# Patient Record
Sex: MALE | NOT HISPANIC OR LATINO | Employment: UNEMPLOYED | URBAN - METROPOLITAN AREA
[De-identification: names, ages, dates, MRNs, and addresses within clinical notes are randomized per-mention and may not be internally consistent; named-entity substitution may affect disease eponyms.]

---

## 2024-06-05 ENCOUNTER — HOSPITAL ENCOUNTER (EMERGENCY)
Facility: CLINIC | Age: 29
Discharge: PSYCHIATRIC OR SUBSTANCE USE FACILITY | End: 2024-06-07
Attending: EMERGENCY MEDICINE | Admitting: EMERGENCY MEDICINE
Payer: COMMERCIAL

## 2024-06-05 DIAGNOSIS — F31.12 BIPOLAR AFFECTIVE DISORDER, CURRENTLY MANIC, MODERATE (H): ICD-10-CM

## 2024-06-05 PROCEDURE — 99285 EMERGENCY DEPT VISIT HI MDM: CPT

## 2024-06-05 ASSESSMENT — COLUMBIA-SUICIDE SEVERITY RATING SCALE - C-SSRS
2. HAVE YOU ACTUALLY HAD ANY THOUGHTS OF KILLING YOURSELF IN THE PAST MONTH?: NO
6. HAVE YOU EVER DONE ANYTHING, STARTED TO DO ANYTHING, OR PREPARED TO DO ANYTHING TO END YOUR LIFE?: NO
1. IN THE PAST MONTH, HAVE YOU WISHED YOU WERE DEAD OR WISHED YOU COULD GO TO SLEEP AND NOT WAKE UP?: NO
1. IN THE PAST MONTH, HAVE YOU WISHED YOU WERE DEAD OR WISHED YOU COULD GO TO SLEEP AND NOT WAKE UP?: NO
6. HAVE YOU EVER DONE ANYTHING, STARTED TO DO ANYTHING, OR PREPARED TO DO ANYTHING TO END YOUR LIFE?: NO
2. HAVE YOU ACTUALLY HAD ANY THOUGHTS OF KILLING YOURSELF IN THE PAST MONTH?: NO

## 2024-06-06 ENCOUNTER — TELEPHONE (OUTPATIENT)
Dept: BEHAVIORAL HEALTH | Facility: CLINIC | Age: 29
End: 2024-06-06
Payer: COMMERCIAL

## 2024-06-06 PROBLEM — F90.9 ADHD (ATTENTION DEFICIT HYPERACTIVITY DISORDER): Status: ACTIVE | Noted: 2024-06-06

## 2024-06-06 PROBLEM — F31.9 BIPOLAR DISORDER (H): Status: ACTIVE | Noted: 2024-06-06

## 2024-06-06 PROCEDURE — 250N000013 HC RX MED GY IP 250 OP 250 PS 637: Performed by: EMERGENCY MEDICINE

## 2024-06-06 PROCEDURE — 99207 PR NO CHARGE LOS: CPT | Performed by: NURSE PRACTITIONER

## 2024-06-06 PROCEDURE — 250N000009 HC RX 250: Performed by: EMERGENCY MEDICINE

## 2024-06-06 PROCEDURE — 96372 THER/PROPH/DIAG INJ SC/IM: CPT | Performed by: EMERGENCY MEDICINE

## 2024-06-06 PROCEDURE — 250N000011 HC RX IP 250 OP 636: Mod: JZ | Performed by: EMERGENCY MEDICINE

## 2024-06-06 RX ORDER — BENZTROPINE MESYLATE 1 MG/1
1 TABLET ORAL 2 TIMES DAILY PRN
Status: DISCONTINUED | OUTPATIENT
Start: 2024-06-06 | End: 2024-06-07 | Stop reason: HOSPADM

## 2024-06-06 RX ORDER — OLANZAPINE 10 MG/1
10 TABLET, ORALLY DISINTEGRATING ORAL 2 TIMES DAILY PRN
Status: DISCONTINUED | OUTPATIENT
Start: 2024-06-06 | End: 2024-06-07

## 2024-06-06 RX ORDER — LISDEXAMFETAMINE DIMESYLATE 30 MG/1
30 CAPSULE ORAL EVERY MORNING
Status: ON HOLD | COMMUNITY
End: 2024-07-09

## 2024-06-06 RX ORDER — HYDROXYZINE HYDROCHLORIDE 50 MG/1
50 TABLET, FILM COATED ORAL 3 TIMES DAILY PRN
Status: ON HOLD | COMMUNITY
End: 2024-07-09

## 2024-06-06 RX ORDER — QUETIAPINE FUMARATE 50 MG/1
100 TABLET, FILM COATED ORAL 3 TIMES DAILY PRN
Status: DISCONTINUED | OUTPATIENT
Start: 2024-06-06 | End: 2024-06-07 | Stop reason: HOSPADM

## 2024-06-06 RX ORDER — OLANZAPINE 10 MG/1
10 TABLET, ORALLY DISINTEGRATING ORAL AT BEDTIME
Status: DISCONTINUED | OUTPATIENT
Start: 2024-06-06 | End: 2024-06-06

## 2024-06-06 RX ORDER — WATER 10 ML/10ML
INJECTION INTRAMUSCULAR; INTRAVENOUS; SUBCUTANEOUS
Status: COMPLETED
Start: 2024-06-06 | End: 2024-06-06

## 2024-06-06 RX ORDER — DIVALPROEX SODIUM 500 MG/1
1500 TABLET, DELAYED RELEASE ORAL AT BEDTIME
Status: ON HOLD | COMMUNITY
End: 2024-07-09

## 2024-06-06 RX ORDER — WATER 10 ML/10ML
INJECTION INTRAMUSCULAR; INTRAVENOUS; SUBCUTANEOUS
Status: DISCONTINUED
Start: 2024-06-06 | End: 2024-06-06 | Stop reason: HOSPADM

## 2024-06-06 RX ORDER — DEXTROAMPHETAMINE SACCHARATE, AMPHETAMINE ASPARTATE, DEXTROAMPHETAMINE SULFATE AND AMPHETAMINE SULFATE 5; 5; 5; 5 MG/1; MG/1; MG/1; MG/1
20 TABLET ORAL DAILY
Status: ON HOLD | COMMUNITY
End: 2024-07-09

## 2024-06-06 RX ORDER — BENZTROPINE MESYLATE 1 MG/1
1 TABLET ORAL 2 TIMES DAILY PRN
Status: ON HOLD | COMMUNITY
End: 2024-07-09

## 2024-06-06 RX ORDER — DIVALPROEX SODIUM 500 MG/1
1500 TABLET, DELAYED RELEASE ORAL AT BEDTIME
Status: DISCONTINUED | OUTPATIENT
Start: 2024-06-06 | End: 2024-06-07 | Stop reason: HOSPADM

## 2024-06-06 RX ORDER — OLANZAPINE 10 MG/1
10 TABLET, ORALLY DISINTEGRATING ORAL ONCE
Status: COMPLETED | OUTPATIENT
Start: 2024-06-06 | End: 2024-06-06

## 2024-06-06 RX ORDER — HYDROXYZINE HYDROCHLORIDE 25 MG/1
50 TABLET, FILM COATED ORAL 3 TIMES DAILY PRN
Status: DISCONTINUED | OUTPATIENT
Start: 2024-06-06 | End: 2024-06-07 | Stop reason: HOSPADM

## 2024-06-06 RX ORDER — DEXTROAMPHETAMINE SACCHARATE, AMPHETAMINE ASPARTATE, DEXTROAMPHETAMINE SULFATE AND AMPHETAMINE SULFATE 5; 5; 5; 5 MG/1; MG/1; MG/1; MG/1
20 TABLET ORAL DAILY
Status: DISCONTINUED | OUTPATIENT
Start: 2024-06-06 | End: 2024-06-06

## 2024-06-06 RX ORDER — RISPERIDONE 2 MG/1
2 TABLET ORAL AT BEDTIME
Status: ON HOLD | COMMUNITY
End: 2024-07-09

## 2024-06-06 RX ORDER — RISPERIDONE 2 MG/1
2 TABLET ORAL AT BEDTIME
Status: DISCONTINUED | OUTPATIENT
Start: 2024-06-06 | End: 2024-06-07

## 2024-06-06 RX ORDER — OLANZAPINE 10 MG/2ML
10 INJECTION, POWDER, FOR SOLUTION INTRAMUSCULAR DAILY PRN
Status: DISCONTINUED | OUTPATIENT
Start: 2024-06-06 | End: 2024-06-07

## 2024-06-06 RX ADMIN — RISPERIDONE 2 MG: 2 TABLET ORAL at 23:59

## 2024-06-06 RX ADMIN — HYDROXYZINE HYDROCHLORIDE 50 MG: 25 TABLET ORAL at 18:51

## 2024-06-06 RX ADMIN — OLANZAPINE 10 MG: 10 TABLET, ORALLY DISINTEGRATING ORAL at 00:20

## 2024-06-06 RX ADMIN — OLANZAPINE 10 MG: 10 TABLET, ORALLY DISINTEGRATING ORAL at 10:48

## 2024-06-06 RX ADMIN — WATER 10 ML: 1 INJECTION INTRAMUSCULAR; INTRAVENOUS; SUBCUTANEOUS at 13:13

## 2024-06-06 RX ADMIN — OLANZAPINE 10 MG: 10 INJECTION, POWDER, FOR SOLUTION INTRAMUSCULAR at 13:13

## 2024-06-06 RX ADMIN — QUETIAPINE FUMARATE 100 MG: 50 TABLET ORAL at 12:50

## 2024-06-06 RX ADMIN — HYDROXYZINE HYDROCHLORIDE 50 MG: 25 TABLET ORAL at 12:38

## 2024-06-06 RX ADMIN — DIVALPROEX SODIUM 1500 MG: 500 TABLET, DELAYED RELEASE ORAL at 23:59

## 2024-06-06 ASSESSMENT — COLUMBIA-SUICIDE SEVERITY RATING SCALE - C-SSRS
2. HAVE YOU ACTUALLY HAD ANY THOUGHTS OF KILLING YOURSELF?: NO
1. HAVE YOU WISHED YOU WERE DEAD OR WISHED YOU COULD GO TO SLEEP AND NOT WAKE UP?: NO

## 2024-06-06 NOTE — ED NOTES
"Pt now awake walking unit talking with pt in 2 continues to display manic pressured speech with poor behavioral control and poor boundaries with others and need to be continually reminded to remain appropriate.      When pt asked to go back to room pt did go back to room pt told \"you are fucking retarded faggot\"   "

## 2024-06-06 NOTE — ED PROVIDER NOTES
Care signed out to me by Dr. Graham.  Please see initial ED provider note regarding history present illness, physical exam, and medical decision making.  In brief patient is a 28-year-old male brought in from group home with ongoing psychosis.  Patient does have a underlying history of bipolar disorder and ADHD.  He is currently on Adderall, risperidone, and Vyvanse.  Patient also been prescribed Depakote in the past but it seems that he is maybe not compliant with this medication.  Given ongoing psychosis patient awaits a DEC evaluation.      1035 - Patient had a code 21 called, placed in restraints.Zyprexa IM administered.      Patient has had extremely labile moods and behavior throughout the late morning early afternoon.  I reordered the patient's home medications not continuing his Adderall or Vyvanse.  He has received as needed doses of Zyprexa and Seroquel.  Patient was placed in seclusion following removal of restraints as he was quite disorganized and interrupting other patient care within the emergency department.  DEC did briefly evaluate the patient and obtained collateral information.  Given patient's decompensation we will plan for inpatient psychiatric admission and stabilization.  They are currently working on a revocation of commitment and a court hold.  Patient has been placed on a 72-hour hold until court hold is available.  Care signed out to my colleague Dr. Pacheco pending available inpatient psychiatric bed        ICD-10-CM    1. Bipolar affective disorder, currently manic, moderate (H)  F31.12                Ferny Ibarra MD  06/06/24 2918

## 2024-06-06 NOTE — PHARMACY-ADMISSION MEDICATION HISTORY
Pharmacist Admission Medication History    Admission medication history is complete. The information provided in this note is only as accurate as the sources available at the time of the update.    Information Source(s): Caregiver and Facility (TCU/NH/) medication list/MAR via phone    Pertinent Information: Spoke to  and nurse that assists patient with medications (# [phone])(RN # [phone]). Patient was supposedly given Depakote last night, but dose was found in patient's pockets here. RN also noted patient has not been sleeping lately and were considering changing ADHD regimen but had not yet made changes.    Changes made to PTA medication list:  Added: All  Deleted: None  Changed: None    Allergies reviewed with patient and updates made in EHR: unable to assess    Medication History Completed By: Jerry Roche [facility] 6/6/2024 11:38 AM    PTA Med List   Medication Sig Last Dose    amphetamine-dextroamphetamine (ADDERALL) 20 MG tablet Take 20 mg by mouth daily Takes in afternoon 6/5/2024 at 1400    benztropine (COGENTIN) 1 MG tablet Take 1 mg by mouth 2 times daily as needed for extrapyramidal symptoms (EPS) Past Month at PRN    divalproex sodium delayed-release (DEPAKOTE) 500 MG DR tablet Take 1,500 mg by mouth at bedtime 6/4/2024 at HS    hydrOXYzine HCl (ATARAX) 50 MG tablet Take 50 mg by mouth 3 times daily as needed for anxiety Past Week at PRN    lisdexamfetamine (VYVANSE) 30 MG capsule Take 30 mg by mouth every morning 6/5/2024 at AM    risperiDONE (RISPERDAL) 2 MG tablet Take 2 mg by mouth at bedtime 6/5/2024 at HS

## 2024-06-06 NOTE — CONSULTS
"Diagnostic Evaluation Consultation  Crisis Assessment    Patient Name: Venancio Edgar  Age:  28 year old  Legal Sex: male  Gender Identity: male  Pronouns:   Race: Data Unavailable  Ethnicity: Data Unavailable  Language: Data Unavailable      Patient was assessed: In person      Patient location: [facility] EMERGENCY DEPT                             [facility]    Referral Data and Chief Complaint  Venancio Edgar presents to the ED via EMS. Patient is presenting to the ED for the following concerns: Verbal agitation, Significant behavioral change, Paranoia, Physical aggression.   Factors that make the mental health crisis life threatening or complex are:  Pt presents to the ED via EMS after becoming physically and verbally agitated at his group home towards staff and a community member while outside. When meeting with Writer, Pt presented with a labile mood, quickly changing from being cooperative to Writer to yelling at Writer, stating he will not participate, questioning Writer, laughing out loud while talking about his medications with volume steadily increasing. Writer attempted to collect information of what has been going on with Pt, yet Pt denied any concerns and then started stating Writer was asking \"the wrong questions\" and is trying to keep Pt in the hospital. Writer discussed how he was trying to let Pt share his perspective and understanding of the situation. Pt continued to be verbally escalated, stating staff was \"retarded\", not asking the right questions, and not needing to speak with a therapist. Writer terminated the assessment given Pt's elevated and labile mood and Pt presenting as experiencing disorganized thought as evidenced by answering and responding to Writer's questions with answers that did not pertain to the question asked.      Informed Consent and Assessment Methods  Explained the crisis assessment process, including applicable information disclosures and limits to " confidentiality, assessed understanding of the process, and obtained consent to proceed with the assessment.  Assessment methods included conducting a formal interview with patient, review of medical records, collaboration with medical staff, and obtaining relevant collateral information from family and community providers when available.  : done     Patient response to interventions: unacceptance expressed  Coping skills were attempted to reduce the crisis:  It was reported Pt called the police several times on 6/5/24 due to concern of Pt's brother's wellbeing     History of the Crisis   It is reported Pt was calm during the day and later in the evening became upset while in his room playing video games. It is also reported Pt then started calling 911 to inquire about his brother's safety. It is reported Pt was unable to be redirected by staff in the group home. It is reported Pt started to be verbally escalated with staff and displaying physical aggression towards the staff. It is reported staff went outside to get space from Pt and it is reported Pt followed staff outside. While outside, it is reported Pt then started to yelling and getting physically close to an individual who happened to be near by. It is reported by staff that Pt has not been sleeping, increase in agitation, and concern of if Pt is taking his medication. Chart review indicates Pt has identified not taking medication as prescribed and security search resulted in finding Depakote in his pocket.    Brief Psychosocial History  Family:  Single, Children no  Support System:  Facility resident(s)/Staff, Parent(s)  Employment Status:  unemployed  Source of Income:  unable to assess  Financial Environmental Concerns:  none  Current Hobbies:  television/movies/videos  Barriers in Personal Life:  mental health concerns, behavioral concerns    Significant Clinical History  Current Anxiety Symptoms:     Current Depression/Trauma:  irritable  Current  Somatic Symptoms:     Current Psychosis/Thought Disturbance:  agitation, hostile/aggressive, elated mood, anger  Current Eating Symptoms:   (unable to assess given Pt's presentation.)  Chemical Use History:  Alcohol:  (unable to assess given Pt's presentation.)  Benzodiazepines:  (unable to assess given Pt's presentation.)  Opiates:  (unable to assess given Pt's presentation.)  Cocaine:  (unable to assess given Pt's presentation.)  Marijuana:  (unable to assess given Pt's presentation.)  Other Use:  (unable to assess given Pt's presentation.)   Past diagnosis:  Bipolar Disorder, ADHD, Other (chart review indicates possible schizophrenia)  Family history:  Bipolar Disorder, Schizophrenia  Past treatment:  Civil Commitment, Inpatient Hospitalization, Supportive Living Environment (group home, prison house, etc), Psychiatric Medication Management, Individual therapy  Details of most recent treatment:  It is reported by Pt's group home staff that Pt has appointments for therapy and medication management, yet Pt declines to attend  Other relevant history:  Pt is on a stay of commitment through [facility].       Collateral Information  Is there collateral information: Yes     Collateral information name, relationship, phone number:  [facility] ,    What happened today: He shared he was told by staff that Pt was getting upset over his video game and then started yelling and became physically aggressive towards staff and they went outside and Pt followed them.     What is different about patient's functioning: He shared Pt has not been sleeping, increased in agitation and quick to anger, and may not be taking medication.     Concern about alcohol/drug use:  no    What do you think the patient needs:  stay in hospital until stable.    Has patient made comments about wanting to kill themselves/others: yes    If d/c is recommended, can they take part in safety/aftercare planning:  yes (Pt is allowed  back to the group home when stable.)    Additional collateral information: Writer spoke with Pt's mother, Yolanda ([phone]). She shared concern that Pt has not been taking his medication and appears to be decompensating. She shared Pt has been hospitalized other times for similar presentation and needed to be stabilized. She shared request for his commitment PD to be revoked in order to remain in the hospital and to get better. She shared Pt's family has history of schizophrenia, schizoaffective bipolar type and she is used to seeing these symptoms and knowing when someone is decompensating.    Writer spoke with Chelsea ([facility] , [phone]). Writer provided update on Pt's presentation and report from assessment and staff report. She shared Pt may need to remain in the hospital for additional care. Writer reiterated concern of medication noncompliance and decompensation, being verbally and physically agitated with group home staff, a community member, and staff here at the ED. Chelsea stated she will pursue revocation of provisional discharge. Writer provided fax number for court order once obtained. She reports she will file later today.      Risk Assessment  [place] Suicide Severity Rating Scale Full Clinical Version:  Suicidal Ideation  Q6 Suicide Behavior (Lifetime): no     Suicidal Behavior (Lifetime)  Actual Attempt (Lifetime):  (Unable to assess given Pt's presentation and disorganized thought.)  Has subject engaged in non-suicidal self-injurious behavior? (Lifetime):  (Unable to assess given Pt's presentation and disorganized thought.)  Interrupted Attempts (Lifetime):  (Unable to assess given Pt's presentation and disorganized thought.)  Aborted or Self-Interrupted Attempt (Lifetime):  (Unable to assess given Pt's presentation and disorganized thought.)  Preparatory Acts or Behavior (Lifetime):  (Unable to assess given Pt's presentation and disorganized thought.)    [place] Suicide  Severity Rating Scale Recent:   Suicidal Ideation (Recent)  Q1 Wished to be Dead (Past Month): no  Q2 Suicidal Thoughts (Past Month): no  Level of Risk per Screen: no risks indicated          Environmental or Psychosocial Events: neither working nor attending school, impulsivity/recklessness, unemployment/underemployment, challenging interpersonal relationships  Protective Factors: Protective Factors: lives in a responsibly safe and stable environment, supportive ongoing medical and mental health care relationships    Does the patient have thoughts of harming others? Feels Like Hurting Others: other (see comments)  Previous Attempt to Hurt Others: yes  Current presentation: Irritable  Violence Threats in Past 6 Months: It is reported Pt was making threats toward staff while at the group home.  Current Violence Plan or Thoughts: Unable to assess given Pt's presentation.  Is the patient engaging in sexually inappropriate behavior?: no  Duty to warn initiated: no  Duty to warn details: Staff is aware of Pt's actions at the group home.    Is the patient engaging in sexually inappropriate behavior?  no        Mental Status Exam   Affect: Labile  Appearance: Appropriate  Attention Span/Concentration: Attentive, Other (please comment) (would respond to Writer when verbally prompted)  Eye Contact: Intense    Fund of Knowledge: Appropriate   Language /Speech Content: Fluent  Language /Speech Volume: Loud  Language /Speech Rate/Productions: Normal  Recent Memory: Variable  Remote Memory: Variable  Mood: Irritable  Orientation to Person: Yes   Orientation to Place: Yes  Orientation to Time of Day: Answer (please comment) (unable to assess given Pt's presentation.)  Orientation to Date: Answer (please comment) (unable to assess given Pt's presentation.)     Situation (Do they understand why they are here?): Answer (please comment) (unable to assess given Pt's presentation.)  Psychomotor Behavior: Normal  Thought Content:  Paranoia  Thought Form: Paranoia, Other (please comment) (Disorganized, would not respond to Writer's prompts with coherent answers)        Medication  Psychotropic medications:   Medication Orders - Psychiatric (From admission, onward)      Start     Dose/Rate Route Frequency Ordered Stop    06/06/24 2200  risperiDONE (risperDAL) tablet 2 mg         2 mg Oral AT BEDTIME 06/06/24 1148      06/06/24 1239  OLANZapine zydis (zyPREXA) ODT tab 10 mg         10 mg Oral 2 TIMES DAILY PRN 06/06/24 1239      06/06/24 1238  QUEtiapine (SEROquel) tablet 100 mg         100 mg Oral 3 TIMES DAILY PRN 06/06/24 1238      06/06/24 1148  hydrOXYzine HCl (ATARAX) tablet 50 mg         50 mg Oral 3 TIMES DAILY PRN 06/06/24 1148      06/06/24 1032  OLANZapine (zyPREXA) injection 10 mg         10 mg Intramuscular DAILY PRN 06/06/24 1032               Current Care Team  No care team member to display    Diagnosis  Patient Active Problem List   Diagnosis Code    Bipolar disorder (H) F31.9    ADHD (attention deficit hyperactivity disorder) F90.9       Primary Problem This Admission  Active Hospital Problems    Bipolar disorder (H)      ADHD (attention deficit hyperactivity disorder)        Clinical Summary and Substantiation of Recommendations     It is the recommendation of this clinician that the patient be admitted to inpatient mental health care for further treatment, stabilization and safety. Attempts at managing mental health symptoms and maintaining safety at a lower level of care have been unsuccessful. Patient s current mental health symptoms are requiring a secure setting due to: pt is displaying symptoms of psychosis that are impairing ability to function safely in the community.      Severe psychiatric, behavioral or other comorbid conditions are appropriate for management at inpatient mental health as indicated by at least one of the following: Psychiatric Symptoms  Severe dysfunction in daily living is present as indicated by  at least one of the following: Extreme deterioration in social interactions, Complete inability to maintain any appropriate aspect of personal responsibility in any adult roles  Situation and expectations are appropriate for inpatient care: Patient is unwilling to participate in treatment voluntarily and requires treatment, Need for physical restraint, seclusion, or other involuntary treatment intervention is present, Around-the-clock medical and nursing care to address symptoms and initiate intervention is required  Inpatient mental health services are necessary to meet patient needs and at least one of the following: Specific condition related to admission diagnosis is present and judged likely to deteriorate in absence of treatment at proposed level of care, Specific condition related to admission diagnosis is present and judged likely to further improve at proposed level of care    Writer spoke with Chelsea ([facility] , [phone]). Writer provided update on Pt's presentation and report from assessment and staff report. Chelsea stated she will pursue revocation of provisional discharge. Writer provided fax number for court order once obtained. She reports she will file later today.       Patient coping skills attempted to reduce the crisis:  It was reported Pt called the police several times on 6/5/24 due to concern of Pt's brother's wellbeing    Disposition  Recommended disposition: Inpatient Mental Health        Reviewed case and recommendations with attending provider. Attending Name: Dr. Ibarra       Attending concurs with disposition: yes       Patient and/or validated legal guardian concurs with disposition:   no (Pt wants to discharge)       Final disposition:  inpatient mental health    Legal status on admission: 72 Hour Hold, Commitment    Assessment Details   Total duration spent with the patient: 10 min     CPT code(s) utilized: Non-Billable    TRI Ochoa, Psychotherapist  DEC -  Triage & Transition Services  Callback: [phone]

## 2024-06-06 NOTE — ED NOTES
At 1440, seclusion was discontinued, as pt is calm and resting with eyes closed, breathing even and unlabored. As pt had previously disrobed, pt is covered with a blanket and scrubs have been made available when pt is agreeable to putting them on.

## 2024-06-06 NOTE — ED PROVIDER NOTES
Emergency Department Note      History of Present Illness     Chief Complaint  Psychiatric Evaluation    HPI  Venancio Edgar is a 28 year old male brought by EMS from his group home.  He has a diagnosis of bipolar disorder and ADHD.  He says that he is on Adderall, risperidone, and Vyvanse.  He also says that he has had Depakote prescribed in the past although he does not like to take it.  The patient was concerned about the welfare of his brother today although it is not entirely clear why.  He called police who came out to his residence.  He was reportedly behaving erratically and was brought into the ED.  The patient does seem to jump from topic to topic and is quite animated.  He makes Zoroastrian statements.  He was in an argument with another individual out of the street tonight.    Independent Historian  EMS provide history with the patient's erratic behavior prehospital.    Past Medical History   Medical History and Problem List  Bipolar disorder  ADHD    Medications  Vyvanse  Depakote  Risperidone  Adderall      Surgical History   No past surgical history on file.  Physical Exam   Patient Vitals for the past 24 hrs:   BP Temp Pulse Resp SpO2   06/05/24 2346 (!) 144/90 97.8  F (36.6  C) 83 16 98 %     Physical Exam  Constitutional:       General: He is not in acute distress.     Appearance: Normal appearance. He is not toxic-appearing.   HENT:      Head: Atraumatic.   Eyes:      General: No scleral icterus.     Conjunctiva/sclera: Conjunctivae normal.   Cardiovascular:      Rate and Rhythm: Normal rate.      Heart sounds: Normal heart sounds.   Pulmonary:      Effort: Pulmonary effort is normal. No respiratory distress.      Breath sounds: Normal breath sounds.   Abdominal:      Palpations: Abdomen is soft.      Tenderness: There is no abdominal tenderness.   Musculoskeletal:         General: No deformity.      Cervical back: Neck supple.   Skin:     General: Skin is warm.      Capillary Refill: Capillary  refill takes less than 2 seconds.   Neurological:      General: No focal deficit present.      Mental Status: He is alert and oriented to person, place, and time.   Psychiatric:      Comments: The patient is quite animated.  He jumps from topic to topic.         ED Course    Medications Administered  Medications   OLANZapine zydis (zyPREXA) ODT tab 10 mg (10 mg Oral $Given 6/6/24 0020)     Discussion of Management  DEC    Social Determinants of Health adding to complexity of care  Lives in a group home    ED Course     Medical Decision Making / Diagnosis     VIDHI Edgar is a 28 year old male with history of bipolar disorder.  He arrives very animated and appears somewhat manic.  However, he has not required restraints.  He did agree to take a dose of Zyprexa.  We are waiting on DEC evaluation.    ICD-10 Codes:    ICD-10-CM    1. Bipolar affective disorder, currently manic, moderate (H)  F31.12             Alex Clarke MD  06/06/24 5148

## 2024-06-06 NOTE — ED NOTES
Patient yelling at staff after having a conversation with DEC, patient was at the window with pressured speech and not listening to multiple staff members. Patient then turned and focused on another patient that was in their room, multiple staff assisting to redirect the patient and not listening to return and stay out of the other patients (BH3)'s room. Staff reported that the patient threw himself to the ground and code 21 was called by writer. Restraints were placed in the room by preceptee and room cleared of objects.

## 2024-06-06 NOTE — ED NOTES
"Pt awake independently ambulated to the BR and back given meal tray writer asked about getting blood work and other labs pt replied yelling \"no dont ask me again!\"   "

## 2024-06-06 NOTE — CONSULTS
DEC Consult Order placed. DEC assessment completed by TRI Wells on 6/6/24 at 10am. Consult acknowledged and completed.     DOLORES MCKEON

## 2024-06-06 NOTE — ED NOTES
IP MH Referral Acuity Rating Score (RARS)    LMHP complete at referral to IP MH, with DEC; and, daily while awaiting IP MH placement. Call score to PPS.  CRITERIA SCORING   New 72 HH and Involuntary for IP MH (not adolescent) 1/1   Boarding over 24 hours 0/1   Vulnerable adult at least 55+ with multiple co morbidities; or, Patient age 11 or under 0/1   Suicide ideation without relief of precipitating factors 0/1   Current plan for suicide 0/1   Current plan for homicide 0/1   Imminent risk or actual attempt to seriously harm another without relief of factors precipitating the attempt 0/1   Severe dysfunction in daily living (ex: complete neglect for self care, extreme disruption in vegetative function, extreme deterioration in social interactions) 1/1   Recent (last 2 weeks) or current physical aggression in the ED 1/1   Restraints or seclusion episode in ED 1/1   Verbal aggression, agitation, yelling, etc., while in the ED 1/1   Active psychosis with psychomotor agitation or catatonia 1/1   Need for constant or near constant redirection (from leaving, from others, etc).  1/1   Intrusive or disruptive behaviors 1/1   TOTAL Acuity Total Score: 8

## 2024-06-06 NOTE — ED NOTES
Around 1230, pt began to get more restless and anxious, upset about being on a hold and missing a class. RN offered oral hydroxyzine. Pt took medication. Unfortunately, pt continued to get more agitated, and began screaming at staff and was not redirectable. RN notified Dr. Ibarra. Per MD, RN to administer oral Seroquel and trial seclusion. Pt took Seroquel, but began to scream and throw water. Pt threw himself back and slid down against the wall. No injury was sustained. Bed frame removed and mattress placed on the floor. Seclusion initiated. Per MD, RN to administer IM zyprexa, if pt does not deescalate and is a harm to self or others. Pt continued to scream, removed clothing, and began to throw mattress and self around room and on floor. RN was concerned for the potential of pt harming himself. RN administered IM zyprexa with security assistance. Pt is now on mattress, resting with eyes closed, breathing even and unlabored. RN updated Dr. Ibarra on situation.

## 2024-06-06 NOTE — ED TRIAGE NOTES
Pt with hx of schizophrenia brought in by ems from group [facility] - paranoid, flight of ideas and delusional. Pt placed on hold as patient was making threats against group home staff and throwing punches.

## 2024-06-06 NOTE — ED NOTES
Assumed care of patient. Patient resting in bed w/ regular respirations, repositions independently.

## 2024-06-06 NOTE — ED NOTES
Pt continues to rest on mattress in room Pt even chest rise and fall breathing even easy and unlabored   Pt independently adjusting self in bed     Family (liz) called given update and states that if patient is to be placed IP that he had a good experience and outcomes when admitted to [facility]

## 2024-06-06 NOTE — ED NOTES
Contacts:    Yolanda (mom): [phone]    Darline Romo ( for senior living): [phone]    Bree (group home ): [phone]    Chelsea Brennan ([facility] ): [phone]    Christi (member of case management team): [phone]

## 2024-06-06 NOTE — ED NOTES
Around 1020, pt was getting restless and argumentative, upset about being on a hold and not being able to leave. RN and security attempted to review care plan, verbally deescalate pt, and redirect to room. Pt went to another pt's room and started interacting with pt. RN requested that pt leave other pt's room to allow for privacy. Despite multiple attempts, pt became more agitated, and continued to refuse. Security officers and code 21 was called. Pt postured at security, and while pt was being directed back to his room, pt laid on the floor and refused to stand up. Pt did not sustain any injuries from laying on the ground. Security brought pt to room and pt was placed in 5-point restraints at 1035. All extremities CMS are intact. Dr. Ibarra was at bedside to assess pt. Per MD RN to order and administered 10 mg IM zyprexa. RN offered IM zyprexa to pt, but pt requested 10 mg oral zyprexa, alternatively. RN administered to pt (see MAR). 1:1 sitter started and continuous video monitoring continued. At 1200, pt was calm and able to verbalize a safety plan to RN and security. All restraints removed. CMS remains intact. Pt provided water and food.

## 2024-06-06 NOTE — ED NOTES
"Pt given PRN medication for agitation (see MAR). Pt continues to yell out and whale out in room. Then comes to staff and says \"you are all a bunch of dumb retards\"   "
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
## 2024-06-06 NOTE — TELEPHONE ENCOUNTER
Pt has been in seclusion/restraints x2 today. Not appropriate for low acuity placement at this time. Labs have not been collected.     R: [place] [facility] [place]patient [place] Call Log 6/6/24 @5:15 pm:    Intake has called facilities that have not updated the bed status within the last 12 hours.                               [facility] is at capacity            [facility] is posting 10 beds. [phone] per call @4:45 p, only 2 avail and they are reviewing   [facility] is posting 0 beds. Negative covid required              [facility] is posting 0 beds. Neg covid. No high school/Elisa-psych. [phone]   [facility] is posting 0 beds. [phone]   [facility] is posting 0 beds. [phone]    [facility] is posting 1 bed. Ages 18-28. Negative covid. [phone]   [facility] is posting 0 beds. [phone]   [facility] is posting 0 beds [phone]       [facility] is posting 7 beds. LOW acuity ONLY. Mixed unit 12+. Negative covid- [phone]   [facility] has 2 beds posted. No aggression. Negative Covid. Low acuity    [facility] is posting 0 beds. Negative covid. [phone]     [facility] is posting 4 beds. Low acuity only. Neg covid.  [phone]    [facility] is posting 0 beds. Low acuity. No current aggression.     [facility] is posting 3 beds available. Negative covid.  [phone].       [facility] is posting 0 beds. Low acuity. 72 HH hold preferred. Negative covid required. [phone]    [facility] is posting 3 beds. Low acuity only. Neg covid.  [phone]    [facility] [place] is posting 3 beds.  Negative covid required.   Vol only, No history of aggression, violence, or assault. No sexual offenders. No 72 HH holds. [phone]    [facility] is posting 3 beds. Negative covid required.  (Must have the  cognitive ability to do programming. No aggressive or violent behavior or recent HX in the last 2 yrs. MH must be primary.) Always low acuity. [phone]    [facility] has 0 beds posted. Negative covid required.  Low acuity only. Violence and aggression capped.  [phone]   [facility] is posting 4 beds. Low acuity, Negative covid required. [phone]   [facility] is posting 1 bed. Negative covid required.  [phone]   [facility] is posting 0 beds. Negative covid. LOW acuity. (No lines, drains, or tubes, oxygen, CPAP, IV, etc.) Must Have a Ride Home. [phone]   [facility] is posting 5 beds. Negative covid.[phone] (No. lines, drains, or tubes, oxygen, CPAP, IV, etc.).      Pt remains on the worklist pending appropriate bed availability.

## 2024-06-06 NOTE — CONSULTS
Psychiatry consult request acknowledged. Spoke with RN who indicates patient is not appropriate for interview at this time. He had another episode of agitation around 1230 and received quetiapine and olanzapine. He is currently asleep and it was decided that it would be best to allow him to continue sleeping.     Home medicines, including Depakote DR 1500 mg at bedtime and risperidone 2 mg at bedtime were previously reordered. Agree with continuing these medicines for now, while holding his psychostimulants.    Patient remains on a 72 hour hold and  has indicated that they will be filing a revocation of his provisional discharge. Would continue 72 hour hold until court hold is active.     Will plan to follow-up with patient tomorrow morning    Last Watkins, THANIA-BC, APRN, CNP  Consult/Liaison Psychiatry  [facility]  Provider can be paged via [facility] Paging/Directory  If I am unavailable, please contact [facility] at [phone] to reach the on-call provider.

## 2024-06-06 NOTE — ED NOTES
Bed: [facility]  Expected date:   Expected time:   Means of arrival:   Comments:  [facility], requesting security

## 2024-06-06 NOTE — ED NOTES
Patient's group home address is: [address].  Patient has lived here for 6-7 months, per EMS.     Contact for collateral is: Sita Giron, [phone]  Writer attempted however was unable to connect with  staff to gather collateral information for DEC assessment.     Andry Sommers on 6/6/2024 at 3:06 AM

## 2024-06-07 ENCOUNTER — TELEPHONE (OUTPATIENT)
Dept: BEHAVIORAL HEALTH | Facility: CLINIC | Age: 29
End: 2024-06-07
Payer: COMMERCIAL

## 2024-06-07 ENCOUNTER — HOSPITAL ENCOUNTER (INPATIENT)
Facility: CLINIC | Age: 29
LOS: 33 days | Discharge: NURSING, REHAB OR LONG-TERM CARE | DRG: 885 | End: 2024-07-10
Attending: PSYCHIATRY & NEUROLOGY | Admitting: PSYCHIATRY & NEUROLOGY
Payer: COMMERCIAL

## 2024-06-07 VITALS
SYSTOLIC BLOOD PRESSURE: 120 MMHG | TEMPERATURE: 97.3 F | RESPIRATION RATE: 18 BRPM | DIASTOLIC BLOOD PRESSURE: 76 MMHG | OXYGEN SATURATION: 96 % | HEART RATE: 71 BPM

## 2024-06-07 DIAGNOSIS — F90.2 ATTENTION DEFICIT HYPERACTIVITY DISORDER (ADHD), COMBINED TYPE: ICD-10-CM

## 2024-06-07 DIAGNOSIS — F31.10 BIPOLAR I DISORDER WITH MANIA (H): Primary | ICD-10-CM

## 2024-06-07 PROCEDURE — 99284 EMERGENCY DEPT VISIT MOD MDM: CPT | Performed by: NURSE PRACTITIONER

## 2024-06-07 PROCEDURE — 250N000013 HC RX MED GY IP 250 OP 250 PS 637: Performed by: PSYCHIATRY & NEUROLOGY

## 2024-06-07 PROCEDURE — 250N000013 HC RX MED GY IP 250 OP 250 PS 637: Performed by: EMERGENCY MEDICINE

## 2024-06-07 PROCEDURE — 124N000002 HC R&B MH UMMC

## 2024-06-07 PROCEDURE — 250N000013 HC RX MED GY IP 250 OP 250 PS 637: Performed by: NURSE PRACTITIONER

## 2024-06-07 RX ORDER — LORAZEPAM 2 MG/ML
1 INJECTION INTRAMUSCULAR EVERY 6 HOURS PRN
Status: DISCONTINUED | OUTPATIENT
Start: 2024-06-07 | End: 2024-06-07 | Stop reason: HOSPADM

## 2024-06-07 RX ORDER — LORAZEPAM 1 MG/1
1 TABLET ORAL EVERY 6 HOURS PRN
Status: DISCONTINUED | OUTPATIENT
Start: 2024-06-07 | End: 2024-06-07 | Stop reason: HOSPADM

## 2024-06-07 RX ORDER — BENZTROPINE MESYLATE 1 MG/1
1 TABLET ORAL 2 TIMES DAILY PRN
Status: DISCONTINUED | OUTPATIENT
Start: 2024-06-07 | End: 2024-07-10 | Stop reason: HOSPADM

## 2024-06-07 RX ORDER — HALOPERIDOL 5 MG/ML
5 INJECTION INTRAMUSCULAR EVERY 6 HOURS PRN
Status: DISCONTINUED | OUTPATIENT
Start: 2024-06-07 | End: 2024-06-07 | Stop reason: HOSPADM

## 2024-06-07 RX ORDER — OLANZAPINE 10 MG/2ML
10 INJECTION, POWDER, FOR SOLUTION INTRAMUSCULAR 3 TIMES DAILY PRN
Status: DISCONTINUED | OUTPATIENT
Start: 2024-06-07 | End: 2024-06-11

## 2024-06-07 RX ORDER — HYDROXYZINE HYDROCHLORIDE 25 MG/1
25 TABLET, FILM COATED ORAL EVERY 4 HOURS PRN
Status: DISCONTINUED | OUTPATIENT
Start: 2024-06-07 | End: 2024-06-11

## 2024-06-07 RX ORDER — RISPERIDONE 3 MG/1
3 TABLET ORAL AT BEDTIME
Status: DISCONTINUED | OUTPATIENT
Start: 2024-06-07 | End: 2024-06-07 | Stop reason: HOSPADM

## 2024-06-07 RX ORDER — HALOPERIDOL 5 MG/1
5 TABLET ORAL EVERY 6 HOURS PRN
Status: DISCONTINUED | OUTPATIENT
Start: 2024-06-07 | End: 2024-06-07 | Stop reason: HOSPADM

## 2024-06-07 RX ORDER — HYDROXYZINE HYDROCHLORIDE 50 MG/1
50 TABLET, FILM COATED ORAL 3 TIMES DAILY PRN
Status: DISCONTINUED | OUTPATIENT
Start: 2024-06-07 | End: 2024-07-10 | Stop reason: HOSPADM

## 2024-06-07 RX ORDER — RISPERIDONE 2 MG/1
2 TABLET ORAL AT BEDTIME
Status: DISCONTINUED | OUTPATIENT
Start: 2024-06-07 | End: 2024-06-10

## 2024-06-07 RX ORDER — OLANZAPINE 10 MG/1
10 TABLET ORAL 3 TIMES DAILY PRN
Status: DISCONTINUED | OUTPATIENT
Start: 2024-06-07 | End: 2024-06-11

## 2024-06-07 RX ORDER — DIVALPROEX SODIUM 500 MG/1
1500 TABLET, DELAYED RELEASE ORAL AT BEDTIME
Status: DISCONTINUED | OUTPATIENT
Start: 2024-06-07 | End: 2024-06-12

## 2024-06-07 RX ORDER — MAGNESIUM HYDROXIDE/ALUMINUM HYDROXICE/SIMETHICONE 120; 1200; 1200 MG/30ML; MG/30ML; MG/30ML
30 SUSPENSION ORAL EVERY 4 HOURS PRN
Status: DISCONTINUED | OUTPATIENT
Start: 2024-06-07 | End: 2024-07-10 | Stop reason: HOSPADM

## 2024-06-07 RX ORDER — AMOXICILLIN 250 MG
1 CAPSULE ORAL 2 TIMES DAILY PRN
Status: DISCONTINUED | OUTPATIENT
Start: 2024-06-07 | End: 2024-07-10 | Stop reason: HOSPADM

## 2024-06-07 RX ORDER — LANOLIN ALCOHOL/MO/W.PET/CERES
3 CREAM (GRAM) TOPICAL
Status: DISCONTINUED | OUTPATIENT
Start: 2024-06-07 | End: 2024-07-10 | Stop reason: HOSPADM

## 2024-06-07 RX ORDER — ACETAMINOPHEN 325 MG/1
650 TABLET ORAL EVERY 4 HOURS PRN
Status: DISCONTINUED | OUTPATIENT
Start: 2024-06-07 | End: 2024-07-10 | Stop reason: HOSPADM

## 2024-06-07 RX ADMIN — OLANZAPINE 10 MG: 10 TABLET, ORALLY DISINTEGRATING ORAL at 11:10

## 2024-06-07 RX ADMIN — RISPERIDONE 2 MG: 2 TABLET ORAL at 21:22

## 2024-06-07 RX ADMIN — NICOTINE POLACRILEX 2 MG: 2 GUM, CHEWING ORAL at 19:49

## 2024-06-07 RX ADMIN — NICOTINE POLACRILEX 2 MG: 2 GUM, CHEWING ORAL at 21:01

## 2024-06-07 RX ADMIN — DIVALPROEX SODIUM 1500 MG: 500 TABLET, DELAYED RELEASE ORAL at 21:22

## 2024-06-07 RX ADMIN — LORAZEPAM 1 MG: 1 TABLET ORAL at 12:01

## 2024-06-07 RX ADMIN — HYDROXYZINE HYDROCHLORIDE 50 MG: 25 TABLET ORAL at 06:35

## 2024-06-07 RX ADMIN — NICOTINE POLACRILEX 2 MG: 2 GUM, CHEWING ORAL at 12:26

## 2024-06-07 ASSESSMENT — ACTIVITIES OF DAILY LIVING (ADL)
ADLS_ACUITY_SCORE: 35
ADLS_ACUITY_SCORE: 45
ADLS_ACUITY_SCORE: 35
ADLS_ACUITY_SCORE: 29
ADLS_ACUITY_SCORE: 35
ADLS_ACUITY_SCORE: 29
ADLS_ACUITY_SCORE: 35

## 2024-06-07 NOTE — ED NOTES
"Pt screaming and yelling at another patient in common area.  Pt escorted to room.  Writer discussed with pt need for appropriate behavior.  Pt verbalized understanding.  Pt now watching television.  Writer requested pt allow blood work to be drawn.  Pt declined blood work at this time.  Writer will attempt again later in the shift.  Writer advised pt he will have a medication available to him later in the evening in order to improve his emotional lability.  Pt stated, \"okay.\"  Pt given water and is now laying in cart on his side resting.  "

## 2024-06-07 NOTE — ED PROVIDER NOTES
No issues during my care.  Patient accepted for transfer to inpatient mental health bed.    Luciano Baxter, DO Baxter, Luciano Azul,   06/07/24 1553

## 2024-06-07 NOTE — TELEPHONE ENCOUNTER
R: [facility] Access Inpatient Bed Call Log 6/7/2024 8:06:42 AM    Intake has called facilities that have not updated their bed status within the last 12 hours.    ADULTS:  *[facility] is posting 0 beds.              [facility] is posting 10 beds. [phone]  8:14a Per [place], low acuity and limited ICU beds beds available.   [facility] is posting 0 beds. [phone]              [facility] is posting 0 beds. [phone]  8:16a Per [place], at capacity .   [facility] is posting 0 beds. [phone]   [facility] (These are Young Adult beds, 18-28) is posting 1 beds. Negative COVID test required, no recent or significant aggression, violence, or sexual assault. [phone]   [facility] is posting 0 beds. [phone]            [facility] is posting 0 beds. [phone]     [facility] is posting 0 beds. [phone]        Pt remains on work list pending appropriate bed availability.      *[facility] is posting 7 beds. Mixed unit 12+. Low acuity only.  DIRECT: [phone]      [facility] is positing 2 beds. No aggression.  [phone]         [facility] is posting 0 beds. [phone]   [facility] is posting 2 beds. Low acuity only. [phone]      [facility] is posting 1 beds. Low acuity. No current aggression. [phone]   [facility] is posting 2 beds. Low acuity. [phone]   [facility] is posting 0 beds. 72 HH preferred. Low acuity. [phone]  8:12a Per SHAWN [place] after 3:00p, 1 potential d/c.   [facility] is posting 0 beds. Low acuity. [phone]     [facility] is posting 1 beds. Vol only, No Hx of aggression, violence, or assault. No sexual offenders. No 72 hr holds. [phone]     [facility] is posting 3 beds. (Must have the cognitive ability to do programming. No aggressive or violent behavior or recent HX in the last  2 yrs. MH must be primary.) [phone]   [facility] is posting 0 beds. Low acuity only. Violence and aggression capped. [phone]     [facility] is posting 4 beds. Low acuity, Neg Covid. [phone]  8:17a [place], some availability.   [facility] [place] posting 1 beds. Negative covid.      [facility] is posting 0 beds. [phone] no wounds, lines, drains, C-paps, tubes and must be able to care for themselves; no heavy hx of aggression. Pt also needs a confirmed ride from facility upon d/c. 8:11a No beds available.  [facility] is posting 28 beds. Call for details. [phone] OUT OF STATE 8:06a 14 child and adol beds, and 30 adult beds.   [facility] is posting 5 beds. Mixed unit.  [phone] 8:08a 2 open beds with a few potential d/c's. No high acuity.     Pt remains on work list pending appropriate bed availability.
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
## 2024-06-07 NOTE — ED NOTES
"Pt requested and was given rubber pencil and paper because he wanted to \"write down my thoughts.\"  Pt then came out of room and is now sitting in common area.  "

## 2024-06-07 NOTE — ED NOTES
"Pt up to the window; \"I need the hold paperwork and the writing behind it that made then think they can keep me here.\"  Pt requests this writer come tell him \"exactly how he got here.\"  \"I need to call my  and that is my right.\"  Pt then went back to his room and started yelling and punching his mattress.  \"You retards behind the window, give me a fucking toothbrush and toothpaste.\"  This RN went out to speak with pt; requesting pt stop yelling.  Pt states, \"yeah, that olanzapine deafens my ears so I have to scream in order to make sure my voices sound the same.\"  Pt does agree that his voice sounds normal right now and he will stop yelling.  "

## 2024-06-07 NOTE — ED NOTES
"Pt awake and walks out of room to window.  States he does not want breakfast at this time.  Pt offered warm blanket and socks, which he accepted and said, \"Thank you.\"  Writer educated pt regarding breakfast being ordered for him and also about appropriate behavior while at facility.  Pt verbalized understanding.  Pt also given remote to television.  Colleague reported to writer pt requested television channel be changed because the tv was lying to him.  Pt now in room watching cartoons and laughing.  "

## 2024-06-07 NOTE — TELEPHONE ENCOUNTER
R: [place]  Access Inpatient [place] Call [place]  6/7/24 @ 1:00am   Intake has called facilities that have not updated their bed status within the last 12 hours.     *METRO:  [place] [facility]: @ capacity.  [facility]: POSTING 10 BEDS. 1 to 2 ICU-level beds.  [place] -- [facility]: @ cap per website. Low acuity  [place] -- [facility]: @ cap per website. Low acuity only.  [place] -- [facility]: @ cap per website.  [facility]: @ cap per website.  [facility]/ beds: POSTING 1 BED. Ages 18-28, Voluntary only, NO aggression/physical/sexual assault, violence hx or drug abuse, or psychosis. Negative Covid.  [place] -- [facility]: @ cap per website.  [place] -- [facility]: @ cap per website.  [place] -- [facility]: @ cap per website. Do not review overnight.     *STATEWIDE (by distance):  [facility]: POSTING 7 BEDS. Mixed unit/Low acuity only.   [facility] - POSTING 2 BEDS. Low acuity, No aggression  [facility] -- @ cap per website.   [facility] - POSTING 1 BED. Low acuity only. No current aggression.  [facility] - POSTING 4 BEDS. Negative Covid. Lower acuity only.  [facility] - POSTING 2 BEDS. Low acuity only. Prefer med-adjustment placements.  [facility] - POSTING 3 BEDS.  No aggression. - Only Low Acuity reviews  [place] - [facility]: @ cap per website. No aggressive behaviors. Do not review overnight.  [place] -- [facility]: POSTING 1 BEDS.  No hx of aggression. No sexual offenders. Voluntary patients only.  [place] -- [facility]: POSTING 3 BEDS. Low acuity only. Must be able to do programming. No aggression/violent behavior in 2 years. No CD treatment.  [place] -- [facility]: POSTING 5 BEDS. Negative Covid test. Must be low acuity ONLY.  [place] -- [facility]: POSTING 4 BEDS. Low acuity. Negative Covid.   [place] -- [facility]: POSTING 1 BED. LOW ACUITY ONLY. Negative Covid Required.  [facility]: @ [facility] per website. No hx of aggression/assault. No lines, drains or tubes. Does not provide detox or CD treatment. Require a confirmed ride upon discharge.  [place] -- [facility]: POSTING 5 BEDS. Negative COVID. No medical devices.     Pt remains on waitlist pending appropriate placement availability

## 2024-06-07 NOTE — ED NOTES
"Pt reluctant to take olanzapine;  states that his \"mind just began waking up from the last dose.\"  \" I don't want it to dull my mind.\"  Pt did agree to take it.  "

## 2024-06-07 NOTE — ED NOTES
Pharmacy contacted by writer due to incorrect dosage of Depakote being sent for pt.  Pharmacy states they will send proper amount.

## 2024-06-07 NOTE — ED NOTES
Pt offered PRN anxiety medication due to pt pacing, multiple visits to window, having difficulty sitting still.  Pt accepted Hydroxyzine 50mg PO.  Pt now back in room watching television.

## 2024-06-07 NOTE — ED NOTES
"Patient up to nurses station, tapping on window. Patient states \"I need to change the TV, its lying, it keeps lying to me.\"  "

## 2024-06-07 NOTE — PROGRESS NOTES
"Triage & Transition Services, Extended Care       Patient: Venancio goes by \"Venancio,\" uses he/him pronouns  Date of Service: June 7, 2024  Site of Service: [facility] EMERGENCY DEPT                             [facility]    Patient was seen yes In person  Mode of Assessment: In person    Patient is followed related to: other (accepted to in mental health unit; awaiting transfer)  Notable observations from today's encounter include: continue to be verbally intrusive and aggressive during the morning; thoughts are disorganized  The care team is working towards the following: Learn and Demonstrate at Least One Skill Focused on Crisis Stabilization, Demonstrate Calm, Non Violent/Destructive Behavior for at least 24 hours  Significant status changes: no  Case Management included: Case Management Included: collaborating with patient's support system  Details on Collaborating with Patient's Support System: [place] faxed Notice of Intent to Revoke Stayed Commitment yesterday late afternoon  Summary of Interaction: provided copy of notice to patient; answered his questions    Recommendations: Final Disposition / Recommended Care Path: inpatient mental health  Plan for Care reviewed with assigned Medical Provider: yes  Plan for Care Team Review: provider, RN  Comments: [place]  Patient and/or validated legal guardian concurs: yes  Clinical Substantiation: Patient has been accepted to [facility] and is awaiting transfer for further psychiatric stabilization.    Summary: Patient has been accepted to [facility] and is awaiting transfer for further psychiatric stabilization.    Legal Status: County: [facility]  Legal Status at Admission: 72 Hour Hold (waiting on signed court order)  72 Hour Hold - Date/Time Initiated: 06/06/2024 1134  72 Hour Hold - Date/Time Ends: 06/11/2024 1134    MITRA Polanco, [facility]  Licensed Mental Health Professional (LMHP)  [place], [phone]    "

## 2024-06-07 NOTE — ED NOTES
"Pt up to the window;  states that his socks are wet and he will need a new pair.  Then pt states, \"is anyone going to play therapist today or psychiatrist and come talk to me\"?  This writer states that a psychiatrist will be here to speak with him shortly.  Pt states, \"wouldn't it make more sense for a therapist to talk to me to figure out my issues before a psychiatrist comes and only talks to me about my meds\"?  "

## 2024-06-07 NOTE — ED NOTES
Patient yelling at staff because he wanted breakfast burrito and was yelling at staff to call his mom. Calling DEC  celeste.

## 2024-06-07 NOTE — ED NOTES
IP MH Referral Acuity Rating Score (RARS)    LMHP complete at referral to IP MH, with DEC; and, daily while awaiting IP MH placement. Call score to PPS.  CRITERIA SCORING   New 72 HH and Involuntary for IP MH (not adolescent) 1/1   Boarding over 24 hours 1/1   Vulnerable adult at least 55+ with multiple co morbidities; or, Patient age 11 or under 0/1   Suicide ideation without relief of precipitating factors 0/1   Current plan for suicide 0/1   Current plan for homicide 0/1   Imminent risk or actual attempt to seriously harm another without relief of factors precipitating the attempt 0/1   Severe dysfunction in daily living (ex: complete neglect for self care, extreme disruption in vegetative function, extreme deterioration in social interactions) 1/1   Recent (last 2 weeks) or current physical aggression in the ED 1/1   Restraints or seclusion episode in ED 1/1   Verbal aggression, agitation, yelling, etc., while in the ED 1/1   Active psychosis with psychomotor agitation or catatonia 1/1   Need for constant or near constant redirection (from leaving, from others, etc).  1/1   Intrusive or disruptive behaviors 1/1   TOTAL Acuity Total Score: 9

## 2024-06-07 NOTE — ED NOTES
Pt took his brown sugar packet and emptied it out in a line in front of BH door.  Pt then went back to his room and began incessantly laughing at the cartoons on his TV.

## 2024-06-07 NOTE — PROGRESS NOTES
Writer received Notice of Intent to Revoke Stayed Commitment from patient's county , [place] from [facility].  A copy was given to  RN to put in paper chart and copy was given to patient.  We are still waiting for the signed court hold document.     CM Chelsea notes on fax cover sheet that she will be out of office until June 12.  Her supervisor, Rosa ([phone]) or another staff member of the  Adult MH Team ([phone]) can assist with patient's case.     Campos Brown, KAYCESW

## 2024-06-07 NOTE — ED NOTES
"Pt states that there is no clock in common area.  Writer informed pt that he can consult television for time.  Pt declined, stating, \"It will tell all the other times too.\"  "

## 2024-06-07 NOTE — ED NOTES
"Pt was given his evening medications.  When giving meds, pt stated, \"I don't need the Depakote, but whatever.\"  Pt then took both Depakote and Risperidol.  Pt allowed vital signs to be taken but is still refusing to have labs drawn.  Pt provided verbal support and encouragement for his demeanor at this time.  Pt is currently eating a courtesy meal and watching television.   "

## 2024-06-07 NOTE — CONSULTS
[facility]  Psychiatry Consultation      Patient name: Venancio Edgar   MRN: [id number]    Age: 28 year old    YOB: 1995    Please refer to the [facility] 's note for additional historical information, safety assessment, and psychosocial treatment details:     Reason for consult: medication management for jose with psychosis    Pertinent information related to this encounter:     Patient is 28 year old male who presented to the emergency department from his group home due to symptoms of jose, including lack of sleep, paranoia, flight of ideas, and worsening agitation and anger. He had also been making threats against group home staff. Staff suspect he has not been taking his prescribed medications, and 3 tablets of Depakote were found in his pocket upon arrival to the ED. PMH significant for bipolar disorder and ADHD.     Patient seen for evaluation in the ED today. He had been resting earlier this morning after receiving a dose of hydroxyzine. Attempted to see him yesterday but he was asleep for much of the afternoon after receiving quetiapine. In the ED, he has been irritable, calling staff names, belittling staff. His behavior is disorganized. Per ED staff, he has been laughing to himself at times.     Attempted to see patient in the  area. Prior to entering, patient noticed writer through the window and began making condescending remarks. Upon meeting with patient, discuss that one of my roles here is to discuss medication. He intensely stares at writer without speaking. Attempted to ask him questions about his medications, and he continued to stare at writer without blinking. He eventually did ask writer to leave the  area and declined any further questions. After exiting, he slid a piece of paper under the door, asking writer to hand it back to him. Writer slid it back under the door, and patient it back. Written on the piece of paper were several disorganized  statements that writer could not comprehend. Patient aware he is on a hold but not agreeable to transfer to inpatient.     Medical History:  Refer to the latest internal medicine/hospitalist note which I reviewed.   No past medical history on file.     Medications:    Current Facility-Administered Medications:     benztropine (COGENTIN) tablet 1 mg, 1 mg, Oral, BID PRN, Ferny Ibarra MD    divalproex sodium delayed-release (DEPAKOTE) DR tablet 1,500 mg, 1,500 mg, Oral, At Bedtime, Ferny Ibarra MD, 1,500 mg at 06/06/24 2359    haloperidol (HALDOL) tablet 5 mg, 5 mg, Oral, Q6H PRN **OR** haloperidol lactate (HALDOL) injection 5 mg, 5 mg, Intramuscular, Q6H PRN, Barron Watkins CNP    hydrOXYzine HCl (ATARAX) tablet 50 mg, 50 mg, Oral, TID PRN, Ferny Ibarra MD, 50 mg at 06/07/24 0635    LORazepam (ATIVAN) tablet 1 mg, 1 mg, Oral, Q6H PRN **OR** LORazepam (ATIVAN) injection 1 mg, 1 mg, Intramuscular, Q6H PRN, Barron Watkins CNP    QUEtiapine (SEROquel) tablet 100 mg, 100 mg, Oral, TID PRN, Ferny Ibarra MD, 100 mg at 06/06/24 1250    risperiDONE (risperDAL) tablet 3 mg, 3 mg, Oral, At Bedtime, Barron Watkins CNP    Current Outpatient Medications:     amphetamine-dextroamphetamine (ADDERALL) 20 MG tablet, Take 20 mg by mouth daily Takes in afternoon, Disp: , Rfl:     benztropine (COGENTIN) 1 MG tablet, Take 1 mg by mouth 2 times daily as needed for extrapyramidal symptoms (EPS), Disp: , Rfl:     divalproex sodium delayed-release (DEPAKOTE) 500 MG DR tablet, Take 1,500 mg by mouth at bedtime, Disp: , Rfl:     hydrOXYzine HCl (ATARAX) 50 MG tablet, Take 50 mg by mouth 3 times daily as needed for anxiety, Disp: , Rfl:     lisdexamfetamine (VYVANSE) 30 MG capsule, Take 30 mg by mouth every morning, Disp: , Rfl:     risperiDONE (RISPERDAL) 2 MG tablet, Take 2 mg by mouth at bedtime, Disp: , Rfl:     Vital Signs:    B/P: 118/78, T: 97.3, P: 71, R: 18  There is no height or weight on file to calculate BMI.        Mental status examination:  Appearance: awake, alert, dressed in hospital scrubs, appeared as age stated, and unkempt  Attitude:  evasive, guarded, and uncooperative  Eye Contact:  intense, staring directly at writer without speaking  Mood:   irritable, labile  Affect:  mood congruent, intensity is heightened, and labile  Speech:  clear, coherent  Psychomotor Behavior:  no evidence of tardive dyskinesia, dystonia, or tics  Throught Process:  disorganized and illogical  Associations:  no loose associations  Thought Content:   not voicing any suicidal or homicidal thinking. He appears to be laughing inappropriately at times, possibly responding to internal stimuli. Presents with paranoia and delusions.   Insight:  limited  Judgement:  poor  Oriented to:   self, place  Attention Span and Concentration:  poor  Recent and Remote Memory:  fair      Diagnoses:    Bipolar Disorder, current episode jose, with psychosis     Recommendations:  1) Continue Depakote DR 1500 mg at bedtime for mood stabilization  2) Increase risperidone from 2 mg to 3 mg at bedtime  3) Will trial PRN haldol/ativan in place of prn olanzapine  4) Can also continue to utilize quetiapine 100 mg tid prn for anxiety or agitation. Patient was able to rest after receiving this yesterday.   5) Intent to revoke his PD was submitted by county . Awaiting court hold. Continue 72 hour hold. Continue to pursue inpatient psychiatric hospitalization.   6) Patient has been unwilling to have any labs drawn thus far. Would attempt to check a Depakote level in about 3 days.      Please reconsult with psychiatry as needed.   aBrron Watkins, CNP

## 2024-06-07 NOTE — ED NOTES
Brought patient toothbrush/toothbrush, and snacks.Patient was shaky, pressured speech, but grateful for the items and snacks.

## 2024-06-08 PROCEDURE — 99222 1ST HOSP IP/OBS MODERATE 55: CPT | Mod: AI | Performed by: PSYCHIATRY & NEUROLOGY

## 2024-06-08 PROCEDURE — 250N000013 HC RX MED GY IP 250 OP 250 PS 637: Performed by: PSYCHIATRY & NEUROLOGY

## 2024-06-08 PROCEDURE — 124N000002 HC R&B MH UMMC

## 2024-06-08 RX ORDER — RISPERIDONE 1 MG/1
1 TABLET ORAL EVERY MORNING
Status: DISCONTINUED | OUTPATIENT
Start: 2024-06-08 | End: 2024-06-20

## 2024-06-08 RX ADMIN — RISPERIDONE 2 MG: 2 TABLET ORAL at 21:26

## 2024-06-08 RX ADMIN — NICOTINE POLACRILEX 2 MG: 2 GUM, CHEWING ORAL at 09:52

## 2024-06-08 RX ADMIN — HYDROXYZINE HYDROCHLORIDE 50 MG: 50 TABLET ORAL at 09:52

## 2024-06-08 RX ADMIN — HYDROXYZINE HYDROCHLORIDE 50 MG: 50 TABLET ORAL at 21:26

## 2024-06-08 RX ADMIN — DIVALPROEX SODIUM 1500 MG: 500 TABLET, DELAYED RELEASE ORAL at 21:27

## 2024-06-08 RX ADMIN — RISPERIDONE 1 MG: 1 TABLET, FILM COATED ORAL at 11:54

## 2024-06-08 RX ADMIN — NICOTINE POLACRILEX 2 MG: 2 GUM, CHEWING ORAL at 11:42

## 2024-06-08 RX ADMIN — OLANZAPINE 10 MG: 10 TABLET, FILM COATED ORAL at 10:38

## 2024-06-08 ASSESSMENT — ACTIVITIES OF DAILY LIVING (ADL)
ADLS_ACUITY_SCORE: 29
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
## 2024-06-08 NOTE — PLAN OF CARE
Problem: Manic Behavior Episode  Goal: Decreased Manic Symptoms  Outcome: Progressing  Intervention: Promote Mood Equilibrium    Alertness: Alert and oriented x3  Affect: labile, tense  Mood: irritable; anxious  Appearance: unkempt  Speech: Pressured; tangential with flight of ideas.    Thought Process: logical   Thought Content: Preoccupied with delusions of his brother being in danger; denies auditory hallucinations, SI/SIB/HI, paranoia  Delusions: Persecutory; believes that his brother is in danger  Anxiety & Depression: Endorses high anxiety   Skin: WNL per patient report.  Bowel/bladder: Denied bowel and bladder.   Appetite: Breakfast and lunch: 100% for both meals  Medications: Medication compliant  PRN medications this shift:   - Hydroxyzine 50 mg to target anxiety  - Zyprexa 10 mg to manage agitation  - Nicotine gum 2mg x2 for comfort.     New Provider orders: Discontinued Miralax r/t refusing it the past few days.She ordered a PRN if needed for constipation.      Milieu Participation: Patient was present in the milieu and appeared social and interactive with peers/staff. Patient's behaviors in the milieu was mostly appropriate with peers; slight intrusive with poor boundaries. He didn't require redirections.Became upset/agitated upon meeting with the psychiatrist; accepted prn Zyprexa with relief noted. Preoccupied with the thought that his brother is in danger and he is not answering his phone and was unable to leave a VM for his brother. Support and encouragement offered. Able to self-regulate and he is receptive to nursing interventions. Patient voices his needs appropriately. No acute behavioral concern with patient this shift.       Patient is eating, hydrating, and sleeping adequately. Patient is medication compliant with reminders. Medication side effects were not observed or reported this shift. Patient denies pain/ physical discomforts. No acute medical concern. VS WNL BP (!) 144/80 (BP Location:  Left arm, Patient Position: Sitting)   Pulse 107   Temp 97.8  F (36.6  C) (Oral)   Resp 18   SpO2 97%

## 2024-06-08 NOTE — PHARMACY-ADMISSION MEDICATION HISTORY
Pharmacy obtained medication history at [facility] on 6/6. See note by Jerry Roche for more details.     Isatu Jenkins, PharmD, MPH, MS
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
## 2024-06-08 NOTE — PLAN OF CARE
06/07/24 2051   Patient Belongings   Belongings Search Yes   Clothing Search Yes     Goal Outcome Evaluation:       Locker:    Shoes  Gray Sweater   Black Pants   Black Phone  A               Admission:  I am responsible for any personal items that are not sent to the safe or pharmacy.  [facility] is not responsible for loss, theft or damage of any property in my possession.    Signature:  _________________________________ Date: _______  Time: _____                                              Staff Signature:  ____________________________ Date: ________  Time: _____      2nd Staff person, if patient is unable/unwilling to sign:    Signature: ________________________________ Date: ________  Time: _____     Discharge:  [facility] has returned all of my personal belongings:    Signature: _________________________________ Date: ________  Time: _____                                          Staff Signature:  ____________________________ Date: ________  Time: _____
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
## 2024-06-09 PROCEDURE — 250N000013 HC RX MED GY IP 250 OP 250 PS 637: Performed by: PSYCHIATRY & NEUROLOGY

## 2024-06-09 PROCEDURE — 124N000002 HC R&B MH UMMC

## 2024-06-09 RX ORDER — HALOPERIDOL 5 MG/ML
5 INJECTION INTRAMUSCULAR EVERY 8 HOURS PRN
Status: DISCONTINUED | OUTPATIENT
Start: 2024-06-09 | End: 2024-07-10 | Stop reason: HOSPADM

## 2024-06-09 RX ORDER — LORAZEPAM 2 MG/1
2 TABLET ORAL EVERY 8 HOURS PRN
Status: DISCONTINUED | OUTPATIENT
Start: 2024-06-09 | End: 2024-06-19

## 2024-06-09 RX ORDER — DIPHENHYDRAMINE HYDROCHLORIDE 50 MG/ML
50 INJECTION INTRAMUSCULAR; INTRAVENOUS EVERY 8 HOURS PRN
Status: DISCONTINUED | OUTPATIENT
Start: 2024-06-09 | End: 2024-07-10 | Stop reason: HOSPADM

## 2024-06-09 RX ORDER — HALOPERIDOL 5 MG/1
5 TABLET ORAL EVERY 8 HOURS PRN
Status: DISCONTINUED | OUTPATIENT
Start: 2024-06-09 | End: 2024-06-19

## 2024-06-09 RX ORDER — LORAZEPAM 2 MG/ML
2 INJECTION INTRAMUSCULAR EVERY 8 HOURS PRN
Status: DISCONTINUED | OUTPATIENT
Start: 2024-06-09 | End: 2024-06-21

## 2024-06-09 RX ORDER — DIPHENHYDRAMINE HCL 50 MG
50 CAPSULE ORAL EVERY 8 HOURS PRN
Status: DISCONTINUED | OUTPATIENT
Start: 2024-06-09 | End: 2024-06-19

## 2024-06-09 RX ADMIN — DIVALPROEX SODIUM 1500 MG: 500 TABLET, DELAYED RELEASE ORAL at 21:17

## 2024-06-09 RX ADMIN — NICOTINE POLACRILEX 2 MG: 2 GUM, CHEWING ORAL at 08:02

## 2024-06-09 RX ADMIN — HALOPERIDOL 5 MG: 5 TABLET ORAL at 11:33

## 2024-06-09 RX ADMIN — DIPHENHYDRAMINE HYDROCHLORIDE 50 MG: 50 CAPSULE ORAL at 11:33

## 2024-06-09 RX ADMIN — HYDROXYZINE HYDROCHLORIDE 50 MG: 50 TABLET ORAL at 09:22

## 2024-06-09 RX ADMIN — LORAZEPAM 2 MG: 2 TABLET ORAL at 11:33

## 2024-06-09 RX ADMIN — OLANZAPINE 10 MG: 10 TABLET, FILM COATED ORAL at 08:02

## 2024-06-09 RX ADMIN — RISPERIDONE 2 MG: 2 TABLET ORAL at 21:17

## 2024-06-09 RX ADMIN — NICOTINE POLACRILEX 2 MG: 2 GUM, CHEWING ORAL at 10:17

## 2024-06-09 RX ADMIN — NICOTINE POLACRILEX 2 MG: 2 GUM, CHEWING ORAL at 09:04

## 2024-06-09 RX ADMIN — RISPERIDONE 1 MG: 1 TABLET, FILM COATED ORAL at 07:54

## 2024-06-09 ASSESSMENT — ACTIVITIES OF DAILY LIVING (ADL)
DRESS: INDEPENDENT
ADLS_ACUITY_SCORE: 29
ADLS_ACUITY_SCORE: 29
DRESS: INDEPENDENT;SCRUBS (BEHAVIORAL HEALTH)
ORAL_HYGIENE: INDEPENDENT
ADLS_ACUITY_SCORE: 29
ORAL_HYGIENE: INDEPENDENT
ADLS_ACUITY_SCORE: 29
HYGIENE/GROOMING: INDEPENDENT
ADLS_ACUITY_SCORE: 29
HYGIENE/GROOMING: INDEPENDENT
LAUNDRY: UNABLE TO COMPLETE
ADLS_ACUITY_SCORE: 29
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
## 2024-06-09 NOTE — PLAN OF CARE
"Problem: Anxiety Signs/Symptoms  Goal: Improved Mood Symptoms (Anxiety Signs/Symptoms)  Outcome: Progressing  Intervention: Optimize Emotion and Mood    Alertness: Alert and oriented x3  Affect: labile, tense  Mood: irritable; anxious  Appearance: unkempt  Speech: Pressured; tangential with flight of ideas.    Thought Process: logical   Thought Content: Preoccupied with delusions of his brother being in danger; denies auditory hallucinations, SI/SIB/HI, paranoia; appears to have visual hallucinations.   Delusions: Persecutory; believes that his brother is in danger  Anxiety & Depression: Endorses high anxiety; had a panic attack episode  Skin: WNL per patient report.  Bowel/bladder: denies bowel and bladder issue.   Appetite: good  Medications: Medication compliant  PRN medications this shift:   - Hydroxyzine 50 mg to target anxiety    Milieu Participation: Patient was isolative to his room napping majority of the shift. Upon waking up from nap, patient began crying and had episode of high anxiety with emotional dysregulations. Nursing intervention provided utilizing TIPP techniques with  cold touch and paced breathing exercises. Patient was able to regulate emotionally; took a shower and administered prn Hydroxyzine 50 mg along with bedtime medications. Relief of anxiety noted. Patient also shared with the writer that \"I have blood all over my shirt\" and when asked he pointed at his sleeves. No blood noted and writer educated patient about visual hallucinations but patient declined and insisted that he saw a blood and \"was coming from my nose.\" Patient was present in the milieu for the reminder of the evening shift; socialized with peers/staff. Asked writer to call his brother for him, no one answered the phone and writer was unable to leave a VM. Patient makes his needs known appropriately. No acute behavioral concern with patient this shift.     Patient is eating, hydrating, and sleeping adequately. Patient is " medication compliant with reminders. Medication side effects were not observed or reported this shift. Patient denies pain/ physical discomforts. No acute medical concern. VS WNL BP (!) 144/80 (BP Location: Left arm, Patient Position: Sitting)   Pulse 107   Temp 97.8  F (36.6  C) (Oral)   Resp 18   SpO2 97%

## 2024-06-09 NOTE — PLAN OF CARE
Goal Outcome Evaluation:  Problem: Sleep Disturbance  Goal: Adequate Sleep/Rest  Outcome: Progressing       Pt in bed at beginning of shift, breathing quiet and unlabored. Pt slept through shift. Pt slept 6.75 hours.      No pt complaints or concerns at this time.      No PRNs given. Will continue to monitor.

## 2024-06-10 PROCEDURE — 250N000013 HC RX MED GY IP 250 OP 250 PS 637: Performed by: PSYCHIATRY & NEUROLOGY

## 2024-06-10 PROCEDURE — 124N000002 HC R&B MH UMMC

## 2024-06-10 RX ORDER — RISPERIDONE 4 MG/1
4 TABLET ORAL AT BEDTIME
Status: DISCONTINUED | OUTPATIENT
Start: 2024-06-10 | End: 2024-06-19

## 2024-06-10 RX ADMIN — LORAZEPAM 2 MG: 2 TABLET ORAL at 13:28

## 2024-06-10 RX ADMIN — OLANZAPINE 10 MG: 10 TABLET, FILM COATED ORAL at 11:00

## 2024-06-10 RX ADMIN — DIVALPROEX SODIUM 1500 MG: 500 TABLET, DELAYED RELEASE ORAL at 20:14

## 2024-06-10 RX ADMIN — HYDROXYZINE HYDROCHLORIDE 50 MG: 50 TABLET ORAL at 14:31

## 2024-06-10 RX ADMIN — NICOTINE POLACRILEX 2 MG: 2 GUM, CHEWING ORAL at 13:35

## 2024-06-10 RX ADMIN — NICOTINE POLACRILEX 2 MG: 2 GUM, CHEWING ORAL at 14:30

## 2024-06-10 RX ADMIN — DIPHENHYDRAMINE HYDROCHLORIDE 50 MG: 50 CAPSULE ORAL at 13:28

## 2024-06-10 RX ADMIN — HYDROXYZINE HYDROCHLORIDE 50 MG: 50 TABLET ORAL at 10:03

## 2024-06-10 RX ADMIN — RISPERIDONE 4 MG: 4 TABLET ORAL at 20:14

## 2024-06-10 RX ADMIN — NICOTINE POLACRILEX 2 MG: 2 GUM, CHEWING ORAL at 10:13

## 2024-06-10 RX ADMIN — RISPERIDONE 1 MG: 1 TABLET, FILM COATED ORAL at 08:24

## 2024-06-10 RX ADMIN — NICOTINE POLACRILEX 2 MG: 2 GUM, CHEWING ORAL at 11:39

## 2024-06-10 RX ADMIN — HALOPERIDOL 5 MG: 5 TABLET ORAL at 13:28

## 2024-06-10 RX ADMIN — Medication 3 MG: at 20:14

## 2024-06-10 RX ADMIN — NICOTINE POLACRILEX 2 MG: 2 GUM, CHEWING ORAL at 08:24

## 2024-06-10 ASSESSMENT — ACTIVITIES OF DAILY LIVING (ADL)
ADLS_ACUITY_SCORE: 29
HYGIENE/GROOMING: INDEPENDENT
ADLS_ACUITY_SCORE: 29
ORAL_HYGIENE: INDEPENDENT
ADLS_ACUITY_SCORE: 29
ADLS_ACUITY_SCORE: 29
ORAL_HYGIENE: INDEPENDENT
ADLS_ACUITY_SCORE: 29
ADLS_ACUITY_SCORE: 29
DRESS: SCRUBS (BEHAVIORAL HEALTH);INDEPENDENT
ADLS_ACUITY_SCORE: 29
DRESS: INDEPENDENT;SCRUBS (BEHAVIORAL HEALTH)
HYGIENE/GROOMING: INDEPENDENT
ADLS_ACUITY_SCORE: 29

## 2024-06-10 NOTE — PLAN OF CARE
Pt asleep at start of shift.     Breathing quiet and unlabored when sleeping.     Pt had no c/o pain or discomfort during the HS.     Appears to have slept 6.25 hours.     Pt continues on Acuity SIO.    Goal Outcome Evaluation:  Problem: Adult Behavioral Health Plan of Care  Goal: Adheres to Safety Considerations for Self and Others  Intervention: Develop and Maintain Individualized Safety Plan  Recent Flowsheet Documentation  Taken 6/10/2024 0000 by Kelly Sullivan, RN  Safety Measures: safety rounds completed  Goal: Absence of New-Onset Illness or Injury  Intervention: Identify and Manage Fall Risk  Recent Flowsheet Documentation  Taken 6/10/2024 0000 by Kelly Sullivan, RN  Safety Measures: safety rounds completed

## 2024-06-10 NOTE — PLAN OF CARE
BEH IP Unit Acuity Rating Score (UARS)  Patient is given one point for every criteria they meet.    CRITERIA SCORING   On a 72 hour hold, court hold, committed, stay of commitment, or revocation. 1    Patient LOS on BEH unit exceeds 20 days. 0  LOS: 3   Patient under guardianship, 55+, otherwise medically complex, or under age 11. 0   Suicide ideation without relief of precipitating factors. 0   Current plan for suicide. 0   Current plan for homicide. 0   Imminent risk or actual attempt to seriously harm another without relief of factors precipitating the attempt. 1   Severe dysfunction in daily living (ex: complete neglect for self care, extreme disruption in vegetative function, extreme deterioration in social interactions). 1   Recent (last 7 days) or current physical aggression in the ED or on unit. 1     Restraints or seclusion episode in past 72 hours. 1   Recent (last 7 days) or current verbal aggression, agitation, yelling, etc., while in the ED or unit. 1 - Last 6/10   Active psychosis. 1   Need for constant or near constant redirection (from leaving, from others, etc).  1   Intrusive or disruptive behaviors. 1   Patient requires 3 or more hours of individualized nursing care per 8-hour shift (i.e. for ADLs, meds, therapeutic interventions). 1   TOTAL 10

## 2024-06-10 NOTE — PLAN OF CARE
Problem: Adult Behavioral Health Plan of Care  Goal: Adheres to Safety Considerations for Self and Others  Outcome: Progressing   Goal Outcome Evaluation:    Pt isolating to room, appearing to sleep for much of shift. Pt presents as mildly disorganized, labile. Endorses anxiety, rated 7/10. Denies all other psychological symptoms. No acute behavioral outbursts noted this shift.      Took scheduled medications without issue.     Denies pain. Denies having any acute physical concerns. No adverse effects of medication noted.     Attempted to gain pt's vitals, however BP cuff was having a difficult time calculating a reading, and pt indicated that he did not want to continue, thus VS reading was not obtained.     1:1 acuity staffing
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
## 2024-06-10 NOTE — PROGRESS NOTES
Combination PRN Medication Administration    Medications Administered: Benadryl+Haldol+Ativan combination    What patient symptom(s) led to the administration of these medications?  Agitation and lability related to conversations about his legal status and meeting his provider.     What interventions were attempted to avoid use of these medication (including other PRN medications)?   De-escalation, medication administration, reorientation, and     What were the patient's response(s) to the interventions?   He took the medications without issue. His mood Lability continued for about 20 mins.      Provider notified: Dr Card        Date: 06/10/24        Time: 2682    Sri Villagomez RN
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
## 2024-06-10 NOTE — PLAN OF CARE
"  Problem: Adult Inpatient Plan of Care  Goal: Optimal Comfort and Wellbeing  Intervention: Provide Person-Centered Care  Recent Flowsheet Documentation  Taken 6/10/2024 2969 by Sri Villagomez RN  Trust Relationship/Rapport:   care explained   choices provided   emotional support provided   empathic listening provided   questions answered   questions encouraged   reassurance provided   thoughts/feelings acknowledged   Goal Outcome Evaluation:    Plan of Care Reviewed With: patient      Pt presents as manic and disorganized with mood lability. He has no insight into his mental health, and denies any mental health symptoms including SI/HI/AVH, but did appear to be internally preoccupied. Pt approached the writer He was seen talking to himself while pacing down the nielson. He vacillated between anger, sadness with tears, and elation.  He became extremely agitated after speaking with the provider and reciving an updated legal status. Combo PRN Ativan/Haldol Benadryl was administered (see documentation)  Pt calmed down after the administration of the PRN medication and he apologized to writer. He asked if we could not \"use the word manic any longer\".   He remained mainly withdrawn after he went to his room towards the end of the shift.   Pt had no other acute physical or behavioral concerns         "

## 2024-06-11 LAB
ALBUMIN SERPL BCG-MCNC: 4.2 G/DL (ref 3.5–5.2)
ALP SERPL-CCNC: 51 U/L (ref 40–150)
ALT SERPL W P-5'-P-CCNC: 18 U/L (ref 0–70)
ANION GAP SERPL CALCULATED.3IONS-SCNC: 10 MMOL/L (ref 7–15)
AST SERPL W P-5'-P-CCNC: 12 U/L (ref 0–45)
BASOPHILS # BLD AUTO: 0 10E3/UL (ref 0–0.2)
BASOPHILS NFR BLD AUTO: 1 %
BILIRUB SERPL-MCNC: 0.2 MG/DL
BUN SERPL-MCNC: 7.8 MG/DL (ref 6–20)
CALCIUM SERPL-MCNC: 9.6 MG/DL (ref 8.6–10)
CHLORIDE SERPL-SCNC: 102 MMOL/L (ref 98–107)
CHOLEST SERPL-MCNC: 157 MG/DL
CREAT SERPL-MCNC: 0.84 MG/DL (ref 0.67–1.17)
DEPRECATED HCO3 PLAS-SCNC: 27 MMOL/L (ref 22–29)
EGFRCR SERPLBLD CKD-EPI 2021: >90 ML/MIN/1.73M2
EOSINOPHIL # BLD AUTO: 0.2 10E3/UL (ref 0–0.7)
EOSINOPHIL NFR BLD AUTO: 4 %
ERYTHROCYTE [DISTWIDTH] IN BLOOD BY AUTOMATED COUNT: 12.9 % (ref 10–15)
GLUCOSE SERPL-MCNC: 125 MG/DL (ref 70–99)
HBA1C MFR BLD: 5.4 %
HCT VFR BLD AUTO: 41.4 % (ref 40–53)
HDLC SERPL-MCNC: 31 MG/DL
HGB BLD-MCNC: 13.9 G/DL (ref 13.3–17.7)
IMM GRANULOCYTES # BLD: 0 10E3/UL
IMM GRANULOCYTES NFR BLD: 0 %
LDLC SERPL CALC-MCNC: 88 MG/DL
LYMPHOCYTES # BLD AUTO: 2 10E3/UL (ref 0.8–5.3)
LYMPHOCYTES NFR BLD AUTO: 40 %
MCH RBC QN AUTO: 28.7 PG (ref 26.5–33)
MCHC RBC AUTO-ENTMCNC: 33.6 G/DL (ref 31.5–36.5)
MCV RBC AUTO: 86 FL (ref 78–100)
MONOCYTES # BLD AUTO: 0.2 10E3/UL (ref 0–1.3)
MONOCYTES NFR BLD AUTO: 5 %
NEUTROPHILS # BLD AUTO: 2.5 10E3/UL (ref 1.6–8.3)
NEUTROPHILS NFR BLD AUTO: 50 %
NONHDLC SERPL-MCNC: 126 MG/DL
NRBC # BLD AUTO: 0 10E3/UL
NRBC BLD AUTO-RTO: 0 /100
PLATELET # BLD AUTO: 190 10E3/UL (ref 150–450)
POTASSIUM SERPL-SCNC: 3.9 MMOL/L (ref 3.4–5.3)
PROT SERPL-MCNC: 6.4 G/DL (ref 6.4–8.3)
RBC # BLD AUTO: 4.84 10E6/UL (ref 4.4–5.9)
SODIUM SERPL-SCNC: 139 MMOL/L (ref 135–145)
TRIGL SERPL-MCNC: 191 MG/DL
VALPROATE SERPL-MCNC: 92.7 UG/ML
WBC # BLD AUTO: 4.9 10E3/UL (ref 4–11)

## 2024-06-11 PROCEDURE — 85025 COMPLETE CBC W/AUTO DIFF WBC: CPT | Performed by: PSYCHIATRY & NEUROLOGY

## 2024-06-11 PROCEDURE — 124N000002 HC R&B MH UMMC

## 2024-06-11 PROCEDURE — 80061 LIPID PANEL: CPT | Performed by: PSYCHIATRY & NEUROLOGY

## 2024-06-11 PROCEDURE — 36415 COLL VENOUS BLD VENIPUNCTURE: CPT | Performed by: PSYCHIATRY & NEUROLOGY

## 2024-06-11 PROCEDURE — 250N000013 HC RX MED GY IP 250 OP 250 PS 637: Performed by: PSYCHIATRY & NEUROLOGY

## 2024-06-11 PROCEDURE — 83036 HEMOGLOBIN GLYCOSYLATED A1C: CPT | Performed by: PSYCHIATRY & NEUROLOGY

## 2024-06-11 PROCEDURE — 80164 ASSAY DIPROPYLACETIC ACD TOT: CPT | Performed by: PSYCHIATRY & NEUROLOGY

## 2024-06-11 PROCEDURE — 82247 BILIRUBIN TOTAL: CPT | Performed by: PSYCHIATRY & NEUROLOGY

## 2024-06-11 RX ADMIN — DIPHENHYDRAMINE HYDROCHLORIDE 50 MG: 50 CAPSULE ORAL at 20:42

## 2024-06-11 RX ADMIN — OLANZAPINE 10 MG: 10 TABLET, FILM COATED ORAL at 07:27

## 2024-06-11 RX ADMIN — HYDROXYZINE HYDROCHLORIDE 50 MG: 50 TABLET ORAL at 19:37

## 2024-06-11 RX ADMIN — NICOTINE POLACRILEX 2 MG: 2 GUM, CHEWING ORAL at 18:14

## 2024-06-11 RX ADMIN — NICOTINE POLACRILEX 2 MG: 2 GUM, CHEWING ORAL at 19:38

## 2024-06-11 RX ADMIN — RISPERIDONE 4 MG: 4 TABLET ORAL at 19:38

## 2024-06-11 RX ADMIN — DIVALPROEX SODIUM 1500 MG: 500 TABLET, DELAYED RELEASE ORAL at 19:37

## 2024-06-11 RX ADMIN — LORAZEPAM 2 MG: 2 TABLET ORAL at 20:42

## 2024-06-11 RX ADMIN — HALOPERIDOL 5 MG: 5 TABLET ORAL at 20:42

## 2024-06-11 RX ADMIN — RISPERIDONE 1 MG: 1 TABLET, FILM COATED ORAL at 07:57

## 2024-06-11 RX ADMIN — HALOPERIDOL 5 MG: 5 TABLET ORAL at 08:34

## 2024-06-11 RX ADMIN — LORAZEPAM 2 MG: 2 TABLET ORAL at 08:34

## 2024-06-11 RX ADMIN — DIPHENHYDRAMINE HYDROCHLORIDE 50 MG: 50 CAPSULE ORAL at 08:34

## 2024-06-11 ASSESSMENT — ACTIVITIES OF DAILY LIVING (ADL)
ADLS_ACUITY_SCORE: 29

## 2024-06-11 NOTE — PLAN OF CARE
BEH IP Unit Acuity Rating Score (UARS)  Patient is given one point for every criteria they meet.    CRITERIA SCORING   On a 72 hour hold, court hold, committed, stay of commitment, or revocation. 1    Patient LOS on BEH unit exceeds 20 days. 0  LOS: 4   Patient under guardianship, 55+, otherwise medically complex, or under age 11. 0   Suicide ideation without relief of precipitating factors. 0   Current plan for suicide. 0   Current plan for homicide. 0   Imminent risk or actual attempt to seriously harm another without relief of factors precipitating the attempt. 1   Severe dysfunction in daily living (ex: complete neglect for self care, extreme disruption in vegetative function, extreme deterioration in social interactions). 1   Recent (last 7 days) or current physical aggression in the ED or on unit. 1     Restraints or seclusion episode in past 72 hours. 1   Recent (last 7 days) or current verbal aggression, agitation, yelling, etc., while in the ED or unit. 1 - Last 6/11   Active psychosis. 1   Need for constant or near constant redirection (from leaving, from others, etc).  1   Intrusive or disruptive behaviors. 1   Patient requires 3 or more hours of individualized nursing care per 8-hour shift (i.e. for ADLs, meds, therapeutic interventions). 1   TOTAL 10

## 2024-06-11 NOTE — PLAN OF CARE
Initial meeting note:    Therapist introduced self to patient and discussed psychotherapy service available to patient.     Pt response: Pt not interested currently in meeting 1:1; therapist will continue remaining available for pt     Plan: Pt was sleeping      Attempted to check in on client but he was sleeping both times I went to meet him.

## 2024-06-11 NOTE — PROGRESS NOTES
"Patient seen, chart reviewed, care discussed with staff.  Blood pressure 121/81, pulse 107, temperature 97.4  F (36.3  C), temperature source Oral, resp. rate 17, weight 90.1 kg (198 lb 11.2 oz), SpO2 97%.  He received a shot of Haldol-Lorazepam-Benadryl today for intensity and lability, he slept ok.  Olanzapine PRN was not effective.  Less angry at me for saying he is manic.      From nursing notes: \"Per staff report patient woke up and requested for water to drink and when he was reminded that he has fasting lab in the morning, he was increasingly getting agitated. When writer approached him he states \" you know exactly what fuck is going on, don't come at me from the side\". He ripped the NPO sign from his door, and closed his door.\"    General appearance: discheveled  Alert.   Affect: labile  Mood: irritable earlier, labile    Speech:  normal.   Eye contact:  good.    Psychomotor behavior: normal  Gait: steady   Abnormal movements:  none  Delusions: not aware of illness  Hallucinations:  denied  Thoughts: logical  Associations: intact  Judgement: poor  Insight: poor  Cognitions: intact in conversation  Memory:  intact in conversation  Orientation: normal    Not suicidal.    Imp:  Bipolar jose  2.  ADHD  Plan:  Depakote level 6/14    Current Facility-Administered Medications   Medication Dose Route Frequency Provider Last Rate Last Admin    acetaminophen (TYLENOL) tablet 650 mg  650 mg Oral Q4H PRN Javier Card MD        alum & mag hydroxide-simethicone (MAALOX) suspension 30 mL  30 mL Oral Q4H PRN Javier Card MD        benztropine (COGENTIN) tablet 1 mg  1 mg Oral BID PRN Javier Card MD        haloperidol (HALDOL) tablet 5 mg  5 mg Oral Q8H PRN Satya Garza MD   5 mg at 06/11/24 0834    And    LORazepam (ATIVAN) tablet 2 mg  2 mg Oral Q8H PRN Satya Garza MD   2 mg at 06/11/24 0834    And    diphenhydrAMINE (BENADRYL) capsule 50 mg  50 mg Oral Q8H PRN Satya Garza MD   50 mg " at 06/11/24 0834    haloperidol lactate (HALDOL) injection 5 mg  5 mg Intramuscular Q8H PRN Satya Garza MD        And    LORazepam (ATIVAN) injection 2 mg  2 mg Intramuscular Q8H PRN Satya Garza MD        And    diphenhydrAMINE (BENADRYL) injection 50 mg  50 mg Intramuscular Q8H PRN Satya Garza MD        divalproex sodium delayed-release (DEPAKOTE) DR tablet 1,500 mg  1,500 mg Oral At Bedtime Javier Cadr MD   1,500 mg at 06/10/24 2014    hydrOXYzine HCl (ATARAX) tablet 50 mg  50 mg Oral TID PRN Javier Card MD   50 mg at 06/10/24 1431    melatonin tablet 3 mg  3 mg Oral At Bedtime PRN Javier Card MD   3 mg at 06/10/24 2014    nicotine (NICORETTE) gum 2 mg  2 mg Buccal Q1H PRN Javier Card MD   2 mg at 06/10/24 1430    risperiDONE (risperDAL) tablet 1 mg  1 mg Oral Satya Ponce MD   1 mg at 06/11/24 0757    risperiDONE (risperDAL) tablet 4 mg  4 mg Oral At Bedtime Javier Card MD   4 mg at 06/10/24 2014    senna-docusate (SENOKOT-S/PERICOLACE) 8.6-50 MG per tablet 1 tablet  1 tablet Oral BID PRN Javier Card MD         Recent Results (from the past 168 hour(s))   Comprehensive metabolic panel    Collection Time: 06/11/24  9:50 AM   Result Value Ref Range    Sodium 139 135 - 145 mmol/L    Potassium 3.9 3.4 - 5.3 mmol/L    Carbon Dioxide (CO2) 27 22 - 29 mmol/L    Anion Gap 10 7 - 15 mmol/L    Urea Nitrogen 7.8 6.0 - 20.0 mg/dL    Creatinine 0.84 0.67 - 1.17 mg/dL    GFR Estimate >90 >60 mL/min/1.73m2    Calcium 9.6 8.6 - 10.0 mg/dL    Chloride 102 98 - 107 mmol/L    Glucose 125 (H) 70 - 99 mg/dL    Alkaline Phosphatase 51 40 - 150 U/L    AST 12 0 - 45 U/L    ALT 18 0 - 70 U/L    Protein Total 6.4 6.4 - 8.3 g/dL    Albumin 4.2 3.5 - 5.2 g/dL    Bilirubin Total 0.2 <=1.2 mg/dL   Hemoglobin A1c    Collection Time: 06/11/24  9:50 AM   Result Value Ref Range    Hemoglobin A1C 5.4 <5.7 %   Lipid panel    Collection Time: 06/11/24  9:50 AM   Result Value Ref Range     Cholesterol 157 <200 mg/dL    Triglycerides 191 (H) <150 mg/dL    Direct Measure HDL 31 (L) >=40 mg/dL    LDL Cholesterol Calculated 88 <=100 mg/dL    Non HDL Cholesterol 126 <130 mg/dL   CBC with platelets and differential    Collection Time: 06/11/24  9:50 AM   Result Value Ref Range    WBC Count 4.9 4.0 - 11.0 10e3/uL    RBC Count 4.84 4.40 - 5.90 10e6/uL    Hemoglobin 13.9 13.3 - 17.7 g/dL    Hematocrit 41.4 40.0 - 53.0 %    MCV 86 78 - 100 fL    MCH 28.7 26.5 - 33.0 pg    MCHC 33.6 31.5 - 36.5 g/dL    RDW 12.9 10.0 - 15.0 %    Platelet Count 190 150 - 450 10e3/uL    % Neutrophils 50 %    % Lymphocytes 40 %    % Monocytes 5 %    % Eosinophils 4 %    % Basophils 1 %    % Immature Granulocytes 0 %    NRBCs per 100 WBC 0 <1 /100    Absolute Neutrophils 2.5 1.6 - 8.3 10e3/uL    Absolute Lymphocytes 2.0 0.8 - 5.3 10e3/uL    Absolute Monocytes 0.2 0.0 - 1.3 10e3/uL    Absolute Eosinophils 0.2 0.0 - 0.7 10e3/uL    Absolute Basophils 0.0 0.0 - 0.2 10e3/uL    Absolute Immature Granulocytes 0.0 <=0.4 10e3/uL    Absolute NRBCs 0.0 10e3/uL

## 2024-06-11 NOTE — PROGRESS NOTES
"Per staff report patient woke up and requested for water to drink and when he was reminded that he has fasting lab in the morning, he was increasingly getting agitated. When writer approached him he states \" you know exactly what fuck is going on, don't come at me from the side\". He ripped the NPO sign from his door, and closed his door.     Patient was observed resting in room at the start of the shift. Patient slept for 6.5 hours. He was encouraged not drink water due the fasting lab that he has this morning, but per staff report patient was observed drinking water. Patient was increasingly getting agitated and stating \" I don't want to do my blood draw today, if you don't stop I will go to my room and this will get escalated I will start to scream\". Patient was then requesting for anxiety medication, due to patient's agitation level patient was offered to take Prn for agitation. He was given 10 mg Zyprexa. He is in denial about his mental health, and was saying \" I am not depressed, but I am actually happy\" . He continues to make statements \" why do you guys want to take my blood, and I don't want the lab people to have my results and can you promise me if I do my blood draw can I leave this place\". Patient was encouraged to get the blood draw and he agreed and states \" okay I will do it\".   "

## 2024-06-11 NOTE — PLAN OF CARE
Per chart review, patient received a shot of Haldol-Lorazepam-Benadryl today for intensity and lability at 8:30 am this morning. Was sleeping each time writer passed by room today after this. Was unable to introduce self and groups. Will continue to check in and invite and encourage to attend groups as appropriate.

## 2024-06-11 NOTE — PROGRESS NOTES
Combination PRN Medication Administration    Medications Administered: Benadryl+Haldol+Ativan combination    What patient symptom(s) led to the administration of these medications?  agitation     What interventions were attempted to avoid use of these medication (including other PRN medications)?   Previous PRN adm, redirection, reassurance, validation, coping strategies, distractions, breakfast    What were the patient's response(s) to the interventions?   Helpful for only a very brief time, less than 10 minutes. Patient vacillates quickly from pleasant to agitated.     Provider notified: Kyaw       Date: 06/11/24        Time: 0824    Jocelin Shepherd RN

## 2024-06-11 NOTE — PLAN OF CARE
Problem: Adult Behavioral Health Plan of Care  Goal: Adheres to Safety Considerations for Self and Others  Outcome: Progressing   Goal Outcome Evaluation:    Sleeping for much of shift. Pressured speech. Denied all psychological symptoms. Social. Visible in milieu later in shift. No acute behavioral outburst noted.     Denies pain. Denies having any acute physical concerns. Eating well.     No acute behavioral outburst noted.     Maintains acuity staffing.     /70 (BP Location: Left arm, Patient Position: Sitting, Cuff Size: Adult Regular)   Pulse 69   Temp 97.8  F (36.6  C) (Temporal)   Resp 17   SpO2 97%

## 2024-06-11 NOTE — PLAN OF CARE
"Days  Nursing assessment completed. Patient awake and in the lounge when writer arrived. He had initially declined am labs and then agreed, however due to lab taking an extended period to come to the unit (despitewriter calling the lab), he ate his breakfast. He took PRN zyprexa fom the night nurse at 0727. Took scheduled Risperdal fom writer. He states he does not need risperdal becaue he does not hear voices and he is sick of his reality being questioned. Became increasingly agitated and requested PRN haldol, benadryl, ativan PO.   Patient rested during the afternoon. Refused ammonia level. Patient slept in his room for the rest of the shift.  Evening  Patient woke up during the beginning of the evening shift. He had a pleasant conversation with writer and accepted validation that he has been having a good day. Affect bright, full range. He interacted appropriately with staff and peers. Ate dinner and independently attended to ADLs. Showered. Requested HS medications. While writer was getting his scheduled HS medications, he made a phone call to his mom. After the call, he was crying and upset. He asked for a PRN Zyprexa. PRN hydroxyzine was administered with HS medications at 1937 (as PRN Zyprexa was discontinued by provider earlier today). This was partially effective temporarily. He was sitting on the floor in the hallway and began to cry, stating there is a pitting feeling in his stomach that he worries will not go away. He was able to talk with staff for a short while, but became upset that \"Dr. Card just thinks I am manic\". Patient continued to become increasingly more anxious and upset. He requested and received additional PRN medication at 2042 (only option available) haldol, ativan benadryl, PO with good benefit.                           "

## 2024-06-11 NOTE — PLAN OF CARE
"Team Note Due:  Monday    Assessment/Intervention/Current Symtoms and Care Coordination:  Chart review and met with team, discussed pt progress, symptomology, and response to treatment.  Discussed the discharge plan and any potential impediments to discharge.    Per team, Pt agitated this morning, received Haldol, Ativan, Benadryl at 8:30am. Pt reports he is \"sick of having his reality questioned\". The provider will increase his Risperdal.     I emailed Jordan at the court to ask for the original commitment order. Pt has been on a stayed commitment, however he is now on a court hold and pending revocation of his commitment.     I called CM Chelsea Modi to coordinate care [phone], left voicemail asking for a return call.     Discharge Plan or Goal:  Back to group home vs alternative group home?  [facility]- group home   [address] [place] [address]    Barriers to Discharge:  Symptoms - jose     Referral Status:  TBD     Legal Status:  COURT HOLD - notice of intent to revoke stay of commitment   [facility]: [facility]  File Number: [id number]  Start and expiration date of commitment:     Next court hearing June 13th at 1:30PM  Pt's  is:  Carlos Gonzales  [employer]  Ph:  [phone]    Contacts:     Yolanda (mom): [phone]     Darline Romo ( for penitentiary): [phone]     Bree (group home ): [phone]     Chelsea Brennan ([facility] ): [phone]  [facility]  Ph: [phone]     Christi (member of case management team): [phone]  Medication Management/Psychiatry:  Name/Clinic: [facility]   Number: [phone]     Upcoming Meetings and Dates/Important Information and next steps:  Commitment process - Next court hearing June 13th at 1:30PM  Get RENNY for group home once pt is more stable and willing to sign RENNY, pt declined to sign for mom yesterday  "

## 2024-06-12 PROCEDURE — 250N000013 HC RX MED GY IP 250 OP 250 PS 637: Performed by: PSYCHIATRY & NEUROLOGY

## 2024-06-12 PROCEDURE — 124N000002 HC R&B MH UMMC

## 2024-06-12 RX ADMIN — NICOTINE POLACRILEX 2 MG: 2 GUM, CHEWING ORAL at 17:14

## 2024-06-12 RX ADMIN — HYDROXYZINE HYDROCHLORIDE 50 MG: 50 TABLET ORAL at 17:14

## 2024-06-12 RX ADMIN — HYDROXYZINE HYDROCHLORIDE 50 MG: 50 TABLET ORAL at 21:16

## 2024-06-12 RX ADMIN — DIVALPROEX SODIUM 1250 MG: 500 TABLET, DELAYED RELEASE ORAL at 20:05

## 2024-06-12 RX ADMIN — NICOTINE POLACRILEX 2 MG: 2 GUM, CHEWING ORAL at 10:13

## 2024-06-12 RX ADMIN — NICOTINE POLACRILEX 2 MG: 2 GUM, CHEWING ORAL at 13:33

## 2024-06-12 RX ADMIN — RISPERIDONE 4 MG: 4 TABLET ORAL at 20:05

## 2024-06-12 RX ADMIN — HYDROXYZINE HYDROCHLORIDE 50 MG: 50 TABLET ORAL at 11:24

## 2024-06-12 RX ADMIN — NICOTINE POLACRILEX 2 MG: 2 GUM, CHEWING ORAL at 14:39

## 2024-06-12 RX ADMIN — Medication 3 MG: at 21:18

## 2024-06-12 RX ADMIN — RISPERIDONE 1 MG: 1 TABLET, FILM COATED ORAL at 10:07

## 2024-06-12 RX ADMIN — NICOTINE POLACRILEX 2 MG: 2 GUM, CHEWING ORAL at 20:33

## 2024-06-12 RX ADMIN — NICOTINE POLACRILEX 2 MG: 2 GUM, CHEWING ORAL at 11:22

## 2024-06-12 ASSESSMENT — ACTIVITIES OF DAILY LIVING (ADL)
ADLS_ACUITY_SCORE: 29
HYGIENE/GROOMING: INDEPENDENT
HYGIENE/GROOMING: INDEPENDENT
ADLS_ACUITY_SCORE: 29
DRESS: INDEPENDENT
ADLS_ACUITY_SCORE: 29
ORAL_HYGIENE: INDEPENDENT
ADLS_ACUITY_SCORE: 29
ORAL_HYGIENE: INDEPENDENT
ADLS_ACUITY_SCORE: 29
DRESS: SCRUBS (BEHAVIORAL HEALTH);INDEPENDENT
ADLS_ACUITY_SCORE: 29

## 2024-06-12 NOTE — PLAN OF CARE
Initial meeting note:    Therapist introduced self to patient and discussed psychotherapy service available to patient.     Pt response: Pt expressed interest in meeting with therapist    Plan: Made plan to meet with pt again; began identifying goals

## 2024-06-12 NOTE — PLAN OF CARE
"Team Note Due:  Monday    Assessment/Intervention/Current Symtoms and Care Coordination:  Chart review and met with team, discussed pt progress, symptomology, and response to treatment.  Discussed the discharge plan and any potential impediments to discharge.    Per team, Pt received B52 in the evening, pleasant, showered, cried after speaking with mom on the phone. Slept 6 hours. This morning was on the phone talking about \"do they have footage of...\".     I asked to meet with patient who was socializing in the [facility], and he stated that makes his anxiety really high. I shared it was nothing bad. I asked if he could sign an RENNY for his group home and he agreed. I asked if he could sign an RENNY for his mother and psychiatrist to see what meds work best, and he stated no because he didn't want (the provider) to have access to speak to them. Pt expressed several statements about the provider. I asked why and he stated \"because he says I am manic and he made me cry\". I shared we are all trying to help him here and if he changes his mind about the RENNY's he could let us know. Pt was pleasant with me. He continues with rapid speech, elevated mood. I asked what helps when his anxiety is high and he says \"haldol ativan and benadryl\". I asked if he tries anything before that and he states nothing else helps. Says hydroxyzine works briefly and wears off right away. I informed RN about Pt's paranoia about provider and suggested someone else meet alongside pt and provider.     I called Bree (group [place] ): [phone]. I asked for some info and he stated he was in the hospital. I explained yes I was calling from the hospital. He didn't have any information, directed me to call Darline Romo. ( for [facility]): [phone]. I called and left a voicemail.     Darline Romo called me back and shared she will talk to her boss to confirm but she is assuming he is welcome back. She says according to Mom - " "Mom says when he gets like this, he most likely hasnt taken his meds for like a month, \"hospital staff said 3 depakote pills were in his pocket, he hides it under his tongue sometimes\", she has seen this 4-5 times before and he can get dangerous. I shared he is not ready for discharge right now but just wanted to see if returning back is an option for dispo. I provided the unit # incase she wants to speak to nursing or Pt.     Beth from [facility] called wanting records. I called back and left  asking for a fax or secure email. I also called [facility]  to ask for a # and she gave me: [phone] which states number not in service. Secure faxed records to [email or web address]. and commitment  [email or web address].    Discharge Plan or Goal:  Back to group home vs alternative group home?  [facility]- group [place]   [address] [place] [address]    Barriers to Discharge:  Symptomology - jose     Referral Status:  TBD     Legal Status:  COURT HOLD - notice of intent to revoke stay of commitment   [facility]: [facility]  File Number: [id number]  Start and expiration date of commitment:     Next court hearing June 13th at 1:30PM  Pt's  is:  Carlos Gonzales  [employer]  Ph:  [phone]    Beth Garza    [address] [place] [address]  Phone: [phone]  [email or web address].    Contacts:    Critical access hospital Group Home:  Primary Contact: Darline Romo ( for custodial): [phone] - RENNY on file for group home staff  Secondary Contact: Bree (group home ): [phone] RENNY on file for group home staff     :  Chelsea Brennan ([facility]  Ph: [phone] - Commitment CM no RENNY needed   [email or web address].  [facility]  Ph: [phone]  Christi (member of case management team): [phone]    Medication Management/Psychiatry:  Name/Clinic: [facility] - " Pt declines RENNY for Psychiatry  Number: [phone]    Other:  Yolanda (mom): [phone] - Pt declines RENNY for Mom     Upcoming Meetings and Dates/Important Information and next steps:  Commitment process - Next court hearing June 13th at 1:30PM

## 2024-06-12 NOTE — PLAN OF CARE
Problem: Sleep Disturbance  Goal: Adequate Sleep/Rest  Outcome: Progressing   Goal Outcome Evaluation:    Patient appeared to sleep 6 hours this night shift.  No prns or snacks given or requested.  No concerns were reported or noted.  Remains on [facility] staffing at this time.  Ammonia lab this morning.

## 2024-06-12 NOTE — PLAN OF CARE
BEH IP Unit Acuity Rating Score (UARS)  Patient is given one point for every criteria they meet.    CRITERIA SCORING   On a 72 hour hold, court hold, committed, stay of commitment, or revocation. 1    Patient LOS on BEH unit exceeds 20 days. 0  LOS: 5   Patient under guardianship, 55+, otherwise medically complex, or under age 11. 0   Suicide ideation without relief of precipitating factors. 0   Current plan for suicide. 0   Current plan for homicide. 0   Imminent risk or actual attempt to seriously harm another without relief of factors precipitating the attempt. 1   Severe dysfunction in daily living (ex: complete neglect for self care, extreme disruption in vegetative function, extreme deterioration in social interactions). 1   Recent (last 7 days) or current physical aggression in the ED or on unit. 1     Restraints or seclusion episode in past 72 hours. 1   Recent (last 7 days) or current verbal aggression, agitation, yelling, etc., while in the ED or unit. 1 - Last 6/11   Active psychosis. 1   Need for constant or near constant redirection (from leaving, from others, etc).  1   Intrusive or disruptive behaviors. 1   Patient requires 3 or more hours of individualized nursing care per 8-hour shift (i.e. for ADLs, meds, therapeutic interventions). 1   TOTAL 10

## 2024-06-12 NOTE — PLAN OF CARE
"  Rehab Group    Start time: 1015  End time: 1215  Patient time total: 35 minutes    attended partial group    #2 attended   Group Type: occupational therapy   Group Topic Covered: cognitive activities, coping skills, and healthy leisure time    Stratego      Group Session Detail:    Patient Response: Pt partcipated in group focused on leisure participation and exploration, socialization and cognitive challenge. Discussed how participation in leisure activities can be used as a healthy coping skill in symptom management and a strategy to reduce stress.    Cognition: Distractible, Restless        Mood/Affect:  Labile, Pleasant overall    Plan: Patient encouraged to maintain attendance for continued ongoing support in working towards occupational therapy goals to support overall treatment/care.        Patient Detail:    Requested to sit and watch writer and another peer participating in a strategy activity. Requested to assist writer in setup of the activity in where to place each piece to optimize strategy. Was social during this time off and on, mostly bright. Appeared to get along well with other peer in the group.     Left group after while d/t feeling restless and unable to sit any longer. Was overhead on the phone later leaving message to brother, was upset about brother \"disowning him\" and was yelling while leaving this message.   Voiced being upset about not being able to receive the medications he usually takes for his ADHD, stating it is making it very difficult for him to focus.       No Charge    Patient Active Problem List   Diagnosis    Bipolar disorder (H)    ADHD (attention deficit hyperactivity disorder)    Bipolar I disorder with jose (H)      "

## 2024-06-12 NOTE — PROGRESS NOTES
"Patient seen, chart reviewed, care discussed with staff.  Blood pressure 136/89, pulse 119, temperature 97.7  F (36.5  C), temperature source Oral, resp. rate 20, weight 90.1 kg (198 lb 11.2 oz), SpO2 98%.  Still angry with me for stating he was \"manic\" but willing to talk to me.    General appearance: fair  Alert.   Affect: good, labile.  Cried after talking to his mother.  Slept 6 hours.  Mood: fair    Speech:  mild increased production   Eye contact:  good.    Psychomotor behavior: normal  Gait: steady   Abnormal movements:  none  Delusions: present, he didcussed finding the meaning of the world using chinese letters and elements.  Hallucinations:  denied  Thoughts: circumstantial  Associations: intact  Judgement: poor  Insight: poor  Cognitions: intact in conversation  Memory:  intact in conversation  Orientation: normal    Not suicidal.    Imp:  Bipolar jose  2.  ADHD  3.  Depakote ;level is 92, will reduce dosage    Current Facility-Administered Medications   Medication Dose Route Frequency Provider Last Rate Last Admin    acetaminophen (TYLENOL) tablet 650 mg  650 mg Oral Q4H PRN Javier Card MD        alum & mag hydroxide-simethicone (MAALOX) suspension 30 mL  30 mL Oral Q4H PRN Javier Card MD        benztropine (COGENTIN) tablet 1 mg  1 mg Oral BID PRN Javier Card MD        haloperidol (HALDOL) tablet 5 mg  5 mg Oral Q8H PRN Satya Garza MD   5 mg at 06/11/24 2042    And    LORazepam (ATIVAN) tablet 2 mg  2 mg Oral Q8H PRN Satya Garza MD   2 mg at 06/11/24 2042    And    diphenhydrAMINE (BENADRYL) capsule 50 mg  50 mg Oral Q8H PRN Satya Garza MD   50 mg at 06/11/24 2042    haloperidol lactate (HALDOL) injection 5 mg  5 mg Intramuscular Q8H PRSatya Bauer MD        And    LORazepam (ATIVAN) injection 2 mg  2 mg Intramuscular Q8H PRSatya Bauer MD        And    diphenhydrAMINE (BENADRYL) injection 50 mg  50 mg Intramuscular Q8H PRN Kayla, " Satya TIWARI MD        divalproex sodium delayed-release (DEPAKOTE) DR tablet 1,500 mg  1,500 mg Oral At Bedtime Javier Card MD   1,500 mg at 06/11/24 1937    hydrOXYzine HCl (ATARAX) tablet 50 mg  50 mg Oral TID PRN Javier Card MD   50 mg at 06/12/24 1124    melatonin tablet 3 mg  3 mg Oral At Bedtime PRN Javier Card MD   3 mg at 06/10/24 2014    nicotine (NICORETTE) gum 2 mg  2 mg Buccal Q1H PRN Javier Card MD   2 mg at 06/12/24 1333    risperiDONE (risperDAL) tablet 1 mg  1 mg Oral Satya Montez MD   1 mg at 06/12/24 1007    risperiDONE (risperDAL) tablet 4 mg  4 mg Oral At Bedtime Javier Card MD   4 mg at 06/11/24 1938    senna-docusate (SENOKOT-S/PERICOLACE) 8.6-50 MG per tablet 1 tablet  1 tablet Oral BID PRN Javier Card MD         Recent Results (from the past 168 hour(s))   Valproic acid    Collection Time: 06/11/24  9:50 AM   Result Value Ref Range    Valproic acid 92.7   ug/mL   Comprehensive metabolic panel    Collection Time: 06/11/24  9:50 AM   Result Value Ref Range    Sodium 139 135 - 145 mmol/L    Potassium 3.9 3.4 - 5.3 mmol/L    Carbon Dioxide (CO2) 27 22 - 29 mmol/L    Anion Gap 10 7 - 15 mmol/L    Urea Nitrogen 7.8 6.0 - 20.0 mg/dL    Creatinine 0.84 0.67 - 1.17 mg/dL    GFR Estimate >90 >60 mL/min/1.73m2    Calcium 9.6 8.6 - 10.0 mg/dL    Chloride 102 98 - 107 mmol/L    Glucose 125 (H) 70 - 99 mg/dL    Alkaline Phosphatase 51 40 - 150 U/L    AST 12 0 - 45 U/L    ALT 18 0 - 70 U/L    Protein Total 6.4 6.4 - 8.3 g/dL    Albumin 4.2 3.5 - 5.2 g/dL    Bilirubin Total 0.2 <=1.2 mg/dL   Hemoglobin A1c    Collection Time: 06/11/24  9:50 AM   Result Value Ref Range    Hemoglobin A1C 5.4 <5.7 %   Lipid panel    Collection Time: 06/11/24  9:50 AM   Result Value Ref Range    Cholesterol 157 <200 mg/dL    Triglycerides 191 (H) <150 mg/dL    Direct Measure HDL 31 (L) >=40 mg/dL    LDL Cholesterol Calculated 88 <=100 mg/dL    Non HDL Cholesterol 126 <130 mg/dL   CBC with platelets  and differential    Collection Time: 06/11/24  9:50 AM   Result Value Ref Range    WBC Count 4.9 4.0 - 11.0 10e3/uL    RBC Count 4.84 4.40 - 5.90 10e6/uL    Hemoglobin 13.9 13.3 - 17.7 g/dL    Hematocrit 41.4 40.0 - 53.0 %    MCV 86 78 - 100 fL    MCH 28.7 26.5 - 33.0 pg    MCHC 33.6 31.5 - 36.5 g/dL    RDW 12.9 10.0 - 15.0 %    Platelet Count 190 150 - 450 10e3/uL    % Neutrophils 50 %    % Lymphocytes 40 %    % Monocytes 5 %    % Eosinophils 4 %    % Basophils 1 %    % Immature Granulocytes 0 %    NRBCs per 100 WBC 0 <1 /100    Absolute Neutrophils 2.5 1.6 - 8.3 10e3/uL    Absolute Lymphocytes 2.0 0.8 - 5.3 10e3/uL    Absolute Monocytes 0.2 0.0 - 1.3 10e3/uL    Absolute Eosinophils 0.2 0.0 - 0.7 10e3/uL    Absolute Basophils 0.0 0.0 - 0.2 10e3/uL    Absolute Immature Granulocytes 0.0 <=0.4 10e3/uL    Absolute NRBCs 0.0 10e3/uL

## 2024-06-12 NOTE — PLAN OF CARE
Problem: Adult Inpatient Plan of Care  Goal: Plan of Care Review  Description: The Plan of Care Review/Shift note should be completed every shift.  The Outcome Evaluation is a brief statement about your assessment that the patient is improving, declining, or no change.  This information will be displayed automatically on your shift  note.  Outcome: Progressing   Goal Outcome Evaluation:                    Patient slept late into the morning. Woke up and approach writer at the nursing station appearing alert. He requested coffee and writer offered his medications as well. He accepted to take his medications / he was compliant and coffee given to him. No pain noted. Patient anxious during shift. He noted that his anxiety rises with Dr. Card being in his sight. Writer checked his vitals and his HR was 148 bpm and writer reassessed a few minutes after he spoke with the CTC / SW. As Dr. Card was walking by, the patient kept noted his increasing anxiety due to provider's presence and after provider was out of his sight he said his anxiety level was better and his HR dropped from 145-119 bpm. However, his HR was high earlier when Dr. Card was not around-the CTC had mentioned him needing to sign an RENNY was during vitals assessment and his BP at that time was elevated.     Patient has been safe on the unit so far. Patent requested prn hydroxyzine for anxiety and 50 mg was administered. He ha been requesting nicotine gum throughout the shift. His affect is intermittently bright with him laughing and smilng.     Patient requested order to use his phone to take out numbers and do a bank transaction. Writer spoke with Dr. Card and got a one time phone use approval for the bank transaction-see order.   Vitals noted at /89 (BP Location: Left arm, Patient Position: Sitting, Cuff Size: Adult Regular)   Pulse 119   Temp 97.7  F (36.5  C) (Oral)   Resp 20   Wt 90.1 kg (198 lb 11.2 oz)   SpO2 98%       Patient came up  to the nursing station requesting to use his phone as he noted earlier. Writer informed him of provider's new one time patient care order-see order details. He was agreeable with the order and writer asked for a staff to assist and supervise patient use of his phone. Phone taken out of patient's locker, had to charge the phone so patient has to wait. Patient came back up to the nursing station later reporting increasing anxiety and he was offered prn nicotine gum and lavender patch to help as he waits for prn hydroxyzine to be due again. He was educated on the process and use of B52. Will continue to monitor and encourage adjunct methods to help with patient's anxiety level.     Patient requested to use his own clothing / shirt for court tomorrow-provider approved. See order.

## 2024-06-12 NOTE — PROGRESS NOTES
Combination PRN Medication Administration    Medications Administered: Benadryl+Haldol+Ativan combination    What patient symptom(s) led to the administration of these medications?  Agitation, anxiety     What interventions were attempted to avoid use of these medication (including other PRN medications)?   Hydroxyzine (only available PRN, Zyprexa PRN was discontinued earlier today)  Reassurance, support, coping strategies, talking with staff    What were the patient's response(s) to the interventions?   adequate    Provider notified: On Call       Date: 06/11/24        Time: 2046    Jocelin Shepherd RN

## 2024-06-12 NOTE — PLAN OF CARE
Problem: Anxiety Signs/Symptoms  Goal: Improved Mood Symptoms (Anxiety Signs/Symptoms)  Outcome: Not Progressing   Goal Outcome Evaluation:    Pressured loud speech, irritable at times. Social. Endorses high anxiety, given PRN hydroxyzine, which pt reports as being somewhat effective; requesting additional medication to alleviate. Denies depression. Denies A/VH, denies having any thoughts of harming self or others. Generally cooperative. Pt expressed gratitude for care and thanked writer for helping with pt's concerns.    Pt continues to disparage his provider. States that he does not have depression and does not want to take Depakote. States that he does not have psychotic symptoms but takes Risperdal to appease others. States that he is upset that he was taken off Adderall and states that this medication helps him.      Mild tachycardia, . Denies pain. Denies having any acute physical concerns at this time.     BP (!) 133/90 (BP Location: Right arm, Patient Position: Sitting, Cuff Size: Adult Regular)   Pulse 100   Temp 98.3  F (36.8  C) (Temporal)   Resp 20   Wt 90.1 kg (198 lb 11.2 oz)   SpO2 97%

## 2024-06-12 NOTE — PLAN OF CARE
"Approached patient in [facility] to attempt to invite to groups. Patient stated he cannot join right now as he is waiting for a phone call from . Writer offered to provide outside of group activities such as crosswords, word search or coloring pages. Patient replied that \"well since you guys won't give me my ADHS meds, I can't focus on anything. It's torture\". Will continue to invite to groups and encourage participation as appropriate.   "

## 2024-06-13 PROCEDURE — 250N000013 HC RX MED GY IP 250 OP 250 PS 637: Performed by: PSYCHIATRY & NEUROLOGY

## 2024-06-13 PROCEDURE — G0177 OPPS/PHP; TRAIN & EDUC SERV: HCPCS

## 2024-06-13 PROCEDURE — 124N000002 HC R&B MH UMMC

## 2024-06-13 PROCEDURE — H2032 ACTIVITY THERAPY, PER 15 MIN: HCPCS

## 2024-06-13 RX ORDER — NICOTINE 21 MG/24HR
1 PATCH, TRANSDERMAL 24 HOURS TRANSDERMAL DAILY
Status: DISCONTINUED | OUTPATIENT
Start: 2024-06-13 | End: 2024-07-10 | Stop reason: HOSPADM

## 2024-06-13 RX ADMIN — NICOTINE POLACRILEX 2 MG: 2 GUM, CHEWING ORAL at 11:22

## 2024-06-13 RX ADMIN — HYDROXYZINE HYDROCHLORIDE 50 MG: 50 TABLET ORAL at 18:39

## 2024-06-13 RX ADMIN — NICOTINE POLACRILEX 2 MG: 2 GUM, CHEWING ORAL at 18:40

## 2024-06-13 RX ADMIN — NICOTINE POLACRILEX 2 MG: 2 GUM, CHEWING ORAL at 08:47

## 2024-06-13 RX ADMIN — DIPHENHYDRAMINE HYDROCHLORIDE 50 MG: 50 CAPSULE ORAL at 12:30

## 2024-06-13 RX ADMIN — RISPERIDONE 4 MG: 4 TABLET ORAL at 20:20

## 2024-06-13 RX ADMIN — HALOPERIDOL 5 MG: 5 TABLET ORAL at 12:30

## 2024-06-13 RX ADMIN — NICOTINE POLACRILEX 2 MG: 2 GUM, CHEWING ORAL at 10:08

## 2024-06-13 RX ADMIN — RISPERIDONE 1 MG: 1 TABLET, FILM COATED ORAL at 08:28

## 2024-06-13 RX ADMIN — HYDROXYZINE HYDROCHLORIDE 50 MG: 50 TABLET ORAL at 09:27

## 2024-06-13 RX ADMIN — DIVALPROEX SODIUM 1250 MG: 500 TABLET, DELAYED RELEASE ORAL at 20:19

## 2024-06-13 RX ADMIN — NICOTINE 1 PATCH: 21 PATCH, EXTENDED RELEASE TRANSDERMAL at 11:20

## 2024-06-13 RX ADMIN — Medication 3 MG: at 20:21

## 2024-06-13 RX ADMIN — LORAZEPAM 2 MG: 2 TABLET ORAL at 12:30

## 2024-06-13 RX ADMIN — NICOTINE POLACRILEX 2 MG: 2 GUM, CHEWING ORAL at 13:04

## 2024-06-13 ASSESSMENT — ACTIVITIES OF DAILY LIVING (ADL)
LAUNDRY: UNABLE TO COMPLETE
ADLS_ACUITY_SCORE: 29
ORAL_HYGIENE: INDEPENDENT
ADLS_ACUITY_SCORE: 29
DRESS: INDEPENDENT
ADLS_ACUITY_SCORE: 29
HYGIENE/GROOMING: INDEPENDENT
ADLS_ACUITY_SCORE: 29

## 2024-06-13 NOTE — PLAN OF CARE
"Problem: Manic Behavior Episode  Goal: Decreased Manic Symptoms  Outcome: Progressing     Patient was in bed at the start of shift sleeping. He refused his scheduled lab draw this morning stating \" they were being sneaky and tried to take my blood. They thought I was sleeping, but I wasn't and since they are being so desperate, now they can't have my blood\". His insight is poor and his thinking is delusional, and suspicious. While in the [facility] area, writer heard him telling another nurse  \"the court is listening to me through a third party proxy and sometime they can be seen and sometime they can't be seen. Sometime they use a nurse to collect this information and when I talk about it, they classify me as manic\" He is restless observed pacing up and down the hallway. He endorsed anxiety rated 7/10 and received hydroxyzine 50 mg with relief. During this shift patient was visible in the [facility] area, was social with peers and attended group. His affect is labile and mood is irritable, anxious. His speech is hyper verbal and loud. He ate 50% of his breakfast and ate 100% of lunch. He did not complain of pain but endorsed anxiety. At 12:20 pm, patient approached writer and said he feels agitated after receiving a phone call from his , and feels he won't be able to keep it together during his court meeting this afternoon. He requested and received prn benadryl, ativan, haldol, with relief. He denies all other psych symptoms. His clothes are in the washer.     Patient is now committed and he took the new fine.       Combination PRN Medication Administration    Medications Administered: Benadryl+Haldol+Ativan combination    What patient symptom(s) led to the administration of these medications?  Received a phone call from his . Feeling agitated and anxious.      What interventions were attempted to avoid use of these medication (including other PRN medications)?   Hydroxyzine, music and group therapy.     What " "were the patient's response(s) to the interventions?   Patient stated \"this will help me, it usually works great\"     Provider notified: Yes        Date: 06/13/24        Time: 12:27 pm     Mima Kowalski RN         "

## 2024-06-13 NOTE — PLAN OF CARE
BEH IP Unit Acuity Rating Score (UARS)  Patient is given one point for every criteria they meet.    CRITERIA SCORING   On a 72 hour hold, court hold, committed, stay of commitment, or revocation. 1    Patient LOS on BEH unit exceeds 20 days. 0  LOS: 6   Patient under guardianship, 55+, otherwise medically complex, or under age 11. 0   Suicide ideation without relief of precipitating factors. 0   Current plan for suicide. 0   Current plan for homicide. 0   Imminent risk or actual attempt to seriously harm another without relief of factors precipitating the attempt. 1   Severe dysfunction in daily living (ex: complete neglect for self care, extreme disruption in vegetative function, extreme deterioration in social interactions). 1   Recent (last 7 days) or current physical aggression in the ED or on unit. 1     Restraints or seclusion episode in past 72 hours. 1   Recent (last 7 days) or current verbal aggression, agitation, yelling, etc., while in the ED or unit. 1 - Last 6/11   Active psychosis. 1   Need for constant or near constant redirection (from leaving, from others, etc).  1   Intrusive or disruptive behaviors. 1   Patient requires 3 or more hours of individualized nursing care per 8-hour shift (i.e. for ADLs, meds, therapeutic interventions). 1   TOTAL 10

## 2024-06-13 NOTE — PLAN OF CARE
"Team Note Due:  Monday    Assessment/Intervention/Current Symtoms and Care Coordination:  Chart review and no team today.     Pt reports high anxiety ahead of court this afternoon. Pt denies manic symptoms but continues to present as such.     Pt attended court and was given permission by provider to wear a sweater from home for court. This was removed from him after court per PA and placed in wash as pt reported it was malodorous. Pt stated \"court was only 7 minutes\" and stated they will revoke his stay, and change to a full commitment. He says \"I already knew they would do this anyways\". Will await signed  order.     Pt asked if he can return to his group home at discharge and I shared yes aDrline Romo shared he can return once the hospital discharges him. He shared he wanted to call Bree at the group home any ways because he likes talking to him.     Pt has been social in the milieu, often reporting high anxiety and requesting medications for this. Pt does appear less loud, and less disorganized then when he first arrived. He has not been observed shouting, or crying hysterically. His mood appears less labile, mostly reporting \"high anxiety\". He does still have pressured, tangential speech.     When writer was speaking to him, he asked writer to read off [facility] the group home staff's phone number for him that he had written on a piece of paper so he can type it into the phone stating \"It's easier if you read it off\".     Discharge Plan or Goal:  Back to group home:  [facility] [place]   [address] [place] [address]    Barriers to Discharge:  Symptomology - jose     Referral Status:  TBD     Legal Status:  COURT HOLD - notice of intent to revoke stay of commitment   [facility]  File Number: [id number]  Start and expiration date of commitment:     Pt's  is:  Carlos Gonzales  [employer]  Ph:  [phone]    Beth Garza    [address] [place] [address] "  [place] [address]  Phone: [phone]  [email or web address].    Contacts:    [facility]:  Primary Contact: Darline Romo ( for custodial): [phone] - RENNY on file for group home staff  Secondary Contact: Bree (group home ): [phone] RENNY on file for group home staff     :  Chelsea Brennan ([facility]  Ph: [phone] - Commitment CM no RENNY needed   [email or web address]..  [facility]  Ph: [phone]  Christi (member of case management team): [phone]    Medication Management/Psychiatry:  Name/Clinic: [facility] - Pt declines RENNY for Psychiatry  Number: [phone]    Other:  Yolanda (mom): [phone] - Pt declines RENNY for Mom     Upcoming Meetings and Dates/Important Information and next steps:  Commitment process - Next court hearing June 13th at 1:30PM

## 2024-06-13 NOTE — SUMMARY OF CARE
"  Combination PRN Medication Administration    Medications Administered: Benadryl+Haldol+Ativan combination    What patient symptom(s) led to the administration of these medications?  Received a phone call from his . Feeling agitated and anxious.      What interventions were attempted to avoid use of these medication (including other PRN medications)?   Hydroxyzine, music and group therapy.     What were the patient's response(s) to the interventions?   Patient stated \"this will help me, it usually works great\"     Provider notified: Yes        Date: 06/13/24        Time: 12:27 pm     Mima Kowalski RN   "

## 2024-06-13 NOTE — PROGRESS NOTES
"Pt greeted this therapist upon arrival onto the unit, asking to show his artwork from a previous session and sharing about upcoming court.  He wanted to start the group and changes his clothes despite court starting in five minutes.  He was open to coaching on some small time-management and priority setting interventions and then returned 7 minutes after group started moving in and out of the session declaring \"I've got such bad ADHD.\"  He then shared about being \"more honest with my psychiatrist\" about not taking his medications and admitted he was \"feeling grandiose\" without them.  He joined movements in dance/movement therapy (D/MT) that were organizing as well as co-regulated with peers and this therapist in an intermittent manner.         06/13/24 1315   Expressive Therapy   Therapy Type dance/movement   Minutes of Treatment 35       "

## 2024-06-13 NOTE — PLAN OF CARE
Problem: Sleep Disturbance  Goal: Adequate Sleep/Rest  Outcome: Progressing   Goal Outcome Evaluation:    Patient appeared to sleep 6 hours. this night shift.  No prns or snacks given or requested.  No concerns were reported or noted.  Remains on [employer] staffing and has court today at 1330. Ammonia level today. Depakote level tomorrow.

## 2024-06-13 NOTE — PLAN OF CARE
Initial meeting note:    Therapist introduced self to patient and discussed psychotherapy service available to patient.     Pt response: Pt not interested currently in meeting 1:1; therapist will continue remaining available for pt     Plan: Pt was encouraged to attend groups and therapist will remain available for 1:1 sessions    Greeted pt in the milieu where he shared that he has court but can't remember why. Writer reminded him and pt shared some thoughts about it being unfair b/c people are lying about him and that he is afraid to say anything b/c he doesn't want to purger himself. Writer suggested he could write down what he wanted to say and that he has a right to say or not say anything. Pt shared he has anxiety and wants his anxiety meds back. Writer suggested they could review some coping skills. Pt shared he already knows how to square breathe and didn't need to review coping skills. Shortly after this pt shared that he could tell he said too much so she left the area to ask for a PRN from his nurse.

## 2024-06-14 LAB — AMMONIA PLAS-SCNC: 85 UMOL/L (ref 16–60)

## 2024-06-14 PROCEDURE — 36415 COLL VENOUS BLD VENIPUNCTURE: CPT | Performed by: PSYCHIATRY & NEUROLOGY

## 2024-06-14 PROCEDURE — 250N000013 HC RX MED GY IP 250 OP 250 PS 637: Performed by: PSYCHIATRY & NEUROLOGY

## 2024-06-14 PROCEDURE — 124N000002 HC R&B MH UMMC

## 2024-06-14 PROCEDURE — 80164 ASSAY DIPROPYLACETIC ACD TOT: CPT | Performed by: PSYCHIATRY & NEUROLOGY

## 2024-06-14 PROCEDURE — 99232 SBSQ HOSP IP/OBS MODERATE 35: CPT | Performed by: CLINICAL NURSE SPECIALIST

## 2024-06-14 PROCEDURE — 82140 ASSAY OF AMMONIA: CPT | Performed by: PSYCHIATRY & NEUROLOGY

## 2024-06-14 PROCEDURE — G0177 OPPS/PHP; TRAIN & EDUC SERV: HCPCS

## 2024-06-14 PROCEDURE — 250N000013 HC RX MED GY IP 250 OP 250 PS 637: Performed by: CLINICAL NURSE SPECIALIST

## 2024-06-14 RX ORDER — DIPHENHYDRAMINE HCL 50 MG
50 CAPSULE ORAL
Status: DISCONTINUED | OUTPATIENT
Start: 2024-06-14 | End: 2024-07-10 | Stop reason: HOSPADM

## 2024-06-14 RX ORDER — OLANZAPINE 10 MG/1
10 TABLET, ORALLY DISINTEGRATING ORAL ONCE
Status: COMPLETED | OUTPATIENT
Start: 2024-06-14 | End: 2024-06-14

## 2024-06-14 RX ORDER — GABAPENTIN 100 MG/1
200 CAPSULE ORAL 3 TIMES DAILY PRN
Status: DISCONTINUED | OUTPATIENT
Start: 2024-06-14 | End: 2024-07-10 | Stop reason: HOSPADM

## 2024-06-14 RX ADMIN — NICOTINE POLACRILEX 2 MG: 2 GUM, CHEWING ORAL at 18:53

## 2024-06-14 RX ADMIN — NICOTINE POLACRILEX 2 MG: 2 GUM, CHEWING ORAL at 12:53

## 2024-06-14 RX ADMIN — DIPHENHYDRAMINE HYDROCHLORIDE 50 MG: 50 CAPSULE ORAL at 21:03

## 2024-06-14 RX ADMIN — HALOPERIDOL 5 MG: 5 TABLET ORAL at 11:31

## 2024-06-14 RX ADMIN — RISPERIDONE 4 MG: 4 TABLET ORAL at 19:56

## 2024-06-14 RX ADMIN — NICOTINE POLACRILEX 2 MG: 2 GUM, CHEWING ORAL at 14:11

## 2024-06-14 RX ADMIN — NICOTINE 1 PATCH: 21 PATCH, EXTENDED RELEASE TRANSDERMAL at 08:07

## 2024-06-14 RX ADMIN — DIVALPROEX SODIUM 1250 MG: 500 TABLET, DELAYED RELEASE ORAL at 19:55

## 2024-06-14 RX ADMIN — LORAZEPAM 2 MG: 2 TABLET ORAL at 11:31

## 2024-06-14 RX ADMIN — Medication 3 MG: at 19:56

## 2024-06-14 RX ADMIN — HYDROXYZINE HYDROCHLORIDE 50 MG: 50 TABLET ORAL at 09:05

## 2024-06-14 RX ADMIN — RISPERIDONE 1 MG: 1 TABLET, FILM COATED ORAL at 08:02

## 2024-06-14 RX ADMIN — OLANZAPINE 10 MG: 10 TABLET, ORALLY DISINTEGRATING ORAL at 21:03

## 2024-06-14 RX ADMIN — NICOTINE POLACRILEX 2 MG: 2 GUM, CHEWING ORAL at 08:22

## 2024-06-14 RX ADMIN — NICOTINE POLACRILEX 2 MG: 2 GUM, CHEWING ORAL at 10:36

## 2024-06-14 RX ADMIN — NICOTINE POLACRILEX 2 MG: 2 GUM, CHEWING ORAL at 17:43

## 2024-06-14 RX ADMIN — NICOTINE POLACRILEX 2 MG: 2 GUM, CHEWING ORAL at 09:23

## 2024-06-14 RX ADMIN — GABAPENTIN 200 MG: 100 CAPSULE ORAL at 17:43

## 2024-06-14 RX ADMIN — DIPHENHYDRAMINE HYDROCHLORIDE 50 MG: 50 CAPSULE ORAL at 11:31

## 2024-06-14 ASSESSMENT — ACTIVITIES OF DAILY LIVING (ADL)
ADLS_ACUITY_SCORE: 29
DRESS: INDEPENDENT;SCRUBS (BEHAVIORAL HEALTH)
ADLS_ACUITY_SCORE: 29
HYGIENE/GROOMING: INDEPENDENT
ORAL_HYGIENE: INDEPENDENT
ADLS_ACUITY_SCORE: 29

## 2024-06-14 NOTE — CARE PLAN
"  Rehab Group    Start time: 1030  End time: 1115  Patient time total: 45 minutes    attended full group     #3 attended   Group Type: general health and coping and life skills   Group Topic Covered: balanced lifestyle and coping skills     Group Session Detail:  Pt attended a self reflection OT discussion.  Participants were guided through discussion questions that reflected on the their challenges, lessons, mood, gratitude, and what they wanted to remember about this day/this period of time.     Patient Response/Contribution:  actively engaged     Patient Detail:    Pt initially needed much redirection as he was engaged in his own conversation with group members.  Pt proceeded to tell a story about his main support identified as his \"Godmother\", the mother of someone whom he met over the internet.  Pt shares a recent success is being able to mentally make the ads skip on [employer] without having a paid subscription.  Pt was frustrated with court before it occurred as he felt \"they already have their minds made up, it doesn't matter what I say or do!\"        Train & Education Service Per Session 45 + Minutes () OT Group code    Patient Active Problem List   Diagnosis    Bipolar disorder (H)    ADHD (attention deficit hyperactivity disorder)    Bipolar I disorder with jose (H)       "

## 2024-06-14 NOTE — PLAN OF CARE
Initial meeting note:    Therapist introduced self to patient and discussed psychotherapy service available to patient.     Pt response: Pt not interested currently in meeting 1:1; therapist will continue remaining available for pt     Plan: Pt was encouraged to attend groups and therapist will remain available for 1:1 sessions    Pt not interested in meeting but briefly told me in the hallway that his court went well, he was happy to have to listen to the reasons the in the petition that for the commitment b/c he has a different understanding of what happened. Pt asked how many days before he can discharge. Writer encouraged pt to work that out with his provider. Pt has pressured speech and and intensity to his anxious mood.

## 2024-06-14 NOTE — PROVIDER NOTIFICATION
06/14/24 0657   Sleep/Rest   Sleep/Rest/Relaxation no problem identified   Sleep Hygiene Promotion noise level reduced;regular sleep pattern promoted;room lighting adjusted   Night Time # Hours 6.5 hours     Assumed care of patient at 1130 pm, in bed sleeping without any s/s distress.Breathing easy,regular non-labored.No behavioral issues noted.No signs of SI,HI,SIB or psychosis noted.15 minute safety checks maintained as per policy.Nursing will continue to monitor as per plan of care.Slept all night for 6.5 hours. Pending labs for valproric acid level, ammonia and urine drug screen this AM.

## 2024-06-14 NOTE — CARE PLAN
"  Rehab Group    Start time: 1030  End time: 1115  Patient time total: 45 minutes    attended full group     #3 attended   Group Type: occupational therapy   Group Topic Covered: coping skills     Group Session Detail:  Self awareness discussion questions     Patient Response/Contribution:  cooperative with task, supportive of peers, expressed understanding of topic, actively engaged, distracted , and interrupted others/ frequently     Patient Detail:    Pt was mainly focused on his desire to discharge, and was under the belief (his own belief) that by requesting a new provider, he would likely be given a discharge date when meeting with him today.  Pt interrupts frequently to give basically unrelated feedback to peers - continued to reiterate telling a peer all his problems would be solved by a [employer] job selling cell phones.  Pt was upset at lunch time when he did not get a full meal - claims \"the cafeteria hates me, just as I thought\" though did not want to listen to writer when I explained I encourged him yesterday to choose sides, or they would not send him any.  Pt replies, \"no, if you don't pick them, they'll pick them for you\".  I continued to explain they only send patients a whole tray if nothing is circled on the menu, though pt only argued \"no , they're against me!\"        Train & Education Service Per Session 45 + Minutes () OT Group code    Patient Active Problem List   Diagnosis    Bipolar disorder (H)    ADHD (attention deficit hyperactivity disorder)    Bipolar I disorder with jose (H)       "

## 2024-06-14 NOTE — PLAN OF CARE
"Team Note Due:  Monday    Assessment/Intervention/Current Symtoms and Care Coordination:  Chart review and no team today.     Pt now switched providers to [facility].    Pt was observed on the phone smiling and laughing loudly. He greeted writer. Awaiting signed commitment order. I met with pt and he shared that he still hasn't met with his new provider. I shared that provider is probably busy and will get to him when he is able. Pt states \"He doesn't look busy!\". Pt states he is upset because his lunch tray was 'screwed up'. Pt states he was so hungry he ate it anyway. I asked if he was getting the right tray and he shared he was getting some additional food sent to him. He was able to process through this with writer. Exhibited pressured, rapid speech.     Discharge Plan or Goal:  Back to group home:  [facility]   [address] [place] [address]    Barriers to Discharge:  Symptomology - jose     Referral Status:  TBD     Legal Status:  COURT HOLD - notice of intent to revoke stay of commitment   County: [facility]  File Number: [id number]  Start and expiration date of commitment:     Pt's  is:  Carlos Gonzales  [employer]  Ph:  [phone]    Beth Garza    [address] [place] [address]  Phone: [phone]  [email or web address].    Contacts:    [facility] [place]:  Primary Contact: Darline Romo ( for intermediate): [phone] - RENNY on file for group home staff  Secondary Contact: Bree (group home ): [phone] RENNY on file for group home staff     :  Chelsea Brennan ([facility]  Ph: [phone] - Commitment CM no RENNY needed   [email or web address]  [facility]  Ph: [phone]  Christi (member of case management team): [phone]    Medication Management/Psychiatry:  Name/Clinic: [facility]  Number: [phone] " [phone]    Other:  Yolanda (mom): [phone] - Pt declines RENNY for Mom     Upcoming Meetings and Dates/Important Information and next steps:  Will need discharge appt for psychiatry prior to leaving  COS and PD

## 2024-06-14 NOTE — PLAN OF CARE
Pt is withdrawn and isolative to self. presented with flat  affect but  brighten up with approach  No harm and assault  to self and others noted or reported this shift.   Pt denies  SI/HI, delusions, and hallucination,discomfort  and pain   Pt is medication compliant and contracted for safety.   Pt is cooperative with cares and behaviorally controlled this shift.  PRN utilized: Atarax  for anxiety 6/10 and melatonin for sleep.     Problem: Adult Inpatient Plan of Care  Goal: Plan of Care Review  Description: The Plan of Care Review/Shift note should be completed every shift.  The Outcome Evaluation is a brief statement about your assessment that the patient is improving, declining, or no change.  This information will be displayed automatically on your shift  note.  Outcome: Progressing     Problem: Manic Behavior Episode  Goal: Decreased Manic Symptoms  Outcome: Progressing   Goal Outcome Evaluation:

## 2024-06-14 NOTE — PLAN OF CARE
"  Problem: Adult Behavioral Health Plan of Care  Goal: Plan of Care Review  Outcome: Progressing  Flowsheets (Taken 6/14/2024 1050)  Patient Agreement with Plan of Care: (\" I want to do anything to help me get out of here. Do you think having my lab drawn will get me out of here quicker?\") agrees with comment (describe)     Problem: Psychotic Signs/Symptoms  Goal: Improved Mood Symptoms (Psychotic Signs/Symptoms)  Outcome: Progressing  Intervention: Optimize Emotion and Mood  Recent Flowsheet Documentation  Taken 6/14/2024 1050 by Chana Downey RN  Diversional Activity: television   Goal Outcome Evaluation:    Plan of Care Reviewed With: patient      When approached about his Lab refusal, he asked, \" Do you think having my Lab drawn will get me out of here quicker? I want to do anything to help me get out of here.\"The  will come later. He was loud and hyperverbal most of the time; however, his speech got loud and tense when he requested Benadryl, Haldol, and Ativan(B52) because of high anxiety. The author attempted to tell him that it was too soon for his Vistaril. \" You mean, I don't have an order for Benadryl and something else. I am agitated.\"  With Provider Junior's permission, he received the B52 for agitation. He had an outburst of crying after talking to his provider, probably over discharging issues. He denied SI/HI, AVHs, and pain but endorsed anxiety and depression. He ate a double portion per his request. He states sleeping and using the restroom were not a concern. He spent most of his day out of his room pacing and socializing with his peers. He asked for gum frequently. VSS except the HR was 115               "

## 2024-06-14 NOTE — CARE PLAN
Rehab Group    Start time: 1130  End time: 1215  Patient time total: 45 minutes    attended full group     #3 attended   Group Type: OT Clinic   Group Topic Covered: coping skills       Group Session Detail:  The purpose of occupational therapy clinic is to facilitate coping skill exploration, creative expression within personally meaningful activities, and clinical observation of social, cognitive, and kinesthetic performance skills.      Patient Response/Contribution:  positive affect, cooperative with task, attentive, and actively engaged       Patient Detail:    Pt expressed an interest in watercolor painting, and after being set up with supplies, painted a cartoon character of a benites while socializing with peers.  Conversation was a bit loose at times between all 3 peers, talking about artificial intelligence being not so artificial, though they all seemed to enjoy each others company.  Pt enjoyed selecting music and sharing with peers.          Train & Education Service Per Session 45 + Minutes () OT Group code    Patient Active Problem List   Diagnosis    Bipolar disorder (H)    ADHD (attention deficit hyperactivity disorder)    Bipolar I disorder with jose (H)

## 2024-06-14 NOTE — PROGRESS NOTES
"Expand All Collapse All    ncomplete         The patient's care was discussed with the treatment team during the daily team meeting and/or staff's chart notes were reviewed.  Staff report  Pt slept 6.5 hours overnight.     Per Evening Staff report: Pt is withdrawn and isolative to self. presented with flat  affect but  brighten up with approach  No harm and assault  to self and others noted or reported this shift.   Pt denies  SI/HI, delusions, and hallucination,discomfort  and pain   Pt is medication compliant and contracted for safety.   Pt is cooperative with cares and behaviorally controlled this shift.  PRN utilized: Atarax  for anxiety 6/10 and melatonin for sleep.    Per CTC report  Pt reports high anxiety ahead of court this afternoon. Pt denies manic symptoms but continues to present as such.      Pt attended court and was given permission by provider to wear a sweater from home for court. This was removed from him after court per PA and placed in wash as pt reported it was malodorous. Pt stated \"court was only 7 minutes\" and stated they will revoke his stay, and change to a full commitment. He says \"I already knew they would do this anyways\". Will await signed  order.      Pt asked if he can return to his group home at discharge and I shared yes Darline Romo shared he can return once the hospital discharges him. He shared he wanted to call [facility] at the group home any ways because he likes talking to him.      Pt has been social in the milieu, often reporting high anxiety and requesting medications for this. Pt does appear less loud, and less disorganized then when he first arrived. He has not been observed shouting, or crying hysterically. His mood appears less labile, mostly reporting \"high anxiety\". He does still have pressured, tangential speech.      When writer was speaking to him, he asked writer to read off [facility] the group home staff's phone number for him that he had written on a piece of " "paper so he can type it into the phone stating \"It's easier if you read it off\".     Upon interview, the patient was initially calm and cooperative with this assessment, he shared that he was doing okay and that his mood is good.  Patient mentioned that he had ADHD but did not want to talk about it since not to be done in the hospital regarding his ADHD medications, he stated there is no use talking about it.  Patient mentioned that he does not like Depakote as it appears to big to swallow though he reported being on Depakote for a long time.  More over patient does not like blood draw that accompany the use of  Depakote.    Patient soon became paranoid and irritable after he mentioned that this provider discharge him and let him go home.  Patient shared that he has life outside of the hospital, reported that he is in school and needed to have his school projects done. Patient hinted he is doing online school.  He became more infuriated when this writer told him that he does not know him enough to effect his discharge today, reminding the patient that we are meeting for the first time and that this provider needed more time to go to his record and understand him better.  Patient is controlled this as writer denying going through his records.  Writer clarified the statement and validated patient's concerns but this does not pacify the patient as he stated \"I do not want to talk anymore.\"    Patient went out of the room, had a meltdown in front of the nursing station, sat down on the floor crying patient was later approached by a staff who persuaded him to cooperate with this provider.  Subsequently, the patient approached this provider, apologized and requested if we could continue where we stopped.  Patient became more cooperative, relaxed and much more comfortable with the assessment.  Writer informed the patient to feel free and be open-minded, asked what ever question or request he has and also not feel compelled " to answer any questions.his uncomfortable answering.    Patient shared that he loves video game, and nature, he mentioned that he needed a more potent antianxiety medication other than hydroxyzine, when writer asked what medication does he have in mind, patient responded AtivanHenriqueadryl, Haldol (B-52).  Writer educated the patient that the medication he is requesting for is strictly for agitation.  Patient was amenable to other as needed for anxiety, writer proposed gabapentin and patient was receptive    Patient also shared that he struggles with attention but he is concerned is that we should not discontinue his PTA medications.  Writer validated his concern and assured that this prior to admit medications would not be discontinued.  Patient denied suicidal/homicidal ideations, he denied auditory/visual hallucinations and thoughts of self-harm              General appearance: fair  Alert.   Affect: good, labile.  Cried after meeting with me, later he approached to continue the interview  Mood: Labile  Speech: Normal  Eye contact:  good.    Psychomotor behavior: normal  Gait: steady   Abnormal movements:  none  Delusions: more of paranoia  Hallucinations:  denied  Thoughts: circumstantial  Associations: intact  Judgement: poor  Insight: poor  Cognitions: intact in conversation  Memory:  intact in conversation  Orientation: normal    Not suicidal.     Imp:  Bipolar jose  2.  ADHD  3.  Depakote ;level is 92, will reduce dosage     Current Facility-Administered Medications             Current Facility-Administered Medications   Medication Dose Route Frequency Provider Last Rate Last Admin    acetaminophen (TYLENOL) tablet 650 mg  650 mg Oral Q4H PRN Javier Card MD        alum & mag hydroxide-simethicone (MAALOX) suspension 30 mL  30 mL Oral Q4H PRN Javier Card MD        benztropine (COGENTIN) tablet 1 mg  1 mg Oral BID PRN Javier Card MD        haloperidol (HALDOL) tablet 5 mg  5 mg Oral Q8H PRN  Satya Garza MD   5 mg at 06/11/24 2042     And    LORazepam (ATIVAN) tablet 2 mg  2 mg Oral Q8H PRN Satya Garza MD   2 mg at 06/11/24 2042     And    diphenhydrAMINE (BENADRYL) capsule 50 mg  50 mg Oral Q8H PRN Satya Garza MD   50 mg at 06/11/24 2042    haloperidol lactate (HALDOL) injection 5 mg  5 mg Intramuscular Q8H PRN Satya Garza MD         And    LORazepam (ATIVAN) injection 2 mg  2 mg Intramuscular Q8H PRN Satya Garza MD         And    diphenhydrAMINE (BENADRYL) injection 50 mg  50 mg Intramuscular Q8H PRN Satya Garza MD        divalproex sodium delayed-release (DEPAKOTE) DR tablet 1,500 mg  1,500 mg Oral At Bedtime Javier Card MD   1,500 mg at 06/11/24 1937    hydrOXYzine HCl (ATARAX) tablet 50 mg  50 mg Oral TID PRN Javier Card MD   50 mg at 06/12/24 1124    melatonin tablet 3 mg  3 mg Oral At Bedtime PRN Javier Card MD   3 mg at 06/10/24 2014    nicotine (NICORETTE) gum 2 mg  2 mg Buccal Q1H PRN Javier Card MD   2 mg at 06/12/24 1333    risperiDONE (risperDAL) tablet 1 mg  1 mg Oral Satya Montez MD   1 mg at 06/12/24 1007    risperiDONE (risperDAL) tablet 4 mg  4 mg Oral At Bedtime Javier Card MD   4 mg at 06/11/24 1938    senna-docusate (SENOKOT-S/PERICOLACE) 8.6-50 MG per tablet 1 tablet  1 tablet Oral BID PRN Javier Card MD             Recent Results[]Expand by Default         Recent Results (from the past 168 hour(s))   Valproic acid     Collection Time: 06/11/24  9:50 AM   Result Value Ref Range     Valproic acid 92.7   ug/mL   Comprehensive metabolic panel     Collection Time: 06/11/24  9:50 AM   Result Value Ref Range     Sodium 139 135 - 145 mmol/L     Potassium 3.9 3.4 - 5.3 mmol/L     Carbon Dioxide (CO2) 27 22 - 29 mmol/L     Anion Gap 10 7 - 15 mmol/L     Urea Nitrogen 7.8 6.0 - 20.0 mg/dL     Creatinine 0.84 0.67 - 1.17 mg/dL     GFR Estimate >90 >60 mL/min/1.73m2     Calcium 9.6 8.6 - 10.0 mg/dL      Chloride 102 98 - 107 mmol/L     Glucose 125 (H) 70 - 99 mg/dL     Alkaline Phosphatase 51 40 - 150 U/L     AST 12 0 - 45 U/L     ALT 18 0 - 70 U/L     Protein Total 6.4 6.4 - 8.3 g/dL     Albumin 4.2 3.5 - 5.2 g/dL     Bilirubin Total 0.2 <=1.2 mg/dL   Hemoglobin A1c     Collection Time: 06/11/24  9:50 AM   Result Value Ref Range     Hemoglobin A1C 5.4 <5.7 %   Lipid panel     Collection Time: 06/11/24  9:50 AM   Result Value Ref Range     Cholesterol 157 <200 mg/dL     Triglycerides 191 (H) <150 mg/dL     Direct Measure HDL 31 (L) >=40 mg/dL     LDL Cholesterol Calculated 88 <=100 mg/dL     Non HDL Cholesterol 126 <130 mg/dL   CBC with platelets and differential     Collection Time: 06/11/24  9:50 AM   Result Value Ref Range     WBC Count 4.9 4.0 - 11.0 10e3/uL     RBC Count 4.84 4.40 - 5.90 10e6/uL     Hemoglobin 13.9 13.3 - 17.7 g/dL     Hematocrit 41.4 40.0 - 53.0 %     MCV 86 78 - 100 fL     MCH 28.7 26.5 - 33.0 pg     MCHC 33.6 31.5 - 36.5 g/dL     RDW 12.9 10.0 - 15.0 %     Platelet Count 190 150 - 450 10e3/uL     % Neutrophils 50 %     % Lymphocytes 40 %     % Monocytes 5 %     % Eosinophils 4 %     % Basophils 1 %     % Immature Granulocytes 0 %     NRBCs per 100 WBC 0 <1 /100     Absolute Neutrophils 2.5 1.6 - 8.3 10e3/uL     Absolute Lymphocytes 2.0 0.8 - 5.3 10e3/uL     Absolute Monocytes 0.2 0.0 - 1.3 10e3/uL     Absolute Eosinophils 0.2 0.0 - 0.7 10e3/uL     Absolute Basophils 0.0 0.0 - 0.2 10e3/uL     Absolute Immature Granulocytes 0.0 <=0.4 10e3/uL     Absolute NRBCs 0.0 10e3/uL           Changes made today: 6/14/2024  Gabapentin 200 mg 3 times daily as needed for anxiety.       Attestation:   Patient has been seen and evaluated by me, Mariano Pacheco, DNP, APRN CNP  The patient was counseled on nature of illness and treatment plan/options  Care was coordinated with treatment team  Total time > 30 minutes

## 2024-06-14 NOTE — PLAN OF CARE
Visible in milieu. Social, pleasant. Denies A/VH, SI/HI. Denies depression. Endorses high anxiety related to being in hospital and lack of clarity regarding discharge.     Pt stated that he was very agitated and that he would like to have zyprexa to calm him down. Spoke with provider and subsequently ordered a one-time dose of 10mg Zyprexa.    Given PRN melatonin for sleep. Later given PRN benadryl for sleep. Given PRN gabapentin for anxiety, which pt reports was somewhat effective.     Took scheduled medication without incident.     No physical concerns voiced at this time.     Tachycardic, HR of 111, upon recheck, HR of 101    /77 (BP Location: Left arm, Patient Position: Sitting, Cuff Size: Adult Large)   Pulse 101   Temp 97.6  F (36.4  C) (Oral)   Resp 16   Wt 90.1 kg (198 lb 11.2 oz)   SpO2 98%

## 2024-06-14 NOTE — PLAN OF CARE
BEH IP Unit Acuity Rating Score (UARS)  Patient is given one point for every criteria they meet.    CRITERIA SCORING   On a 72 hour hold, court hold, committed, stay of commitment, or revocation. 1    Patient LOS on BEH unit exceeds 20 days. 0  LOS: 7   Patient under guardianship, 55+, otherwise medically complex, or under age 11. 0   Suicide ideation without relief of precipitating factors. 0   Current plan for suicide. 0   Current plan for homicide. 0   Imminent risk or actual attempt to seriously harm another without relief of factors precipitating the attempt. 0   Severe dysfunction in daily living (ex: complete neglect for self care, extreme disruption in vegetative function, extreme deterioration in social interactions). 1   Recent (last 7 days) or current physical aggression in the ED or on unit. 1     Restraints or seclusion episode in past 72 hours. 1   Recent (last 7 days) or current verbal aggression, agitation, yelling, etc., while in the ED or unit. 1 - Last 6/11   Active psychosis. 1   Need for constant or near constant redirection (from leaving, from others, etc).  0   Intrusive or disruptive behaviors. 0   Patient requires 3 or more hours of individualized nursing care per 8-hour shift (i.e. for ADLs, meds, therapeutic interventions). 0   TOTAL 6

## 2024-06-14 NOTE — CARE PLAN
Rehab Group    Start time: 1115   End time: 1200  Patient time total: 45 minutes    attended partial group     #3 attended   Group Type: OT Clinic   Group Topic Covered: coping skills     Group Session Detail:  The purpose of occupational therapy clinic is to facilitate coping skill exploration, creative expression within personally meaningful activities, and clinical observation of social, cognitive, and kinesthetic performance skills.      Patient Response/Contribution:  positive affect, cooperative with task, and worked intermittently     Patient Detail:    Pt had some difficulty focusing on activity, in and out of group a few times, unsure what he wanted to do - evenutally decided to do watercolor again, though lost interest fairly quickly.  Enjoyed selecting and listening to music with peers.        Train & Education Service Per Session 45 + Minutes () OT Group code    Patient Active Problem List   Diagnosis    Bipolar disorder (H)    ADHD (attention deficit hyperactivity disorder)    Bipolar I disorder with jose (H)

## 2024-06-15 LAB — VALPROATE SERPL-MCNC: 75.3 UG/ML

## 2024-06-15 PROCEDURE — 124N000002 HC R&B MH UMMC

## 2024-06-15 PROCEDURE — 250N000013 HC RX MED GY IP 250 OP 250 PS 637: Performed by: CLINICAL NURSE SPECIALIST

## 2024-06-15 PROCEDURE — 250N000013 HC RX MED GY IP 250 OP 250 PS 637: Performed by: PSYCHIATRY & NEUROLOGY

## 2024-06-15 RX ADMIN — DIVALPROEX SODIUM 1250 MG: 500 TABLET, DELAYED RELEASE ORAL at 21:18

## 2024-06-15 RX ADMIN — NICOTINE POLACRILEX 2 MG: 2 GUM, CHEWING ORAL at 10:02

## 2024-06-15 RX ADMIN — RISPERIDONE 1 MG: 1 TABLET, FILM COATED ORAL at 08:53

## 2024-06-15 RX ADMIN — GABAPENTIN 200 MG: 100 CAPSULE ORAL at 21:18

## 2024-06-15 RX ADMIN — NICOTINE POLACRILEX 2 MG: 2 GUM, CHEWING ORAL at 15:40

## 2024-06-15 RX ADMIN — HYDROXYZINE HYDROCHLORIDE 50 MG: 50 TABLET ORAL at 14:11

## 2024-06-15 RX ADMIN — NICOTINE 1 PATCH: 21 PATCH, EXTENDED RELEASE TRANSDERMAL at 08:53

## 2024-06-15 RX ADMIN — HYDROXYZINE HYDROCHLORIDE 50 MG: 50 TABLET ORAL at 11:43

## 2024-06-15 RX ADMIN — NICOTINE POLACRILEX 2 MG: 2 GUM, CHEWING ORAL at 11:13

## 2024-06-15 RX ADMIN — NICOTINE POLACRILEX 2 MG: 2 GUM, CHEWING ORAL at 19:53

## 2024-06-15 RX ADMIN — NICOTINE POLACRILEX 2 MG: 2 GUM, CHEWING ORAL at 21:18

## 2024-06-15 RX ADMIN — NICOTINE POLACRILEX 2 MG: 2 GUM, CHEWING ORAL at 14:11

## 2024-06-15 RX ADMIN — HYDROXYZINE HYDROCHLORIDE 50 MG: 50 TABLET ORAL at 17:23

## 2024-06-15 RX ADMIN — NICOTINE POLACRILEX 2 MG: 2 GUM, CHEWING ORAL at 08:57

## 2024-06-15 RX ADMIN — NICOTINE POLACRILEX 2 MG: 2 GUM, CHEWING ORAL at 12:19

## 2024-06-15 RX ADMIN — RISPERIDONE 4 MG: 4 TABLET ORAL at 21:18

## 2024-06-15 RX ADMIN — GABAPENTIN 200 MG: 100 CAPSULE ORAL at 13:28

## 2024-06-15 ASSESSMENT — ACTIVITIES OF DAILY LIVING (ADL)
ADLS_ACUITY_SCORE: 29
HYGIENE/GROOMING: INDEPENDENT
HYGIENE/GROOMING: INDEPENDENT
ORAL_HYGIENE: INDEPENDENT
ADLS_ACUITY_SCORE: 29
ORAL_HYGIENE: INDEPENDENT
ADLS_ACUITY_SCORE: 29
DRESS: INDEPENDENT
ADLS_ACUITY_SCORE: 29
DRESS: INDEPENDENT
ADLS_ACUITY_SCORE: 29
ADLS_ACUITY_SCORE: 29

## 2024-06-15 NOTE — PLAN OF CARE
"Milieu Participation/Behavior:  Alert and active in the milieu. He is social with select staff and peers. Enjoys music and listens to headphones.   He continues to be loud at times, with pressured and tangential speech. He interjects himself into others conversations. Demonstrates very labile mood.   Pt was reportedly very rude to the OT staff during group, as he has been demanding to listen to music on and IPAD which wasn't able to be accommodated. After OT group, pt c/o elevated anxiety and demanded specifically for Benadryl/Ativan/Haldol prn. He was amenable to using prn Hydroxyzine first as he did not present as acutely agitated. He continues to seek prn Benadryl/ativan/Haldol throughout the shift and becomes argumentative and demonstrating manipulative behaviors. Pt is making threats to escalate his behavior in order to get Benadry/Ativan/Haldol. He was heard on the phone stating, \"As I understand it, they (staff) don't care that I am uncomfortable. I guess I will just have to flip the fuck out.\" (Just prior to this time pt was observed laughing and watching a movie). He is not receptive of non pharmalogical methods of coping with anxiety.   Provider on call paged with request to consider an alternative prn or increased dose for pt to utilize.     States he was brought to the hospital just because he called 911 to make a missing persons report about his brother.   He spoke about his hobbies at home and says that he likes to play video games and be on the internet.     Safety:  No acute safety concerns.     SI/Self harm:   Denies SI or NSSI    Aggression/agitation/HI:   Verbal threats to escalate behavior in order to obtain prn medications    AVH:   Denies hallucinations. States \"the only issues with my psyche happen at night.\"    Affect:  Labile    Mood:  \"Good\", \"bored\"  \"Anxious\"        PRN Med:  1143-Hydroxyzine-pt reports good efficacy, but within 20 minutes of administration pt was asking again for oral " "Benadryl/Ativan/Haldol. Pt sarcastically says, \"I guess Ill just have to do the best I can, or get agitated enough for you to give it to me.\"  1328-prn Gabapentin  1411-PRN Hydroxyzine    -Nicotine gum     Medication AE:   Per pt- \"Do not give me any more zyprexa ever again. It gave me this crazy feeling in my toe and like lightning strikes of neurons on the left side of my head.\"     I & O:  Eating and hydrating well. No concerns    Sleep:   Reportedly slept 5 hours overnight. No concerns per pt    ADLs:  Independent    Physical Complaints/Issues:   Elevated ammonia  (85)    Vitals:    Refused VS assessment.     Problem: Adult Behavioral Health Plan of Care  Goal: Plan of Care Review  Outcome: Progressing  Flowsheets (Taken 6/15/2024 1385)  Patient Agreement with Plan of Care: agrees     Problem: Manic Behavior Episode  Goal: Decreased Manic Symptoms  Outcome: Progressing   Goal Outcome Evaluation:    Plan of Care Reviewed With: patient                   "

## 2024-06-15 NOTE — PLAN OF CARE
Problem: Sleep Disturbance  Goal: Adequate Sleep/Rest  Outcome: Progressing   Goal Outcome Evaluation:    Patient appeared to sleep 5 hours this night shift.  No prns or snacks given or requested.  No concerns were reported or noted.  Remains on Acuity staffing at this time.

## 2024-06-15 NOTE — PLAN OF CARE
"  Rehab Group    Start time: 11:15  End time: 12:10  Patient time total: 5-10 minutes    attended partial group    #2 attended   Group Type: occupational therapy   Group Topic Covered: balanced lifestyle, cognitive activities, emotional regulation, healthy leisure time, and social skills     Group Session Detail:  Pt passively observed a structured occupational therapy group with a focus on facilitating social and leisure engagement. This occupational therapy group was facilitated in order to: foster relaxation, explore leisure opportunities, cope with stress, decrease isolation/improve social skills, and exercise overall cognitive skills.     Patient Response/Contribution:  left group on several occasions, interrupted others/ frequently, appeared frustrated, became angry or agitated, and disruptive to the group     Patient Detail:  Upon writer's entry to the [facility] area, patient approached writer and demanded to listen to music via an Ipad. Writer was unable to accommodate patient's request at this time due to the inaccessibility of an Ipad and the fact that there was to be an organized group beginning. Patient verbalized, \"We just want to talk and listen to the Ipad, that's all we want to do...so no one is going to come to your group.\" Patient spoke in a very loud, tense and elevated manner. Patient appearing increasingly frustrated and agitated during this interaction. Patient did not appear accepting or receptive to writer's responses.   Eventually, patient appeared more regulated and calm and approached writer to thank her for being here on the weekend. Patient appeared more accepting of boundaries and guidelines. Patient sat within the dynamic of group; however, opted to passively observe the group activity (briefly). Patient reported increased feelings of anxiety and left to speak with his nurse. Patient remained restless, coming in/out of group area for the remainder of group.       Patient " Active Problem List   Diagnosis    Bipolar disorder (H)    ADHD (attention deficit hyperactivity disorder)    Bipolar I disorder with jose (H)

## 2024-06-16 PROCEDURE — 250N000013 HC RX MED GY IP 250 OP 250 PS 637: Performed by: PSYCHIATRY & NEUROLOGY

## 2024-06-16 PROCEDURE — 124N000002 HC R&B MH UMMC

## 2024-06-16 PROCEDURE — 250N000013 HC RX MED GY IP 250 OP 250 PS 637: Performed by: CLINICAL NURSE SPECIALIST

## 2024-06-16 RX ADMIN — DIPHENHYDRAMINE HYDROCHLORIDE 50 MG: 50 CAPSULE ORAL at 22:19

## 2024-06-16 RX ADMIN — NICOTINE POLACRILEX 4 MG: 4 GUM, CHEWING BUCCAL at 21:05

## 2024-06-16 RX ADMIN — GABAPENTIN 200 MG: 100 CAPSULE ORAL at 12:15

## 2024-06-16 RX ADMIN — Medication 3 MG: at 23:59

## 2024-06-16 RX ADMIN — NICOTINE 1 PATCH: 21 PATCH, EXTENDED RELEASE TRANSDERMAL at 07:56

## 2024-06-16 RX ADMIN — HYDROXYZINE HYDROCHLORIDE 50 MG: 50 TABLET ORAL at 10:52

## 2024-06-16 RX ADMIN — HYDROXYZINE HYDROCHLORIDE 50 MG: 50 TABLET ORAL at 18:43

## 2024-06-16 RX ADMIN — GABAPENTIN 200 MG: 100 CAPSULE ORAL at 23:59

## 2024-06-16 RX ADMIN — NICOTINE POLACRILEX 4 MG: 4 GUM, CHEWING BUCCAL at 19:38

## 2024-06-16 RX ADMIN — SENNOSIDES AND DOCUSATE SODIUM 1 TABLET: 50; 8.6 TABLET ORAL at 18:38

## 2024-06-16 RX ADMIN — NICOTINE POLACRILEX 4 MG: 4 GUM, CHEWING BUCCAL at 18:07

## 2024-06-16 RX ADMIN — NICOTINE POLACRILEX 2 MG: 2 GUM, CHEWING ORAL at 12:20

## 2024-06-16 RX ADMIN — NICOTINE POLACRILEX 2 MG: 2 GUM, CHEWING ORAL at 08:32

## 2024-06-16 RX ADMIN — NICOTINE POLACRILEX 4 MG: 4 GUM, CHEWING BUCCAL at 16:46

## 2024-06-16 RX ADMIN — DIVALPROEX SODIUM 1250 MG: 500 TABLET, DELAYED RELEASE ORAL at 20:04

## 2024-06-16 RX ADMIN — NICOTINE POLACRILEX 4 MG: 4 GUM, CHEWING BUCCAL at 14:26

## 2024-06-16 RX ADMIN — NICOTINE POLACRILEX 2 MG: 2 GUM, CHEWING ORAL at 10:16

## 2024-06-16 RX ADMIN — NICOTINE POLACRILEX 4 MG: 4 GUM, CHEWING BUCCAL at 22:10

## 2024-06-16 RX ADMIN — DIPHENHYDRAMINE HYDROCHLORIDE 50 MG: 50 CAPSULE ORAL at 01:45

## 2024-06-16 ASSESSMENT — ACTIVITIES OF DAILY LIVING (ADL)
ORAL_HYGIENE: INDEPENDENT
DRESS: INDEPENDENT;SCRUBS (BEHAVIORAL HEALTH)
ADLS_ACUITY_SCORE: 29
HYGIENE/GROOMING: INDEPENDENT
ADLS_ACUITY_SCORE: 29
DRESS: SCRUBS (BEHAVIORAL HEALTH);INDEPENDENT
ADLS_ACUITY_SCORE: 29
ADLS_ACUITY_SCORE: 29
HYGIENE/GROOMING: INDEPENDENT
ADLS_ACUITY_SCORE: 29
ORAL_HYGIENE: INDEPENDENT
ADLS_ACUITY_SCORE: 29

## 2024-06-16 NOTE — PLAN OF CARE
Rehab Group    Start time: 1615  End time: 1710  Patient time total: 30 minutes    attended partial group     #3 attended   Group Type: occupational therapy   Group Topic Covered: cognitive therapy, coping skills, healthy leisure time, problem solving, and social skills   Group Session Detail:  Patient engaged in a leisure activity with a visuospatial and cognitive component in order to promote: problem solving skills, improve attention, emphasize new learning, exercise cognitive skills, and foster leisure and social engagement   Patient Response/Contribution:  supportive of peers and left group on several occasions   Patient Detail:pt hyperverbal at times. Pt initially agreeable to participate in group activity. Pt was in and out of group area on several occasions. Pt pleasant during interactions. Pt smiled and laughed throughout group engagement. Pt asked to assist peers with his bananagram tiles and peer agreed. Most of pt's responses were not actual words, pt agreed with this observation and was able to change the tiles and make actual words. Pt shared enjoyment of activity.         No Charge    Patient Active Problem List   Diagnosis    Bipolar disorder (H)    ADHD (attention deficit hyperactivity disorder)    Bipolar I disorder with jose (H)

## 2024-06-16 NOTE — PLAN OF CARE
"Pt VSS. Pt denies SI, HI, SIB, hallucinations, and depression. Pt is labile, frequently seeks out staff, and speaks in a loud voice.     Pt requested PRN hydroxyzine for anxiety at 1050. Pt described his anxiety as initiating from his abdominal area. Pt said \"as my stomach gets larger, so does my anxiety\".     Pt refused Risperdal this morning and reported it was for \"explicit reasons\", pt stated \"Since taking it, I have had two orgasms without ejaculation, so I won't take it anymore\". Pt was not redirectable regarding medication administration.     Pt visible in morning in milieu watching TV, talking with staff and peers, and walking in the nielson. Pt at breakfast in milieu and took a shower.    Problem: Impaired Interpersonal Functioning  Goal: Improved Self-Control (Impaired Interpersonal Functioning)  Outcome: Progressing  Intervention: Promote Behavior and Impulse Control  Recent Flowsheet Documentation  Taken 6/16/2024 0906 by Yanna Durán, RN  Trust Relationship/Rapport:   care explained   choices provided   thoughts/feelings acknowledged   questions encouraged   questions answered   empathic listening provided       "

## 2024-06-16 NOTE — PLAN OF CARE
"AM Note    Assumed care for pt at 11AM.    Reported having high anxiety rated 7/10 and requested PRN gabapentin, although pt reports that he does not think that the medication is effective and is waiting until he can take another dose of hydroxyzine. Pt is generally calm and cooperative. Visible in milieu, social. Making frequent requests. Overheard in milieu expressing his dismay that his doctor called him manic, insulted his doctor.     Pt appeared to become upset when writer brought up pt's AM Risperdal, which pt refused. Pt stated that he is \"not going to talk about it.\" Per previous nursing noted pt reported having sexual side effects from risperdal, he reports not having ejaculation with orgasm.     PM Note     Denied all psychological symptoms. Pleasant with writer. Social, visible in milieu. Speech is linear, logical. Loud, pressured speech. No acute behavioral outburst noted.   Continues to refused scheduled Risperdal due to sexual side effect. States that he will not talk about this.    PRN nicotine. PRN Benadryl for sleep.   Endorses foot discomfort from hard floor. No other physical concerns voiced.   "

## 2024-06-16 NOTE — PROGRESS NOTES
Venancio presents as remaining somewhat hypomanic at this time. Speaks in a loud voice. Labile in mood. Agitated and anxious about not being able to reach his mother on the phone during day shift yesterday.  Benadryl prn given for sleep and agitation at 0140.  Also wants it known that he does not want to take Risperdal due to some explicit reasons.  Denies any pain at this time.     Difficulty falling to sleep. Had Benadryl prn for anxiety and agitation.  Soon after, Venancio fell to sleep and appeared to have slept a total of 4.25 hours.  Remains on [employer] staffing.      Ammonia level from yesterday high at 85. (16-60 normal limits).

## 2024-06-16 NOTE — PLAN OF CARE
"Pt has a elated affect with anxious elevated mood. Pt rates anxiety at 6/10 and depression 0/10. Pt received hydroxyzine for anxiety with some relief. Pt rates pain at 0/10. Pt reports no SI/HI/SIB and contracts for safety. Pt denies any hallucinations and not noted responding to any internal stimuli. Pt was medication compliant. No reported or observed medication side effects. Pt was visible on unit and social with select peers. Amonia level was 85 yesterday and on call provider was updated. BP was 149/105 and the recheck was 126/74. Discussed \"B52\" with pt and was receptive to only using B52 as a last resort. Continue current POC.    Plan of Care Reviewed With: patient                   "

## 2024-06-17 PROCEDURE — 250N000013 HC RX MED GY IP 250 OP 250 PS 637: Performed by: PSYCHIATRY & NEUROLOGY

## 2024-06-17 PROCEDURE — G0177 OPPS/PHP; TRAIN & EDUC SERV: HCPCS

## 2024-06-17 PROCEDURE — 99232 SBSQ HOSP IP/OBS MODERATE 35: CPT | Performed by: CLINICAL NURSE SPECIALIST

## 2024-06-17 PROCEDURE — 90832 PSYTX W PT 30 MINUTES: CPT

## 2024-06-17 PROCEDURE — 124N000002 HC R&B MH UMMC

## 2024-06-17 RX ADMIN — HALOPERIDOL 5 MG: 5 TABLET ORAL at 12:45

## 2024-06-17 RX ADMIN — DIPHENHYDRAMINE HYDROCHLORIDE 50 MG: 50 CAPSULE ORAL at 12:45

## 2024-06-17 RX ADMIN — NICOTINE 1 PATCH: 21 PATCH, EXTENDED RELEASE TRANSDERMAL at 08:23

## 2024-06-17 RX ADMIN — NICOTINE POLACRILEX 4 MG: 4 GUM, CHEWING BUCCAL at 08:37

## 2024-06-17 RX ADMIN — LORAZEPAM 2 MG: 2 TABLET ORAL at 12:52

## 2024-06-17 RX ADMIN — ACETAMINOPHEN 650 MG: 325 TABLET, FILM COATED ORAL at 01:30

## 2024-06-17 RX ADMIN — HYDROXYZINE HYDROCHLORIDE 50 MG: 50 TABLET ORAL at 09:54

## 2024-06-17 RX ADMIN — DIVALPROEX SODIUM 1250 MG: 500 TABLET, DELAYED RELEASE ORAL at 21:48

## 2024-06-17 RX ADMIN — HYDROXYZINE HYDROCHLORIDE 50 MG: 50 TABLET ORAL at 01:23

## 2024-06-17 ASSESSMENT — ACTIVITIES OF DAILY LIVING (ADL)
ADLS_ACUITY_SCORE: 29

## 2024-06-17 NOTE — PLAN OF CARE
"Goal Outcome Evaluation:    Plan of Care Reviewed With: patient        Pt visible in the milieu, socialized with peers, watched TV briefly in the [facility] area, attended and participated in group activities. Pt very hyper verbal and denies all mental health psych symptoms in the morning and contracted for safety in a dismissive way. Pt later expressed anxiety and restless about 0950 and received prn hydroxyzine at 0954. Pt continued to be hyper verbal, cursing and swearing at staff. Pt refused his morning risperidone and took only his nicotine patched and stated the medication causes erectile dysfunction. Staff explained the benefit of the medication and pt stated \"you will on hold me and give me a shot\".  During lunch time, pt complaint his lunch was not double potion and per nutrition staff  pt ordered two different on tray and was expecting for the every thing to be double. Writer explained to pt and pt was screamed at writer stating \"the only think that you can me happy but you cannot\". Staff encouraged pt to fill out his menu and pt stated we need to figure out his test. Staff explained to pt the important of filling his menu and pt stated \"I don't care\".   Writer was in the nurses station and head pt yelling, screaming, and cursing and slammed his door very hard. This writer offered prn haldol, ativan and benadryl and pt continued threatening and posturing at staff. Pt later accepted his prn medications after having 1:1 with the charge nurse. Pt came out and apologized to this writer.   Pt stated he was suicidal and writer attempted to do further assessment and pt stated \"I don't want to talk about it, if 16 years are doing it why not me?\".  ALE Pacheco notified and pt was placed on SIO.         "

## 2024-06-17 NOTE — PLAN OF CARE
"Individual Therapy Note      Date of Service: June 17, 2024    Patient: Venancio goes by \"Venancio,\" uses he/him pronouns    Individuals Present: Venancio & Drea Correa [facility]    Session start: 0915  Session end: 0945  Session duration in minutes: 30      Modality Used: Person Centered and Brief Therapy    Goals: Verbal processing    Patient Description of current symptoms: Pt shared he feels fine     Mental Status Exam:   Attitude: cooperative  Eye Contact: good  Mood: anxious  Affect: mood congruent and intensity is heightened  Speech: clear, coherent and pressured speech  Psychomotor Behavior: no evidence of tardive dyskinesia, dystonia, or tics  Thought Process:  disorganized and tangental  Associations: loosening of associations present  Thought Content: no evidence of suicidal ideation or homicidal ideation  Insight:  poor  Judgement: poor  Attention Span and Concentration: fair    Pt progress: Met pt in hallway. Pt started telling writer about his thoughts related to his hospital stay. Pt was tangential and pressured in his speech, was tense with intense eye contact. At one point pt started pacing in place while he was talking to writer. Pt shared some delusional thoughts about the internet capturing souls, decoding captcha, and other thoughts of grandeur.     Treatment Objective(s) Addressed:   The focus of this session was on processing feelings related to hospital stay.       Progress Towards Goals and Assessment of Patient:   Patient is not making progress towards treatment goals as evidenced by delusional thoughts and jose.       Therapeutic Intervention(s):   Provided active listening, unconditional positive regard, and validation.   Engaged in guided discovery, explored patient's perspectives and helped expand them through socratic dialogue       Plan/next step: Engaged in unit programming  Check in with therapist as needed      [address]404 - Psychotherapy (with patient) - 30 (16-37*) min    Patient Active " Problem List   Diagnosis    Bipolar disorder (H)    ADHD (attention deficit hyperactivity disorder)    Bipolar I disorder with jose (H)

## 2024-06-17 NOTE — PLAN OF CARE
06/17/24 1519   Individualization/Patient Specific Goals   Patient Personal Strengths expressive of emotions;resilient   Patient Vulnerabilities traumatic event;lacks insight into illness;history of unsuccessful treatment   Interprofessional Rounds   Summary Venancio presents with symptoms of jose and limited insight into his current mental health functioning.   Participants advanced practice nurse;nursing;[facility]   Behavioral Team Discussion   Participants Mariano Pacheco CNP, Racheal CTC, Ramon RN   Anticipated length of stay 10 days   Continued Stay Criteria/Rationale Pending symptom stabilization and safe discharge plan.   Anticipated Discharge Disposition IRTS;group home     PRECAUTIONS AND SAFETY    Behavioral Orders   Procedures    Assault precautions    Code 1 - Restrict to Unit    Routine Programming     As clinically indicated    Status 15     Every 15 minutes.    Status Individual Observation     SIO 2:1  Patient SIO status reviewed with team/RN.  Please also refer to RN/team documentation for add'l detail.    -SIO staff to monitor following which have contributed to patient being on SIO:  Threatening Suicide  -Possible interventions SIO staff could use to support patient's treatment progress:  Monitoring and redirection  -When following observed, team will review discontinuation of SIO:  No longer Suicidal     Order Specific Question:   CONTINUOUS 24 hours / day     Answer:   5 feet     Order Specific Question:   Indications for SIO     Answer:   Suicide risk       Safety  Safety WDL: WDL  Patient Location: patient room, own, hallway, dining room, lounge  Observed Behavior: sitting  Observed Behavior (Comment): watching television, engaging with peers and staffs  Safety Measures: suicide check-in completed  Diversional Activity: television  Suicidality: Status 15  Assault: status 15  Elopement Interventions: status 15

## 2024-06-17 NOTE — PLAN OF CARE
"Patient continues on SIO 2:1 staffing to manage his aggressive and self-destructive behaviors.  Venancio had verbalized suicidal ideation during the day shift today.  Suicide precautions were initiated this evening.  Venancio has not engaged in any aggressive of self injurious behaviors this shift.    Patient slept poorly during the NOC shift of 6/17/24 with only 1.5 hours of sleep noted.  He demonstrated signs of mounting agitation during the day shift and received a dose of PRN Haldol, Benadryl, and Ativan at 12:45.  Between the sleep deprivation and the administration of emergency PRN medications, Venancio slept for the majority of this evening shift.  He woke up around 21:30 and unit staff brought him food and fluids.  RN writer greeted Venancio shortly after this.  He presented as calm, pleasant, and cooperative on approach.  Venancio reported that he was \"doing much better\".  He denied any active suicidal ideation, plan, or intent.  Venancio accepted his scheduled dose of Depakote without incident, but he refused to accept his HS dose of Risperdal; he noted that he experiences \"sexual side effects\" with the Risperdal.  Soon after taking his HS dose of Depakote, Venancio retreated to his room and verbalized a desire to return to sleep.    Elevated ammonia level collected on 6/14/24 noted.  Currently, Venancio is not demonstrating any symptoms of hyperammonemia.  While he reportedly demonstrated evidence of irritable mood earlier today, this is thought to be a manifestation of his acute manic episode.  Venancio is not showing any signs of headache, vomiting, ataxia, or gait abnormalities.     Latest Reference Range & Units 06/14/24 14:20   Ammonia 16 - 60 umol/L 85 (H)   (H): Data is abnormally high    This note has been routed to Venancio's attending psychiatric provider, Dr. Pacheco, to carry out provider notification and ensure proper follow-up.  "

## 2024-06-17 NOTE — PROGRESS NOTES
Venancio remains hypomanic and is having a difficult time falling to sleep again tonight.  Asking for Melatonin and Neurontin prn.  Both given and heading back to bed to try and sleep.  Polite, cooperative, talkative.     Vistaril given per patient request at 0125.  Having a difficult time still trying to sleep.    Now at 0234, Venancio is becoming very verbally agitated and expressing anger and frustration that he is in the hospital.  Wanting more meds at this time, but has already had prns of Benadryl, Vistaril, Tylenol, Neurontin, and Melatonin since 2219 last evening.  Refuses Risperdal as he feels it is affecting him in a negative sexual way. Seems to be agitated due to not being able to sleep tonight.  Asked him how he would handle the lack of sleep if he were at home and he said he didn't know.     After several prn meds and later in the shift, Venancio fell asleep for 1.5 hours.  Awake now in the lounge and remains hypomanic.

## 2024-06-17 NOTE — PROGRESS NOTES
"Writer attempted to clarify if the pt was suicidal.   Pt stated \"well yeah I said I was going to hurt myself, so I can go to [facility] because stupid fuck [facility] is not meeting my needs\".   Writer stated that doing so will not get him transferred to another hospital. He became very mad, and started saying that were were violating HIPAA, and yelling loudly. Writer asked him to call his , and discuss that option with him. He stated he would and he is going to [facility] the entire hospital.   "

## 2024-06-17 NOTE — PLAN OF CARE
"Team Note Due:  Monday     Assessment/Intervention/Current Symtoms and Care Coordination:  Chart review and met with team, discussed pt progress, symptomology, and response to treatment. Discussed the discharge plan and any potential impediments to discharge.     Writer called [facility]'s Office to ask about Hand. No Hand petition has been filed. Acquired Hand petition paperwork. Provider notified and given petition paperwork.     Venancio was overheard asking the [facility] if he could be transferred to another hospital. [facility] was redirecting him to ask the care team. He was becoming more agitated. Writer intervened to communicate that he would not be transferring to another hospital. Venancio was very tense and screamed \"fuck you\" at writer and walked to his room. Shortly after he was heard screaming in the hallway, vacillating between angry (He was upset about being offered medications, he stated that somebody told him he would be hospitalized longer if he took medications) and sad, becoming tearful and saying, \"I'm not like that at all.\"      Discharge Plan or Goal:  Back to group [place]:  [facility]- group home   [address] [place] [address]     Barriers to Discharge:  Symptomology - jose     Referral Status:  TBD     Legal Status:  COURT HOLD - notice of intent to revoke stay of commitment   [facility]: [facility]  File Number: [id number]  Start and expiration date of commitment:      Pt's  is:  Carlos Gonzales  [place] [employer]  Ph:  [phone]     Beth Garza    [address] [place] [address]  Phone: [phone]  [email or web address]     Contacts:    [facility]  Primary Contact: Darline Romo ( for correction): [phone] - RENNY on file for group home staff  Secondary Contact: Bree (group home ): [phone] RENNY on file for group home staff     :  Chelsea Brennan ([facility]  Ph: [phone] - " Commitment CM no RENNY needed   [email or web address]..  [facility]  Ph: [phone]  Christi (member of case management team): [phone]     Medication Management/Psychiatry:  Name/Clinic: [facility] - Pt declines [facility] for Psychiatry  Number: [phone]     Other:  Yolanda (mom): [phone] - Pt declines [facility] for Mom      Upcoming Meetings and Dates/Important Information and next steps:  Will need discharge appt for psychiatry prior to leaving  COS and PD

## 2024-06-17 NOTE — PLAN OF CARE
BEH IP Unit Acuity Rating Score (UARS)  Patient is given one point for every criteria they meet.    CRITERIA SCORING   On a 72 hour hold, court hold, committed, stay of commitment, or revocation. 1    Patient LOS on BEH unit exceeds 20 days. 0  LOS: 10   Patient under guardianship, 55+, otherwise medically complex, or under age 11. 0   Suicide ideation without relief of precipitating factors. 0   Current plan for suicide. 0   Current plan for homicide. 0   Imminent risk or actual attempt to seriously harm another without relief of factors precipitating the attempt. 0   Severe dysfunction in daily living (ex: complete neglect for self care, extreme disruption in vegetative function, extreme deterioration in social interactions). 1   Recent (last 7 days) or current physical aggression in the ED or on unit. 0   Restraints or seclusion episode in past 72 hours. 0   Recent (last 7 days) or current verbal aggression, agitation, yelling, etc., while in the ED or unit. 1   Active psychosis. 1   Need for constant or near constant redirection (from leaving, from others, etc).  0   Intrusive or disruptive behaviors. 0   Patient requires 3 or more hours of individualized nursing care per 8-hour shift (i.e. for ADLs, meds, therapeutic interventions). 0   TOTAL 4

## 2024-06-17 NOTE — PROGRESS NOTES
Combination PRN Medication Administration    Medications Administered: Benadryl+Haldol+Ativan combination    What patient symptom(s) led to the administration of these medications?  Verbally cursing, posturing towards writer with fist flexed and ready to punch writer and writer left and other staff intervened. Pt yelled at the top of his voice and slammed his door very hard.      What interventions were attempted to avoid use of these medication (including other PRN medications)?   Pt had previously received prn hydroxyzine at 0954 for anxiety and restless. Staff used essential oil and verbal redirection.    What were the patient's response(s) to the interventions?   Pt came out and apologized to this writer    Provider notified: Junior AGUILERA       Date: 06/17/24        Time: 3172    Harry Mckinnon RN

## 2024-06-17 NOTE — PROGRESS NOTES
"   The patient's care was discussed with the treatment team during the daily team meeting and/or staff's chart notes were reviewed.  Staff report  Pt slept 6.5 hours overnight.     Per Day/Evening Staff report: Assumed care for pt at 11AM.     Reported having high anxiety rated 7/10 and requested PRN gabapentin, although pt reports that he does not think that the medication is effective and is waiting until he can take another dose of hydroxyzine. Pt is generally calm and cooperative. Visible in milieu, social. Making frequent requests. Overheard in milieu expressing his dismay that his doctor called him manic, insulted his doctor.      Pt appeared to become upset when writer brought up pt's AM Risperdal, which pt refused. Pt stated that he is \"not going to talk about it.\" Per previous nursing noted pt reported having sexual side effects from risperdal, he reports not having ejaculation with orgasm.      PM Note      Denied all psychological symptoms. Pleasant with writer. Social, visible in milieu. Speech is linear, logical. Loud, pressured speech. No acute behavioral outburst noted.   Continues to refused scheduled Risperdal due to sexual side effect. States that he will not talk about this.    PRN nicotine. PRN Benadryl for sleep.   Endorses foot discomfort from hard floor. No other physical concerns voiced.      Upon interview, the patient was making a phone call unsuccessfully when writer approached him for an assessment.  Patient initially responded \"leave me alone\" but when he saw this writer and the manager (Conchis) patient was cooperative and followed us to his room.  Patient was curious as to what happened to this writer's hand.  Writer explained to the patient that it was a home accident and could not write with the hand because of the thumb spica hence the manager acting as my scribe. Patient demonstrated empathy.    Patient reported that \"I kept myself schizoaffective during the weekend.\"  I think " "that's the right thing to do and I believe he can be cured without a pill.  Patient complained of side effects from risperidone stating he could not ejaculate on orgasm.  Writer validated patient's concerns that side effects are the reasons for most noncompliance.  Writer promised to look into it and see what could be changed.  Regarding Zyprexa, patient stated he got better sleep but it does not last.  He stated that he had an issue with sleep since he was a teenager.  Patient blamed Zyprexa for making the left side of his brain active but prevents blood flow to his right small toe which makes it numb and feels like the toe is not there.  Patient asked this writer \"can it be fixed?\"  Patient then shared that he could not take Zyprexa for this side effect which bothers him.    Patient was paranoid, demonstrated some flight of ideas, reported his brother are genuses, \"that [employer] are sublimely messaging me.\"  Writer asked if the patient will be willing to take any alternative medications to risperidone?  Patient reported he was not going to take antipsychotic medications because of the side effect profiles, stating that it might be necessary to inject him intramuscularly every time as he would not be willing to take it voluntarily.  As part of his disorganization, patient stated \"I am not going to an IRTS without anybody talking about placement    Writer asked the patient about constantly requesting for B-52 patient said he was asking for B-52 but his RN Te talked to him and there was no further discussion about it.  Patient endorsed hearing voices but denies self-harm.    While writer was assessing another patient, he over heard patient yelled out loudly, staff reported patient was palpably agitated, verbally coursing, posturing towards staff with a closed fist wanting to punch the staff, but other staff intervened.  Patient yelled out the more and slammed the door.  He was immediately giving an emergency " "medications: Benadryl+Haldol+ Ativan combination. Later, staff reported that patient was threatening suicide, placed on SIO 2:1 at 5  feet for safety.                   General appearance: fair  Alert.  Awake, alert  Affect: \"good\", labile.  Blunted, flat  Mood: Emotional lability, irritable,   Speech: Pressured, loud  Eye contact:  good.    Psychomotor behavior: Agitated, posturing, slamming door  Gait: steady   Abnormal movements:  none  Delusions: more of paranoia,  Hallucinations:  denied  Thoughts: circumstantial, flight of ideas  Associations: Loosening association  Judgement: poor  Insight: poor  Cognitions: intact in conversation  Memory:  intact in conversation  Orientation: normal    Not suicidal.     Imp:  Bipolar jose  2.  ADHD  3.  Depakote ;level is 92, will reduce dosage     Current Facility-Administered Medications                     Current Facility-Administered Medications   Medication Dose Route Frequency Provider Last Rate Last Admin    acetaminophen (TYLENOL) tablet 650 mg  650 mg Oral Q4H PRN Javier Card MD        alum & mag hydroxide-simethicone (MAALOX) suspension 30 mL  30 mL Oral Q4H PRN Javier Card MD        benztropine (COGENTIN) tablet 1 mg  1 mg Oral BID PRN Javier Card MD        haloperidol (HALDOL) tablet 5 mg  5 mg Oral Q8H PRN Satya Garza MD   5 mg at 06/11/24 2042     And    LORazepam (ATIVAN) tablet 2 mg  2 mg Oral Q8H PRN Satya Garza MD   2 mg at 06/11/24 2042     And    diphenhydrAMINE (BENADRYL) capsule 50 mg  50 mg Oral Q8H PRN Satya Garza MD   50 mg at 06/11/24 2042    haloperidol lactate (HALDOL) injection 5 mg  5 mg Intramuscular Q8H PRSatya Bauer MD         And    LORazepam (ATIVAN) injection 2 mg  2 mg Intramuscular Q8H PRSatya Bauer MD         And    diphenhydrAMINE (BENADRYL) injection 50 mg  50 mg Intramuscular Q8H PRSatya Bauer MD        divalproex sodium delayed-release (DEPAKOTE) DR" tablet 1,500 mg  1,500 mg Oral At Bedtime Javier Card MD   1,500 mg at 06/11/24 1937    hydrOXYzine HCl (ATARAX) tablet 50 mg  50 mg Oral TID PRN Javier Card MD   50 mg at 06/12/24 1124    melatonin tablet 3 mg  3 mg Oral At Bedtime PRN Javier Card MD   3 mg at 06/10/24 2014    nicotine (NICORETTE) gum 2 mg  2 mg Buccal Q1H PRN Javier Card MD   2 mg at 06/12/24 1333    risperiDONE (risperDAL) tablet 1 mg  1 mg Oral Satya Montez MD   1 mg at 06/12/24 1007    risperiDONE (risperDAL) tablet 4 mg  4 mg Oral At Bedtime Javier Card MD   4 mg at 06/11/24 1938    senna-docusate (SENOKOT-S/PERICOLACE) 8.6-50 MG per tablet 1 tablet  1 tablet Oral BID PRN Javier Card MD             Recent Results[]Expand by Default             Recent Results (from the past 168 hour(s))   Valproic acid     Collection Time: 06/11/24  9:50 AM   Result Value Ref Range     Valproic acid 92.7   ug/mL   Comprehensive metabolic panel     Collection Time: 06/11/24  9:50 AM   Result Value Ref Range     Sodium 139 135 - 145 mmol/L     Potassium 3.9 3.4 - 5.3 mmol/L     Carbon Dioxide (CO2) 27 22 - 29 mmol/L     Anion Gap 10 7 - 15 mmol/L     Urea Nitrogen 7.8 6.0 - 20.0 mg/dL     Creatinine 0.84 0.67 - 1.17 mg/dL     GFR Estimate >90 >60 mL/min/1.73m2     Calcium 9.6 8.6 - 10.0 mg/dL     Chloride 102 98 - 107 mmol/L     Glucose 125 (H) 70 - 99 mg/dL     Alkaline Phosphatase 51 40 - 150 U/L     AST 12 0 - 45 U/L     ALT 18 0 - 70 U/L     Protein Total 6.4 6.4 - 8.3 g/dL     Albumin 4.2 3.5 - 5.2 g/dL     Bilirubin Total 0.2 <=1.2 mg/dL   Hemoglobin A1c     Collection Time: 06/11/24  9:50 AM   Result Value Ref Range     Hemoglobin A1C 5.4 <5.7 %   Lipid panel     Collection Time: 06/11/24  9:50 AM   Result Value Ref Range     Cholesterol 157 <200 mg/dL     Triglycerides 191 (H) <150 mg/dL     Direct Measure HDL 31 (L) >=40 mg/dL     LDL Cholesterol Calculated 88 <=100 mg/dL     Non HDL Cholesterol 126 <130 mg/dL   CBC with  platelets and differential     Collection Time: 06/11/24  9:50 AM   Result Value Ref Range     WBC Count 4.9 4.0 - 11.0 10e3/uL     RBC Count 4.84 4.40 - 5.90 10e6/uL     Hemoglobin 13.9 13.3 - 17.7 g/dL     Hematocrit 41.4 40.0 - 53.0 %     MCV 86 78 - 100 fL     MCH 28.7 26.5 - 33.0 pg     MCHC 33.6 31.5 - 36.5 g/dL     RDW 12.9 10.0 - 15.0 %     Platelet Count 190 150 - 450 10e3/uL     % Neutrophils 50 %     % Lymphocytes 40 %     % Monocytes 5 %     % Eosinophils 4 %     % Basophils 1 %     % Immature Granulocytes 0 %     NRBCs per 100 WBC 0 <1 /100     Absolute Neutrophils 2.5 1.6 - 8.3 10e3/uL     Absolute Lymphocytes 2.0 0.8 - 5.3 10e3/uL     Absolute Monocytes 0.2 0.0 - 1.3 10e3/uL     Absolute Eosinophils 0.2 0.0 - 0.7 10e3/uL     Absolute Basophils 0.0 0.0 - 0.2 10e3/uL     Absolute Immature Granulocytes 0.0 <=0.4 10e3/uL     Absolute NRBCs 0.0 10e3/uL            Changes made today: 6/17/2024

## 2024-06-18 PROCEDURE — 250N000013 HC RX MED GY IP 250 OP 250 PS 637: Performed by: PSYCHIATRY & NEUROLOGY

## 2024-06-18 PROCEDURE — 250N000013 HC RX MED GY IP 250 OP 250 PS 637: Performed by: CLINICAL NURSE SPECIALIST

## 2024-06-18 PROCEDURE — 99232 SBSQ HOSP IP/OBS MODERATE 35: CPT | Performed by: CLINICAL NURSE SPECIALIST

## 2024-06-18 PROCEDURE — 124N000002 HC R&B MH UMMC

## 2024-06-18 RX ORDER — LACTULOSE 10 G/10G
20 SOLUTION ORAL 3 TIMES DAILY
Status: DISCONTINUED | OUTPATIENT
Start: 2024-06-18 | End: 2024-06-21

## 2024-06-18 RX ADMIN — DIPHENHYDRAMINE HYDROCHLORIDE 50 MG: 50 CAPSULE ORAL at 18:15

## 2024-06-18 RX ADMIN — DIVALPROEX SODIUM 1250 MG: 500 TABLET, DELAYED RELEASE ORAL at 18:14

## 2024-06-18 RX ADMIN — GABAPENTIN 200 MG: 100 CAPSULE ORAL at 13:35

## 2024-06-18 RX ADMIN — LACTULOSE 20 G: 10 POWDER, FOR SOLUTION ORAL at 18:14

## 2024-06-18 RX ADMIN — LORAZEPAM 2 MG: 2 TABLET ORAL at 18:15

## 2024-06-18 RX ADMIN — HYDROXYZINE HYDROCHLORIDE 50 MG: 50 TABLET ORAL at 11:39

## 2024-06-18 RX ADMIN — HALOPERIDOL 5 MG: 5 TABLET ORAL at 18:15

## 2024-06-18 RX ADMIN — NICOTINE POLACRILEX 4 MG: 4 GUM, CHEWING BUCCAL at 10:06

## 2024-06-18 RX ADMIN — NICOTINE POLACRILEX 4 MG: 4 GUM, CHEWING BUCCAL at 13:36

## 2024-06-18 RX ADMIN — HYDROXYZINE HYDROCHLORIDE 50 MG: 50 TABLET ORAL at 16:17

## 2024-06-18 RX ADMIN — NICOTINE POLACRILEX 4 MG: 4 GUM, CHEWING BUCCAL at 19:26

## 2024-06-18 RX ADMIN — SENNOSIDES AND DOCUSATE SODIUM 1 TABLET: 50; 8.6 TABLET ORAL at 14:27

## 2024-06-18 RX ADMIN — NICOTINE POLACRILEX 4 MG: 4 GUM, CHEWING BUCCAL at 11:39

## 2024-06-18 RX ADMIN — NICOTINE POLACRILEX 4 MG: 4 GUM, CHEWING BUCCAL at 16:18

## 2024-06-18 RX ADMIN — NICOTINE 1 PATCH: 21 PATCH, EXTENDED RELEASE TRANSDERMAL at 08:49

## 2024-06-18 RX ADMIN — NICOTINE POLACRILEX 4 MG: 4 GUM, CHEWING BUCCAL at 08:56

## 2024-06-18 RX ADMIN — NICOTINE POLACRILEX 4 MG: 4 GUM, CHEWING BUCCAL at 14:38

## 2024-06-18 ASSESSMENT — ACTIVITIES OF DAILY LIVING (ADL)
ADLS_ACUITY_SCORE: 29
HYGIENE/GROOMING: HANDWASHING;SHOWER;INDEPENDENT
ADLS_ACUITY_SCORE: 29
ORAL_HYGIENE: INDEPENDENT
ADLS_ACUITY_SCORE: 29
DRESS: SCRUBS (BEHAVIORAL HEALTH)
LAUNDRY: UNABLE TO COMPLETE
DRESS: INDEPENDENT;SCRUBS (BEHAVIORAL HEALTH)
ADLS_ACUITY_SCORE: 29
ORAL_HYGIENE: INDEPENDENT
ADLS_ACUITY_SCORE: 29
HYGIENE/GROOMING: INDEPENDENT

## 2024-06-18 NOTE — PLAN OF CARE
"Team Note Due:  Monday     Assessment/Intervention/Current Symtoms and Care Coordination:  Chart review and met with team, discussed pt progress, symptomology, and response to treatment. Discussed the discharge plan and any potential impediments to discharge.     Per team, Pt continues to present with jose, loud pressured speech.   Got 6 hrs of sleep. Yesterday was in a nurses face and reported SI. Today denies SI, SIB.     I met with Pt, he was loud, pressured speech. I asked how he was feeling today and he stated much better, glad you didn't ask me yesterday. He denies having any SI today.  His mood was labile. Pt stated \"let [facility] know\". I asked who [employer] was and he stated \"use your brain for 30 seconds\" then stated it was his group home company. I said I only knew them as [employer]. Pt had periods of agitation in his voice stating he didn't want to see any paperwork, \"just put it in my binder\". Pt states he doesn't want any meds changed when he goes back to his group home. I did share I requested his med list from [facility] as it sounds like he was requesting a bedtime med from [facility] so we want to see what he was taking there. Pt stated he appreciated me taking the time to speak with him today. He also asked about my name, despite having met him frequently. He could not recall who I was.     I called  Director Darline Romo to ask for a copy of his med list per team as Pt reported taking Risperdal at . She stated she would email me a copy as well as the new  director's contact info.     I called Pt's commitment CM as Pt was calling her stating that 'we need the commitment order so we can work on discharge planning'. Her voicemail was full. I sent her a follow up email to coordinate care and inform her Provider is now BYohanaA and requesting Hand petkelsey.     Commitment CM emailed back stating she still does not have the signed order from the , but as soon as she gets it she will " "send it. She stated that Pt is probably referring to \"Beth Garza\" the , where he often references \"Rosa Garza\". I shared with CM we are submitted a Hand petition.    CM emailed back: \"I am not too surprised about moving forward with a Hand.  I have been working lu [employer] for just over a year since he was petitioned for commitment.  He current presentation is not what I have experienced with him when in a hospital setting for as long as he has.  If he continues to refuse signing releases of information, I can reach out to our 's Office about getting a court order - cooperating with signing releases of information is a term of his commitment\".    I sent CM updated records as well as the Hand petition, and submitted the Hand petition to Bethabigail Garza  as well.     Provider informed Pt that he was submitting the Hand petition and he appeared to take this well stating he would do what the provider requested.      Discharge Plan or Goal:  Back to long-term or a different facility within the agency with higher level of care  Potentially:  [facility]- group home   [address] [place] [address]     Barriers to Discharge:  Symptomology - jose     Referral Status:  TBD    Legal Status:  COURT HOLD - notice of intent to revoke stay of commitment   [facility]: [facility]  File Number: [id number]  Start and expiration date of commitment:      Pt's  is:  Carlos Gonzales  [employer]  Ph:  [phone]     Beth Garza    [address] [place] [address]  Phone: [phone]  [email or web address].     Contacts:    [facility]:  Primary Contact: Darline Romo ( for long-term): [phone] - RENNY on file for group home staff  Secondary Contact: Bree (group home ): [phone] RENNY on file for group home staff     :  Chelsea Brennan ([facility]  Ph: [phone] - Commitment  no " RENNY needed   [email or web address].  [facility]  Ph: [phone]  Christi (member of case management team): [phone]     Medication Management/Psychiatry:  Name/Clinic: [facility] - Pt declines [facility]EN[facility] for Psychiatry  Number: [phone]     Other:  Yolanda (mom): [phone] - Pt declines [facility] for Mom      Upcoming Meetings and Dates/Important Information and next steps:  Will need discharge appt for psychiatry prior to leaving  COS and PD

## 2024-06-18 NOTE — PLAN OF CARE
BEH IP Unit Acuity Rating Score (UARS)  Patient is given one point for every criteria they meet.    CRITERIA SCORING   On a 72 hour hold, court hold, committed, stay of commitment, or revocation. 1    Patient LOS on BEH unit exceeds 20 days. 0  LOS: 11   Patient under guardianship, 55+, otherwise medically complex, or under age 11. 0   Suicide ideation without relief of precipitating factors. 0   Current plan for suicide. 0   Current plan for homicide. 0   Imminent risk or actual attempt to seriously harm another without relief of factors precipitating the attempt. 1   Severe dysfunction in daily living (ex: complete neglect for self care, extreme disruption in vegetative function, extreme deterioration in social interactions). 1   Recent (last 7 days) or current physical aggression in the ED or on unit. 0   Restraints or seclusion episode in past 72 hours. 0   Recent (last 7 days) or current verbal aggression, agitation, yelling, etc., while in the ED or unit. 1   Active psychosis. 1   Need for constant or near constant redirection (from leaving, from others, etc).  0   Intrusive or disruptive behaviors. 1   Patient requires 3 or more hours of individualized nursing care per 8-hour shift (i.e. for ADLs, meds, therapeutic interventions). 0   TOTAL 6

## 2024-06-18 NOTE — PLAN OF CARE
Approached patient in [facility] to invite to participate in group. Patient right away asked if selecting songs on the iPad would be a part of group. Writer notified patient that this would not be part of this group, but presented with other options. Patient informed writer that he did not wish to join to continue working on project unless writer brought the iPad for music and stated that nobody else would be interested in joining either if writer didn't do this.   Writer again offered the options for group and reiterated that there would not be music selection at this time. Patient therefore stated he would not join group. Was observed on the unit during this time, hyper-verbal and tangential.

## 2024-06-18 NOTE — CARE PLAN
Rehab Group    Start time: 1115  End time: 1200  Patient time total: 45 minutes    attended full group     #3 attended   Group Type: OT Clinic   Group Topic Covered: activity therapy, coping skills, and healthy leisure time     Group Session Detail:  The purpose of occupational therapy clinic is to facilitate coping skill exploration, creative expression within personally meaningful activities, and clinical observation of social, cognitive, and kinesthetic performance skills.      Patient Response/Contribution:  attentive and actively engaged     Patient Detail:    Improved attention to task activity of choice today.  Chose to work on another [employer] project while taking turn selecting music.  Continued to ensure all peers got turns, even if someone stepped away.  Relaxed, appropriate use of humor, not taking over group time socially.  Spilled water all over table, took initiative to clean it up quickly before it impacted anyone else's work.        Train & Education Service Per Session 45 + Minutes () OT Group code    Patient Active Problem List   Diagnosis    Bipolar disorder (H)    ADHD (attention deficit hyperactivity disorder)    Bipolar I disorder with jose (H)

## 2024-06-18 NOTE — PLAN OF CARE
Problem: Sleep Disturbance  Goal: Adequate Sleep/Rest  Outcome: Progressing   Goal Outcome Evaluation:    Patient appeared to sleep 6 hours this night shift.  No prns or snacks given or requested.  No concerns were reported or noted.  On a 2:1 SIO at this time.  Could probably be put back on acuity or 1:1 staffing.  Will continue to monitor Venancio.

## 2024-06-18 NOTE — PROVIDER NOTIFICATION
Combination PRN Medication Administration    Medications Administered: Benadryl+Haldol+Ativan combination given at 18:15.    What patient symptom(s) led to the administration of these medications?    Persistent agitation in the setting of severe manic symptoms.  Threatening behavior, aggressive posturing, and menacing gestures.    What interventions were attempted to avoid use of these medication (including other PRN medications)?     PRN Hydroxyzine administered at 16:17.  Emotional support, validation, and reassurance.  Verbal de-escalation.  Reduction of environmental stimulation.        What were the patient's response(s) to the interventions?     Significant reduction in agitation noted.  Venancio was able to integrate back into the therapeutic milieu and contract for safety.      Patient's attending psychiatric provider, Mariano Pacheco CNP was notified of these events via message routing on 06/18/24  at 18:55.    Fareed Rosa RN

## 2024-06-18 NOTE — PLAN OF CARE
"Patient remains manic appearing with no appreciable insight into the severity of his manic symptoms.  Venancio continues on 1:1 acuity staffing to manage his aggressive, impulsive, and self-destructive behaviors.  Venancio presents as elated and grandiose with tangential, pressured speech.  He tends to lead the discussion.  His affect and eye contact are notable for increased intensity.  Venancio would frequently seek out RN writer this evening with a host of complaints related to his involuntary hospitalization and length-of-stay.  He stated that he would need to work closely with a therapist to manage \"the trauma associated with being a patient at [place]!\".  He was given emotional support, validation, and reassurance.  Venancio reported a spike in anxiety this afternoon and requested a dose of PRN Hydroxyzine 50 mg; he was given this at 16:17 and later reported minimal relief from his persistent symptoms.     Shortly after eating dinner this evening, Venancio was socializing with peers in [place] [address].  A staff member was completing routine vital signs assessments and Venancio agreed to cooperate with completion of this task.  When the blood pressure cuff was applied, Venacnio was apparently triggered by the discomfort of the inflating cuff.  He became extremely agitated, threatening, and postured toward unit staff in an aggressive manner.  Venancio's screaming triggered a staff response and RN writer was able to verbally de-escalate him to the point where he was willing to walk away from the confrontation he was instigating.  RN writer escorted Venancio to his room to reduce environmental stimulation.  Once in his room, Venancio reported that he was planning to \"do something to that mother fucker!\" while clenching his fists and making menacing gestures that implied aggressive ideation.  Venancio stated that the staff member was treating him with mal-intent and \"hurting me on purpose!\".  He was quite agitated and was demonstrating severe " "perseveration.  At this point, the decision was made to administer emergency medications to reduce Venancio's persistent agitation and aggressive behaviors.  He was given a dose of PRN Haldol 5 mg, Ativan 2 mg, and Benadryl 50 mg at 18:15.  He requested his HS dose of Depakote early, and this request was granted; he received the Depakote along with the emergency PRN medications.  This did appear to offer Venancio relief, as he was able to integrate back into the therapeutic milieu without further incident.    Venancio continues to refuse his HS dose of Risperdal.  He cited \"sexual side effects\" as his reason for refusing this medication.      Venancio was given first dose education on Lactulose, which he started on this shift to target his elevated ammonia level.  He accepted this without incident and affirmed an understanding of this teaching.    He denies any SE's or acute physical concerns at this time.    "

## 2024-06-18 NOTE — PROGRESS NOTES
"The patient's care was discussed with the treatment team during the daily team meeting and/or staff's chart notes were reviewed.  Staff report  Pt slept 6 hours overnight.  Patient presented with pressured speech, loud and manic with occasional mood lability.  Patient is requesting for a therapist, does not want outpatient provider/psychiatrist.  Patient reported taking 0.25 mg of  risperidone at bedtime while in his group home, and would be willing to take same amount now in the hospital.       Upon interview, patient appears anxious but cooperative with this assessment.  He shared that he does not want medication and that makes him feel tired, stated \"I want a medication that have no sexual dysfunction side effect\".\"    Patient reported a high anxiety level but shared that occupational therapy helped.  Patient persistently request for Ativan believing that is the only medication that will help him.  Writer discussed with the patient the intent to file a Hand order with [facility] because patient has not been taking medications since admission, and considering the number of days he had been here without taking medication and the problems that brought him lingers, it amounts to wasting of his time.  Patient himself informed this writer he was admitted on 7 June, that he has been here for 11 days.  Writer made the patient understand that the goal of admitting him would be defeated if patient's condition does not improve due to not taking medications.    Patient perseverated on getting Ativan for his anxiety, writer informed the patient that rather than indulging in palliative measures, we want to takle the problem from the root.  Explained to the patient that dealing with the problem from the roots would eliminate or reduce his anxiety.  Patient responded that the root of the issue is unknown.  Patient requested this writer to call [facility] and let them know \"which ever medication they need me to take, I will " "take it.\"  Patient appears calmer than usual even though he expressed high anxiety level, he does not appear disruptive thus far. Per staff report patient denied SI or SIB requested to downgrade his 2:1 SIO to a staff acuity.    Patient results indicate hyperammonemia internal medicine consult placed  Lactulose ordered.                  General appearance: fair  Alert.  Awake, alert, Calm and Cooperative  Affect: \"good\", labile.  Blunted, flat  Mood:Anxious  Speech: Pressured, loud  Eye contact:  good.    Psychomotor behavior: Calm  Gait: steady   Abnormal movements:  none  Delusions: more of paranoia,  Hallucinations:  denied  Thoughts: circumstantial, flight of ideas  Associations: Loosening association  Judgement: poor  Insight: poor  Cognitions: intact in conversation  Memory:  intact in conversation  Orientation: normal    Not suicidal.     Imp:  Bipolar jose  2.  ADHD  3.  Depakote ;level is 92, will reduce dosage     Current Facility-Administered Medications                     Current Facility-Administered Medications   Medication Dose Route Frequency Provider Last Rate Last Admin    acetaminophen (TYLENOL) tablet 650 mg  650 mg Oral Q4H PRN Javier Card MD        alum & mag hydroxide-simethicone (MAALOX) suspension 30 mL  30 mL Oral Q4H PRN Javier Card MD        benztropine (COGENTIN) tablet 1 mg  1 mg Oral BID PRN Javier Card MD        haloperidol (HALDOL) tablet 5 mg  5 mg Oral Q8H PRN Satya Garza MD   5 mg at 06/11/24 2042     And    LORazepam (ATIVAN) tablet 2 mg  2 mg Oral Q8H PRN Satya Garza MD   2 mg at 06/11/24 2042     And    diphenhydrAMINE (BENADRYL) capsule 50 mg  50 mg Oral Q8H PRN Satya Garza MD   50 mg at 06/11/24 2042    haloperidol lactate (HALDOL) injection 5 mg  5 mg Intramuscular Q8H PRSatya Bauer MD         And    LORazepam (ATIVAN) injection 2 mg  2 mg Intramuscular Q8H PRN Satya Garza MD         And    diphenhydrAMINE " (BENADRYL) injection 50 mg  50 mg Intramuscular Q8H PRN Satya Garza MD        divalproex sodium delayed-release (DEPAKOTE) DR tablet 1,500 mg  1,500 mg Oral At Bedtime Javier Card MD   1,500 mg at 06/11/24 1937    hydrOXYzine HCl (ATARAX) tablet 50 mg  50 mg Oral TID PRN Javier Card MD   50 mg at 06/12/24 1124    melatonin tablet 3 mg  3 mg Oral At Bedtime PRN Javier Card MD   3 mg at 06/10/24 2014    nicotine (NICORETTE) gum 2 mg  2 mg Buccal Q1H PRN Javier Card MD   2 mg at 06/12/24 1333    risperiDONE (risperDAL) tablet 1 mg  1 mg Oral QAM Satya Garza MD   1 mg at 06/12/24 1007    risperiDONE (risperDAL) tablet 4 mg  4 mg Oral At Bedtime Javier Card MD   4 mg at 06/11/24 1938    senna-docusate (SENOKOT-S/PERICOLACE) 8.6-50 MG per tablet 1 tablet  1 tablet Oral BID PRN Javier Card MD             Recent Results[]Expand by Default             Recent Results (from the past 168 hour(s))   Valproic acid     Collection Time: 06/11/24  9:50 AM   Result Value Ref Range     Valproic acid 92.7   ug/mL   Comprehensive metabolic panel     Collection Time: 06/11/24  9:50 AM   Result Value Ref Range     Sodium 139 135 - 145 mmol/L     Potassium 3.9 3.4 - 5.3 mmol/L     Carbon Dioxide (CO2) 27 22 - 29 mmol/L     Anion Gap 10 7 - 15 mmol/L     Urea Nitrogen 7.8 6.0 - 20.0 mg/dL     Creatinine 0.84 0.67 - 1.17 mg/dL     GFR Estimate >90 >60 mL/min/1.73m2     Calcium 9.6 8.6 - 10.0 mg/dL     Chloride 102 98 - 107 mmol/L     Glucose 125 (H) 70 - 99 mg/dL     Alkaline Phosphatase 51 40 - 150 U/L     AST 12 0 - 45 U/L     ALT 18 0 - 70 U/L     Protein Total 6.4 6.4 - 8.3 g/dL     Albumin 4.2 3.5 - 5.2 g/dL     Bilirubin Total 0.2 <=1.2 mg/dL   Hemoglobin A1c     Collection Time: 06/11/24  9:50 AM   Result Value Ref Range     Hemoglobin A1C 5.4 <5.7 %   Lipid panel     Collection Time: 06/11/24  9:50 AM   Result Value Ref Range     Cholesterol 157 <200 mg/dL     Triglycerides 191 (H) <150 mg/dL      Direct Measure HDL 31 (L) >=40 mg/dL     LDL Cholesterol Calculated 88 <=100 mg/dL     Non HDL Cholesterol 126 <130 mg/dL   CBC with platelets and differential     Collection Time: 06/11/24  9:50 AM   Result Value Ref Range     WBC Count 4.9 4.0 - 11.0 10e3/uL     RBC Count 4.84 4.40 - 5.90 10e6/uL     Hemoglobin 13.9 13.3 - 17.7 g/dL     Hematocrit 41.4 40.0 - 53.0 %     MCV 86 78 - 100 fL     MCH 28.7 26.5 - 33.0 pg     MCHC 33.6 31.5 - 36.5 g/dL     RDW 12.9 10.0 - 15.0 %     Platelet Count 190 150 - 450 10e3/uL     % Neutrophils 50 %     % Lymphocytes 40 %     % Monocytes 5 %     % Eosinophils 4 %     % Basophils 1 %     % Immature Granulocytes 0 %     NRBCs per 100 WBC 0 <1 /100     Absolute Neutrophils 2.5 1.6 - 8.3 10e3/uL     Absolute Lymphocytes 2.0 0.8 - 5.3 10e3/uL     Absolute Monocytes 0.2 0.0 - 1.3 10e3/uL     Absolute Eosinophils 0.2 0.0 - 0.7 10e3/uL     Absolute Basophils 0.0 0.0 - 0.2 10e3/uL     Absolute Immature Granulocytes 0.0 <=0.4 10e3/uL     Absolute NRBCs 0.0 10e3/uL           Changes made today 6/18/24  Lactulose 20 g packet 3 times daily  Hand [facility] with [facility]

## 2024-06-18 NOTE — PLAN OF CARE
"  Problem: Adult Behavioral Health Plan of Care  Goal: Adheres to Safety Considerations for Self and Others  Outcome: Progressing     Problem: Adult Behavioral Health Plan of Care  Goal: Optimized Coping Skills in Response to Life Stressors  Intervention: Promote Effective Coping Strategies  Recent Flowsheet Documentation  Taken 6/18/2024 6823 by Alphonse Granados RN  Supportive Measures:   active listening utilized   self-care encouraged   self-responsibility promoted   verbalization of feelings encouraged   self-reflection promoted   relaxation techniques promoted   Goal Outcome Evaluation:    Plan of Care Reviewed With: patient          Patient present in the milieu all day pacing the halls, social and interactive with staff and peers and attending group. Patient upon approach full range in affect at times appearing tense and anxious with pressured and loud speech. Patient states continued frustration with hospitalization and commitment process. Patient at the start of the shift on 2:1 SIO to monitor for SI/SIB following statements made the day before. Patient today denying thoughts/intent/plan of SI or SIB stating \"It was the only thing I could think to say at the time cause I wanted to go to [facility]\". Patient was asked if he had a plan on he would have acted on the suicidal comments yesterday, he responded \"I would have called my \". Patient again stated \"It was just something I said, obviously I couldn't hurt myself by calling my . After discussion with provider and care team SIO was discontinued with patient continuing to contract for safety the remainder of the day.     Patient reporting increased anxiety and depression on and off throughout the day which he requested for PRN hydroxyzine at 1139 and later Gabapentin at 1335. Patient reporting relief with medication and coping techniques including artwork and showering. Patient denied SI/SIB, AH/VH, or thoughts of harming others. Patient does still " demonstrate symptoms of jose including tense affect, and pressured speech, and mood lability.     Patient cooperative with vital sign assessments which were stable. Provider notified of increased lab level of ammonia with no ordered changes with current assessments or interventions. Patient had no stated or observed symptoms of Hyperammonemia. Patient diet good eating 100% of meals and numerous snacks throughout the day. Patient independent with requesting and accepting of scheduled medications though continues to refuse AM Risperidone continuing to site concerns for sexual side effects. Patient requested PRN Sennakot to address constipation. Patient reports chronic slight difficulty going with last BM today. Patient independent with identifying needs and completing ADL's including showering twice today.

## 2024-06-19 LAB
AMMONIA PLAS-SCNC: 47 UMOL/L (ref 16–60)
HOLD SPECIMEN: NORMAL
VALPROATE SERPL-MCNC: 75.2 UG/ML

## 2024-06-19 PROCEDURE — 80164 ASSAY DIPROPYLACETIC ACD TOT: CPT | Performed by: CLINICAL NURSE SPECIALIST

## 2024-06-19 PROCEDURE — 99232 SBSQ HOSP IP/OBS MODERATE 35: CPT | Performed by: CLINICAL NURSE SPECIALIST

## 2024-06-19 PROCEDURE — 250N000013 HC RX MED GY IP 250 OP 250 PS 637: Performed by: CLINICAL NURSE SPECIALIST

## 2024-06-19 PROCEDURE — 82140 ASSAY OF AMMONIA: CPT | Performed by: PHYSICIAN ASSISTANT

## 2024-06-19 PROCEDURE — 250N000013 HC RX MED GY IP 250 OP 250 PS 637: Performed by: PSYCHIATRY & NEUROLOGY

## 2024-06-19 PROCEDURE — 99207 PR NO BILLABLE SERVICE THIS VISIT: CPT | Performed by: PHYSICIAN ASSISTANT

## 2024-06-19 PROCEDURE — 124N000002 HC R&B MH UMMC

## 2024-06-19 PROCEDURE — 36415 COLL VENOUS BLD VENIPUNCTURE: CPT | Performed by: PHYSICIAN ASSISTANT

## 2024-06-19 RX ORDER — HALOPERIDOL 5 MG/1
5 TABLET ORAL EVERY 8 HOURS PRN
Status: DISCONTINUED | OUTPATIENT
Start: 2024-06-19 | End: 2024-06-20

## 2024-06-19 RX ORDER — RISPERIDONE 0.5 MG/1
0.5 TABLET ORAL AT BEDTIME
Status: DISCONTINUED | OUTPATIENT
Start: 2024-06-19 | End: 2024-07-10 | Stop reason: HOSPADM

## 2024-06-19 RX ORDER — DIPHENHYDRAMINE HCL 50 MG
50 CAPSULE ORAL EVERY 8 HOURS PRN
Status: DISCONTINUED | OUTPATIENT
Start: 2024-06-19 | End: 2024-06-20

## 2024-06-19 RX ADMIN — NICOTINE POLACRILEX 4 MG: 4 GUM, CHEWING BUCCAL at 18:46

## 2024-06-19 RX ADMIN — HYDROXYZINE HYDROCHLORIDE 50 MG: 50 TABLET ORAL at 22:03

## 2024-06-19 RX ADMIN — NICOTINE POLACRILEX 4 MG: 4 GUM, CHEWING BUCCAL at 17:39

## 2024-06-19 RX ADMIN — LACTULOSE 20 G: 10 POWDER, FOR SOLUTION ORAL at 09:19

## 2024-06-19 RX ADMIN — NICOTINE POLACRILEX 4 MG: 4 GUM, CHEWING BUCCAL at 16:31

## 2024-06-19 RX ADMIN — NICOTINE 1 PATCH: 21 PATCH, EXTENDED RELEASE TRANSDERMAL at 09:19

## 2024-06-19 RX ADMIN — LACTULOSE 20 G: 10 POWDER, FOR SOLUTION ORAL at 13:44

## 2024-06-19 RX ADMIN — NICOTINE POLACRILEX 4 MG: 4 GUM, CHEWING BUCCAL at 10:43

## 2024-06-19 RX ADMIN — HYDROXYZINE HYDROCHLORIDE 50 MG: 50 TABLET ORAL at 16:31

## 2024-06-19 RX ADMIN — HALOPERIDOL 5 MG: 5 TABLET ORAL at 12:31

## 2024-06-19 RX ADMIN — RISPERIDONE 0.5 MG: 0.5 TABLET, FILM COATED ORAL at 21:07

## 2024-06-19 RX ADMIN — GABAPENTIN 200 MG: 100 CAPSULE ORAL at 13:34

## 2024-06-19 RX ADMIN — Medication 3 MG: at 22:05

## 2024-06-19 RX ADMIN — DIPHENHYDRAMINE HYDROCHLORIDE 50 MG: 50 CAPSULE ORAL at 12:31

## 2024-06-19 RX ADMIN — DIVALPROEX SODIUM 1250 MG: 500 TABLET, DELAYED RELEASE ORAL at 21:06

## 2024-06-19 RX ADMIN — NICOTINE POLACRILEX 4 MG: 4 GUM, CHEWING BUCCAL at 12:32

## 2024-06-19 RX ADMIN — LACTULOSE 20 G: 10 POWDER, FOR SOLUTION ORAL at 21:06

## 2024-06-19 RX ADMIN — HYDROXYZINE HYDROCHLORIDE 50 MG: 50 TABLET ORAL at 09:40

## 2024-06-19 RX ADMIN — NICOTINE POLACRILEX 4 MG: 4 GUM, CHEWING BUCCAL at 09:31

## 2024-06-19 RX ADMIN — NICOTINE POLACRILEX 4 MG: 4 GUM, CHEWING BUCCAL at 22:00

## 2024-06-19 RX ADMIN — NICOTINE POLACRILEX 4 MG: 4 GUM, CHEWING BUCCAL at 13:36

## 2024-06-19 ASSESSMENT — ACTIVITIES OF DAILY LIVING (ADL)
ADLS_ACUITY_SCORE: 29
HYGIENE/GROOMING: HANDWASHING;SHOWER;INDEPENDENT
ADLS_ACUITY_SCORE: 29
DRESS: INDEPENDENT
ADLS_ACUITY_SCORE: 29
DRESS: INDEPENDENT
ADLS_ACUITY_SCORE: 29
ADLS_ACUITY_SCORE: 29
ORAL_HYGIENE: INDEPENDENT
ADLS_ACUITY_SCORE: 29
ADLS_ACUITY_SCORE: 29
HYGIENE/GROOMING: INDEPENDENT
ADLS_ACUITY_SCORE: 29
ADLS_ACUITY_SCORE: 29
LAUNDRY: UNABLE TO COMPLETE
ADLS_ACUITY_SCORE: 29
ORAL_HYGIENE: INDEPENDENT
ADLS_ACUITY_SCORE: 29
ADLS_ACUITY_SCORE: 29

## 2024-06-19 NOTE — PROGRESS NOTES
Internal Medicine was consulted today for hyperammonemia. In review of the chart, it looks like the last ammonia checked at 6/14/24 and was mildly elevated at 85. Recheck today reveals a normal ammonia at 47. Mr. Edgar denies any complaints currently.     We will cancel this consult as the ammonia is normal and patient has no complaints.     Please feel free to consult us with any questions or concerns.     ZOEY LightC, MPH

## 2024-06-19 NOTE — PLAN OF CARE
"Team Note Due:  Monday     Assessment/Intervention/Current Symtoms and Care Coordination:  Chart review and met with team, discussed pt progress, symptomology, and response to treatment. Discussed the discharge plan and any potential impediments to discharge.     Per team, Pt agitated last night, posturing, clenching fists. Appeared to get triggered by his blood pressure cuff and accused staff of trying to hurt him. Was redirected to his room. Took Haldol Ativan Benadryl. Appears to be seeking many medications. Continued loud pressured speech, mood lability.     I met with patient and he shared he was highly anxious right now. I asked if we could walk and talk and initiated walking down the [place] with him. He asked if I had heard from his CM and I shared the courts are closed today and she has not yet sent me the signed order. I reminded him that the provider submitted a porter petition. (They most likely will complete both at same time). I also informed pt that even with a signed order that doesn't mean he can discharge right away, reminded him of the Porter. I did share per team that Provider changed his risperidone to 0.25 HS as he requested. I also shared we discussed PRN Haldol and Hydroxyzine to help with his anxiety. I shared his CM plans to come to meet for a discharge planning meeting at some point. He says \"yeah because I am ready to go, I have a group home\". I asked pt what happened last night and he said \"that was a few days ago\". I prompted him about the event of last night with the BP cuff and he said \"that matty wouldn't apologize to me, Mo and (someone else) wouldn't do that [staff at his group home]. I praised him for walking away. I encouraged him to speak to his provider about his new PRN options today to avoid escalating to that point again. Pt speaking rapidly, pressured, loud speech. His eyes are pin pointed when speaking. He also reported feeling sad. Continued limited insight, feels there is " "nothing wrong and he should be able to discharge. Reports the only reason he's here is waiting for the  order which is not accurate and he has been informed of this several times. He is unable to connect this.     Shortly after pt was escalating, started yelling, screaming. Apparently peer told him to give his ice cream treat to him just prior. Pt was crying hysterically, pacing, saying he can't take this anymore. Pt went to his room and cried heavily across his bed stating why god why!. Pt calmed down after quite a long time and later stated \"I apologize , can you tell the courts the hospital is giving me the wrong meds which is why I am acting this way\". Pt continues to present as tense, labile mood, laughing, joking at times speaking loudly, pressured speech, racing thoughts.      Discharge Plan or Goal:  Back to [facility] or a different facility within the agency with higher level of care  Potentially:  [facility] [place]   [address] [place] [address]     Barriers to Discharge:  Symptomology - jose     Referral Status:  TBD    Legal Status:  COURT HOLD - notice of intent to revoke stay of commitment   Hand submitted 6/18  County: [facility]  File Number: [id number]  Start and expiration date of commitment:      Pt's  is:  Carlos Gonzales  [employer]  Ph:  [phone]     Beth Garza    [address] [place] [address]  Phone: [phone]  [email or web address].     Contacts:    [facility]:  Primary Contact: Darline Romo ( for [facility]): [phone] - RENNY on file for group home staff  Secondary Contact: Bree (group home ): [phone] RENNY on file for group home staff     :  Chelsea Brennan ([facility]  Ph: [phone] - Commitment CM no RENNY needed   [email or web address]  [facility]  Ph: [phone]  Christi (member of case management team): " [phone]     Medication Management/Psychiatry:  Name/Clinic: [facility] - Pt declines [facility] for Psychiatry  Number: [phone]     Other:  Yolanda (mom): [phone] - Pt declines [facility] for Mom      Upcoming Meetings and Dates/Important Information and next steps:  Will need discharge appt for psychiatry prior to leaving  COS and PD  Follow up with group home about med list if we haven't got it back on Thurs 6/20.

## 2024-06-19 NOTE — PLAN OF CARE
Pt had behavioral outburst around 1215 r/t being told by provider that he cannot make a skype/discord call that he apparently asked for. Pt yelling, clenching fists, crying, swearing, demanding B52. Writer, charge RN and psych associates attempted to verbally de-escalate. Writer offered gabapentin and nicotine gum as this was pt's plan with primary RN earlier. Pt only continued to yell and demand B52. Writer pulled out haldol, benadryl, ativan to give to pt as it was clear pt continued to escalate. At that time, the provider gave order not to give B52 as this is volitional behavior. Verbal de-escalation was continued and pt did walk back to his room. At that point, primary RN returned from break and received new order from provider to give haldol and benadryl. See MAR and primary RN note.

## 2024-06-19 NOTE — PLAN OF CARE
Rehab Group    Start time: 1600  End time: 1700  Patient time total: 20 minutes    attended partial group    #2 attended   Group Type: occupational therapy   Group Topic Covered: cognitive activities, coping skills, and healthy leisure time    Card game activity      Group Session Detail:    Cognition: Distractible, Restless       Mood/Affect:  Anxious, Irritable at times, pleasant overall     Thought Content: Delusional Disorganized             Plan: Patient encouraged to maintain attendance for continued ongoing support in working towards occupational therapy goals to support overall treatment/care.        Patient Detail:    Requested to sit a table with writer and peer while observing the activity they were participating in. Was hyper verbal at times during this time, often trying to get the peer to laugh along with what he was talking about and questioning whether peer was paying attention to what he was saying while peer was trying to focus on activity. Expressed frustrations regarding several things on the unit such as staff not smiling enough when he asked them to turn lights on.        Patient Active Problem List   Diagnosis    Bipolar disorder (H)    ADHD (attention deficit hyperactivity disorder)    Bipolar I disorder with jose (H)

## 2024-06-19 NOTE — PLAN OF CARE
Pt asleep at start of shift.     Breathing quiet and unlabored when sleeping.     Pt had no c/o pain or discomfort during the HS.     Appears to have slept 6.75 hours.     Pt continues on Acuity SIO..     Goal Outcome Evaluation:  Problem: Adult Behavioral Health Plan of Care  Goal: Adheres to Safety Considerations for Self and Others  Intervention: Develop and Maintain Individualized Safety Plan  Recent Flowsheet Documentation  Taken 6/19/2024 0000 by Kelly Sullivan, RN  Safety Measures: safety rounds completed  Goal: Absence of New-Onset Illness or Injury  Intervention: Identify and Manage Fall Risk  Recent Flowsheet Documentation  Taken 6/19/2024 0000 by Kelly Sullivan, RN  Safety Measures: safety rounds completed     Problem: Sleep Disturbance  Goal: Adequate Sleep/Rest  Outcome: Progressing

## 2024-06-19 NOTE — PLAN OF CARE
Problem: Adult Behavioral Health Plan of Care  Goal: Adheres to Safety Considerations for Self and Others  Intervention: Develop and Maintain Individualized Safety Plan  Recent Flowsheet Documentation  Taken 6/19/2024 1455 by Alphonse Granados RN  Safety Measures:   1:1 observation maintained   safety plan reviewed   clinical history reviewed   self-directed behavior promoted   Goal Outcome Evaluation:    Plan of Care Reviewed With: patient        Patient present in the milieu all day pacing the halls, social and interactive with staff and peers and attending group. Patient upon approach full range in affect at times appearing tense and anxious with pressured and loud speech. Patient states continued frustration with hospitalization and commitment process.     Patient reporting increased anxiety and depression on and off throughout the day which he requested for PRN hydroxyzine and gabapentin as well as using showering, artwork and ginger ale as coping mechanisms. Patient reporting relief with medication. Patient denied SI/SIB, AH/VH. Patient does still demonstrate symptoms of jose including tense affect, and pressured speech, and mood lability.     Patient after lunch reporting increasing anxiety and depression in relation to continued hospitalization and inability to get more Ginger ale. Patient requesting for PRN Benadryl, Haldol, and Ativan and continued to escalate as another RN attempted to consult with provider about the PRN medication Which provider ordered not to give at this time. Patient continued to escalate to the point that he was screaming and spitting in the lounge, while pulling his hair, than began clenching his fists then asked another male peer if he wanted to fight several times. RN and staff continuously intervened with reassurance and providing space by clearing the lounge. Patient stated he would be willing to take Haldol and Benadryl. Provider consulted and placed orders for the medication  which were given. Patient stated plan to shower which he demonstrated and appeared to calm and was apologetic.     Patient cooperative with vital sign assessments which were stable. Patients ammonia levels reassessed and came back WNL at 47. Provider notified and stated plan to continue on Lactulose remainder of day then will be discontinued tomorrow. Patient had no stated or observed symptoms of Hyperammonemia. Patient diet good eating 100% of meals and numerous snacks throughout the day. Patient independent with requesting and accepting of scheduled medications though continues to refuse AM Risperidone continuing to site concerns for sexual side effects. Patient independent with identifying needs and completing ADL's including showering twice today.

## 2024-06-19 NOTE — PLAN OF CARE
BEH IP Unit Acuity Rating Score (UARS)  Patient is given one point for every criteria they meet.    CRITERIA SCORING   On a 72 hour hold, court hold, committed, stay of commitment, or revocation. 1    Patient LOS on BEH unit exceeds 20 days. 0  LOS: 12   Patient under guardianship, 55+, otherwise medically complex, or under age 11. 0   Suicide ideation without relief of precipitating factors. 0   Current plan for suicide. 0   Current plan for homicide. 0   Imminent risk or actual attempt to seriously harm another without relief of factors precipitating the attempt. 1   Severe dysfunction in daily living (ex: complete neglect for self care, extreme disruption in vegetative function, extreme deterioration in social interactions). 1   Recent (last 7 days) or current physical aggression in the ED or on unit. 1   Restraints or seclusion episode in past 72 hours. 0   Recent (last 7 days) or current verbal aggression, agitation, yelling, etc., while in the ED or unit. 1   Active psychosis. 1   Need for constant or near constant redirection (from leaving, from others, etc).  1   Intrusive or disruptive behaviors. 1   Patient requires 3 or more hours of individualized nursing care per 8-hour shift (i.e. for ADLs, meds, therapeutic interventions). 0   TOTAL 8

## 2024-06-19 NOTE — PROGRESS NOTES
The patient's care was discussed with the treatment team during the daily team meeting and/or staff's chart notes were reviewed.  Staff report  Pt slept 6.5 hours overnight.  Patient presented with pressured speech, loud and manic with occasional mood lability. Couple outbursts today with little to no cause, Over ice cream, B/P curves/measurement, medications for anxiety and receiving information about the Court/Hand medications.     Upon interview, patient was interviewed with Marcin, a PA acting as my scribe . appears labile anxious and very paranoid during the assessment.initially, the patient was cooperative with this assessment, discussing about his diabetes medications.patient informed this writer that it is unnecessary to have him on Hand medication and that writer she will call [facility] and inform them that patient is willing to take the offered medication.  Writer informed the patient the medications on the list for Hand pending approval from the court.    Patient commented that he would do if Invega to please the staff and the county but he would prefer Abilify.  Writer explained to the patient the benefit and the risks of this medication, educating the patient that Invega comes in injectable Sustenna that he could be taking once monthly since he does not like taking oral pills when at home.  Patient disputed this has a wrong claim that he likes taking oral pills he just does not like the side effects.  Patient was agreeable to taking risperidone 0.5 mg at bedtime, although his request was for 0.25 mg at bedtime.    Patient shared that he does not think he needs antipsychotic medication since he was not hearing voices.  Writer informed the patient that the medication is not intended for voice hearing only.  The conversation was going smoothly between the patient and this provider until the patient requested a skype call order, explaining to this provider that he needed an order to visit a website  "called [employer] and be able to use his cell phone there for 50 minutes or less.  Writer informed the patient that it is against the unit policy.  Patient stated he had contacted the staff about this and they told him this provider is the one who could put in an order before he could use skype.    Writer informed the patient that since it is against the unit policy it will not be appropriate to put in such order.  Patient momentarily flared up with a great outburst, he became irate, yelled out and stormed out of the room towards the nurses station crying and insinuating racial discrimination.  When staff asked the patient what happened he told staff he only requested to retrieve phone numbers from his cell phone and he was denied (This was not true).  Patient immediately was requesting emergency medication ativan+benadryl+haldol volitionally.  Patient has been consistently requesting for this emergency medication (B-52), believing that the only way to get this is by acting out with aggressive behaviors.  This has become a pattern of volitional behavior.               General appearance: fair  Alert.  Awake, alert, Calm and Cooperative  Affect: \"Irritable\", labile, Blunted, flat  Mood:Anxious, irritable  Speech: Pressured, loud  Eye contact:  good.    Psychomotor behavior: Restless  Gait: steady   Abnormal movements:  none  Delusions: more of paranoia,  Hallucinations: denied  Thoughts: circumstantial, flight of ideas  Associations: Loosening association  Judgement: poor  Insight: poor  Cognitions: intact in conversation  Memory:  intact in conversation  Orientation: normal    Not suicidal.     Imp:  Bipolar jose  2.  ADHD  3.  Depakote ;level is 92, will reduce dosage     Current Facility-Administered Medications                     Current Facility-Administered Medications   Medication Dose Route Frequency Provider Last Rate Last Admin    acetaminophen (TYLENOL) tablet 650 mg  650 mg Oral Q4H PRN Javier Card MD     "    alum & mag hydroxide-simethicone (MAALOX) suspension 30 mL  30 mL Oral Q4H PRN Javier Card MD        benztropine (COGENTIN) tablet 1 mg  1 mg Oral BID PRN Javier Card MD        haloperidol (HALDOL) tablet 5 mg  5 mg Oral Q8H PRN Satya Garza MD   5 mg at 06/11/24 2042     And    LORazepam (ATIVAN) tablet 2 mg  2 mg Oral Q8H PRN Satya Garza MD   2 mg at 06/11/24 2042     And    diphenhydrAMINE (BENADRYL) capsule 50 mg  50 mg Oral Q8H PRN Satya Garza MD   50 mg at 06/11/24 2042    haloperidol lactate (HALDOL) injection 5 mg  5 mg Intramuscular Q8H PRN Satya Garza MD         And    LORazepam (ATIVAN) injection 2 mg  2 mg Intramuscular Q8H PRN Satya Garza MD         And    diphenhydrAMINE (BENADRYL) injection 50 mg  50 mg Intramuscular Q8H PRN Satya Garza MD        divalproex sodium delayed-release (DEPAKOTE) DR tablet 1,500 mg  1,500 mg Oral At Bedtime Javier Card MD   1,500 mg at 06/11/24 1937    hydrOXYzine HCl (ATARAX) tablet 50 mg  50 mg Oral TID PRN Javier Card MD   50 mg at 06/12/24 1124    melatonin tablet 3 mg  3 mg Oral At Bedtime PRN Javier Card MD   3 mg at 06/10/24 2014    nicotine (NICORETTE) gum 2 mg  2 mg Buccal Q1H PRN Javier Card MD   2 mg at 06/12/24 1333    risperiDONE (risperDAL) tablet 1 mg  1 mg Oral Satya Montez MD   1 mg at 06/12/24 1007    risperiDONE (risperDAL) tablet 4 mg  4 mg Oral At Bedtime Javier Card MD   4 mg at 06/11/24 1938    senna-docusate (SENOKOT-S/PERICOLACE) 8.6-50 MG per tablet 1 tablet  1 tablet Oral BID PRN Jvaier Card MD             Recent Results[]Expand by Default             Recent Results (from the past 168 hour(s))   Valproic acid     Collection Time: 06/11/24  9:50 AM   Result Value Ref Range     Valproic acid 92.7   ug/mL   Comprehensive metabolic panel     Collection Time: 06/11/24  9:50 AM   Result Value Ref Range     Sodium 139 135 - 145 mmol/L     Potassium  3.9 3.4 - 5.3 mmol/L     Carbon Dioxide (CO2) 27 22 - 29 mmol/L     Anion Gap 10 7 - 15 mmol/L     Urea Nitrogen 7.8 6.0 - 20.0 mg/dL     Creatinine 0.84 0.67 - 1.17 mg/dL     GFR Estimate >90 >60 mL/min/1.73m2     Calcium 9.6 8.6 - 10.0 mg/dL     Chloride 102 98 - 107 mmol/L     Glucose 125 (H) 70 - 99 mg/dL     Alkaline Phosphatase 51 40 - 150 U/L     AST 12 0 - 45 U/L     ALT 18 0 - 70 U/L     Protein Total 6.4 6.4 - 8.3 g/dL     Albumin 4.2 3.5 - 5.2 g/dL     Bilirubin Total 0.2 <=1.2 mg/dL   Hemoglobin A1c     Collection Time: 06/11/24  9:50 AM   Result Value Ref Range     Hemoglobin A1C 5.4 <5.7 %   Lipid panel     Collection Time: 06/11/24  9:50 AM   Result Value Ref Range     Cholesterol 157 <200 mg/dL     Triglycerides 191 (H) <150 mg/dL     Direct Measure HDL 31 (L) >=40 mg/dL     LDL Cholesterol Calculated 88 <=100 mg/dL     Non HDL Cholesterol 126 <130 mg/dL   CBC with platelets and differential     Collection Time: 06/11/24  9:50 AM   Result Value Ref Range     WBC Count 4.9 4.0 - 11.0 10e3/uL     RBC Count 4.84 4.40 - 5.90 10e6/uL     Hemoglobin 13.9 13.3 - 17.7 g/dL     Hematocrit 41.4 40.0 - 53.0 %     MCV 86 78 - 100 fL     MCH 28.7 26.5 - 33.0 pg     MCHC 33.6 31.5 - 36.5 g/dL     RDW 12.9 10.0 - 15.0 %     Platelet Count 190 150 - 450 10e3/uL     % Neutrophils 50 %     % Lymphocytes 40 %     % Monocytes 5 %     % Eosinophils 4 %     % Basophils 1 %     % Immature Granulocytes 0 %     NRBCs per 100 WBC 0 <1 /100     Absolute Neutrophils 2.5 1.6 - 8.3 10e3/uL     Absolute Lymphocytes 2.0 0.8 - 5.3 10e3/uL     Absolute Monocytes 0.2 0.0 - 1.3 10e3/uL     Absolute Eosinophils 0.2 0.0 - 0.7 10e3/uL     Absolute Basophils 0.0 0.0 - 0.2 10e3/uL     Absolute Immature Granulocytes 0.0 <=0.4 10e3/uL     Absolute NRBCs 0.0 10e3/uL            Changes made today 6/18/24  Risperidone to 0.25 mg at bedtime.

## 2024-06-20 PROCEDURE — 250N000013 HC RX MED GY IP 250 OP 250 PS 637: Performed by: CLINICAL NURSE SPECIALIST

## 2024-06-20 PROCEDURE — 250N000013 HC RX MED GY IP 250 OP 250 PS 637: Performed by: PSYCHIATRY & NEUROLOGY

## 2024-06-20 PROCEDURE — 99232 SBSQ HOSP IP/OBS MODERATE 35: CPT | Performed by: CLINICAL NURSE SPECIALIST

## 2024-06-20 PROCEDURE — 124N000002 HC R&B MH UMMC

## 2024-06-20 RX ORDER — DIPHENHYDRAMINE HCL 50 MG
50 CAPSULE ORAL EVERY 8 HOURS PRN
Status: CANCELLED | OUTPATIENT
Start: 2024-06-20

## 2024-06-20 RX ORDER — HALOPERIDOL 5 MG/1
5 TABLET ORAL EVERY 8 HOURS PRN
Status: DISCONTINUED | OUTPATIENT
Start: 2024-06-20 | End: 2024-07-10 | Stop reason: HOSPADM

## 2024-06-20 RX ORDER — HALOPERIDOL 5 MG/1
5 TABLET ORAL ONCE
Status: COMPLETED | OUTPATIENT
Start: 2024-06-20 | End: 2024-06-20

## 2024-06-20 RX ORDER — HALOPERIDOL 5 MG/1
5 TABLET ORAL EVERY 8 HOURS PRN
Status: CANCELLED | OUTPATIENT
Start: 2024-06-20

## 2024-06-20 RX ORDER — HALOPERIDOL 5 MG/1
5 TABLET ORAL EVERY 6 HOURS PRN
Status: CANCELLED | OUTPATIENT
Start: 2024-06-20

## 2024-06-20 RX ORDER — DIPHENHYDRAMINE HCL 50 MG
50 CAPSULE ORAL EVERY 8 HOURS PRN
Status: DISCONTINUED | OUTPATIENT
Start: 2024-06-20 | End: 2024-07-10 | Stop reason: HOSPADM

## 2024-06-20 RX ORDER — HALOPERIDOL 5 MG/1
5 TABLET ORAL EVERY 8 HOURS PRN
Status: DISCONTINUED | OUTPATIENT
Start: 2024-06-20 | End: 2024-06-20

## 2024-06-20 RX ORDER — DIPHENHYDRAMINE HCL 50 MG
50 CAPSULE ORAL EVERY 8 HOURS PRN
Status: DISCONTINUED | OUTPATIENT
Start: 2024-06-20 | End: 2024-06-20

## 2024-06-20 RX ADMIN — Medication 3 MG: at 20:45

## 2024-06-20 RX ADMIN — NICOTINE POLACRILEX 4 MG: 4 GUM, CHEWING BUCCAL at 15:02

## 2024-06-20 RX ADMIN — NICOTINE POLACRILEX 4 MG: 4 GUM, CHEWING BUCCAL at 16:49

## 2024-06-20 RX ADMIN — DIPHENHYDRAMINE HYDROCHLORIDE 50 MG: 50 CAPSULE ORAL at 19:56

## 2024-06-20 RX ADMIN — LACTULOSE 20 G: 10 POWDER, FOR SOLUTION ORAL at 08:19

## 2024-06-20 RX ADMIN — HALOPERIDOL 5 MG: 5 TABLET ORAL at 11:05

## 2024-06-20 RX ADMIN — RISPERIDONE 0.5 MG: 0.5 TABLET, FILM COATED ORAL at 19:57

## 2024-06-20 RX ADMIN — LACTULOSE 20 G: 10 POWDER, FOR SOLUTION ORAL at 13:43

## 2024-06-20 RX ADMIN — HYDROXYZINE HYDROCHLORIDE 50 MG: 50 TABLET ORAL at 10:23

## 2024-06-20 RX ADMIN — NICOTINE 1 PATCH: 21 PATCH, EXTENDED RELEASE TRANSDERMAL at 08:23

## 2024-06-20 RX ADMIN — DIVALPROEX SODIUM 1250 MG: 500 TABLET, DELAYED RELEASE ORAL at 19:56

## 2024-06-20 RX ADMIN — HYDROXYZINE HYDROCHLORIDE 50 MG: 50 TABLET ORAL at 15:05

## 2024-06-20 RX ADMIN — NICOTINE POLACRILEX 4 MG: 4 GUM, CHEWING BUCCAL at 12:17

## 2024-06-20 RX ADMIN — DIPHENHYDRAMINE HYDROCHLORIDE 50 MG: 50 CAPSULE ORAL at 11:05

## 2024-06-20 RX ADMIN — NICOTINE POLACRILEX 4 MG: 4 GUM, CHEWING BUCCAL at 09:15

## 2024-06-20 RX ADMIN — NICOTINE POLACRILEX 4 MG: 4 GUM, CHEWING BUCCAL at 17:58

## 2024-06-20 ASSESSMENT — ACTIVITIES OF DAILY LIVING (ADL)
ADLS_ACUITY_SCORE: 29
ADLS_ACUITY_SCORE: 29
DRESS: SCRUBS (BEHAVIORAL HEALTH);INDEPENDENT
ADLS_ACUITY_SCORE: 29
HYGIENE/GROOMING: INDEPENDENT
ORAL_HYGIENE: INDEPENDENT
ADLS_ACUITY_SCORE: 29

## 2024-06-20 NOTE — PLAN OF CARE
"Pt had an uneventful shift this day. Pt mood presents as anxiety with mixed affect. Pt denies SI, SIB, HI and thoughts of hurting others. Pt denies depression and A/VH. Pt c/o anxiety and requested hydroxyzine at 1023 with minimal effect. Pt approached window at approx 1100 and c/o anxiety of 8/10, pt speech was pressured with tense affect. Pt was clenching fists, not aggressively. Pt was provided with haldol/bendaryl combination with stated effect. Pt appeared calmer, speech less pressured and decreased volume.     Pt requested to get a phone number out of his phone for \"Max\". RN writer monitored and pt used phone appropriately and gave it back w/o incident.    Pt was medication compliant. Food and fluid intake adequate. No behavioral outbursts reported or observed. Pt made bed and tidied room on his own accord this morning.      "

## 2024-06-20 NOTE — PLAN OF CARE
"Denies having any thoughts of harming self or others. Denies A/VH. Endorses anxiety \"less than 5\" and depression \"less than 5.\" Pt notes improvement in his level of anxiety. Pt often laughing in appropriate contexts, but does so very loudly. Speaks loudly, with some degree of pressured speech. Generally cooperative.     Pt expresses frustration with hospitalization and a desire to be out of the hospital by the 4th of July. Pt talked with writer regarding a video games that will release soon that he is excited about.    Give PRN Benadryl and melatonin. Initially tried to give pt melatonin, however pt stated that he wanted Benadryl, thus Benadryl was given first. Pt later came to nursing window again for assistance with sleep, with pt asking for haldol or zyprexa to help him sleep. Discussed with pt that these medications have appropriate uses and that other options should be used to aid with sleep, with pt being somewhat receptive to this. Given PRN melatonin at this time.      Pt reported by PA staff to be yelling early in shift in the bathroom about ear wax in right ear. Spoke to pt about issue, with pt appearing very upset by this issue, yelling, insisted to writer that he can not hear and that his hearing is not simply diminished when writer asked if his hearing with diminished despite pt responding to writer's questions and thus presumably demonstrating some capacity to hear. Pt did not give a specific answer to questioning about when issues with wax started and if it has been getting worse, with pt simply stating that he has had the issue his \"whole life.\" Denies any other symptoms beyond not being able to \"hear anything.\" Provider contacted with ENT consult subsequently obtained. Provider also ordered scheduled debrox, however pt stated that he would not allow any liquid into his ear and thus refused this medication.    Took psychiatric medication initially without incident, however after given, pt stated " "something approximating \"you didn't give me the Risperdal, did you?,\" writer stated that he did, with pt stating something approximating \"it's fine.\"      Refused scheduled lactulose, stating that he had loose stool.     Pt endorses foot pain from hard floor. Refused medication for pain.     /80 (BP Location: Left arm, Patient Position: Sitting, Cuff Size: Adult Large)   Pulse 93   Temp 97.3  F (36.3  C) (Temporal)   Resp 16   Wt 90.1 kg (198 lb 11.2 oz)   SpO2 96%     "

## 2024-06-20 NOTE — PLAN OF CARE
Problem: Sleep Disturbance  Goal: Adequate Sleep/Rest  Outcome: Progressing   Goal Outcome Evaluation:    Patient appeared to sleep 6.75 hours this night shift.  No prns or snacks given or requested.  No concerns were reported or noted.  [facility] and [facility] on [facility] at this time.

## 2024-06-20 NOTE — PLAN OF CARE
BEH IP Unit Acuity Rating Score (UARS)  Patient is given one point for every criteria they meet.    CRITERIA SCORING   On a 72 hour hold, court hold, committed, stay of commitment, or revocation. 1    Patient LOS on BEH unit exceeds 20 days. 0  LOS: 13   Patient under guardianship, 55+, otherwise medically complex, or under age 11. 0   Suicide ideation without relief of precipitating factors. 0   Current plan for suicide. 0   Current plan for homicide. 0   Imminent risk or actual attempt to seriously harm another without relief of factors precipitating the attempt. 1   Severe dysfunction in daily living (ex: complete neglect for self care, extreme disruption in vegetative function, extreme deterioration in social interactions). 1   Recent (last 7 days) or current physical aggression in the ED or on unit. 1   Restraints or seclusion episode in past 72 hours. 0   Recent (last 7 days) or current verbal aggression, agitation, yelling, etc., while in the ED or unit. 1   Active psychosis. 1   Need for constant or near constant redirection (from leaving, from others, etc).  1   Intrusive or disruptive behaviors. 1   Patient requires 3 or more hours of individualized nursing care per 8-hour shift (i.e. for ADLs, meds, therapeutic interventions). 0   TOTAL 8

## 2024-06-20 NOTE — PLAN OF CARE
Pt has a labile affect with anxious tense mood. Speech is tangential and pressured.  Pt rates anxiety at 6/10 and depression 5/10. Encouraged pt to use coping techniques to help with anxiety. Pt received hydroxyzine at 1630 with some relief. Pt rates pain at 0/10. Pt reports no SI/HI/SIB and contracts for safety. Pt denies any hallucinations and not noted responding to any internal stimuli. Pt was medication compliant with no cheeking noted. No reported or observed medication side effects. Pt was visible on unit and social with peers. Continue current POC.    Plan of Care Reviewed With: patient Plan of Care Reviewed With: patient    Overall Patient Progress: no changeOverall Patient Progress: no change

## 2024-06-20 NOTE — PLAN OF CARE
"Team Note Due:  Monday     Assessment/Intervention/Current Symtoms and Care Coordination:  Chart review and met with team, discussed pt progress, symptomology, and response to treatment. Discussed the discharge plan and any potential impediments to discharge.     Received confirmation that the Hand petition was filed.  is awaiting a hearing date for this.     I called Darline Romo - group home director to follow up on the group home med list as she stated she would send It a couple days ago but I have yet to receive this. I left her a voicemail. I also called [facility] -  to ask for a copy of this. He sent me a picture of it on a computer screen. Fwd this to Pt's provider.     A peer (who told pt yesterday to give him food off his tray) made a loud noise at the other end of the nielson, and pt stated \"that was probably for me\". Pt continues to present as loud, pressured speech, tense. Pt appears to have animosity toward peer. This should be monitored closely. Pt's mood continues to be labile, paranoid.       Discharge Plan or Goal:  Back to [facility] or a different facility within the agency with higher level of care  Potentially:  [facility]   [address] [place] [address]     Barriers to Discharge:  Symptomology - jose, agitation      Referral Status:  TBD    Legal Status:  COURT HOLD - notice of intent to revoke stay of commitment   Hand submitted 6/18 - awaiting Hand hearing as of 6/20.   [facility]: [facility]  File Number: [id number]     Pt's  is:  Carlos Gonzales  [employer]  Ph:  [phone]     Beth Garza    [address] [place] [address]  Phone: [phone]  [email or web address]     Contacts:  [facility]:  Primary Contact: Darline Romo ( for [facility]): [phone] - RENNY on file for group home staff  Secondary Contact: Bree (group home ): [phone] RENNY " on file for group home staff  Email: [email or web address]    :  Chelsea Brennan ([facility]  Ph: [phone] - Commitment CM no RENNY needed   [email or web address].  [facility]  Ph: [phone]  Christi (member of case management team): [phone]     Medication Management/Psychiatry:  Name/Clinic: [facility] - Pt declines RENNY for Psychiatry  Number: [phone]     Other:  Yolanda (mom): [phone] - Pt declines RENNY for Mom      Upcoming Meetings and Dates/Important Information and next steps:  Will need discharge appt for psychiatry prior to leaving  COS and PD

## 2024-06-20 NOTE — PROGRESS NOTES
"The patient's care was discussed with the treatment team during the daily team meeting and/or staff's chart notes were reviewed.  Staff report  Pt slept 6.75 hours overnight.  Patient apologizing for his behavior of yesterday, stated he hope today is going to be a better day for him. Evening shift report: Pt has a labile affect with anxious tense mood. Speech is tangential and pressured.  Pt rates anxiety at 6/10 and depression 5/10. Encouraged pt to use coping techniques to help with anxiety. Pt received hydroxyzine at 1630 with some relief. Pt rates pain at 0/10. Pt reports no SI/HI/SIB and contracts for safety. Pt denies any hallucinations and not noted responding to any internal stimuli. Pt was medication compliant with no cheeking noted. No reported or observed medication side effects. Pt was visible on unit and social with peers. Continue current POC.     Plan of Care Reviewed With: patient Plan of Care Reviewed With: patient     Overall Patient Progress: no changeOverall Patient Progress: no change    Upon interview, patient was interviewed in his room with one of the male PAs  Acted as my scribe.  Patient appears animated, labile and paranoid but minimally cooperative stated that he is \"doing good.\"  Patient reported that the as needed medication helped his mood, stating that his mood was not pleasant prior to taking the as needed medication.  Patient continued to apologize for his behavior of yesterday.    Patient requested for his phone to retrieve and number from it so he could talk to Sha.  He was redirected to his primary RN for that.  Patient stated he likes his group home and does not want to go to the [facility].  Patient was talking about his medications as well as discharge writer informed the patient that we will have to wait because the process is in the court and we can only do so much to alter the court process.    Patient seems to demonstrate understanding, and she stated \"okay waiting on court " "decision.\"  Patient again stated he would be willing to take Abilify or Invega, stating that once a month Invega is good.  Patient denied auditory/visual hallucinations, denied suicidal/homicidal ideation and thoughts of self-harm.   Patient reported feeling safe on the unit and that he has been exercising by walking a lot within the hallway.  Writer asked the patient could go back to group if he has no further questions.                  General appearance: fair  Alert.  Awake, alert, Calm and Cooperative  Affect:  labile, Paranoid, Blunted   Mood: \"doing good\" Anxious,   Speech: Pressured, loud  Eye contact:  good.    Psychomotor behavior: Restless  Gait: steady   Abnormal movements:  none  Delusions: more of paranoia,  Hallucinations: denied  Thoughts: less circumstantial, less tangential  Associations: No Loosening association  Judgement: poor  Insight: poor  Cognitions: intact in conversation  Memory:  intact in conversation  Orientation: normal    Not suicidal.     Imp:  Bipolar jose  2.  ADHD  3.  Depakote ;level is 92, will reduce dosage     Current Facility-Administered Medications                     Current Facility-Administered Medications   Medication Dose Route Frequency Provider Last Rate Last Admin    acetaminophen (TYLENOL) tablet 650 mg  650 mg Oral Q4H PRN Javier Card MD        alum & mag hydroxide-simethicone (MAALOX) suspension 30 mL  30 mL Oral Q4H PRN Javier Card MD        benztropine (COGENTIN) tablet 1 mg  1 mg Oral BID PRN Javier Card MD        haloperidol (HALDOL) tablet 5 mg  5 mg Oral Q8H PRN Satya Garza MD   5 mg at 06/11/24 2042     And    LORazepam (ATIVAN) tablet 2 mg  2 mg Oral Q8H PRN Satya Garza MD   2 mg at 06/11/24 2042     And    diphenhydrAMINE (BENADRYL) capsule 50 mg  50 mg Oral Q8H PRN Satya Garza MD   50 mg at 06/11/24 2042    haloperidol lactate (HALDOL) injection 5 mg  5 mg Intramuscular Q8H PRN Satya Garza MD         " And    LORazepam (ATIVAN) injection 2 mg  2 mg Intramuscular Q8H PRN Satya Garza MD         And    diphenhydrAMINE (BENADRYL) injection 50 mg  50 mg Intramuscular Q8H PRN Satya Garza MD        divalproex sodium delayed-release (DEPAKOTE) DR tablet 1,500 mg  1,500 mg Oral At Bedtime Javier Card MD   1,500 mg at 06/11/24 1937    hydrOXYzine HCl (ATARAX) tablet 50 mg  50 mg Oral TID PRN Javier Card MD   50 mg at 06/12/24 1124    melatonin tablet 3 mg  3 mg Oral At Bedtime PRN Javier Card MD   3 mg at 06/10/24 2014    nicotine (NICORETTE) gum 2 mg  2 mg Buccal Q1H PRN Javier Card MD   2 mg at 06/12/24 1333    risperiDONE (risperDAL) tablet 1 mg  1 mg Oral Satya Montez MD   1 mg at 06/12/24 1007    risperiDONE (risperDAL) tablet 4 mg  4 mg Oral At Bedtime Javier Card MD   4 mg at 06/11/24 1938    senna-docusate (SENOKOT-S/PERICOLACE) 8.6-50 MG per tablet 1 tablet  1 tablet Oral BID PRN Javier Card MD             Recent Results[]Expand by Default             Recent Results (from the past 168 hour(s))   Valproic acid     Collection Time: 06/11/24  9:50 AM   Result Value Ref Range     Valproic acid 92.7   ug/mL   Comprehensive metabolic panel     Collection Time: 06/11/24  9:50 AM   Result Value Ref Range     Sodium 139 135 - 145 mmol/L     Potassium 3.9 3.4 - 5.3 mmol/L     Carbon Dioxide (CO2) 27 22 - 29 mmol/L     Anion Gap 10 7 - 15 mmol/L     Urea Nitrogen 7.8 6.0 - 20.0 mg/dL     Creatinine 0.84 0.67 - 1.17 mg/dL     GFR Estimate >90 >60 mL/min/1.73m2     Calcium 9.6 8.6 - 10.0 mg/dL     Chloride 102 98 - 107 mmol/L     Glucose 125 (H) 70 - 99 mg/dL     Alkaline Phosphatase 51 40 - 150 U/L     AST 12 0 - 45 U/L     ALT 18 0 - 70 U/L     Protein Total 6.4 6.4 - 8.3 g/dL     Albumin 4.2 3.5 - 5.2 g/dL     Bilirubin Total 0.2 <=1.2 mg/dL   Hemoglobin A1c     Collection Time: 06/11/24  9:50 AM   Result Value Ref Range     Hemoglobin A1C 5.4 <5.7 %   Lipid panel      Collection Time: 06/11/24  9:50 AM   Result Value Ref Range     Cholesterol 157 <200 mg/dL     Triglycerides 191 (H) <150 mg/dL     Direct Measure HDL 31 (L) >=40 mg/dL     LDL Cholesterol Calculated 88 <=100 mg/dL     Non HDL Cholesterol 126 <130 mg/dL   CBC with platelets and differential     Collection Time: 06/11/24  9:50 AM   Result Value Ref Range     WBC Count 4.9 4.0 - 11.0 10e3/uL     RBC Count 4.84 4.40 - 5.90 10e6/uL     Hemoglobin 13.9 13.3 - 17.7 g/dL     Hematocrit 41.4 40.0 - 53.0 %     MCV 86 78 - 100 fL     MCH 28.7 26.5 - 33.0 pg     MCHC 33.6 31.5 - 36.5 g/dL     RDW 12.9 10.0 - 15.0 %     Platelet Count 190 150 - 450 10e3/uL     % Neutrophils 50 %     % Lymphocytes 40 %     % Monocytes 5 %     % Eosinophils 4 %     % Basophils 1 %     % Immature Granulocytes 0 %     NRBCs per 100 WBC 0 <1 /100     Absolute Neutrophils 2.5 1.6 - 8.3 10e3/uL     Absolute Lymphocytes 2.0 0.8 - 5.3 10e3/uL     Absolute Monocytes 0.2 0.0 - 1.3 10e3/uL     Absolute Eosinophils 0.2 0.0 - 0.7 10e3/uL     Absolute Basophils 0.0 0.0 - 0.2 10e3/uL     Absolute Immature Granulocytes 0.0 <=0.4 10e3/uL     Absolute NRBCs 0.0 10e3/uL            Changes made today 6/20/24  Discontinued  1 mg morning dose of Risperidone

## 2024-06-21 PROCEDURE — 124N000002 HC R&B MH UMMC

## 2024-06-21 PROCEDURE — 99232 SBSQ HOSP IP/OBS MODERATE 35: CPT | Performed by: CLINICAL NURSE SPECIALIST

## 2024-06-21 PROCEDURE — 250N000013 HC RX MED GY IP 250 OP 250 PS 637: Performed by: CLINICAL NURSE SPECIALIST

## 2024-06-21 PROCEDURE — H2032 ACTIVITY THERAPY, PER 15 MIN: HCPCS

## 2024-06-21 PROCEDURE — 250N000013 HC RX MED GY IP 250 OP 250 PS 637: Performed by: PSYCHIATRY & NEUROLOGY

## 2024-06-21 PROCEDURE — F09Z3XZ CERUMEN MANAGEMENT TREATMENT USING CERUMEN MANAGEMENT EQUIPMENT: ICD-10-PCS | Performed by: PHYSICIAN ASSISTANT

## 2024-06-21 PROCEDURE — 69210 REMOVE IMPACTED EAR WAX UNI: CPT | Performed by: PHYSICIAN ASSISTANT

## 2024-06-21 RX ADMIN — NICOTINE POLACRILEX 4 MG: 4 GUM, CHEWING BUCCAL at 08:30

## 2024-06-21 RX ADMIN — NICOTINE POLACRILEX 4 MG: 4 GUM, CHEWING BUCCAL at 13:28

## 2024-06-21 RX ADMIN — DIPHENHYDRAMINE HYDROCHLORIDE 50 MG: 50 CAPSULE ORAL at 11:47

## 2024-06-21 RX ADMIN — DIVALPROEX SODIUM 1250 MG: 500 TABLET, DELAYED RELEASE ORAL at 20:50

## 2024-06-21 RX ADMIN — Medication 3 MG: at 22:29

## 2024-06-21 RX ADMIN — HYDROXYZINE HYDROCHLORIDE 50 MG: 50 TABLET ORAL at 10:34

## 2024-06-21 RX ADMIN — DIPHENHYDRAMINE HYDROCHLORIDE 50 MG: 50 CAPSULE ORAL at 21:08

## 2024-06-21 RX ADMIN — NICOTINE 1 PATCH: 21 PATCH, EXTENDED RELEASE TRANSDERMAL at 08:23

## 2024-06-21 RX ADMIN — HALOPERIDOL 5 MG: 5 TABLET ORAL at 11:47

## 2024-06-21 RX ADMIN — RISPERIDONE 0.5 MG: 0.5 TABLET, FILM COATED ORAL at 20:50

## 2024-06-21 RX ADMIN — NICOTINE POLACRILEX 4 MG: 4 GUM, CHEWING BUCCAL at 16:20

## 2024-06-21 RX ADMIN — CARBAMIDE PEROXIDE 3 DROP: 65 SOLUTION/ DROPS TOPICAL at 20:53

## 2024-06-21 ASSESSMENT — ACTIVITIES OF DAILY LIVING (ADL)
ADLS_ACUITY_SCORE: 29
ORAL_HYGIENE: INDEPENDENT;PROMPTS
HYGIENE/GROOMING: INDEPENDENT
ADLS_ACUITY_SCORE: 29
ORAL_HYGIENE: INDEPENDENT
ADLS_ACUITY_SCORE: 29
ADLS_ACUITY_SCORE: 29
DRESS: INDEPENDENT
ADLS_ACUITY_SCORE: 29
HYGIENE/GROOMING: INDEPENDENT
ADLS_ACUITY_SCORE: 29
DRESS: INDEPENDENT
ADLS_ACUITY_SCORE: 29
ADLS_ACUITY_SCORE: 29

## 2024-06-21 NOTE — PLAN OF CARE
"Team Note Due:  Monday     Assessment/Intervention/Current Symtoms and Care Coordination:  Chart review and met with team, discussed pt progress, symptomology, and response to treatment. Discussed the discharge plan and any potential impediments to discharge.     I saw pt in the nielson and he had towels. I asked if he was about to shower and he stated yes I want to shower as much as I can to show that I am ready to leave. He also mentioned showering helps him cope. Encouraged him to do so if it helps calm him.    Pt's Hand hearing came in the fax. July 2nd at 9:30AM. I gave a copy to patient, and he immediately stated \"What!\", he stated he needed a second to process so I left him alone. He was observed sitting in the chair near the phone staring, and tense. He was yelling that he does not accept this. A copy was placed in binder. I sent a court tv request to Deborah Leon.     A few minutes later, Pt asked to speak with writer again. Pt states he spoke to Ramiro his  and he wants to waive the hearing and take whatever meds the provider wants him to. He says he cannot wait until July 2nd as he needs to be out on July 4th stating he has things to do. He asks me to \"call the court everyday and harass them like they harass me\". I referred him to his  to advocate for him. He says \"there's no reason why they can't meet on Monday, Tues, Wed, Thurs or Friday next week!\". Pt started to state what he believes the Hand hearing is stating \"they will let me pick which meds I want from a list\". I did share that is not the process and with a Hand the hospital can \"force\" meds to help him get better. Pt did not seem to understand this. He says he will take whatever meds the provider wants him to. Pt's thought process is not coherent. He does not appear able to comprehend information at this time due to jose symptomology. Pt has pin pointed pupils, he stands with his arms in the air, and fist clenched " "speaking loudly. I asked what might be helpful right now, and he says \"Haldol! But they won't give me that right now!\". Pt was redirected and said he was going to use the exercise bike. Pt continues with elevated, loud, pressured, tangential speech. Pt continues to shout across the unit and share this news with everyone.     Discharge Plan or Goal:  Most likely discharge to different facility within the [facility] agency with higher level of care though pt is currently wanting to return to [facility].     Was living at:  [facility] through [facility]  [address] [place] [id number]     Barriers to Discharge:  Symptomology - jose, agitation      Referral Status:  TBD    Legal Status:  COURT HOLD - notice of intent to revoke stay of commitment   Hand submitted 6/18 - awaiting Hand hearing as of 6/20.   [facility]  File Number: [id number]     Pt's  is:  Carlos Gonzales  [place] [employer]  Ph:  [phone]     Beth Graza    [address] [place] [id number]  Phone: [phone]  [email or web address].     Contacts:  [facility]:  Primary Contact: Darline Romo ( for [facility]): [phone] - RENNY on file for group home staff  Secondary Contact: Bree (group home ): [phone] RENNY on file for group home staff  Email: [email or web address]    :  Chelsea Brennan ([facility]  Ph: [phone] - Commitment CM no RENNY needed   [email or web address].  [facility]  Ph: [phone]  Christi (member of case management team): [phone]     Medication Management/Psychiatry:  Name/Clinic: [facility] - Pt declines [facility] for Psychiatry  Number: [phone]     Other:  Yolanda (mom): [phone] - Pt declines [facility] for Mom      Upcoming Meetings and Dates/Important Information and next steps:  Will need discharge appt for psychiatry prior to leaving  [facility] and " PD    Upcoming Hand hearing 7/2 at 9:30AM.

## 2024-06-21 NOTE — PLAN OF CARE
BEH IP Unit Acuity Rating Score (UARS)  Patient is given one point for every criteria they meet.    CRITERIA SCORING   On a 72 hour hold, court hold, committed, stay of commitment, or revocation. 1    Patient LOS on BEH unit exceeds 20 days. 0  LOS: 14   Patient under guardianship, 55+, otherwise medically complex, or under age 11. 0   Suicide ideation without relief of precipitating factors. 0   Current plan for suicide. 0   Current plan for homicide. 0   Imminent risk or actual attempt to seriously harm another without relief of factors precipitating the attempt. 1   Severe dysfunction in daily living (ex: complete neglect for self care, extreme disruption in vegetative function, extreme deterioration in social interactions). 1   Recent (last 7 days) or current physical aggression in the ED or on unit. 1   Restraints or seclusion episode in past 72 hours. 0   Recent (last 7 days) or current verbal aggression, agitation, yelling, etc., while in the ED or unit. 1   Active psychosis. 1   Need for constant or near constant redirection (from leaving, from others, etc).  1   Intrusive or disruptive behaviors. 1   Patient requires 3 or more hours of individualized nursing care per 8-hour shift (i.e. for ADLs, meds, therapeutic interventions). 0   TOTAL 8

## 2024-06-21 NOTE — CARE PLAN
"  Rehab Group    Start time: 1030   End time: 1215  Patient time total: 50 minutes    attended full group    #2 attended   Group Type: general health and coping   Group Topic Covered: anger/conflict management     Group Session Detail:  Topic group - worksheet activity/discussion where purpose was to reflect on a past accomplishment that felt impossible at the time, and to recognize personal skills, strengths, etc that helped to bring success.  Additionally, group members were asked to think of something that feels impossible right now, why it may not be impossible, and small steps to take toward achieving the impossible once again.     Patient Response/Contribution:  cooperative with task and supportive of peers     Patient Detail:    Pt initially presented to group in a pleasant mood, though during check-in, began talking about the length of his hospitalization, questioned who broke HIPAA to find his diagnosis.    Regarding topic activity, pt stated he thought \"hydroxizine\" was impossible,and he will try it out.Currently he feels it is impossible to get out of the hospital, and feels it is all up to his doctor and taking his medications.  Makes tangential comments throughout, though is receptive to cues to return to group topic.          Train & Education Service Per Session 45 + Minutes () OT Group code    Patient Active Problem List   Diagnosis    Bipolar disorder (H)    ADHD (attention deficit hyperactivity disorder)    Bipolar I disorder with jose (H)       "

## 2024-06-21 NOTE — PROGRESS NOTES
Rehab Group     Start time: 1030  End time: 1415  Patient time total: 60 minutes      #3 attended   Group Type: music   Group Topic Covered: balanced lifestyle, coping skills, emotional regulation, healthy leisure time, mindfulness, relaxation , self-care, self-esteem, sensory intervention, and social skills   Group Session Detail: Cooperatively engaged in Morning and Afternoon Music Relaxation groups to decrease anxiety and promote Mindfulness, self-expression and  social skills.     Patient Response/Contribution:  cooperative with task and attentive   Patient Detail:  Talkative affect, appropriately engaged in session, responding well to the music.  Presented as somewhat organized/tracking in conversation in group, open, but when overheard in the hallway talking to staff appeared much less regulated and grounded, appearing not fully in reality.   Likes hyperpop music and resonates with this.        Activity Therapy Per 15 minutes () Other Rehab therapies    Patient Active Problem List   Diagnosis    Bipolar disorder (H)    ADHD (attention deficit hyperactivity disorder)    Bipolar I disorder with jose (H)

## 2024-06-21 NOTE — PLAN OF CARE
"  Rehab Group    Start time: 1545  End time: 1430  Patient time total: 5 minutes    attended partial group     #3 attended   Group Type: occupational therapy   Group Topic Covered: healthy leisure time    What do you bev      Group Session Detail:    Patient Response: Pt partcipated in group focused on leisure participation and exploration and socialization. Discussed how participation in leisure activities can be used as a healthy coping skill in symptom management and a strategy to reduce stress.     Plan: Patient encouraged to maintain attendance for continued ongoing support in working towards occupational therapy goals to support overall treatment/care.        Patient Detail:    Initially declined writer's invite to join group. Expressed missing another peer who transferred to a different unit yesterday.   Part way through group expressed interest in joining group, however became upset when there was not enough chairs and did not want to pull one up, stating \"they apparently need the chairs over there (pod 2) more than we do.   Did later on join for last few minutes of group, thanking writer at the end.      Patient Active Problem List   Diagnosis    Bipolar disorder (H)    ADHD (attention deficit hyperactivity disorder)    Bipolar I disorder with jose (H)      "

## 2024-06-21 NOTE — PROGRESS NOTES
Patient is irritable, flat and blunted affect. Pt had a good morning until 11:30am; but pt started to get anxious, elated, angry, and agitated. He felt these symptoms because of how held up his case is with the court. He wants to discharge on July 4th because a new video game is coming out on that same day. He was frustrated and requested Benadryl 50 mg and Haldol 5 mg. Appropriate measures were taken combination of prn administration form was filled out. Due to the symptoms he was experiencing above he was able to get the medications. He has been showing a calm behavior since taking the prn medication. He denies depression, paranoia and delusions. He denies SI/SIB/HI.     No physical concerns noted this shift. Pt also denies pain.

## 2024-06-21 NOTE — CONSULTS
Otolaryngology Consult Note  June 21, 2024      CC: Ear wax, change in hearing    HPI: Venancio Edgar is a 28 year old male with a past medical history of bipolar disorder and ADHD who was brought to the ED on 6/5/2024 for erratic behavior. He is currently admitted for psychiatric management. ENT was consulted due to patient complaint of ear wax and change in hearing.    Patient denies any change in hearing or tinnitus. He states that he has had problems with ear wax in the past. He reports being seen in the emergency department for ear cleanings in the past and states water from the shower gets stuck behind the wax and affects his hearing. This is what prompts his ED visits. He wants his ears cleaned today so he can submerge his head in the shower comfortably.    Past Medical History:   Diagnosis Date    ADHD (attention deficit hyperactivity disorder)     Bipolar disorder (H)     DORV with doubly committed VSD     Stimulant abuse (H)     Substance-induced psychotic disorder (H)     Vitamin D deficiency        No past surgical history on file.    No current outpatient medications on file.        No Known Allergies    Social History     Socioeconomic History    Marital status: Not on file     Spouse name: Not on file    Number of children: Not on file    Years of education: Not on file    Highest education level: Not on file   Occupational History    Not on file   Tobacco Use    Smoking status: Not on file    Smokeless tobacco: Not on file   Substance and Sexual Activity    Alcohol use: Not on file    Drug use: Not on file    Sexual activity: Not on file   Other Topics Concern    Not on file   Social History Narrative    Born in [place], grew up in [place].   2021: History of loss of mother and adoption early life by his biological family members (here referred to as mother).  Most recent year he has been unstable [facility], engaging in high risk behaviors and drug abuse.      Social Determinants of  Health     Financial Resource Strain: High Risk (1/1/2022)    Received from [facility]    Financial Resource Strain     Difficulty of Paying Living Expenses: Not on file     Difficulty of Paying Living Expenses: Not on file   Food Insecurity: Not on file   Transportation Needs: Not on file   Physical Activity: Not on file   Stress: Not on file   Social Connections: Unknown (1/1/2022)    Received from [facility]    Social Connections     Frequency of Communication with Friends and Family: Not on file   Interpersonal Safety: Not on file   Housing Stability: Not on file       No family history on file.    ROS: 12 point review of systems is negative unless noted in HPI.    PHYSICAL EXAM:  General: walking around unit, no acute distress  /80 (BP Location: Left arm, Patient Position: Sitting, Cuff Size: Adult Large)   Pulse 93   Temp 97.3  F (36.3  C) (Temporal)   Resp 16   Wt 90.1 kg (198 lb 11.2 oz)   SpO2 96%   Head: normocephalic, atraumatic  Face: symmetrical, no swelling, edema, or erythema.   Eyes: EOMI, clear sclera  Ears: no tragal tenderness, external ear canal open with cerumen impaction bilaterally  Nose: no anterior drainage, no external deformity  Mouth: Normal tongue, moist, adequate dentition  Neck: no LAD, trachea midline  Neuro: cranial nerves 2-12 grossly intact  Respiratory: breathing non-labored on RA, no stridor  Skin: no rashes or skin lesions of the face/neck  Psych: cooperative, talkative  Cardio: extremities warm and well perfused     Otologic microscope exam:   Patient's ear pathology required use of the O-Scope microscope for the purpose of cleaning and improving visualization in order to assure a more accurate diagnostic evaluation.     Right ear was examined under the microscope. Moist, dark wax completely occluding the canal. A moderate amount of cerumen was removed with suction. TM was not visualized before  patient stopped the procedure. He would like to continue with drops and have a repeat cleaning on Monday.    Left ear was also examined under the microscope. Cerumen is hard and dry and occluding the canal. A suction was used to remove a moderate amount of cerumen. TM again was not fully visualized as patient stopped the procedure as he felt enough was removed for him to comfortably submerge his head in the shower.        Assessment and Plan  Venancio Edgar is a 28 year old male with a past medical history of bipolar disorder and ADHD who was brought to the ED on 6/5/2024 for erratic behavior. He is currently admitted for psychiatric management. ENT was consulted due to patient complaint of ear wax and change in hearing. Exam is consistent with bilateral cerumen impaction. Ears were partially cleaned today with no signs of infection at this time. Recommend bilateral debrox drops daily and ENT will return for repeat cleaning on Monday. Suction machine was left in station 12 exam room.    - Debrox drops to both ears 1-2x daily  - Repeat cleaning expected for Monday  - Please contact ENT with any questions or concerns    --Patient and plan discussed with Dr. Tristin Hanks PA-C pg via [facility] or [facility]  Otolaryngology-Head & Neck Surgery  Please page ENT with questions by dialing [phone] and entering job code [phone] when prompted.

## 2024-06-21 NOTE — PROVIDER NOTIFICATION
"Combination PRN Medication Administration    Medications Administered: Benadryl+Haldol combination    What patient symptom(s) led to the administration of these medications?    Pt is upset after talking to the provider he stated his symptom got worse. \"I am on edge and I am feeling my self getting angry, elated I do not want to do something stupid. I know this medication will help me\".      What interventions were attempted to avoid use of these medication (including other PRN medications)?     Giving other options such as a 1:1 therapy. Pt declined and stated he wants Benadryl and Haldol combination. Hydroxyzine was given he stated it is ineffective.     What were the patient's response(s) to the interventions?     Pt declined therapy 1:1 therapy and just wanted medication.     Provider notified: Mariano Pacheco       Date: 06/21/24        Time: 1144    Shazia Devine RN   "

## 2024-06-21 NOTE — PLAN OF CARE
Venancio appeared sleeping for  6.75 hours without respiratory distress  No discomfort or pain verbalized   No harm/assault  to self and others noted or reported  No aggressive behavior exhibited this shift.    Problem: Sleep Disturbance  Goal: Adequate Sleep/Rest  Outcome: Progressing     Problem: Adult Behavioral Health Plan of Care  Goal: Plan of Care Review  Outcome: Progressing  Goal: Adheres to Safety Considerations for Self and Others  Intervention: Develop and Maintain Individualized Safety Plan  Recent Flowsheet Documentation  Taken 6/21/2024 0324 by Ailyn Barker RN  Safety Measures: safety rounds completed  Goal: Absence of New-Onset Illness or Injury  Intervention: Identify and Manage Fall Risk  Recent Flowsheet Documentation  Taken 6/21/2024 0324 by Ailyn Barker RN  Safety Measures: safety rounds completed   Goal Outcome Evaluation:

## 2024-06-21 NOTE — PLAN OF CARE
Group Attendance:  attended full group    Time session began: 1415  Time session ended: 1500  Patient's total time in group: 15    Total # Attendees   3   Group Type psychotherapeutic     Group Topic Covered emotional regulation and healthy coping skills        Group Session Detail Group members chose songs to listen to that matches or will improve their mood. Writer also used this time as a time to check in with the mood of pts.     Patient's response to the group topic/interactions:  left group on several occasions, distracted , and verbalizations were off topic     Patient Details: Pt attended group for a few minutes and talked with a peer for a while. Pt was very distracted with recent news he received and was in and out of the group area talking with different people.            No Charge (0-15 min)    Patient Active Problem List   Diagnosis    Bipolar disorder (H)    ADHD (attention deficit hyperactivity disorder)    Bipolar I disorder with jose (H)

## 2024-06-21 NOTE — CARE PLAN
Rehab Group    Start time: 1130   End time: 1215  Patient time total: 25 minutes    attended partial group    #2 attended   Group Type: general health and coping and life skills   Group Topic Covered: healthy leisure time     Group Session Detail:     Patient Response/Contribution:  positive affect, cooperative with task, supportive of peers, and requested more information on topic     Patient Detail:    Pt was in/out of social activity group.  Peer requested to make popcorn - pt enjoyed watching the airpopper, asked a few questions, noted he might like to get one.  Pt states he's hopeful to be able to return to his group home, as he really likes it there.  Friendly with peers, enjoyed listening to music - remains appropriate.        No Charge    Patient Active Problem List   Diagnosis    Bipolar disorder (H)    ADHD (attention deficit hyperactivity disorder)    Bipolar I disorder with jose (H)

## 2024-06-21 NOTE — PROGRESS NOTES
"The patient's care was discussed with the treatment team during the daily team meeting and/or staff's chart notes were reviewed.  Staff report  Pt slept 6.75 hours overnight.  Denies having any thoughts of harming self or others. Denies A/VH. Endorses anxiety \"less than 5\" and depression \"less than 5.\" Pt notes improvement in his level of anxiety. Pt often laughing in appropriate contexts, but does so very loudly. Speaks loudly, with some degree of pressured speech. Generally cooperative.      Pt expresses frustration with hospitalization and a desire to be out of the hospital by the 4th of July. Pt talked with writer regarding a video games that will release soon that he is excited about.     Give PRN Benadryl and melatonin. Initially tried to give pt melatonin, however pt stated that he wanted Benadryl, thus Benadryl was given first. Pt later came to nursing window again for assistance with sleep, with pt asking for haldol or zyprexa to help him sleep. Discussed with pt that these medications have appropriate uses and that other options should be used to aid with sleep, with pt being somewhat receptive to this. Given PRN melatonin at this time.       Pt reported by PA staff to be yelling early in shift in the bathroom about ear wax in right ear. Spoke to pt about issue, with pt appearing very upset by this issue, yelling, insisted to writer that he can not hear and that his hearing is not simply diminished when writer asked if his hearing with diminished despite pt responding to writer's questions and thus presumably demonstrating some capacity to hear. Pt did not give a specific answer to questioning about when issues with wax started and if it has been getting worse, with pt simply stating that he has had the issue his \"whole life.\" Denies any other symptoms beyond not being able to \"hear anything.\" Provider contacted with ENT consult subsequently obtained. Provider also ordered scheduled debrox, however pt " "stated that he would not allow any liquid into his ear and thus refused this medication.     Took psychiatric medication initially without incident, however after given, pt stated something approximating \"you didn't give me the Risperdal, did you?,\" writer stated that he did, with pt stating something approximating \"it's fine.\"       Refused scheduled lactulose, stating that he had loose stool.      Pt endorses foot pain from hard floor. Refused medication for pain.        Upon interview, patient stated \"I am in a good mood, I do not have the need to take Haldol/Benadryl.\"  Patient shared that he slept well because another Nurse helped him become tired by talking to him.  Patient stated \"I am happy today, however reported he has nothing much to do here.    Patient remains fairly calm, less pressured speech, and truly seems to be happy and cooperative with this assessment.  Patient mentioned that \"Haldol/Benadryl works for me.\"  Patient asked if the [facility] picked the Hand medications for me yet?  He became momentarily frustrated when he was told we are still expecting to hear from [facility].  Writer encouraged the patient to continue to use his coping skills, and assuring him that we might hear from the [facility] next week if not today Friday. Just heard from the [facility] few hours later that the [facility] just set a Court Hand hearing for July 2 and patient was not happy when the news was broken to him, asking the [facility] to harass the Court to move the date closer.     Meanwhile, patient had earlier demanded to leave July 4 because of a new video game that is coming out that day. At that time, writer had informed the patient that we are waiting on the court.  Understanding that the weekend is here, writer encouraged the patient to continue to use his coping skills.    Patient shared that his mother is coming tomorrow and he wanted her to bring \"crocs shoes without laces, pops and chips.  Patient informed that he he could " "have the pop (ginger ale) and the chips requested at the nurses discretion but writer would put an order to wear the crocs shoes as he complained that his feet are cracking from walking barefooted on the unit. Patient was not able to maintain  stable mood for a long time, appears labile, demonstrating different types of emotions within the short period of this assessment, overall, mood is unpredictable.                   General appearance: fair  Alert.  Awake, alert, Calm and Cooperative  Affect:  labile, happy, Paranoid, Blunted   Mood: \"Good\" mood lability  Speech: less pressured, loud  Eye contact:  good.    Psychomotor behavior: less restless  Gait: steady   Abnormal movements:  none  Delusions: more of paranoia,  Hallucinations: denied  Thoughts: less circumstantial, less tangential  Associations: No Loosening association  Judgement: poor  Insight: poor  Cognitions: intact in conversation  Memory:  intact in conversation  Orientation: normal    Not suicidal.     Imp:  Bipolar jose  2.  ADHD  3.  Depakote ;level is 92, will reduce dosage     Current Facility-Administered Medications                     Current Facility-Administered Medications   Medication Dose Route Frequency Provider Last Rate Last Admin    acetaminophen (TYLENOL) tablet 650 mg  650 mg Oral Q4H PRN Javier Card MD        alum & mag hydroxide-simethicone (MAALOX) suspension 30 mL  30 mL Oral Q4H PRN Javier Card MD        benztropine (COGENTIN) tablet 1 mg  1 mg Oral BID PRN Javier Card MD        haloperidol (HALDOL) tablet 5 mg  5 mg Oral Q8H PRN Satya Garza MD   5 mg at 06/11/24 2042     And    LORazepam (ATIVAN) tablet 2 mg  2 mg Oral Q8H PRN Satya Garza MD   2 mg at 06/11/24 2042     And    diphenhydrAMINE (BENADRYL) capsule 50 mg  50 mg Oral Q8H PRN Satya Garza MD   50 mg at 06/11/24 2042    haloperidol lactate (HALDOL) injection 5 mg  5 mg Intramuscular Q8H PRN Satya Garza MD         " And    LORazepam (ATIVAN) injection 2 mg  2 mg Intramuscular Q8H PRN Satya Garza MD         And    diphenhydrAMINE (BENADRYL) injection 50 mg  50 mg Intramuscular Q8H PRN Satya Garza MD        divalproex sodium delayed-release (DEPAKOTE) DR tablet 1,500 mg  1,500 mg Oral At Bedtime Javier Card MD   1,500 mg at 06/11/24 1937    hydrOXYzine HCl (ATARAX) tablet 50 mg  50 mg Oral TID PRN Javier Card MD   50 mg at 06/12/24 1124    melatonin tablet 3 mg  3 mg Oral At Bedtime PRN Javier Card MD   3 mg at 06/10/24 2014    nicotine (NICORETTE) gum 2 mg  2 mg Buccal Q1H PRN Javier Card MD   2 mg at 06/12/24 1333    risperiDONE (risperDAL) tablet 1 mg  1 mg Oral Satya Montez MD   1 mg at 06/12/24 1007    risperiDONE (risperDAL) tablet 4 mg  4 mg Oral At Bedtime Javier Card MD   4 mg at 06/11/24 1938    senna-docusate (SENOKOT-S/PERICOLACE) 8.6-50 MG per tablet 1 tablet  1 tablet Oral BID PRN Javier Card MD             Recent Results[]Expand by Default             Recent Results (from the past 168 hour(s))   Valproic acid     Collection Time: 06/11/24  9:50 AM   Result Value Ref Range     Valproic acid 92.7   ug/mL   Comprehensive metabolic panel     Collection Time: 06/11/24  9:50 AM   Result Value Ref Range     Sodium 139 135 - 145 mmol/L     Potassium 3.9 3.4 - 5.3 mmol/L     Carbon Dioxide (CO2) 27 22 - 29 mmol/L     Anion Gap 10 7 - 15 mmol/L     Urea Nitrogen 7.8 6.0 - 20.0 mg/dL     Creatinine 0.84 0.67 - 1.17 mg/dL     GFR Estimate >90 >60 mL/min/1.73m2     Calcium 9.6 8.6 - 10.0 mg/dL     Chloride 102 98 - 107 mmol/L     Glucose 125 (H) 70 - 99 mg/dL     Alkaline Phosphatase 51 40 - 150 U/L     AST 12 0 - 45 U/L     ALT 18 0 - 70 U/L     Protein Total 6.4 6.4 - 8.3 g/dL     Albumin 4.2 3.5 - 5.2 g/dL     Bilirubin Total 0.2 <=1.2 mg/dL   Hemoglobin A1c     Collection Time: 06/11/24  9:50 AM   Result Value Ref Range     Hemoglobin A1C 5.4 <5.7 %   Lipid panel      Collection Time: 06/11/24  9:50 AM   Result Value Ref Range     Cholesterol 157 <200 mg/dL     Triglycerides 191 (H) <150 mg/dL     Direct Measure HDL 31 (L) >=40 mg/dL     LDL Cholesterol Calculated 88 <=100 mg/dL     Non HDL Cholesterol 126 <130 mg/dL   CBC with platelets and differential     Collection Time: 06/11/24  9:50 AM   Result Value Ref Range     WBC Count 4.9 4.0 - 11.0 10e3/uL     RBC Count 4.84 4.40 - 5.90 10e6/uL     Hemoglobin 13.9 13.3 - 17.7 g/dL     Hematocrit 41.4 40.0 - 53.0 %     MCV 86 78 - 100 fL     MCH 28.7 26.5 - 33.0 pg     MCHC 33.6 31.5 - 36.5 g/dL     RDW 12.9 10.0 - 15.0 %     Platelet Count 190 150 - 450 10e3/uL     % Neutrophils 50 %     % Lymphocytes 40 %     % Monocytes 5 %     % Eosinophils 4 %     % Basophils 1 %     % Immature Granulocytes 0 %     NRBCs per 100 WBC 0 <1 /100     Absolute Neutrophils 2.5 1.6 - 8.3 10e3/uL     Absolute Lymphocytes 2.0 0.8 - 5.3 10e3/uL     Absolute Monocytes 0.2 0.0 - 1.3 10e3/uL     Absolute Eosinophils 0.2 0.0 - 0.7 10e3/uL     Absolute Basophils 0.0 0.0 - 0.2 10e3/uL     Absolute Immature Granulocytes 0.0 <=0.4 10e3/uL     Absolute NRBCs 0.0 10e3/uL            Changes made today 6/21/24  Care order, Ok to wear crocs on the unit.

## 2024-06-22 PROCEDURE — 250N000013 HC RX MED GY IP 250 OP 250 PS 637: Performed by: PSYCHIATRY & NEUROLOGY

## 2024-06-22 PROCEDURE — 250N000013 HC RX MED GY IP 250 OP 250 PS 637: Performed by: CLINICAL NURSE SPECIALIST

## 2024-06-22 PROCEDURE — 124N000002 HC R&B MH UMMC

## 2024-06-22 RX ADMIN — NICOTINE POLACRILEX 4 MG: 4 GUM, CHEWING BUCCAL at 18:16

## 2024-06-22 RX ADMIN — DIPHENHYDRAMINE HYDROCHLORIDE 50 MG: 50 CAPSULE ORAL at 12:24

## 2024-06-22 RX ADMIN — NICOTINE POLACRILEX 4 MG: 4 GUM, CHEWING BUCCAL at 10:57

## 2024-06-22 RX ADMIN — HYDROXYZINE HYDROCHLORIDE 50 MG: 50 TABLET ORAL at 11:30

## 2024-06-22 RX ADMIN — DIVALPROEX SODIUM 1250 MG: 500 TABLET, DELAYED RELEASE ORAL at 19:08

## 2024-06-22 RX ADMIN — Medication 3 MG: at 19:11

## 2024-06-22 RX ADMIN — NICOTINE 1 PATCH: 21 PATCH, EXTENDED RELEASE TRANSDERMAL at 09:48

## 2024-06-22 RX ADMIN — CARBAMIDE PEROXIDE 3 DROP: 65 SOLUTION/ DROPS TOPICAL at 09:51

## 2024-06-22 RX ADMIN — HALOPERIDOL 5 MG: 5 TABLET ORAL at 12:24

## 2024-06-22 RX ADMIN — NICOTINE POLACRILEX 4 MG: 4 GUM, CHEWING BUCCAL at 09:50

## 2024-06-22 RX ADMIN — NICOTINE POLACRILEX 4 MG: 4 GUM, CHEWING BUCCAL at 15:01

## 2024-06-22 RX ADMIN — HYDROXYZINE HYDROCHLORIDE 50 MG: 50 TABLET ORAL at 16:13

## 2024-06-22 RX ADMIN — RISPERIDONE 0.5 MG: 0.5 TABLET, FILM COATED ORAL at 19:09

## 2024-06-22 RX ADMIN — NICOTINE POLACRILEX 4 MG: 4 GUM, CHEWING BUCCAL at 12:24

## 2024-06-22 ASSESSMENT — ACTIVITIES OF DAILY LIVING (ADL)
ADLS_ACUITY_SCORE: 29
ORAL_HYGIENE: INDEPENDENT
ADLS_ACUITY_SCORE: 29
HYGIENE/GROOMING: HANDWASHING;SHOWER;INDEPENDENT
LAUNDRY: UNABLE TO COMPLETE
ADLS_ACUITY_SCORE: 29
ADLS_ACUITY_SCORE: 29
DRESS: SCRUBS (BEHAVIORAL HEALTH)
ADLS_ACUITY_SCORE: 29

## 2024-06-22 NOTE — PROGRESS NOTES
Patient irritable during the start of the evening, he asked if his family can bring food to him from home. Writer told pt that it is not something that can be done right now. Provider also notified, provider also stated it can not be done due to unit rules. Pt has been seeing other pts get food from their family brought to them during visiting time, and is wondering why his case is different. He was very agitated and was loud on the unit. Writer had to call unit manager to explain the issue. Unit manager stated he can only have it brought to him once this weekend and this issue can be revisited with the treatment team on Monday. He denies all mental health symptoms including anxiety, depression, paranoia, delusions and SI/SIB/HI. Pt also denies hearing voices. Denies any physical concern, pt also denies pain. He is eating and drinking fine. Pt appears disheveled. He mentioned that he is getting a good night of sleep here.     /88 (BP Location: Left arm)   Pulse 78   Temp 98.3  F (36.8  C) (Tympanic)   Resp 16   Wt 90.1 kg (198 lb 11.2 oz)   SpO2 96%

## 2024-06-22 NOTE — PLAN OF CARE
RN Assessment   The patient presents with a hypomanic state and a predominantly irritable or elevated mood. He reports difficulty coping with anxiety and agitation and has requested hydroxyzine and Haldol, and Benadryl on an as-needed basis, despite encouragement to utilize non-pharmacologic coping strategies. He acknowledges attempting some suggested strategies but expresses a preference for medication.   The patient denies any current safety concerns, including active or passive suicidal ideation or thoughts of non-suicidal self-injury. Alhtough presented remains disorganized he denies psychiatric symptoms incliiung hallucinations, delusions, or paranoia.   The patient's insight into his situation and the need for hospitalization is poor; he believes he will return to his previous residence, contrary to his mother's report during her visit today. She expressed concern about the possibility of the patient being placed in the same living setting as his brother, who is also hospitalized at [facility] with similar symptoms.   The patient has no physical health complaints and has consumed all meals provided.

## 2024-06-22 NOTE — PROGRESS NOTES
Patient was observed resting in room at the start of the shift. Patient slept for 6.5  hours. No concerns or issues noted during this shift.

## 2024-06-23 PROCEDURE — 250N000013 HC RX MED GY IP 250 OP 250 PS 637: Performed by: PSYCHIATRY & NEUROLOGY

## 2024-06-23 PROCEDURE — 250N000013 HC RX MED GY IP 250 OP 250 PS 637: Performed by: CLINICAL NURSE SPECIALIST

## 2024-06-23 PROCEDURE — 124N000002 HC R&B MH UMMC

## 2024-06-23 RX ADMIN — NICOTINE POLACRILEX 4 MG: 4 GUM, CHEWING BUCCAL at 09:18

## 2024-06-23 RX ADMIN — NICOTINE POLACRILEX 4 MG: 4 GUM, CHEWING BUCCAL at 10:35

## 2024-06-23 RX ADMIN — NICOTINE 1 PATCH: 21 PATCH, EXTENDED RELEASE TRANSDERMAL at 09:42

## 2024-06-23 RX ADMIN — DIPHENHYDRAMINE HYDROCHLORIDE 50 MG: 50 CAPSULE ORAL at 11:32

## 2024-06-23 RX ADMIN — NICOTINE POLACRILEX 4 MG: 4 GUM, CHEWING BUCCAL at 16:27

## 2024-06-23 RX ADMIN — NICOTINE POLACRILEX 4 MG: 4 GUM, CHEWING BUCCAL at 18:09

## 2024-06-23 RX ADMIN — DIVALPROEX SODIUM 1250 MG: 500 TABLET, DELAYED RELEASE ORAL at 19:40

## 2024-06-23 RX ADMIN — RISPERIDONE 0.5 MG: 0.5 TABLET, FILM COATED ORAL at 19:41

## 2024-06-23 RX ADMIN — DIPHENHYDRAMINE HYDROCHLORIDE 50 MG: 50 CAPSULE ORAL at 20:30

## 2024-06-23 RX ADMIN — HYDROXYZINE HYDROCHLORIDE 50 MG: 50 TABLET ORAL at 09:42

## 2024-06-23 RX ADMIN — HALOPERIDOL 5 MG: 5 TABLET ORAL at 11:32

## 2024-06-23 RX ADMIN — CARBAMIDE PEROXIDE 3 DROP: 65 SOLUTION/ DROPS TOPICAL at 19:40

## 2024-06-23 RX ADMIN — HYDROXYZINE HYDROCHLORIDE 50 MG: 50 TABLET ORAL at 16:57

## 2024-06-23 RX ADMIN — Medication 3 MG: at 19:44

## 2024-06-23 ASSESSMENT — ACTIVITIES OF DAILY LIVING (ADL)
ADLS_ACUITY_SCORE: 29
HYGIENE/GROOMING: INDEPENDENT
DRESS: INDEPENDENT;SCRUBS (BEHAVIORAL HEALTH)
ADLS_ACUITY_SCORE: 29
ORAL_HYGIENE: INDEPENDENT

## 2024-06-23 NOTE — PLAN OF CARE
Problem: Adult Inpatient Plan of Care  Goal: Optimal Comfort and Wellbeing  Outcome: Progressing   Goal Outcome Evaluation:         Venancio has been up most of the morning. He comes to the RN window for PRN medications to decrease his anxiety. When he is in the lounge he is quite loud. He did receive access to his cell phone today. He spoke about going to an IRTS and how he will be really opposed to the idea. He stated if he went to and IRTS from here he would act out at the IRTS. He denies depression/SI/SIB. Denies AH/VH. Insight to his hospitalization is poor. Took PRN Haldol and Benadryl in the afternoon and remained isolative thereafter.

## 2024-06-23 NOTE — PLAN OF CARE
Goal Outcome Evaluation:  Problem: Sleep Disturbance  Goal: Adequate Sleep/Rest  Outcome: Progressing        Pt in bed at beginning of shift, breathing quiet and unlabored. Pt slept through shift. Pt slept 6.25 hours.      No pt complaints or concerns at this time.      No PRNs given. Will continue to monitor.

## 2024-06-23 NOTE — PLAN OF CARE
Problem: Adult Inpatient Plan of Care  Goal: Optimal Comfort and Wellbeing  Outcome: Progressing   Goal Outcome Evaluation:    Plan of Care Reviewed With: patient        Patient present in the milieu throughout the evening pacing the halls, social and interactive with staff and peers. Patient upon approach full range in affect at times appearing tense and anxious with pressured and loud speech. Patient states continued frustration with hospitalization and commitment process.     Patient reporting increased anxiety early in the evening and requested for PRN hydroxyzine as well as using showering, and artwork as coping mechanisms. Patient reporting relief with medication. Patient denied SI/SIB, AH/VH. Patient does still demonstrate symptoms of jose including tense affect, and pressured speech, and mood lability.       Patient cooperative with vital sign assessments which were stable.  Patient diet good eating 100% of meals and numerous snacks throughout the day. Patient independent with requesting and accepting of scheduled medications. Patient refused the scheduled debrox this evening citing temporary difficulty hearing after administration. Patient independent with identifying needs and completing ADL's including showering.

## 2024-06-24 PROCEDURE — 124N000002 HC R&B MH UMMC

## 2024-06-24 PROCEDURE — 250N000013 HC RX MED GY IP 250 OP 250 PS 637: Performed by: PSYCHIATRY & NEUROLOGY

## 2024-06-24 PROCEDURE — 250N000013 HC RX MED GY IP 250 OP 250 PS 637: Performed by: CLINICAL NURSE SPECIALIST

## 2024-06-24 PROCEDURE — 99232 SBSQ HOSP IP/OBS MODERATE 35: CPT | Performed by: CLINICAL NURSE SPECIALIST

## 2024-06-24 RX ADMIN — DIPHENHYDRAMINE HYDROCHLORIDE 50 MG: 50 CAPSULE ORAL at 12:19

## 2024-06-24 RX ADMIN — HALOPERIDOL 5 MG: 5 TABLET ORAL at 12:19

## 2024-06-24 RX ADMIN — NICOTINE 1 PATCH: 21 PATCH, EXTENDED RELEASE TRANSDERMAL at 09:26

## 2024-06-24 RX ADMIN — NICOTINE POLACRILEX 4 MG: 4 GUM, CHEWING BUCCAL at 18:32

## 2024-06-24 RX ADMIN — RISPERIDONE 0.5 MG: 0.5 TABLET, FILM COATED ORAL at 19:14

## 2024-06-24 RX ADMIN — NICOTINE POLACRILEX 4 MG: 4 GUM, CHEWING BUCCAL at 09:25

## 2024-06-24 RX ADMIN — HYDROXYZINE HYDROCHLORIDE 50 MG: 50 TABLET ORAL at 10:26

## 2024-06-24 RX ADMIN — NICOTINE POLACRILEX 4 MG: 4 GUM, CHEWING BUCCAL at 12:20

## 2024-06-24 RX ADMIN — DIVALPROEX SODIUM 1250 MG: 500 TABLET, DELAYED RELEASE ORAL at 19:14

## 2024-06-24 RX ADMIN — Medication 3 MG: at 19:14

## 2024-06-24 ASSESSMENT — ACTIVITIES OF DAILY LIVING (ADL)
ADLS_ACUITY_SCORE: 29
ORAL_HYGIENE: INDEPENDENT
ADLS_ACUITY_SCORE: 29
DRESS: INDEPENDENT;SCRUBS (BEHAVIORAL HEALTH)
ADLS_ACUITY_SCORE: 29
ADLS_ACUITY_SCORE: 29
HYGIENE/GROOMING: INDEPENDENT
DRESS: SCRUBS (BEHAVIORAL HEALTH)
ADLS_ACUITY_SCORE: 29
ORAL_HYGIENE: INDEPENDENT
ADLS_ACUITY_SCORE: 29
HYGIENE/GROOMING: INDEPENDENT
ADLS_ACUITY_SCORE: 29

## 2024-06-24 NOTE — PROGRESS NOTES
Brief ENT Progress Note  6/24/2024    ENT visited patient in his room. Patient laying in bed. He answers questions and engages with interviewer. He reports hearing loss when he had the ear wax drops placed. He reports feeling okay now and his hearing has returned. He declined further ear cleaning.    - May stop debrox drops or switch frequency to 1x/week  - Please contact ENT with any questions or concerns        Mary Hanks PA-C pg via [facility] or [facility]  Otolaryngology-Head & Neck Surgery  Please contact ENT by dialing [phone] and entering job code [phone].

## 2024-06-24 NOTE — PLAN OF CARE
"Team Note Due:  Monday     Assessment/Intervention/Current Symtoms and Care Coordination:  Chart review and met with team, discussed pt progress, symptomology, and response to treatment. Discussed the discharge plan and any potential impediments to discharge.     Per team, Pt was getting anxious by new peer on unit and asked to move to Pod 2. On Pod 2 Pt was less stimulated, and has been mostly staying in his room isolating/sleeping. Pt did state he will \"act out if he goes to an IRTS\" and adamantly does not want to go there.  I greeted pt and he stated hi.     I sent an email to Norman Loyola <[email or web address] and John Espana <[email or web address]> to ask about a care coordination/discharge planning meeting.     Team note complete.     Discharge Plan or Goal:  Most likely discharge to different facility within the [facility] with higher level of care though pt is currently wanting to return to [facility].     Was living at:  [facility] through [address] [address]    Barriers to Discharge:  Symptomology - jose, agitation      Referral Status:  TBD    Legal Status:  COURT HOLD - notice of intent to revoke stay of commitment   Hand submitted 6/18 - awaiting Ahnd hearing as of 6/20.   [facility]: [facility]  File Number: [id number]     Pt's  is:  Carlos Gonzales  [employer]  Ph:  [phone]     Beth Garza    [address] [place] [id number]  Phone: [phone]  [email or web address].     Contacts:  [facility]:  Primary Contact: Darline Romo ( for [facility]): [phone] - RENNY on file for group home staff  Secondary Contact: Bree (group home ): [phone] RENNY on file for group home staff  Email: [email or web address]    Norman Loyola <[email or web address]> and John Espana " [email or web address]> and Tal Ken as Norman Loyola is away.     :  Chelsea Brennan ([facility]  Ph: [phone] - Commitment CM no RENNY needed   [email or web address]  [facility]  Ph: [phone]  Christi (member of case management team): [phone]     Medication Management/Psychiatry:  Name/Clinic: [facility] - Pt declines [facility] for Psychiatry  Number: [phone]     Other:  Yolanda (mom): [phone] - Pt declines [facility] for Mom      Upcoming Meetings and Dates/Important Information and next steps:  Will need discharge appt for psychiatry prior to leaving  COS and PD    Upcoming Hand hearing 7/2 at 9:30AM.   Schedule discharge care meeting with [place] and Pt to discuss discharge option - they want him to potentially move to a higher needs home within their agency.

## 2024-06-24 NOTE — PLAN OF CARE
"Patient appears to be improving with a reduction in manic symptoms noted.  Venancio has been sleeping better at night.  His speech is less pressured, and his thought process is less disorganized.  His personal hygiene maintenance has improved; he shaved and took a shower this AM.  He is less impulsive and more willing to problem solve with unit staff.  Venancio reported this AM that he has felt triggered by a disruptive female peer (E.R.); he requested to move to pod #2, and he was moved to room N154.  Venancio reports feeling \"less stressed out\" now that he is receiving care on pod #2.  Venancio denies that he is experiencing any manic/ psychotic symptoms or dangerous ideations at this time.  He endorses anxiety and depressive symptoms, which he attributes to his involuntary hospitalization and length-of-stay.  Venancio has been using his adaptive coping mechanisms more effectively.  He requests PRN medications appropriately when his breakthrough symptoms exceed his ability to cope.  He requested a dose of PRN Hydroxyzine 50 mg, which he received at 10:26;  he reported a mild reduction in anxiety when later reassessed.  Later in the shift, Venancio reported mounting agitation and requested a dose of PRN Haldol 5 mg and Benadryl 50 mg, which he received at 12:19; when later reassessed, Venancio reported a significant reduction in agitation.  Venancio has been compliant with his scheduled medications, and no adverse side effects have been reported or observed.  He denies any acute physical concerns at this time.  Venancio was asleep in his room when unit staff were performing routine vital signs assessment today.    "

## 2024-06-24 NOTE — PROGRESS NOTES
"The patient's care was discussed with the treatment team during the daily team meeting and/or staff's chart notes were reviewed.  Staff report  Pt slept 6.5 hours overnight.  Evening staff report: Pt endorsing anxiety, requested and given 50 mg hydroxyzine. Denied depression, SI/HI, A/VH. Cooperative. At times appears tense. Loud speech. Blunted affect. No acute behavioral concerns noted this shift.      Denied pain. Denied having any acute physical concerns at this time.      Took scheduled medication without incident. Requested melatonin for sleep. Later requested benadryl for sleep.      /64 (BP Location: Right arm, Patient Position: Sitting, Cuff Size: Adult Large)   Pulse 96   Temp 97.1  F (36.2  C) (Temporal)   Resp 16   Wt 90.1 kg (198 lb 11.2 oz)   SpO2 95%        Upon interview, patient stated \"I am in doing great.\"  Patient reported the weekend was really good, stated \"no outbursts, I requested for Haldol plus Benadryl was very effective and the staff helped me to use coping skills.  Patient seems elated as he was discussing with this provider.  Patient briefly talked about his room change, stating another patient with schizoaffective was newly admitted into pod 1, feared they will always be at [facility], and so she requested a transfer to pod 2 to avoid conflicts.  Writer commended that effort as good and therapeutic.    Writer encouraged the patient to use more of coping skills than taking Haldol plus Benadryl, reminding the patient that he may not have access to this medication while back to the community patient seems to demonstrate understanding by voicing that he will reduce the frequency of asking for the medications.    Patient denied auditory hallucinations/visual hallucination, he reiterated that he never experienced auditory hallucinations, stating, if any \"I have my headphones on.\"  Patient denied having any delusions.  Patient reported attending groups but stated there is no group " "today, otherwise \"I will attend.\"    Patient requested for more vegetables and hummus, in his diet stating that the doctor's order is required.  Patient also asked if his mom will bring him taco?  Writer redirected the patient that would be at the nurses discretion.  Patient seems to have no further questions at this time, neither did he mention anything about wanting to discharge on the 4th of July.                   General appearance: fair  Alert.  Awake, alert, Calm and Cooperative  Affect:  labile, happy, cheerful  Mood: \"Good\"   Speech: less pressured, normal rate, tone and volume.  Eye contact:  good.    Psychomotor behavior: less restless  Gait: steady   Abnormal movements:  none  Delusions: denied  Hallucinations: denied  Thoughts: less circumstantial, less tangential  Associations: No Loosening association  Judgement: fair  Insight: limited  Cognitions: intact in conversation  Memory:  intact in conversation  Orientation: normal    Not suicidal.     Imp:  Bipolar jose  2.  ADHD  3.  Depakote ;level is 92, will reduce dosage     Current Facility-Administered Medications                     Current Facility-Administered Medications   Medication Dose Route Frequency Provider Last Rate Last Admin    acetaminophen (TYLENOL) tablet 650 mg  650 mg Oral Q4H PRN Javier Card MD        alum & mag hydroxide-simethicone (MAALOX) suspension 30 mL  30 mL Oral Q4H PRN Javier Card MD        benztropine (COGENTIN) tablet 1 mg  1 mg Oral BID PRN Javier Card MD        haloperidol (HALDOL) tablet 5 mg  5 mg Oral Q8H PRN Satya Garza MD   5 mg at 06/11/24 2042     And    LORazepam (ATIVAN) tablet 2 mg  2 mg Oral Q8H PRSatya Bauer MD   2 mg at 06/11/24 2042     And    diphenhydrAMINE (BENADRYL) capsule 50 mg  50 mg Oral Q8H PRN Satya Garza MD   50 mg at 06/11/24 2042    haloperidol lactate (HALDOL) injection 5 mg  5 mg Intramuscular Q8H PRN Satya Garza MD         And    " LORazepam (ATIVAN) injection 2 mg  2 mg Intramuscular Q8H PRN Satya Garza MD         And    diphenhydrAMINE (BENADRYL) injection 50 mg  50 mg Intramuscular Q8H PRN Satya Garza MD        divalproex sodium delayed-release (DEPAKOTE) DR tablet 1,500 mg  1,500 mg Oral At Bedtime Javier Card MD   1,500 mg at 06/11/24 1937    hydrOXYzine HCl (ATARAX) tablet 50 mg  50 mg Oral TID PRN Javier Card MD   50 mg at 06/12/24 1124    melatonin tablet 3 mg  3 mg Oral At Bedtime PRN Javier Card MD   3 mg at 06/10/24 2014    nicotine (NICORETTE) gum 2 mg  2 mg Buccal Q1H PRN Javier Card MD   2 mg at 06/12/24 1333    risperiDONE (risperDAL) tablet 1 mg  1 mg Oral Satya Montez MD   1 mg at 06/12/24 1007    risperiDONE (risperDAL) tablet 4 mg  4 mg Oral At Bedtime Javier Card MD   4 mg at 06/11/24 1938    senna-docusate (SENOKOT-S/PERICOLACE) 8.6-50 MG per tablet 1 tablet  1 tablet Oral BID PRN Javier Card MD             Recent Results[]Expand by Default             Recent Results (from the past 168 hour(s))   Valproic acid     Collection Time: 06/11/24  9:50 AM   Result Value Ref Range     Valproic acid 92.7   ug/mL   Comprehensive metabolic panel     Collection Time: 06/11/24  9:50 AM   Result Value Ref Range     Sodium 139 135 - 145 mmol/L     Potassium 3.9 3.4 - 5.3 mmol/L     Carbon Dioxide (CO2) 27 22 - 29 mmol/L     Anion Gap 10 7 - 15 mmol/L     Urea Nitrogen 7.8 6.0 - 20.0 mg/dL     Creatinine 0.84 0.67 - 1.17 mg/dL     GFR Estimate >90 >60 mL/min/1.73m2     Calcium 9.6 8.6 - 10.0 mg/dL     Chloride 102 98 - 107 mmol/L     Glucose 125 (H) 70 - 99 mg/dL     Alkaline Phosphatase 51 40 - 150 U/L     AST 12 0 - 45 U/L     ALT 18 0 - 70 U/L     Protein Total 6.4 6.4 - 8.3 g/dL     Albumin 4.2 3.5 - 5.2 g/dL     Bilirubin Total 0.2 <=1.2 mg/dL   Hemoglobin A1c     Collection Time: 06/11/24  9:50 AM   Result Value Ref Range     Hemoglobin A1C 5.4 <5.7 %   Lipid panel     Collection  Time: 06/11/24  9:50 AM   Result Value Ref Range     Cholesterol 157 <200 mg/dL     Triglycerides 191 (H) <150 mg/dL     Direct Measure HDL 31 (L) >=40 mg/dL     LDL Cholesterol Calculated 88 <=100 mg/dL     Non HDL Cholesterol 126 <130 mg/dL   CBC with platelets and differential     Collection Time: 06/11/24  9:50 AM   Result Value Ref Range     WBC Count 4.9 4.0 - 11.0 10e3/uL     RBC Count 4.84 4.40 - 5.90 10e6/uL     Hemoglobin 13.9 13.3 - 17.7 g/dL     Hematocrit 41.4 40.0 - 53.0 %     MCV 86 78 - 100 fL     MCH 28.7 26.5 - 33.0 pg     MCHC 33.6 31.5 - 36.5 g/dL     RDW 12.9 10.0 - 15.0 %     Platelet Count 190 150 - 450 10e3/uL     % Neutrophils 50 %     % Lymphocytes 40 %     % Monocytes 5 %     % Eosinophils 4 %     % Basophils 1 %     % Immature Granulocytes 0 %     NRBCs per 100 WBC 0 <1 /100     Absolute Neutrophils 2.5 1.6 - 8.3 10e3/uL     Absolute Lymphocytes 2.0 0.8 - 5.3 10e3/uL     Absolute Monocytes 0.2 0.0 - 1.3 10e3/uL     Absolute Eosinophils 0.2 0.0 - 0.7 10e3/uL     Absolute Basophils 0.0 0.0 - 0.2 10e3/uL     Absolute Immature Granulocytes 0.0 <=0.4 10e3/uL     Absolute NRBCs 0.0 10e3/uL            Changes made today 6/24/24  Diet order: More vegetables and Hummus

## 2024-06-24 NOTE — PLAN OF CARE
Venancio was visible in the milieu but withdrawn to self. Presented with bright  affect and good concentration but appeared  restless  denies discomfort, pain, SI/HI, delusions, and hallucination   Utilized PRN melatonin with bed time medication to promote sleep  Pt is medication compliant and contracted for safety.   Cooperative with cares and behaviorally controlled this shift.     Problem: Adult Behavioral Health Plan of Care  Goal: Plan of Care Review  Outcome: Progressing  Flowsheets (Taken 6/24/2024 1818)  Patient Agreement with Plan of Care: agrees  Goal: Adheres to Safety Considerations for Self and Others  Intervention: Develop and Maintain Individualized Safety Plan  Recent Flowsheet Documentation  Taken 6/24/2024 1818 by Ailyn Barker RN  Safety Measures: safety rounds completed  Goal: Absence of New-Onset Illness or Injury  Intervention: Identify and Manage Fall Risk  Recent Flowsheet Documentation  Taken 6/24/2024 1818 by Ailyn Barker RN  Safety Measures: safety rounds completed  Goal: Optimized Coping Skills in Response to Life Stressors  Intervention: Promote Effective Coping Strategies  Recent Flowsheet Documentation  Taken 6/24/2024 1818 by Ailyn Barker RN  Supportive Measures: active listening utilized  Goal: Develops/Participates in Therapeutic [place] to Support Successful Transition  Intervention: Foster Therapeutic [place]  Recent Flowsheet Documentation  Taken 6/24/2024 1818 by Ailyn Barker RN  Trust Relationship/Rapport:   care explained   choices provided   questions answered   thoughts/feelings acknowledged   Goal Outcome Evaluation:    Plan of Care Reviewed With: patient

## 2024-06-24 NOTE — PLAN OF CARE
Goal Outcome Evaluation:  Problem: Sleep Disturbance  Goal: Adequate Sleep/Rest  Outcome: Progressing  NOC Shift Report     Pt in bed at beginning of shift, breathing quiet and unlabored. Pt slept through shift. Pt slept 6.5 hours.      No pt complaints or concerns at this time.      No PRNs given. Will continue to monitor.

## 2024-06-24 NOTE — PROVIDER NOTIFICATION
"   06/24/24 5574   Individualization/Patient Specific Goals   Patient Personal Strengths expressive of emotions;resilient   Patient Vulnerabilities traumatic event;lacks insight into illness;history of unsuccessful treatment   Interprofessional Rounds   Participants advanced practice nurse;nursing;CTC;psychiatrist   Behavioral Team Discussion   Participants Mariano Pacheco CNP, Dr Card, Psychiatrist; Lilian LEYVA, Fareed RN   Progress Pt reported he will \"Act out if he has to go to [facility]\". Pt otherwise had no behavioral issues. He asked to move to a different pod due to a highly acute patient and since moved he has been quiet and mostly isolating to his room.   Continued Stay Criteria/Rationale Pending symptom stabilization and safe discharge plan.   Medical/Physical See H&P   Precautions Assault, Suicide   Plan Pt has a Hand hearing July 2nd, 2024. I emailed [facility] staff to ask about a care meeting as they want to move him to a higher needs home within their agency. Pt wants to return to group home he was at.   Anticipated Discharge Disposition group home     PRECAUTIONS AND SAFETY    Behavioral Orders   Procedures    Assault precautions    Code 1 - Restrict to Unit    Routine Programming     As clinically indicated    Status 15     Every 15 minutes.    Suicide precautions: Suicide Risk: LOW; Clinical rationale to override score: modification to the care environment     Patients on Suicide Precautions should have a Combination Diet ordered that includes a Diet selection(s) AND a Behavioral Tray selection for Safe Tray - with utensils, or Safe Tray - NO utensils       Order Specific Question:   Suicide Risk     Answer:   LOW     Order Specific Question:   Clinical rationale to override score:     Answer:   modification to the care environment       Safety  Safety WDL: WDL  [facility] patient room, own  Observed Behavior: sitting  Observed Behavior (Comment): screaming, " clenching fists, threats towards peer.  Safety Measures: suicide assessment completed  Diversional Activity: art work  Suicidality: Status 15, Behavioral scrubs (pajamas)  Assault: status 15, private room, behavioral scrubs (pajamas)  Elopement Interventions: status 15

## 2024-06-24 NOTE — PLAN OF CARE
BEH IP Unit Acuity Rating Score (UARS)  Patient is given one point for every criteria they meet.    CRITERIA SCORING   On a 72 hour hold, court hold, committed, stay of commitment, or revocation. 1    Patient LOS on BEH unit exceeds 20 days. 0  LOS: 17   Patient under guardianship, 55+, otherwise medically complex, or under age 11. 0   Suicide ideation without relief of precipitating factors. 0   Current plan for suicide. 0   Current plan for homicide. 0   Imminent risk or actual attempt to seriously harm another without relief of factors precipitating the attempt. 1   Severe dysfunction in daily living (ex: complete neglect for self care, extreme disruption in vegetative function, extreme deterioration in social interactions). 1   Recent (last 7 days) or current physical aggression in the ED or on unit. 1   Restraints or seclusion episode in past 72 hours. 0   Recent (last 7 days) or current verbal aggression, agitation, yelling, etc., while in the ED or unit. 1   Active psychosis. 1   Need for constant or near constant redirection (from leaving, from others, etc).  1   Intrusive or disruptive behaviors. 1   Patient requires 3 or more hours of individualized nursing care per 8-hour shift (i.e. for ADLs, meds, therapeutic interventions). 0   TOTAL 8

## 2024-06-25 PROCEDURE — 250N000013 HC RX MED GY IP 250 OP 250 PS 637: Performed by: PSYCHIATRY & NEUROLOGY

## 2024-06-25 PROCEDURE — 99231 SBSQ HOSP IP/OBS SF/LOW 25: CPT | Performed by: CLINICAL NURSE SPECIALIST

## 2024-06-25 PROCEDURE — G0177 OPPS/PHP; TRAIN & EDUC SERV: HCPCS

## 2024-06-25 PROCEDURE — 250N000013 HC RX MED GY IP 250 OP 250 PS 637: Performed by: CLINICAL NURSE SPECIALIST

## 2024-06-25 PROCEDURE — H2032 ACTIVITY THERAPY, PER 15 MIN: HCPCS

## 2024-06-25 PROCEDURE — 124N000002 HC R&B MH UMMC

## 2024-06-25 RX ADMIN — HALOPERIDOL 5 MG: 5 TABLET ORAL at 11:04

## 2024-06-25 RX ADMIN — Medication 3 MG: at 19:07

## 2024-06-25 RX ADMIN — DIPHENHYDRAMINE HYDROCHLORIDE 50 MG: 50 CAPSULE ORAL at 19:52

## 2024-06-25 RX ADMIN — NICOTINE POLACRILEX 4 MG: 4 GUM, CHEWING BUCCAL at 17:07

## 2024-06-25 RX ADMIN — HYDROXYZINE HYDROCHLORIDE 50 MG: 50 TABLET ORAL at 15:52

## 2024-06-25 RX ADMIN — DIVALPROEX SODIUM 1250 MG: 500 TABLET, DELAYED RELEASE ORAL at 19:07

## 2024-06-25 RX ADMIN — DIPHENHYDRAMINE HYDROCHLORIDE 50 MG: 50 CAPSULE ORAL at 11:04

## 2024-06-25 RX ADMIN — NICOTINE POLACRILEX 4 MG: 4 GUM, CHEWING BUCCAL at 12:33

## 2024-06-25 RX ADMIN — NICOTINE POLACRILEX 4 MG: 4 GUM, CHEWING BUCCAL at 18:37

## 2024-06-25 RX ADMIN — NICOTINE POLACRILEX 4 MG: 4 GUM, CHEWING BUCCAL at 09:54

## 2024-06-25 RX ADMIN — HYDROXYZINE HYDROCHLORIDE 50 MG: 50 TABLET ORAL at 09:12

## 2024-06-25 RX ADMIN — NICOTINE POLACRILEX 4 MG: 4 GUM, CHEWING BUCCAL at 08:29

## 2024-06-25 RX ADMIN — NICOTINE POLACRILEX 4 MG: 4 GUM, CHEWING BUCCAL at 15:52

## 2024-06-25 RX ADMIN — NICOTINE 1 PATCH: 21 PATCH, EXTENDED RELEASE TRANSDERMAL at 08:30

## 2024-06-25 RX ADMIN — RISPERIDONE 0.5 MG: 0.5 TABLET, FILM COATED ORAL at 19:07

## 2024-06-25 ASSESSMENT — ACTIVITIES OF DAILY LIVING (ADL)
ORAL_HYGIENE: INDEPENDENT
ADLS_ACUITY_SCORE: 29
ADLS_ACUITY_SCORE: 29
ADLS_ACUITY_SCORE: 30
HYGIENE/GROOMING: INDEPENDENT
ORAL_HYGIENE: INDEPENDENT
ADLS_ACUITY_SCORE: 29
ADLS_ACUITY_SCORE: 29
DRESS: SCRUBS (BEHAVIORAL HEALTH);INDEPENDENT
DRESS: SCRUBS (BEHAVIORAL HEALTH);INDEPENDENT
ADLS_ACUITY_SCORE: 29
ADLS_ACUITY_SCORE: 29
ADLS_ACUITY_SCORE: 30
ADLS_ACUITY_SCORE: 29
HYGIENE/GROOMING: INDEPENDENT
ADLS_ACUITY_SCORE: 29
ADLS_ACUITY_SCORE: 30
ADLS_ACUITY_SCORE: 29

## 2024-06-25 NOTE — PLAN OF CARE
BEH IP Unit Acuity Rating Score (UARS)  Patient is given one point for every criteria they meet.    CRITERIA SCORING   On a 72 hour hold, court hold, committed, stay of commitment, or revocation. 1    Patient LOS on BEH unit exceeds 20 days. 0  LOS: 18   Patient under guardianship, 55+, otherwise medically complex, or under age 11. 0   Suicide ideation without relief of precipitating factors. 0   Current plan for suicide. 0   Current plan for homicide. 0   Imminent risk or actual attempt to seriously harm another without relief of factors precipitating the attempt. 0   Severe dysfunction in daily living (ex: complete neglect for self care, extreme disruption in vegetative function, extreme deterioration in social interactions). 1   Recent (last 7 days) or current physical aggression in the ED or on unit. 1   Restraints or seclusion episode in past 72 hours. 0   Recent (last 7 days) or current verbal aggression, agitation, yelling, etc., while in the ED or unit. 1   Active psychosis. 1   Need for constant or near constant redirection (from leaving, from others, etc).  0   Intrusive or disruptive behaviors. 0   Patient requires 3 or more hours of individualized nursing care per 8-hour shift (i.e. for ADLs, meds, therapeutic interventions). 0   TOTAL 5

## 2024-06-25 NOTE — PLAN OF CARE
"Individual Therapy Note      Date of Service: June 25, 2024    Patient: Venancio goes by \"Venancio,\" uses he/him pronouns    Individuals Present: Venancio & MARLENE Medina    Session start: 1120  Session end: 1130  Session duration in minutes: 10      Modality Used: Person Centered and Brief Therapy    Goals: Verbal processing    Patient Description of current symptoms: Feeling better, less agitated, feeling good     Mental Status Exam:   Attitude: cooperative  Eye Contact: good  Mood: good  Affect: mood congruent  Speech: clear, coherent  Psychomotor Behavior: no evidence of tardive dyskinesia, dystonia, or tics  Thought Process:  goal oriented and circumstantial  Associations: no loose associations  Thought Content: no evidence of suicidal ideation or homicidal ideation, no auditory hallucinations present, and no visual hallucinations present  Insight: limited  Judgement: limited  Attention Span and Concentration: fair    Pt progress: Met pt in the hallway.Pt shared that he was feeling much better and a lot less agitation. Pt shared that it's been helpful and a relief to have less agitation. Pt shared his nervousness about his outside prescriber not giving him his ADHD medicine. Writer and pt discussed his concerns and writer referred him to check out his concerns when he leaves and encouraged him that there could be other options to help with ADHD besides the specific meds he was on.     Treatment Objective(s) Addressed:   The focus of this session was on processing feelings related to hospital stay and identifying an appropriate aftercare plan     Progress Towards Goals and Assessment of Patient:   Patient is making progress towards treatment goals as evidenced by more calm, less agitation, improved mood.       Therapeutic Intervention(s):   Provided active listening, unconditional positive regard, and validation.   Engaged in guided discovery, explored patient's perspectives and helped expand them through " socratic dialogue      Plan/next step: Engaged in unit programming  Check in with therapist as needed      No Charge (0-15 min)    Patient Active Problem List   Diagnosis    Bipolar disorder (H)    ADHD (attention deficit hyperactivity disorder)    Bipolar I disorder with jose (H)

## 2024-06-25 NOTE — PLAN OF CARE
Patient presents as elated and grandiose with a full range affect.  Venancio continues to make good progress toward mood stabilization.  Venancio denies that he is experiencing any manic/ psychotic symptoms or dangerous ideations at this time.  He did not make any overtly delusional statements over the balance of this day shift.  Venancio continues to endorse high anxiety and depressive symptoms, and he requests PRN medications appropriately when he is unable to cope with his persistent symptoms.  He requested a dose of PRN Hydroxyzine 50 mg, which he received at 08:19;  he reported minimal anxiety relief when later reassessed.  Later in the shift, Venancio reported mounting agitation and requested a dose of PRN Haldol 5 mg and Benadryl 50 mg, which he received at 11:04; when later reassessed, Venancio reported a significant reduction in agitation.  Venancio has been compliant with his scheduled medications, and no adverse side effects have been reported or observed.  He denies any acute physical concerns at this time. /81   Pulse 89   Temp 97.4  F (36.3  C) (Temporal)   Resp 16   Wt 90.1 kg (198 lb 11.2 oz)   SpO2 95%

## 2024-06-25 NOTE — PLAN OF CARE
Team Note Due:  Monday     Assessment/Intervention/Current Symtoms and Care Coordination:  Chart review and met with team, discussed pt progress, symptomology, and response to treatment. Discussed the discharge plan and any potential impediments to discharge.     Per team, pt doing better, clearly improving, less stimulated on Pod 2, more distress tolerance.     I met with pt and he says he is feeling better, he appears brighter and less anxious stating he was coloring. He says he feels better being on Pod 2 where it is quieter.     Discharge Plan or Goal:  Most likely discharge to different facility within the [facility] agency with higher level of care though pt is currently wanting to return to [facility].     Was living at:  [facility] through [address] [address]    Barriers to Discharge:  Symptomology - jose, agitation      Referral Status:  TBD    Legal Status:  COURT HOLD - notice of intent to revoke stay of commitment   Hand submitted 6/18 - Hand hearing is July 2nd   [facility]: [facility]  File Number: [id number]     Pt's  is:  Carlos Gonzales  [employer]  Ph:  [phone]     Beth Garza    [address] [place] [id number]  Phone: [phone]  [email or web address].     Contacts:  [facility]:  Primary Contact: Darline Romo ( for [facility]): [phone] - RENNY on file for group home staff  Secondary Contact: Bree (group home ): [phone] RENNY on file for group home staff  Email: [email or web address]    Norman Loyola <[email or web address]> and John Espana <[email or web address]> and Tal Ken as Norman Loyola is away.     :  Chelsea Brennan ([facility]  Ph: [phone] - Commitment CM no RENNY needed   [email or web address]  [facility]  Ph: [phone]  Christi (member of case management team):  [phone]     Medication Management/Psychiatry:  Name/Clinic: [facility] - Pt declines [facility] for Psychiatry  Number: [phone]     Other:  Yolanda (mom): [phone] - Pt declines [facility] for Mom      Upcoming Meetings and Dates/Important Information and next steps:  Will need discharge appt for psychiatry prior to leaving  COS and PD    Upcoming Hand hearing 7/2 at 9:30AM.   Schedule discharge care meeting with [facility] and Pt to discuss discharge option - they want him to potentially move to a higher needs home within their agency.

## 2024-06-25 NOTE — PROGRESS NOTES
"The patient's care was discussed with the treatment team during the daily team meeting and/or staff's chart notes were reviewed.  Staff report  Pt slept 7 hours overnight.  Evening staff report: Venancio was visible in the milieu but withdrawn to self. Presented with bright  affect and good concentration but appeared  restless  denies discomfort, pain, SI/HI, delusions, and hallucination   Utilized PRN melatonin with bed time medication to promote sleep  Pt is medication compliant and contracted for safety.   Cooperative with cares and behaviorally controlled this shift.     Upon interview, patient is looking brighter, he reported good mood.  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  I have been able to mask it for a while but now I think I need it.  Writer commended the patient for using a positive coping skills to manage his anxiety and stated it is okay to ask for the anxiety/agitation medication when you feel it is coming up, not waiting till it escalates before asking for help.    Patient hinted that he is trying to set up a therapist with the help of his mother, he feels he does not need help with that from the hospital as his mother will be able to set that up for him.  Upon prompt, patient reported he has an outpatient psychiatrist from [facility]    Patient denied auditory/visual hallucinations, denied suicidal/homicidal ideations and thoughts of self-harm.  Patient  acknowledged receiving extra vegetables and hummus in his diet, stating that it is getting too much has requested " "to have it for lunch only.  Patient seems to have no further questions at this time.                   General appearance: fair  Alert.  Awake, alert, Calm and Cooperative  Affect:  labile, happy, cheerful  Mood: \"Good\"   Speech: less pressured, normal rate, tone and volume.  Eye contact:  good.    Psychomotor behavior: less restless  Gait: steady   Abnormal movements:  none  Delusions: denied  Hallucinations: denied  Thoughts: less circumstantial, less tangential, overall appropriate  Associations: No Loosening association  Judgement: fair  Insight: limited  Cognitions: intact in conversation  Memory:  intact in conversation  Orientation: normal    Not suicidal.  Not homicidal  Imp:  Bipolar jose  2.  ADHD  3. Depakote level 75.2 (6/19)     Current Facility-Administered Medications                     Current Facility-Administered Medications   Medication Dose Route Frequency Provider Last Rate Last Admin    acetaminophen (TYLENOL) tablet 650 mg  650 mg Oral Q4H PRN Javier Card MD        alum & mag hydroxide-simethicone (MAALOX) suspension 30 mL  30 mL Oral Q4H PRN Javier Card MD        benztropine (COGENTIN) tablet 1 mg  1 mg Oral BID PRN Javier Card MD        haloperidol (HALDOL) tablet 5 mg  5 mg Oral Q8H PRN Satya Garza MD   5 mg at 06/11/24 2042     And    LORazepam (ATIVAN) tablet 2 mg  2 mg Oral Q8H PRSatya Bauer MD   2 mg at 06/11/24 2042     And    diphenhydrAMINE (BENADRYL) capsule 50 mg  50 mg Oral Q8H PRN Satya Garza MD   50 mg at 06/11/24 2042    haloperidol lactate (HALDOL) injection 5 mg  5 mg Intramuscular Q8H PRSatya Bauer MD         And    LORazepam (ATIVAN) injection 2 mg  2 mg Intramuscular Q8H PRSatya Bauer MD         And    diphenhydrAMINE (BENADRYL) injection 50 mg  50 mg Intramuscular Q8H PRSatya Bauer MD        divalproex sodium delayed-release (DEPAKOTE) DR tablet 1,500 mg  1,500 mg Oral At Bedtime Kyaw" Javier MONSIVAIS MD   1,500 mg at 06/11/24 1937    hydrOXYzine HCl (ATARAX) tablet 50 mg  50 mg Oral TID PRN Javier Card MD   50 mg at 06/12/24 1124    melatonin tablet 3 mg  3 mg Oral At Bedtime PRN Javier Card MD   3 mg at 06/10/24 2014    nicotine (NICORETTE) gum 2 mg  2 mg Buccal Q1H PRN Javier Card MD   2 mg at 06/12/24 1333    risperiDONE (risperDAL) tablet 1 mg  1 mg Oral Satya Montez MD   1 mg at 06/12/24 1007    risperiDONE (risperDAL) tablet 4 mg  0.5 mg Oral At Bedtime MARIO Salguero, CNP   4 mg at 06/11/24 1938    senna-docusate (SENOKOT-S/PERICOLACE) 8.6-50 MG per tablet 1 tablet  1 tablet Oral BID PRN Javier Card MD             Recent Results[]Expand by Default             Recent Results (from the past 168 hour(s))   Valproic acid     Collection Time: 06/11/24  9:50 AM   Result Value Ref Range     Valproic acid 92.7   ug/mL   Comprehensive metabolic panel     Collection Time: 06/11/24  9:50 AM   Result Value Ref Range     Sodium 139 135 - 145 mmol/L     Potassium 3.9 3.4 - 5.3 mmol/L     Carbon Dioxide (CO2) 27 22 - 29 mmol/L     Anion Gap 10 7 - 15 mmol/L     Urea Nitrogen 7.8 6.0 - 20.0 mg/dL     Creatinine 0.84 0.67 - 1.17 mg/dL     GFR Estimate >90 >60 mL/min/1.73m2     Calcium 9.6 8.6 - 10.0 mg/dL     Chloride 102 98 - 107 mmol/L     Glucose 125 (H) 70 - 99 mg/dL     Alkaline Phosphatase 51 40 - 150 U/L     AST 12 0 - 45 U/L     ALT 18 0 - 70 U/L     Protein Total 6.4 6.4 - 8.3 g/dL     Albumin 4.2 3.5 - 5.2 g/dL     Bilirubin Total 0.2 <=1.2 mg/dL   Hemoglobin A1c     Collection Time: 06/11/24  9:50 AM   Result Value Ref Range     Hemoglobin A1C 5.4 <5.7 %   Lipid panel     Collection Time: 06/11/24  9:50 AM   Result Value Ref Range     Cholesterol 157 <200 mg/dL     Triglycerides 191 (H) <150 mg/dL     Direct Measure HDL 31 (L) >=40 mg/dL     LDL Cholesterol Calculated 88 <=100 mg/dL     Non HDL Cholesterol 126 <130 mg/dL   CBC with platelets and differential      Collection Time: 06/11/24  9:50 AM   Result Value Ref Range     WBC Count 4.9 4.0 - 11.0 10e3/uL     RBC Count 4.84 4.40 - 5.90 10e6/uL     Hemoglobin 13.9 13.3 - 17.7 g/dL     Hematocrit 41.4 40.0 - 53.0 %     MCV 86 78 - 100 fL     MCH 28.7 26.5 - 33.0 pg     MCHC 33.6 31.5 - 36.5 g/dL     RDW 12.9 10.0 - 15.0 %     Platelet Count 190 150 - 450 10e3/uL     % Neutrophils 50 %     % Lymphocytes 40 %     % Monocytes 5 %     % Eosinophils 4 %     % Basophils 1 %     % Immature Granulocytes 0 %     NRBCs per 100 WBC 0 <1 /100     Absolute Neutrophils 2.5 1.6 - 8.3 10e3/uL     Absolute Lymphocytes 2.0 0.8 - 5.3 10e3/uL     Absolute Monocytes 0.2 0.0 - 1.3 10e3/uL     Absolute Eosinophils 0.2 0.0 - 0.7 10e3/uL     Absolute Basophils 0.0 0.0 - 0.2 10e3/uL     Absolute Immature Granulocytes 0.0 <=0.4 10e3/uL     Absolute NRBCs 0.0 10e3/uL            Changes made today 6/24/24  Diet order: More vegetables and Hummus

## 2024-06-25 NOTE — CARE PLAN
"  Rehab Group    Start time: 1030  End time: 1115    Patient time total: 45 minutes    attended full group    #1 attended   Group Type: general health and coping   Group Topic Covered: coping skills     Group Session Detail:  Pt participated in a discussion group where levels of functioning were discussed, and the differences between \"crisis, struggling, surviving, and thriving\" were explored.       Patient Response/Contribution:      Currently pt feels as if he is thriving.  Pt shares the unit was becoming stressful for him with the admission of a new patient,and he was able to recongnize how it was affecting him, and problem solve with staff to change rooms.  In the past, he notes he would have been agitated and started yelling.  Prior to group, pt stated he asked for a PRN, again recognizing sx of agitation, narrowing them down more to \"anger and irritability\", and again, knew what to do, and find the medication helps him quickly.  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   Patient Detail:    See above.        Train & Education Service Per Session 45 + Minutes () OT Group code    Patient Active Problem List   Diagnosis    Bipolar disorder (H)    ADHD (attention deficit hyperactivity disorder)    Bipolar I disorder with ojse (H)       "

## 2024-06-26 PROCEDURE — 99232 SBSQ HOSP IP/OBS MODERATE 35: CPT | Performed by: CLINICAL NURSE SPECIALIST

## 2024-06-26 PROCEDURE — G0177 OPPS/PHP; TRAIN & EDUC SERV: HCPCS

## 2024-06-26 PROCEDURE — 250N000013 HC RX MED GY IP 250 OP 250 PS 637: Performed by: CLINICAL NURSE SPECIALIST

## 2024-06-26 PROCEDURE — 250N000013 HC RX MED GY IP 250 OP 250 PS 637: Performed by: PSYCHIATRY & NEUROLOGY

## 2024-06-26 PROCEDURE — 124N000002 HC R&B MH UMMC

## 2024-06-26 RX ADMIN — HALOPERIDOL 5 MG: 5 TABLET ORAL at 11:35

## 2024-06-26 RX ADMIN — DIPHENHYDRAMINE HYDROCHLORIDE 50 MG: 50 CAPSULE ORAL at 20:03

## 2024-06-26 RX ADMIN — Medication 3 MG: at 19:00

## 2024-06-26 RX ADMIN — NICOTINE POLACRILEX 4 MG: 4 GUM, CHEWING BUCCAL at 12:49

## 2024-06-26 RX ADMIN — NICOTINE POLACRILEX 4 MG: 4 GUM, CHEWING BUCCAL at 11:04

## 2024-06-26 RX ADMIN — DIVALPROEX SODIUM 1250 MG: 500 TABLET, DELAYED RELEASE ORAL at 19:00

## 2024-06-26 RX ADMIN — RISPERIDONE 0.5 MG: 0.5 TABLET, FILM COATED ORAL at 19:00

## 2024-06-26 RX ADMIN — DIPHENHYDRAMINE HYDROCHLORIDE 50 MG: 50 CAPSULE ORAL at 11:35

## 2024-06-26 RX ADMIN — NICOTINE POLACRILEX 4 MG: 4 GUM, CHEWING BUCCAL at 09:26

## 2024-06-26 RX ADMIN — NICOTINE 1 PATCH: 21 PATCH, EXTENDED RELEASE TRANSDERMAL at 09:24

## 2024-06-26 ASSESSMENT — ACTIVITIES OF DAILY LIVING (ADL)
HYGIENE/GROOMING: INDEPENDENT
ADLS_ACUITY_SCORE: 30
HYGIENE/GROOMING: INDEPENDENT
DRESS: INDEPENDENT;SCRUBS (BEHAVIORAL HEALTH)
DRESS: SCRUBS (BEHAVIORAL HEALTH);INDEPENDENT
ADLS_ACUITY_SCORE: 30
ORAL_HYGIENE: INDEPENDENT
ADLS_ACUITY_SCORE: 30
ORAL_HYGIENE: INDEPENDENT
ADLS_ACUITY_SCORE: 30

## 2024-06-26 NOTE — PLAN OF CARE
Team Note Due:  Monday     Assessment/Intervention/Current Symtoms and Care Coordination:  Chart review and met with team, discussed pt progress, symptomology, and response to treatment. Discussed the discharge plan and any potential impediments to discharge.     Per team, Pt was a bit irritable this morning when the shower wasn't cleaned but otherwise having a good morning. No outbursts last evening.     I emailed pt's [facility] team - Norman Perez and Wilian to ask about discharge care meetings as I have not heard back from them. [facility] (housing) is considering wanting to move Venancio to a higher needs placement within their agency. I asked that we have a care meeting with Venancio to share options, otherwise if they are unable to meet I suggested reviewing potential options with Venancio and then updating us.     Discharge Plan or Goal:  Most likely discharge to different facility within the [facility] agency with higher level of care though pt is currently wanting to [facility] to [facility].     Was living at:  [facility] through [address] [address]    Barriers to Discharge:  Symptomology - jose, agitation      Referral Status:  TBD    Legal Status:  COURT HOLD - notice of intent to revoke stay of commitment   Hand submitted 6/18 - Hand hearing is July 2nd   [facility]: [facility]  File Number: [id number]     Pt's  is:  Carlos Gonzales  [employer]  Ph:  [phone]     Beth Garza    [address] [place] [address]  Phone: [phone]  [email or web address]     Contacts:  [facility]:  Primary Contact: Darline Romo ( for [facility]): [phone] - RENNY on file for group home staff  Secondary Contact: Bree (group home ): [phone] RENNY on file for group home staff  Email: [email or web address]    Norman Loyola <[email or web address]> and John Espana  <[email or web address]> and Tal Ken as Norman Loyola is away.     :  Chelsea Brennan ([facility]  Ph: [phone] - Commitment CM no RENNY needed   [email or web address].  [facility]  Ph: [phone]  Christi (member of case management team): [phone]     Medication Management/Psychiatry:  Name/Clinic: [facility] - Pt declines [facility] for Psychiatry  Number: [phone]     Other:  Yolanda (mom): [phone] - Pt declines [facility] for Mom      Upcoming Meetings and Dates/Important Information and next steps:  Will need discharge appt for psychiatry prior to leaving  [facility] and PD    Upcoming Hand hearing 7/2 at 9:30AM.   Schedule discharge care meeting with [facility] and Pt to discuss discharge option - they want him to potentially move to a higher needs home within their agency.

## 2024-06-26 NOTE — PLAN OF CARE
BEH IP Unit Acuity Rating Score (UARS)  Patient is given one point for every criteria they meet.    CRITERIA SCORING   On a 72 hour hold, court hold, committed, stay of commitment, or revocation. 1    Patient LOS on BEH unit exceeds 20 days. 0  LOS: 19   Patient under guardianship, 55+, otherwise medically complex, or under age 11. 0   Suicide ideation without relief of precipitating factors. 0   Current plan for suicide. 0   Current plan for homicide. 0   Imminent risk or actual attempt to seriously harm another without relief of factors precipitating the attempt. 0   Severe dysfunction in daily living (ex: complete neglect for self care, extreme disruption in vegetative function, extreme deterioration in social interactions). 1   Recent (last 7 days) or current physical aggression in the ED or on unit. 1   Restraints or seclusion episode in past 72 hours. 0   Recent (last 7 days) or current verbal aggression, agitation, yelling, etc., while in the ED or unit. 1   Active psychosis. 1   Need for constant or near constant redirection (from leaving, from others, etc).  0   Intrusive or disruptive behaviors. 0   Patient requires 3 or more hours of individualized nursing care per 8-hour shift (i.e. for ADLs, meds, therapeutic interventions). 0   TOTAL 5

## 2024-06-26 NOTE — PROGRESS NOTES
"The patient's care was discussed with the treatment team during the daily team meeting and/or staff's chart notes were reviewed.  Staff report  Pt slept 6.5 hours overnight.  Evening staff report: Patient started out the shift with a full range affect, denied manic or psychotic symptoms but continues to reports high anxiety, PRN Hydroxyzine 50mg was administered twice this shift. Patient continues to report that Hydroxyzine does not help much with his anxiety symptoms. Patient denied depression, SI, HI, SIB, AVH. Patient denied pain, mood was stabilized for the most part of the shift until after he spoke with is mom over the phone, was observed to be tearful and crying out loud.  Patient was cooperative with care.      Patient had an adequate oral intake, denied any concerns with bowel and bladder. Patient was medication compliant, PRN Melatonin was given with HS medications and Nicotine gums were utilized this shift.      Patient has remained safe on the unit, no agitation, behavioral outbursts or acute safety concerns. Staff will continue to follow the plan of care.     Upon interview, patient described that he is in good spirit.  Patient stated \"I am just here till my final court hearing.\"  He stated that sleep is good, appetite is good and that his anxiety is reduced.  Patient shared that he woke up this morning not feeling great like yesterday but reported doing pretty good at this time.    Patient was counting his days here stating \"I have 2 weeks to 17 days to leave here.  Patient did not feel bad about this, in fact he appears hopeful and enthusiastic while analyzing the remaining days for him to stay here.  In his analysis, patient stated following his porter commitment, he will have like 7 days to stay in which he would be monitored to see how the medication is doing before discharge.  Patient smiled periodically while narrating this to this provider.    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  Patient shared that he will save money to go to school for a degree in IT.  Patient analyzed that IT job would be easier for him than sales job.  Patient finds sales Job too challenging since he wants to be successful, he will not attempt any sales job but believes he will be able to make it in IT job.    Patient stated that \"sales job is not meant for a lazy person like me.\" He believes that with the modern technology, he will be able to complete the school program and earn his diploma.  He mentions that even though it appears like cheating, he could use chat GPT with his researches.    Writer encouraged the patient that he should go for whatever goal he sets for himself with determination to succeed.  Informed the patient that after discharge, he should continue to take his medications as prescribed. Patient reported that he is going back to his group home where they will help him with his medication administration, therefore he is not envisaging any problem with taking his medications.    Patient denied auditory/visual hallucinations, denied suicidal/homicidal ideations and thoughts of self-harm. Seems to have no further questions at this time.               Mental Status Exam:  General appearance: Awake, Alert, dressed in hospital scrubs, appeared at stated age  Attitude:  Calm and Cooperative  Affect:  happy, cheerful  Mood: \"Good\"   Speech: less pressured, normal rate, tone and volume.  Eye contact:  good.    Psychomotor behavior : Calm  Gait: steady   Abnormal movements:  none  Delusions: denied  Hallucinations: denied  Thoughts: less circumstantial, less tangential, overall appropriate  Associations: No Loosening association  Judgement: fair  Insight: limited  Cognitions: intact in conversation  Memory:  intact in conversation  Orientation: normal    Not " suicidal.  Not homicidal  Imp:  Bipolar jose  2.  ADHD  3. Depakote level 75.2 (6/19)     Current Facility-Administered Medications                     Current Facility-Administered Medications   Medication Dose Route Frequency Provider Last Rate Last Admin    acetaminophen (TYLENOL) tablet 650 mg  650 mg Oral Q4H PRN Javier Card MD        alum & mag hydroxide-simethicone (MAALOX) suspension 30 mL  30 mL Oral Q4H PRN Javier Card MD        benztropine (COGENTIN) tablet 1 mg  1 mg Oral BID PRN Javier Card MD        haloperidol (HALDOL) tablet 5 mg  5 mg Oral Q8H PRN Satya Garza MD   5 mg at 06/11/24 2042     And    LORazepam (ATIVAN) tablet 2 mg  2 mg Oral Q8H PRN Satya Garza MD   2 mg at 06/11/24 2042     And    diphenhydrAMINE (BENADRYL) capsule 50 mg  50 mg Oral Q8H PRN Satay Garza MD   50 mg at 06/11/24 2042    haloperidol lactate (HALDOL) injection 5 mg  5 mg Intramuscular Q8H PRN Satya Garza MD         And    LORazepam (ATIVAN) injection 2 mg  2 mg Intramuscular Q8H PRN Satya Garza MD         And    diphenhydrAMINE (BENADRYL) injection 50 mg  50 mg Intramuscular Q8H PRN Satya Garza MD        divalproex sodium delayed-release (DEPAKOTE) DR tablet 1,500 mg  1,500 mg Oral At Bedtime Javier Card MD   1,500 mg at 06/11/24 1937    hydrOXYzine HCl (ATARAX) tablet 50 mg  50 mg Oral TID PRN Javier Card MD   50 mg at 06/12/24 1124    melatonin tablet 3 mg  3 mg Oral At Bedtime PRN Javier Card MD   3 mg at 06/10/24 2014    nicotine (NICORETTE) gum 2 mg  2 mg Buccal Q1H PRN Javier Card MD   2 mg at 06/12/24 1333    risperiDONE (risperDAL) tablet 1 mg  1 mg Oral Satya Montez MD   1 mg at 06/12/24 1007    risperiDONE (risperDAL) tablet 4 mg  0.5 mg Oral At Bedtime MARIO Salguero, CNP   4 mg at 06/11/24 1938    senna-docusate (SENOKOT-S/PERICOLACE) 8.6-50 MG per tablet 1 tablet  1 tablet Oral BID Javier Bragg MD              Recent Results[]Expand by Default             Recent Results (from the past 168 hour(s))   Valproic acid     Collection Time: 06/11/24  9:50 AM   Result Value Ref Range     Valproic acid 92.7   ug/mL   Comprehensive metabolic panel     Collection Time: 06/11/24  9:50 AM   Result Value Ref Range     Sodium 139 135 - 145 mmol/L     Potassium 3.9 3.4 - 5.3 mmol/L     Carbon Dioxide (CO2) 27 22 - 29 mmol/L     Anion Gap 10 7 - 15 mmol/L     Urea Nitrogen 7.8 6.0 - 20.0 mg/dL     Creatinine 0.84 0.67 - 1.17 mg/dL     GFR Estimate >90 >60 mL/min/1.73m2     Calcium 9.6 8.6 - 10.0 mg/dL     Chloride 102 98 - 107 mmol/L     Glucose 125 (H) 70 - 99 mg/dL     Alkaline Phosphatase 51 40 - 150 U/L     AST 12 0 - 45 U/L     ALT 18 0 - 70 U/L     Protein Total 6.4 6.4 - 8.3 g/dL     Albumin 4.2 3.5 - 5.2 g/dL     Bilirubin Total 0.2 <=1.2 mg/dL   Hemoglobin A1c     Collection Time: 06/11/24  9:50 AM   Result Value Ref Range     Hemoglobin A1C 5.4 <5.7 %   Lipid panel     Collection Time: 06/11/24  9:50 AM   Result Value Ref Range     Cholesterol 157 <200 mg/dL     Triglycerides 191 (H) <150 mg/dL     Direct Measure HDL 31 (L) >=40 mg/dL     LDL Cholesterol Calculated 88 <=100 mg/dL     Non HDL Cholesterol 126 <130 mg/dL   CBC with platelets and differential     Collection Time: 06/11/24  9:50 AM   Result Value Ref Range     WBC Count 4.9 4.0 - 11.0 10e3/uL     RBC Count 4.84 4.40 - 5.90 10e6/uL     Hemoglobin 13.9 13.3 - 17.7 g/dL     Hematocrit 41.4 40.0 - 53.0 %     MCV 86 78 - 100 fL     MCH 28.7 26.5 - 33.0 pg     MCHC 33.6 31.5 - 36.5 g/dL     RDW 12.9 10.0 - 15.0 %     Platelet Count 190 150 - 450 10e3/uL     % Neutrophils 50 %     % Lymphocytes 40 %     % Monocytes 5 %     % Eosinophils 4 %     % Basophils 1 %     % Immature Granulocytes 0 %     NRBCs per 100 WBC 0 <1 /100     Absolute Neutrophils 2.5 1.6 - 8.3 10e3/uL     Absolute Lymphocytes 2.0 0.8 - 5.3 10e3/uL     Absolute Monocytes 0.2 0.0 - 1.3 10e3/uL      "Absolute Eosinophils 0.2 0.0 - 0.7 10e3/uL     Absolute Basophils 0.0 0.0 - 0.2 10e3/uL     Absolute Immature Granulocytes 0.0 <=0.4 10e3/uL     Absolute NRBCs 0.0 10e3/uL            Changes made today 6/26/24  No medication changes.       Review of systems:      For physical examination and 12 point review of system please, see note of Dr. Clarke from 6/5/24.  I reviewed that note and agree with it.          DIagnoses:      Bipolar affective disorder, jose, severe with psychosis.          Assessment and Plan:      Assessment and Hospital Summary:  Venancio Edgar is a 28-year old male with history of bipolar disorder, who arrived at the ED very animated and appeared somewhat manic.  Patient presented with verbal agitation, significant behavior change, paranoia, physical aggression. Per DEC note Pt presents to the ED via EMS after becoming physically and verbally agitated at his group home towards staff and a community member while outside. When meeting with Writer, Pt presented with a labile mood, quickly changing from being cooperative to Writer to yelling at Writer, stating he will not participate, questioning Writer, laughing out loud while talking about his medications with volume steadily increasing. Writer attempted to collect information of what has been going on with Pt, yet Pt denied any concerns and then started stating Writer was asking \"the wrong questions\" and is trying to keep Pt in the hospital. Writer discussed how he was trying to let Pt share his perspective and understanding of the situation. Pt continued to be verbally escalated, stating staff was \"retarded\", not asking the right questions, and not needing to speak with a therapist. Writer terminated the assessment given Pt's elevated and labile mood and Pt presenting as experiencing disorganized thought as evidenced by answering and responding to Writer's questions with answers that did not pertain to the question asked.     Per DEC note " Patient's CM is planning to revoke his PD. Will for now continue on 72 hour hold. Patient's decompensation is likely due to poor compliance with meds. He was restarted on PTA meds and Risperdal dose was increased with his permission. Will continue to provide support and structure.         Target Psychiatric Symptoms and Interventions    1. Education given regarding diagnostic and treatment options with risks, benefits and alternatives and adequate verbalization of understanding.  2. Admitted on 6/7/2024 to station 12N..  Precautions placed includes:Assault precautions, suicide precautions  3. Medications: 6/7/2024: PTA medications reviewed.     4. Medications:  Hospital  Alum & Mag hydroxide-simethicone ( Maalox) suspension 30 mg oral every 4 hours as needed indigestion  Benztropine (Cogentin) tablet 1 mg 2 times daily as needed extrapyramidal symptoms  -Vitals and neuro checks with PRN blood pressure meds for SBP > 200  Code 1 restrictive unit  Full code  Risperidone 0.5 mg at bedtime.       Divalproex sodium delayed release (Depakote DR) tablet 1,250 mg at bedtime  Haloperidol (Haldol) tablet 5 mg oral every 8 hours as needed agitation  And  Diphenhydramine (Benadryl) capsule 50 mg oral every 8 hours as needed agitation  Haloperidol lactate (Haldol) injection 5 mg intramuscular every 8 hours as needed agitation  And  Diphenhydramine (Benadryl) injection 50 mg intramuscular every 8 hours as needed agitation  Gabapentin (Neurontin) capsule 200 mg 3 times daily as needed anxiety  Hydroxyzine HCl (Atarax) tablet 50 mg oral 3 times daily as needed anxiety  Legal status Court Hold  No COVID-19 virus (coronavirus) screening test needed or already ordered/completed  Routine programming  [place]-docusate (Senokot-S/Maris[place]-Colace) 8.6-50 mg, 1 tablet oral 2 times daily as needed constipation  Status 15 every 15-minute safety check  Suicide precaution suicide risk Low  Melatonin tablet 3 mg at bedtime as needed sleep  Vital signs  and pain assessment  Weight patient routine every TU, TH, SA.  5. Consultations:  Hospitalist to follow as needed.  IM to follow for every medical conditions.  6. Structure and Supervision  Unit [address].  Barriers to Discharge: Danisha, agitation.  7.   is following in regards to collecting and reviewing collateral information, referrals and disposition planning.  Legal: Court hold - notice of intent to revoke stay of commitment   Yazan submitted 6/18 - Yazan hearing is July 2nd   Referrals: CTC to facilitate all referrals  Care Coordination: CTC to coordinate care with external providers  Placement: CTC to facilitate placement following symptom stabilization  Anticipated Discharge: 14 days      . Further treatment programming to be determined throughout the hospital course.     Risk Assessment: IP AC RISK ASSESSMENT: Patient able to contract for safety and Patient on precautions     This note was created with help of Dragon dictation system. Grammatical / typing errors are not intentional.  Risks, benefits, and alternatives discussed at length with patient.       Acute Medical Problems and Treatments:  Acute medical concerns:  - No acute medical concerns     Pertinent labs/imaging:  Recent Results   Recent Results (from the past 504 hour(s))   Valproic acid    Collection Time: 06/11/24  9:50 AM   Result Value Ref Range    Valproic acid 92.7   ug/mL   Comprehensive metabolic panel    Collection Time: 06/11/24  9:50 AM   Result Value Ref Range    Sodium 139 135 - 145 mmol/L    Potassium 3.9 3.4 - 5.3 mmol/L    Carbon Dioxide (CO2) 27 22 - 29 mmol/L    Anion Gap 10 7 - 15 mmol/L    Urea Nitrogen 7.8 6.0 - 20.0 mg/dL    Creatinine 0.84 0.67 - 1.17 mg/dL    GFR Estimate >90 >60 mL/min/1.73m2    Calcium 9.6 8.6 - 10.0 mg/dL    Chloride 102 98 - 107 mmol/L    Glucose 125 (H) 70 - 99 mg/dL    Alkaline Phosphatase 51 40 - 150 U/L    AST 12 0 - 45 U/L    ALT 18 0 - 70 U/L    Protein Total 6.4 6.4 - 8.3 g/dL     Albumin 4.2 3.5 - 5.2 g/dL    Bilirubin Total 0.2 <=1.2 mg/dL   Hemoglobin A1c    Collection Time: 06/11/24  9:50 AM   Result Value Ref Range    Hemoglobin A1C 5.4 <5.7 %   Lipid panel    Collection Time: 06/11/24  9:50 AM   Result Value Ref Range    Cholesterol 157 <200 mg/dL    Triglycerides 191 (H) <150 mg/dL    Direct Measure HDL 31 (L) >=40 mg/dL    LDL Cholesterol Calculated 88 <=100 mg/dL    Non HDL Cholesterol 126 <130 mg/dL   CBC with platelets and differential    Collection Time: 06/11/24  9:50 AM   Result Value Ref Range    WBC Count 4.9 4.0 - 11.0 10e3/uL    RBC Count 4.84 4.40 - 5.90 10e6/uL    Hemoglobin 13.9 13.3 - 17.7 g/dL    Hematocrit 41.4 40.0 - 53.0 %    MCV 86 78 - 100 fL    MCH 28.7 26.5 - 33.0 pg    MCHC 33.6 31.5 - 36.5 g/dL    RDW 12.9 10.0 - 15.0 %    Platelet Count 190 150 - 450 10e3/uL    % Neutrophils 50 %    % Lymphocytes 40 %    % Monocytes 5 %    % Eosinophils 4 %    % Basophils 1 %    % Immature Granulocytes 0 %    NRBCs per 100 WBC 0 <1 /100    Absolute Neutrophils 2.5 1.6 - 8.3 10e3/uL    Absolute Lymphocytes 2.0 0.8 - 5.3 10e3/uL    Absolute Monocytes 0.2 0.0 - 1.3 10e3/uL    Absolute Eosinophils 0.2 0.0 - 0.7 10e3/uL    Absolute Basophils 0.0 0.0 - 0.2 10e3/uL    Absolute Immature Granulocytes 0.0 <=0.4 10e3/uL    Absolute NRBCs 0.0 10e3/uL   Valproic acid    Collection Time: 06/14/24  2:20 PM   Result Value Ref Range    Valproic acid 75.3   ug/mL   Ammonia    Collection Time: 06/14/24  2:20 PM   Result Value Ref Range    Ammonia 85 (H) 16 - 60 umol/L   Ammonia    Collection Time: 06/19/24 10:28 AM   Result Value Ref Range    Ammonia 47 16 - 60 umol/L   Valproic acid    Collection Time: 06/19/24 10:28 AM   Result Value Ref Range    Valproic acid 75.2   ug/mL   Extra Green Top (Lithium Heparin) Tube    Collection Time: 06/19/24 10:28 AM   Result Value Ref Range    Hold Specimen JIC                       Attestation:   Patient has been seen and evaluated by me, Mariano Pacheco,  GAVI, APRN CNP  The patient was counseled on nature of illness and treatment plan/options  Care was coordinated with treatment team  Total time > 30 minutes

## 2024-06-26 NOTE — PLAN OF CARE
Problem: Sleep Disturbance  Goal: Adequate Sleep/Rest  Outcome: Progressing   Goal Outcome Evaluation:    Patient appeared to sleep 6.5 hours this night shift.  No prns or snacks given or requested.  No concerns were reported or noted.

## 2024-06-26 NOTE — PLAN OF CARE
Problem: Adult Behavioral Health Plan of Care  Goal: Adheres to Safety Considerations for Self and Others  Outcome: Progressing  Intervention: Develop and Maintain Individualized Safety Plan  Recent Flowsheet Documentation  Taken 6/25/2024 2036 by Bridgette Cali RN  Safety Measures:   clinical history reviewed   safety rounds completed   suicide check-in completed   Goal Outcome Evaluation:    Plan of Care Reviewed With: patient          Patient started out the shift with a full range affect, denied manic or psychotic symptoms but continues to reports high anxiety, PRN Hydroxyzine 50mg was administered twice this shift. Patient continues to report that Hydroxyzine does not help much with his anxiety symptoms. Patient denied depression, SI, HI, SIB, AVH. Patient denied pain, mood was stabilized for the most part of the shift until after he spoke with is mom over the phone, was observed to be tearful and crying out loud.  Patient was cooperative with care.     Patient had an adequate oral intake, denied any concerns with bowel and bladder. Patient was medication compliant, PRN Melatonin was given with HS medications and Nicotine gums were utilized this shift.     Patient has remained safe on the unit, no agitation, behavioral outbursts or acute safety concerns. Staff will continue to follow the plan of care.

## 2024-06-26 NOTE — PLAN OF CARE
He slept until dinner time and then was talkative towards others.  He states medications given in the morning shift were helpful.  He denies pain/discomfort and cold, flu like symptoms when asked.  He denies problematic anxiety, depression, suicidal, and homicidal ideations.  He denies hallucinations, but did endorse racing thoughts and general anxiety.  He did use the phone to look up with telephone bill without concern in the shift.  He showered and changed clothes in the shift.  PRN melatonin with evening medications, stated he wanted his scheduled medications at 1900.  PRN benadryl at 2000.      [facility]   Phone: [phone]    Occupational Therapy    [] Initial Evaluation            [x] Daily Treatment Note         [] Discharge Summary      Patient: Shanda Meza   : 1952   MRN: [id number]   Date of Service:  2/10/2023    Admitting Diagnosis:  Acute ischemic stroke [facility])  Current Admission Summary: 70-year-old male who was admitted on  with left-sided weakness for greater than 2 days. 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 CTA with less than 50% stenosis of the right ICA. MRI of the brain revealed subacute ischemia on the right basal ganglia. Echo with normal EF and negative bubble study. Neurology evaluated and suggested aspirin and Plavix for 21 days then aspirin only in addition to Lipitor. He was evaluated by therapy and suggested to continue in an inpatient setting prior to returning home. He reports no BM for multiple days. Past Medical History:  has a past medical history of CVA (cerebral vascular accident) ([address] and HTN (hypertension). Past Surgical History:  has a past surgical history that includes Finger surgery (Right); Hernia repair; fracture surgery; and hernia repair.     Discharge Recommendations: Discharge recommendations depending pt progress    DME Required For Discharge: DME to be determined pending patient progress    Precautions/Restrictions: high fall risk, up as tolerated,  Dysphagia - Soft and Bite Sized, Left inattention, Pusher syndrome    Pre-Admission Information   Lives With: brother Jasson Ashley)                Type of Home: house  Home Layout: two level, bedroom in the basement, 10 steps with 1 handrail on the L  Home Access:  2 step to enter without rails   Bathroom Layout: tub/shower unit  Bathroom Equipment: grab bars in shower  Toilet Height: elevated height  Home Equipment: no prior equipment  Transfer Assistance: Independent without use of device  Ambulation Assistance:Independent without use of device  ADL Assistance: independent with all ADL's   IADL Assistance: independent with homemaking tasks  Active :        [x] Yes                 [] No - drives a manual   Hand Dominance: [x] Left                 [] Right  Current Employment: retired. Occupation: sales  Hobbies: [employer]   Recent Falls: chart reports had several falls that caused this admission (about 5 falls)       Subjective  General: Pt seated upright in recliner, agreeable to OT/PT cotx and shower. Pain: 0/10  Pain Interventions: not applicable     Activities of Daily Living  Basic Activities of Daily Living  Grooming: setup assistance supervision minimal assistance  Grooming Comments: setup/supervision for oral hygiene seated in wc at sink. Patient applied deodorant seated on BSC with RUE, Yuliet for lifting LUE  Upper Extremity Bathing: minimal assistance  Lower Extremity Bathing: maximum assistance dependent   Bathing Comments: Patient completed shower seated on BSC in walk in shower with assist of 2. CGA-Yuliet for sitting balance. Yuliet for washing RUE. maxA for lower legs and bottom. Assist of 2 for drying bottom/legs while in stance. IV waterproofed for shower  Upper Extremity Dressing: moderate assistance  Lower Extremity Dressing: dependent requires verbal cueing Increased time to complete task Comment: modA for threading, assist of 2 for managing over hips- Yuliet for balance and total A for managing over hips. Total A donning socks    Dressing Comments: patient able to participate in dressing more this date. General Comments: verbal cues for positioning and posture throughout    Instrumental Activities of Daily Living  No IADL completed on this date. Functional Mobility  Bed Mobility  Bed mobility not completed on this date. Comments: In room chair at THERESE/EOS  Transfers  Sit to stand transfer:2 person assistance with Yuliet + CGA for stand with horizontal bar    Stand to sit transfer: 2 person assistance with min +CGA Stand step transfer: 2 person assistance with mod + min, progressing to mod + CGA   Shower transfer: 2 person assistance with mod + min    Shower transfer equipment: bedside commode, grab bars  Shower transfer comments:  to [facility]  Comments: verbal cues for posture and positioning. Patient able to transfer to  better. Mod + CGA from  to [facility] at end of session. Functional Mobility:  Sitting Balance: contact guard assistance, minimal assistance. Sitting Balance Comment: CGA-Yuliet while seated on BS for shower and dressing. Verbal and tactile cueing throughout   Standing Balance: 2 person assistance with mod + min-CGA . Standing Balance Comment: Varies, heavy left lateral lean   Functional Mobility: .  2 person assistance with mod + min (with hand held assist for LUE) , + w/c follow  Functional Mobility Activity: 25 ft on retana rail  Functional Mobility Device Use: no device  Functional Mobility Comment:  L lateral lean, VC to identify and correct, physical assist required to facilitate step-to pattern with L LE. Other Therapeutic Activity:      Second Session:   Patient in recliner upon arrival, agreeable to OT/PT session. Reported no pain. Reported some dizziness initially but cleared quickly. Patient transferred stand step from recliner to  with mod + CGA. Patient completed 4 stairs with modA of 2. Verbal and tactile cueing for lateral lean. Total assist for LUE. Refer to PT note for details on stair navigation. Patient completed 4 step ups with modA of 2. Patient tolerated well with rest breaks. Patient transferred from  to toilet with modA of 2 with grab bar. Patient unable to void BM. Patient transferred from toilet to bed with Stedy due to time constraints- Yuliet for transfer to [place] from toilet. Patient transferred sit to supine with modA of 2. Patient left in bed with alarm on, call button in reach and all needs met.        Cognition  Overall Cognitive Status: WFL  Arousal/Alterness: appropriate responses to stimuli  Following Commands: follows one step commands with repetition  Attention Span: attends with cues to redirect  Memory: decreased recall of precautions, decreased recall of recent events  Safety Judgement: decreased awareness of need for assistance, decreased awareness of need for safety  Problem Solving: assistance required to generate solutions, assistance required to implement solutions, decreased awareness of errors, assistance required to identify errors made, assistance required to correct errors made  Insights: not aware of deficits  Initiation: requires cues for some  Sequencing: requires cues for some  Comments: impulsive at times  Orientation:    oriented to person, oriented to time, and oriented to situation  Comments: pt stated he was in the Reading unit in [place]" however unable to state MHF  Command Following:   impaired  Follows one step directions with repetition and increased time    Education  Barriers To Learning: cognition and language  Patient Education: patient educated on goals, OT role and benefits, plan of care, precautions, ADL adaptive strategies, IADL safety, proper use of assistive device/equipment, adaptive device training, energy conservation, orientation, family education, pressure relief, transfer training, discharge recommendations  Learning Assessment:  patient verbalizes understanding, would benefit from continued reinforcement, patient will require reinforcement due to cognitive deficits  Assessment  Activity Tolerance: Fair with no c/o pain or fatigue  Impairments Requiring Therapeutic Intervention: decreased functional mobility, decreased ADL status, decreased ROM, decreased strength, decreased safety awareness, decreased cognition, decreased endurance, decreased sensation, decreased balance, decreased IADL, decreased fine motor control, decreased coordination, increased pain, decreased posture  Prognosis: good  Clinical Assessment: Pt is a 73 yo M who presents at [facility] in ARU s/p subacute ischemia on the right basal ganglia on 2/4. PTA, pt was independent with ADLs, iADLs, transfers, and functional mobility with no AE. Currently, pt is functioning below baseline as he requires above assistance levels for ADLs, functional tranfers, and functional mobility. Pt is limited be deficits in sitting balance, standing balance, safety awareness, LUE ROM, LUE strength, LUE sensitivity, and proprioception. Skilled intensive OT in ARU warranted to improve safety and independence with all occupational pursuits. Recommend continued co-tx for therapy session. Pt benefits from x 2 skilled hands to provide increased cues/feedback and promote improved safety and performance with ADLs. Safety Interventions: patient left in chair, chair alarm in place, call light within reach, patient at risk for falls, telesitter in use, and family/caregiver present    Plan  Frequency: 5 x/week, 60 min/day  Current Treatment Recommendations: strengthening, ROM, balance training, functional mobility training, transfer training, stair training, endurance training, neuromuscular re-education, wheelchair mobility training, modalities, patient/caregiver education, ADL/self-care training, IADL training, home management training, cognitive reorientation, home exercise program, safety education, equipment evaluation/education, and positioning  Goals  Patient Goals: \"to get this working\" in re to LUE   Short Term Goals:  Time Frame: in 10 days  Patient will complete upper body bathing at min A while seated. Patient will complete lower body bathing at min A while seated and standing as needed. Patient will complete upper body dressing at min A. Patient will complete lower body dressing at mod A using AE as needed. Patient will complete toileting at min A with GB as needed. Patient will complete a toilet transfer with min A with GB as needed.    Patient will complete a visual assessment to complete occupational profile. Long Term Goals:  Time Frame: in 21 days  Patient will complete upper body bathing at supervision while seated. Patient will complete lower body bathing at supervision while seated and standing as needed. Patient will complete upper body dressing at set-up assistance. Patient will complete lower body dressing at supervision using AE as needed. Patient will complete toileting at supervision with GB as needed. Patient will complete grooming at supervision while in stance. Patient will complete functional transfers at supervision using LRAD. Patient will complete functional mobility at supervision using LRAD. Patient will increase functional sitting balance to mod I for improved ADL completion. Patient will increase functional standing balance to SUP for improved ADL completion.     No goals met 2/10     Therapy Session Time     Individual Group Co-treatment   Time In   1007   Time Out   1104   Minutes   57      Second Session Therapy Time:   Individual Concurrent Group Co-treatment   Time In 1320     1245   Time Out 1326     1320   Minutes 6     35     Timed Code Treatment Minutes:  57 + 41    Total Treatment Minutes:  98 minutes        Electronically Signed By: AMRIT Kaye OTR/L [id number]

## 2024-06-26 NOTE — PLAN OF CARE
Rehab Group    Start time: 1015  End time: 1145  Patient time total: 45 minutes    attended full group    #6 attended   Group Type: OT Clinic   Group Topic Covered: coping skills     Group Session Detail:  Intervention: OT Clinic with 5 peers. Pt participated in a OT Clinic group to facilitate coping skills exploration and creative expression through personally meaningful activities, and to encourage utilization of these healthy coping skills to promote overall health and wellness. Group included clinical observation of social, cognitive and kinesthetic performance skills to inform treatment and safe discharge planning.    Mood/Affect: Pleasant      Plan: Patient encouraged to maintain attendance for continued ongoing support in working towards occupational therapy goals to support overall treatment/care.        Patient Detail:    Joined clinic group at invitation of writer, taking interest in a project another peer was working on and selecting a similar project. Completed the majority of the project and elected to take a break and finish working on tomorrow to meet with provider.   Came back later and took up writer's suggestion to doodle as patient wanted to sit and chat with others but not work on project again. Chatted with others until needing to go meet with a staff member.      Patient Active Problem List   Diagnosis    Bipolar disorder (H)    ADHD (attention deficit hyperactivity disorder)    Bipolar I disorder with jose (H)

## 2024-06-26 NOTE — PROGRESS NOTES
Rehab Group    Start time: 1615  End time: 1700  Patient time total: 45 minutes     #3 attended   Group Type: music   Group Topic Covered: balanced lifestyle, relaxation , and social skills       Group Session Detail: Cooperatively engaged in Evening Music Relaxation group to decrease anxiety and promote group cohesion on the unit.       Patient Response/Contribution:  listened actively, attentive, and actively engaged     Patient Detail:  Talkative affect, appropriately engaged in session, responding well to the music.            Activity Therapy Per 15 minutes () Other Rehab therapies    Patient Active Problem List   Diagnosis    Bipolar disorder (H)    ADHD (attention deficit hyperactivity disorder)    Bipolar I disorder with jose (H)

## 2024-06-26 NOTE — PLAN OF CARE
"Goal Outcome Evaluation:    Plan of Care Reviewed With: patient      'Haldol always makes me feel better\"  \"Can't believe you guys are forcing me to take medications for the rest of my life\"    Pt was calm and cooperative today. He spent most of the shift participating in group, watching tv, and hanging out in his room. Took a shower. Denies all mental health symptoms including anxiety, depression, paranoia,delusions, visual and command hallucinations ,SI,SIB,and HI. Committed to safe behavior. Did endorse agitation and requested PRN. Also appeared irritable when talking about how long he had here and about Hand. No medical concerns. No behavior concerns. Denies pain. No med side effects observed or noted.      /72 (BP Location: Left arm, Patient Position: Sitting, Cuff Size: Adult Regular)   Pulse 85   Temp 97.7  F (36.5  C) (Temporal)   Resp 16   Wt 90.1 kg (198 lb 11.2 oz)   SpO2 97%        Last 24H PRN:     diphenhydrAMINE (BENADRYL) capsule 50 mg, 50 mg at 06/26/24 1135 **AND** haloperidol (HALDOL) tablet 5 mg, 5 mg at 06/26/24 1135    diphenhydrAMINE (BENADRYL) capsule 50 mg, 50 mg at 06/25/24 1952    hydrOXYzine HCl (ATARAX) tablet 50 mg, 50 mg at 06/25/24 1552    melatonin tablet 3 mg, 3 mg at 06/25/24 1907    nicotine polacrilex (NICORETTE) gum 4 mg, 4 mg at 06/26/24 1249   "

## 2024-06-27 PROCEDURE — 250N000013 HC RX MED GY IP 250 OP 250 PS 637: Performed by: PSYCHIATRY & NEUROLOGY

## 2024-06-27 PROCEDURE — 250N000013 HC RX MED GY IP 250 OP 250 PS 637: Performed by: CLINICAL NURSE SPECIALIST

## 2024-06-27 PROCEDURE — G0177 OPPS/PHP; TRAIN & EDUC SERV: HCPCS

## 2024-06-27 PROCEDURE — 124N000002 HC R&B MH UMMC

## 2024-06-27 PROCEDURE — 99232 SBSQ HOSP IP/OBS MODERATE 35: CPT | Performed by: CLINICAL NURSE SPECIALIST

## 2024-06-27 RX ADMIN — NICOTINE POLACRILEX 4 MG: 4 GUM, CHEWING BUCCAL at 17:54

## 2024-06-27 RX ADMIN — DIPHENHYDRAMINE HYDROCHLORIDE 50 MG: 50 CAPSULE ORAL at 20:29

## 2024-06-27 RX ADMIN — HALOPERIDOL 5 MG: 5 TABLET ORAL at 12:10

## 2024-06-27 RX ADMIN — RISPERIDONE 0.5 MG: 0.5 TABLET, FILM COATED ORAL at 19:13

## 2024-06-27 RX ADMIN — HYDROXYZINE HYDROCHLORIDE 50 MG: 50 TABLET ORAL at 10:17

## 2024-06-27 RX ADMIN — DIPHENHYDRAMINE HYDROCHLORIDE 50 MG: 50 CAPSULE ORAL at 12:10

## 2024-06-27 RX ADMIN — Medication 3 MG: at 19:13

## 2024-06-27 RX ADMIN — NICOTINE POLACRILEX 4 MG: 4 GUM, CHEWING BUCCAL at 09:30

## 2024-06-27 RX ADMIN — NICOTINE POLACRILEX 4 MG: 4 GUM, CHEWING BUCCAL at 08:31

## 2024-06-27 RX ADMIN — DIVALPROEX SODIUM 1250 MG: 500 TABLET, DELAYED RELEASE ORAL at 19:13

## 2024-06-27 RX ADMIN — NICOTINE 1 PATCH: 21 PATCH, EXTENDED RELEASE TRANSDERMAL at 08:29

## 2024-06-27 ASSESSMENT — ACTIVITIES OF DAILY LIVING (ADL)
ADLS_ACUITY_SCORE: 30
HYGIENE/GROOMING: INDEPENDENT
ADLS_ACUITY_SCORE: 30
HYGIENE/GROOMING: INDEPENDENT
ADLS_ACUITY_SCORE: 30
ORAL_HYGIENE: INDEPENDENT
ADLS_ACUITY_SCORE: 30
DRESS: INDEPENDENT
ADLS_ACUITY_SCORE: 30

## 2024-06-27 NOTE — PLAN OF CARE
Initial meeting note:    Therapist introduced self to patient and discussed psychotherapy service available to patient.     Pt response: Pt not interested currently in meeting 1:1; therapist will continue remaining available for pt     Plan: Pt was encouraged to attend groups and therapist will remain available for 1:1 sessions    Writer was going to meet with pt to discuss safety plan, pt declined b/c he was tired

## 2024-06-27 NOTE — CARE PLAN
"  Rehab Group    Start time: 1030  End time: 1115  Patient time total: 45 minutes    attended full group     #3 attended   Group Type: OT Clinic   Group Topic Covered: activity therapy   Group Session Detail:  OT clinic group provides an opportunity for individual-based goal directed activity within a group setting - allowing for interaction and socialization.  Hands-on task work help to build/restore/or maintain skills such as: focus, concentration, decision making, and problem solving.  Patients are encouraged to carry over potential new interests/hobbies learned in group following discharge.       Patient Response/Contribution:  positive affect, cooperative with task, and organized       Patient Detail:    During check-in, pt shared he was doing fine, was looking forward to finishing a painting project that he started the previous day, identified some changed he wanted to make, and made a plan to do so - \"acrylic paint isn't as forgiving as watercolor like I'm used to!\".  Bright mood, requested popcorn, as he fell asleep when he had requested 2 days ago.  Writer was able to accommodate request, which he was very pleased about, and expressed much appreciation.        Train & Education Service Per Session 45 + Minutes () OT Group code    Patient Active Problem List   Diagnosis    Bipolar disorder (H)    ADHD (attention deficit hyperactivity disorder)    Bipolar I disorder with jose (H)       "

## 2024-06-27 NOTE — PLAN OF CARE
BEH IP Unit Acuity Rating Score (UARS)  Patient is given one point for every criteria they meet.    CRITERIA SCORING   On a 72 hour hold, court hold, committed, stay of commitment, or revocation. 1    Patient LOS on BEH unit exceeds 20 days. 0  LOS: 20   Patient under guardianship, 55+, otherwise medically complex, or under age 11. 0   Suicide ideation without relief of precipitating factors. 0   Current plan for suicide. 0   Current plan for homicide. 0   Imminent risk or actual attempt to seriously harm another without relief of factors precipitating the attempt. 0   Severe dysfunction in daily living (ex: complete neglect for self care, extreme disruption in vegetative function, extreme deterioration in social interactions). 1   Recent (last 7 days) or current physical aggression in the ED or on unit. 1   Restraints or seclusion episode in past 72 hours. 0   Recent (last 7 days) or current verbal aggression, agitation, yelling, etc., while in the ED or unit. 1   Active psychosis. 1   Need for constant or near constant redirection (from leaving, from others, etc).  0   Intrusive or disruptive behaviors. 0   Patient requires 3 or more hours of individualized nursing care per 8-hour shift (i.e. for ADLs, meds, therapeutic interventions). 0   TOTAL 5

## 2024-06-27 NOTE — PLAN OF CARE
"  Problem: Adult Behavioral Health Plan of Care  Goal: Adheres to Safety Considerations for Self and Others  Outcome: Progressing   Goal Outcome Evaluation:  Patient endorsed high anxiety and agitation in the AM and requested PRN hydroxyzine. Patient reported he is anxious about his upcoming court hearing, and that he doesn't understand why he is here. Patient stated \"I was just messing with them in the emergency room because I wanted to get out of there\" Patient denies SI, HI, AVH. Patient stated \"I don't understand why I have schizoaffective disorder\". Patient reported that he usually \"ramps up\" and gets agitated around 11am, and often needs to take his haldol and benadryl. Writer asked what that looks like for him. Patient stated \"I don't show my agitation because I want to get out of here\". Patient came up to the medication window and stated he felt very agitated and needed to take his haldol and benadryl. Writer suggested that patient try his PRN gabapentin and patient, irritated stated no, that the hydroxyzine doesn't last long enough and the gabapentin \"does nothing\". Patient appeared tense and angry\" Patient stated he just spoke to one of his doctors on the phone and that made him feel very agitated. Writer gave patient the Haldol and Benadryl. After it was administered patient stated I'll go to sleep eventually after taking this, patient stated \"I love how it makes me feel when I sleep here after taking that, I feel safe, I have good dreams, and I hate being awake here, it just makes me agitated\". Patient was later observed to be sleeping.                      "

## 2024-06-27 NOTE — PROGRESS NOTES
The patient's care was discussed with the treatment team during the daily team meeting and/or staff's chart notes were reviewed.  Staff report  Pt slept 6.5 hours overnight.  Evening staff report: He slept until dinner time and then was talkative towards others.  He states medications given in the morning shift were helpful.  He denies pain/discomfort and cold, flu like symptoms when asked.  He denies problematic anxiety, depression, suicidal, and homicidal ideations.  He denies hallucinations, but did endorse racing thoughts and general anxiety.  He did use the phone to look up with telephone bill without concern in the shift.  He showered and changed clothes in the shift.  PRN melatonin with evening medications, stated he wanted his scheduled medications at 1900.  PRN benadryl at 2000.     Upon interview, visited with patient twice, initially eating lunch, reapproched while sleeping in his room, patient was able to wake up and participated in this assessment.  Patient reported doing okay and nothing has changed significantly from the previous day.  Writer asked if patient did not sleep well last night, that he is now sleeping during the day, patient responded that he slept well, that he just feels like sleeping after taking prn Benadryl and Haldol.     Patient shared that he is just keeping his fingers crossed for the oncoming Hand hearing on 2 July.  Patient appears calm, cooperative and not in any apparent distress.  Patient observed to be slightly sedated, and not in the mood of talking too much today.  However patient reported that his medication is working well without experiencing any side effects.    Patient denied auditory/visual hallucinations, he denied suicidal/homicidal ideations and thoughts of self-harm.  Writer informed the patient that he would allow him to resume his sleep after he stated he had no further questions or concerns.                 Mental Status Exam:  General appearance: Awake,  "feeling sleepy, dressed in hospital yellow scrubs, appeared at stated age  Attitude:  Calm and Cooperative  Affect:  appropriate  Mood: \"Neutral\"   Speech:  Normal rate, tone and volume.  Eye contact:  good.    Psychomotor behavior : Calm  Gait: steady   Abnormal movements:  none  Delusions: denied  Hallucinations: denied  Thoughts: Goal oriented, appropriate  Associations: No Loosening association  Judgement: fair  Insight: limited  Cognitions: intact in conversation  Memory:  intact in conversation  Orientation: normal    Not suicidal.  Not homicidal       Current Facility-Administered Medications                     Current Facility-Administered Medications   Medication Dose Route Frequency Provider Last Rate Last Admin    acetaminophen (TYLENOL) tablet 650 mg  650 mg Oral Q4H PRN Javier Card MD        alum & mag hydroxide-simethicone (MAALOX) suspension 30 mL  30 mL Oral Q4H PRN Javier Card MD        benztropine (COGENTIN) tablet 1 mg  1 mg Oral BID PRN Javier Card MD        haloperidol (HALDOL) tablet 5 mg  5 mg Oral Q8H PRN Satya Garza MD   5 mg at 06/11/24 2042     And    LORazepam (ATIVAN) tablet 2 mg  2 mg Oral Q8H PRN Satya Garza MD   2 mg at 06/11/24 2042     And    diphenhydrAMINE (BENADRYL) capsule 50 mg  50 mg Oral Q8H PRN Satya Garza MD   50 mg at 06/11/24 2042    haloperidol lactate (HALDOL) injection 5 mg  5 mg Intramuscular Q8H PRSatya Bauer MD         And    LORazepam (ATIVAN) injection 2 mg  2 mg Intramuscular Q8H PRSatya Bauer MD         And    diphenhydrAMINE (BENADRYL) injection 50 mg  50 mg Intramuscular Q8H PRSatya Bauer MD        divalproex sodium delayed-release (DEPAKOTE) DR tablet 1,500 mg  1,500 mg Oral At Bedtime Javier Card MD   1,500 mg at 06/11/24 1937    hydrOXYzine HCl (ATARAX) tablet 50 mg  50 mg Oral TID PRN Javier Card MD   50 mg at 06/12/24 1124    melatonin tablet 3 mg  3 mg Oral At Bedtime " Javier Bragg MD   3 mg at 06/10/24 2014    nicotine (NICORETTE) gum 2 mg  2 mg Buccal Q1H Javier Bragg MD   2 mg at 06/12/24 1333    risperiDONE (risperDAL) tablet 1 mg  1 mg Oral Satya Montez MD   1 mg at 06/12/24 1007    risperiDONE (risperDAL) tablet 4 mg  0.5 mg Oral At Bedtime MARIO Salguero, CNP   4 mg at 06/11/24 1938    senna-docusate (SENOKOT-S/PERICOLACE) 8.6-50 MG per tablet 1 tablet  1 tablet Oral BID Javier Bragg MD             Recent Results[]Expand by Default             Recent Results (from the past 168 hour(s))   Valproic acid     Collection Time: 06/11/24  9:50 AM   Result Value Ref Range     Valproic acid 92.7   ug/mL   Comprehensive metabolic panel     Collection Time: 06/11/24  9:50 AM   Result Value Ref Range     Sodium 139 135 - 145 mmol/L     Potassium 3.9 3.4 - 5.3 mmol/L     Carbon Dioxide (CO2) 27 22 - 29 mmol/L     Anion Gap 10 7 - 15 mmol/L     Urea Nitrogen 7.8 6.0 - 20.0 mg/dL     Creatinine 0.84 0.67 - 1.17 mg/dL     GFR Estimate >90 >60 mL/min/1.73m2     Calcium 9.6 8.6 - 10.0 mg/dL     Chloride 102 98 - 107 mmol/L     Glucose 125 (H) 70 - 99 mg/dL     Alkaline Phosphatase 51 40 - 150 U/L     AST 12 0 - 45 U/L     ALT 18 0 - 70 U/L     Protein Total 6.4 6.4 - 8.3 g/dL     Albumin 4.2 3.5 - 5.2 g/dL     Bilirubin Total 0.2 <=1.2 mg/dL   Hemoglobin A1c     Collection Time: 06/11/24  9:50 AM   Result Value Ref Range     Hemoglobin A1C 5.4 <5.7 %   Lipid panel     Collection Time: 06/11/24  9:50 AM   Result Value Ref Range     Cholesterol 157 <200 mg/dL     Triglycerides 191 (H) <150 mg/dL     Direct Measure HDL 31 (L) >=40 mg/dL     LDL Cholesterol Calculated 88 <=100 mg/dL     Non HDL Cholesterol 126 <130 mg/dL   CBC with platelets and differential     Collection Time: 06/11/24  9:50 AM   Result Value Ref Range     WBC Count 4.9 4.0 - 11.0 10e3/uL     RBC Count 4.84 4.40 - 5.90 10e6/uL     Hemoglobin 13.9 13.3 - 17.7 g/dL     Hematocrit 41.4 40.0  "- 53.0 %     MCV 86 78 - 100 fL     MCH 28.7 26.5 - 33.0 pg     MCHC 33.6 31.5 - 36.5 g/dL     RDW 12.9 10.0 - 15.0 %     Platelet Count 190 150 - 450 10e3/uL     % Neutrophils 50 %     % Lymphocytes 40 %     % Monocytes 5 %     % Eosinophils 4 %     % Basophils 1 %     % Immature Granulocytes 0 %     NRBCs per 100 WBC 0 <1 /100     Absolute Neutrophils 2.5 1.6 - 8.3 10e3/uL     Absolute Lymphocytes 2.0 0.8 - 5.3 10e3/uL     Absolute Monocytes 0.2 0.0 - 1.3 10e3/uL     Absolute Eosinophils 0.2 0.0 - 0.7 10e3/uL     Absolute Basophils 0.0 0.0 - 0.2 10e3/uL     Absolute Immature Granulocytes 0.0 <=0.4 10e3/uL     Absolute NRBCs 0.0 10e3/uL            Changes made today 6/27/24  No medication changes.        Review of systems:      For physical examination and 12 point review of system please, see note of Dr. Clarke from 6/5/24.  I reviewed that note and agree with it.          DIagnoses:      Bipolar affective disorder, jose, severe with psychosis.          Assessment and Plan:      Assessment and Hospital Summary:  Venancio Edgar is a 28-year old male with history of bipolar disorder, who arrived at the ED very animated and appeared somewhat manic.  Patient presented with verbal agitation, significant behavior change, paranoia, physical aggression. Per DEC note Pt presents to the ED via EMS after becoming physically and verbally agitated at his group home towards staff and a community member while outside. When meeting with Writer, Pt presented with a labile mood, quickly changing from being cooperative to Writer to yelling at Writer, stating he will not participate, questioning Writer, laughing out loud while talking about his medications with volume steadily increasing. Writer attempted to collect information of what has been going on with Pt, yet Pt denied any concerns and then started stating Writer was asking \"the wrong questions\" and is trying to keep Pt in the hospital. Writer discussed how he was trying to " "let Pt share his perspective and understanding of the situation. Pt continued to be verbally escalated, stating staff was \"retarded\", not asking the right questions, and not needing to speak with a therapist. Writer terminated the assessment given Pt's elevated and labile mood and Pt presenting as experiencing disorganized thought as evidenced by answering and responding to Writer's questions with answers that did not pertain to the question asked.      Per DEC note Patient's CM is planning to revoke his PD. Will for now continue on 72 hour hold. Patient's decompensation is likely due to poor compliance with meds. He was restarted on PTA meds and Risperdal dose was increased with his permission. Will continue to provide support and structure.          Target Psychiatric Symptoms and Interventions     1. Education given regarding diagnostic and treatment options with risks, benefits and alternatives and adequate verbalization of understanding.  2. Admitted on 6/7/2024 to station 12N..  Precautions placed includes:Assault precautions, suicide precautions  3. Medications: 6/7/2024: PTA medications reviewed.     4. Medications:  Hospital  Alum & Mag hydroxide-simethicone ( Maalox) suspension 30 mg oral every 4 hours as needed indigestion  Benztropine (Cogentin) tablet 1 mg 2 times daily as needed extrapyramidal symptoms  -Vitals and neuro checks with PRN blood pressure meds for SBP > 200  Code 1 restrictive unit  Full code  Risperidone 0.5 mg at bedtime.       Divalproex sodium delayed release (Depakote DR) tablet 1,250 mg at bedtime  Haloperidol (Haldol) tablet 5 mg oral every 8 hours as needed agitation  And  Diphenhydramine (Benadryl) capsule 50 mg oral every 8 hours as needed agitation  Haloperidol lactate (Haldol) injection 5 mg intramuscular every 8 hours as needed agitation  And  Diphenhydramine (Benadryl) injection 50 mg intramuscular every 8 hours as needed agitation  Gabapentin (Neurontin) capsule 200 mg 3 " times daily as needed anxiety  Hydroxyzine HCl (Atarax) tablet 50 mg oral 3 times daily as needed anxiety  Legal status Court Hold  No COVID-19 virus (coronavirus) screening test needed or already ordered/completed  Routine programming  [place]-docusate (Senokot-S/Marisela-Colace) 8.6-50 mg, 1 tablet oral 2 times daily as needed constipation  Status 15 every 15-minute safety check  Suicide precaution suicide risk Low  Melatonin tablet 3 mg at bedtime as needed sleep  Vital signs and pain assessment  Weight patient routine every TU, TH, SA.  5. Consultations:  Hospitalist to follow as needed.  IM to follow for every medical conditions.  6. Structure and Supervision  Unit 12 N.  Barriers to Discharge: Danisha, agitation.  7.   is following in regards to collecting and reviewing collateral information, referrals and disposition planning.  Legal: Court hold - notice of intent to revoke stay of commitment   Yazan submitted 6/18 - Hand hearing is July 2nd   Referrals: CTC to facilitate all referrals  Care Coordination: CTC to coordinate care with external providers  Placement: CTC to facilitate placement following symptom stabilization  Anticipated Discharge: 14 days      . Further treatment programming to be determined throughout the hospital course.     Risk Assessment: [facility] RISK ASSESSMENT: Patient able to contract for safety and Patient on precautions     This note was created with help of Dragon dictation system. Grammatical / typing errors are not intentional.  Risks, benefits, and alternatives discussed at length with patient.         Acute Medical Problems and Treatments:  Acute medical concerns:  - No acute medical concerns     Pertinent labs/imaging:  Recent Results   Recent Results         Recent Results (from the past 504 hour(s))   Valproic acid     Collection Time: 06/11/24  9:50 AM   Result Value Ref Range     Valproic acid 92.7   ug/mL   Comprehensive metabolic panel     Collection Time:  06/11/24  9:50 AM   Result Value Ref Range     Sodium 139 135 - 145 mmol/L     Potassium 3.9 3.4 - 5.3 mmol/L     Carbon Dioxide (CO2) 27 22 - 29 mmol/L     Anion Gap 10 7 - 15 mmol/L     Urea Nitrogen 7.8 6.0 - 20.0 mg/dL     Creatinine 0.84 0.67 - 1.17 mg/dL     GFR Estimate >90 >60 mL/min/1.73m2     Calcium 9.6 8.6 - 10.0 mg/dL     Chloride 102 98 - 107 mmol/L     Glucose 125 (H) 70 - 99 mg/dL     Alkaline Phosphatase 51 40 - 150 U/L     AST 12 0 - 45 U/L     ALT 18 0 - 70 U/L     Protein Total 6.4 6.4 - 8.3 g/dL     Albumin 4.2 3.5 - 5.2 g/dL     Bilirubin Total 0.2 <=1.2 mg/dL   Hemoglobin A1c     Collection Time: 06/11/24  9:50 AM   Result Value Ref Range     Hemoglobin A1C 5.4 <5.7 %   Lipid panel     Collection Time: 06/11/24  9:50 AM   Result Value Ref Range     Cholesterol 157 <200 mg/dL     Triglycerides 191 (H) <150 mg/dL     Direct Measure HDL 31 (L) >=40 mg/dL     LDL Cholesterol Calculated 88 <=100 mg/dL     Non HDL Cholesterol 126 <130 mg/dL   CBC with platelets and differential     Collection Time: 06/11/24  9:50 AM   Result Value Ref Range     WBC Count 4.9 4.0 - 11.0 10e3/uL     RBC Count 4.84 4.40 - 5.90 10e6/uL     Hemoglobin 13.9 13.3 - 17.7 g/dL     Hematocrit 41.4 40.0 - 53.0 %     MCV 86 78 - 100 fL     MCH 28.7 26.5 - 33.0 pg     MCHC 33.6 31.5 - 36.5 g/dL     RDW 12.9 10.0 - 15.0 %     Platelet Count 190 150 - 450 10e3/uL     % Neutrophils 50 %     % Lymphocytes 40 %     % Monocytes 5 %     % Eosinophils 4 %     % Basophils 1 %     % Immature Granulocytes 0 %     NRBCs per 100 WBC 0 <1 /100     Absolute Neutrophils 2.5 1.6 - 8.3 10e3/uL     Absolute Lymphocytes 2.0 0.8 - 5.3 10e3/uL     Absolute Monocytes 0.2 0.0 - 1.3 10e3/uL     Absolute Eosinophils 0.2 0.0 - 0.7 10e3/uL     Absolute Basophils 0.0 0.0 - 0.2 10e3/uL     Absolute Immature Granulocytes 0.0 <=0.4 10e3/uL     Absolute NRBCs 0.0 10e3/uL   Valproic acid     Collection Time: 06/14/24  2:20 PM   Result Value Ref Range      Valproic acid 75.3   ug/mL   Ammonia     Collection Time: 06/14/24  2:20 PM   Result Value Ref Range     Ammonia 85 (H) 16 - 60 umol/L   Ammonia     Collection Time: 06/19/24 10:28 AM   Result Value Ref Range     Ammonia 47 16 - 60 umol/L   Valproic acid     Collection Time: 06/19/24 10:28 AM   Result Value Ref Range     Valproic acid 75.2   ug/mL   Extra Green Top (Lithium Heparin) Tube     Collection Time: 06/19/24 10:28 AM   Result Value Ref Range     Hold Specimen [facility]

## 2024-06-27 NOTE — PLAN OF CARE
Team Note Due:  Monday     Assessment/Intervention/Current Symtoms and Care Coordination:  Chart review and met with team, discussed pt progress, symptomology, and response to treatment. Discussed the discharge plan and any potential impediments to discharge.     Pt has final Hand hearing 7/2 - confirm discharge date with Provider.    Received a call from [facility] examiner to provide an update on Pt's progress. I shared the primary reason for Hand request is due to Pt's past history of med non adherence. He has mentioned he doesn't want to take these meds out of the hospital as well and risks severe decompensation and re-hospitalization if he doesn't adhere to his medications.     I emailed Chelsea Brennan to ask for some assistance in contacting [facility] to solidify discharge plans as I haven't received any response back.     Discharge Plan or Goal:  Discharge to different facility within the [facility] agency with higher level of care ([facility]'s idea) vs. return to [facility] through [facility].     Was living at:  [facility] through [address] [address]    Barriers to Discharge:  Symptomology - jose, agitation      Referral Status:  TBD    Legal Status:  COURT HOLD - notice of intent to revoke stay of commitment   Hand submitted 6/18 - Hand hearing is July 2nd   [facility]: [facility]  File Number: [id number]     Pt's  is:  Carlos Gonzales  [place] [employer]  Ph:  [phone]     Beth Garza    [address] [place] [address]  Phone: [phone]  [email or web address]     Contacts:  [facility]:  Norman Loyola <[email or web address]> and John Espana <[email or web address]> and Tal Ken as Norman Loyola is away.   Darlnie Romo ( for [facility]): [phone] - RENNY on file for [facility] staff  Bree ([facility] ): [phone] RENNY on file for [facility]  staff  Email: [email or web address]    :  Chelsea Mika ([facility]  Ph: [phone] - Commitment CM no RENNY needed   [email or web address].  [facility]  Ph: [phone]  Christi (member of case management team): [phone]     Medication Management/Psychiatry:  Name/Clinic: [facility] - Pt declines [facility] for Psychiatry  Number: [phone]     Other:  Yolanda (mom): [phone] - Pt declines [facility] for Mom      Upcoming Meetings and Dates/Important Information and next steps:  Will need discharge appt for psychiatry prior to leaving  COS and PD    Upcoming Hand hearing 7/2 at 9:30AM.     Schedule discharge care meeting or phone call with [facility] and Pt to discuss discharge option - they want him to potentially move to a higher needs home within their agency. They have not got back to me. I have emailed them several times stating if they want to discuss other alternative placements within their agency we need to figure out what that is - I haven't told Pt yet as I haven't heard what they are thinking. It is either a higher level of care within [facility] vs. Returning to same group home at [facility] ([facility]). Pt wants to return back to same [facility] group home.

## 2024-06-28 PROCEDURE — G0177 OPPS/PHP; TRAIN & EDUC SERV: HCPCS

## 2024-06-28 PROCEDURE — 250N000013 HC RX MED GY IP 250 OP 250 PS 637: Performed by: PSYCHIATRY & NEUROLOGY

## 2024-06-28 PROCEDURE — 124N000002 HC R&B MH UMMC

## 2024-06-28 PROCEDURE — 250N000013 HC RX MED GY IP 250 OP 250 PS 637: Performed by: CLINICAL NURSE SPECIALIST

## 2024-06-28 PROCEDURE — 99232 SBSQ HOSP IP/OBS MODERATE 35: CPT | Performed by: CLINICAL NURSE SPECIALIST

## 2024-06-28 RX ADMIN — NICOTINE POLACRILEX 4 MG: 4 GUM, CHEWING BUCCAL at 14:54

## 2024-06-28 RX ADMIN — RISPERIDONE 0.5 MG: 0.5 TABLET, FILM COATED ORAL at 19:03

## 2024-06-28 RX ADMIN — DIVALPROEX SODIUM 1250 MG: 500 TABLET, DELAYED RELEASE ORAL at 19:03

## 2024-06-28 RX ADMIN — HYDROXYZINE HYDROCHLORIDE 50 MG: 50 TABLET ORAL at 10:31

## 2024-06-28 RX ADMIN — GABAPENTIN 200 MG: 100 CAPSULE ORAL at 14:41

## 2024-06-28 RX ADMIN — Medication 3 MG: at 19:03

## 2024-06-28 RX ADMIN — NICOTINE 1 PATCH: 21 PATCH, EXTENDED RELEASE TRANSDERMAL at 08:26

## 2024-06-28 RX ADMIN — DIPHENHYDRAMINE HYDROCHLORIDE 50 MG: 50 CAPSULE ORAL at 12:46

## 2024-06-28 RX ADMIN — NICOTINE POLACRILEX 4 MG: 4 GUM, CHEWING BUCCAL at 08:36

## 2024-06-28 RX ADMIN — HALOPERIDOL 5 MG: 5 TABLET ORAL at 12:46

## 2024-06-28 ASSESSMENT — ACTIVITIES OF DAILY LIVING (ADL)
ADLS_ACUITY_SCORE: 30
DRESS: INDEPENDENT;SCRUBS (BEHAVIORAL HEALTH)
ADLS_ACUITY_SCORE: 30
ORAL_HYGIENE: INDEPENDENT
ADLS_ACUITY_SCORE: 30
HYGIENE/GROOMING: INDEPENDENT
ADLS_ACUITY_SCORE: 30
ADLS_ACUITY_SCORE: 30
HYGIENE/GROOMING: INDEPENDENT
ADLS_ACUITY_SCORE: 30
ORAL_HYGIENE: INDEPENDENT
ADLS_ACUITY_SCORE: 30

## 2024-06-28 NOTE — PLAN OF CARE
NOC Shift Report    Pt slept 6.5 hours overnight.     Pt was asleep at the start of shift. No s/s of respiratory distress while asleep.    No pain or discomfort reported overnight.      No PRNs administered.     No requests, complaints, or medical concerns expressed by pt, nor behavioral issues noted by staff.     Pt safety maintained. Will continue to monitor.    Goal Outcome Evaluation:  Problem: Sleep Disturbance  Goal: Adequate Sleep/Rest  Outcome: Progressing

## 2024-06-28 NOTE — PLAN OF CARE
"Team Note Due:  Monday     Assessment/Intervention/Current Symtoms and Care Coordination:  Chart review and met with team, discussed pt progress, symptomology, and response to treatment. Discussed the discharge plan and any potential impediments to discharge.      Pt has final Hand hearing 7/2 - Pt will be medically stable to discharge 7/5/24. Writer contacted [facility] for  to return call regarding return to group home. Writer spoke with [facility]- who states he does not feel the pt should return to the group because other staff are afraid. Bree states they were discussing him going to a different group home. Writer asked if they can make a decision so the hospital can proceed with discharge planning. Pt became verbally aggressive when another pt petted him and yelled, \"Don't fucking pet me, I am not your pet\". Writer sent email to  and [facility] home staff advising of pt's discharge date and asking for clarity if he can return to previous site or if there is a new group home manage we can connect with.    Writer met again with pt and updated him no information has been received at this time to clarify if he can return to the previous location or a new place.        Discharge Plan or Goal:  Discharge to different facility within the [facility] agency with higher level of care ([facility]'s idea) vs. return to [facility] through [facility].     Was living at:  [facility] through [facility]  [address] [place] [address]     Barriers to Discharge:  Symptomology - jose, agitation      Referral Status:  TBD     Legal Status:  COURT HOLD - notice of intent to revoke stay of commitment   Yazan submitted 6/18 - Hand hearing is July 2nd   [facility]: Luis  File Number: [id number]     Pt's  is:  Carlos Gonzales  [place] [employer]  Ph:  [phone]     Beth Garza    [address] [place] [address]  Phone: [phone]  " [email or web address].     Contacts:  [facility] Home:  Norman Milla <[email or web address]> and John Espana <[email or web address]> and Tal Ken as Norman Carney is away.   Darline Romo ( for detention): [phone] - RENNY on file for detention staff  Bree (detention ): [phone] RENNY on file for group home staff  Email: [email or web address]     :  Chelsea Brennan ([facility]  Ph: [phone] - Commitment CM no RENNY needed   [email or web address]  [facility]  Ph: [phone]  Christi (member of case management team): [phone]    Medication Management/Psychiatry:  Name/Clinic: [facility] - Pt declines [facility] for Psychiatry  Number: [phone]    Other:  Yolanda (mom): [phone] - Pt declines [facility] for Mom      Upcoming Meetings and Dates/Important Information and next steps:  Will need discharge appt for psychiatry prior to leaving  COS and PD     Upcoming Hand hearing 7/2 at 9:30AM.      Schedule discharge care meeting or phone call with [facility] and Pt to discuss discharge option - they want him to potentially move to a higher needs home within their agency. They have not got back to me. I have emailed them several times stating if they want to discuss other alternative placements within their agency we need to figure out what that is - I haven't told Pt yet as I haven't heard what they are thinking. It is either a higher level of care within [facility] vs. Returning to same group home at [facility] ([facility]). Pt wants to return back to same [facility] group home.

## 2024-06-28 NOTE — PLAN OF CARE
"He is active in the [facility] area and states, \" I\"m having a really shitty day,\" in early interactions.  States frustration being in the hospital, patient in room 146, \" petting my head,  like a fucking dog,\" possibly having to find new housing arrangement, and the fact that he cannot get his bendaryl, haldol, and ativan medications sooner.  States he is, \" pissed,\" at the patient in room 146, yet denies ideas of harming himself or others.  He denies thoughts of harming patient in room 146 when asked by writer.  He required reassurance from writer that writer discussed boundaries with patient in room 146.  He agrees that it would be best that him and patient in room 146 avoid each other as much as possible on the unit.  He denies suicidal ideations, yet states feeling depressed and said, \" I just want to melt away,\" due to being in the hospital and over his housing situation.  Writer informed him that he will be updated on his housing option by his .  He states that he, \" gets really pissed,\" in the early afternoon and needs his bendaryl, ativan, and haldol combination in order to, \" calm down.\"  Education was given that if he is consistently agitated in the afternoon to discuss with the provider to discuss medication options, so we would not need as need medications to help manage his symptoms.  He denies experiencing any hallucinations when asked.  He agrees to come to staff for concerns.  PRN melatonin with evening medications.       "

## 2024-06-28 NOTE — PROGRESS NOTES
"The patient's care was discussed with the treatment team during the daily team meeting and/or staff's chart notes were reviewed.  Staff report  Pt slept 6.5 hours overnight.  Evening staff report: Pt has a elated affect with anxious mood. Pt rates anxiety at 4/10 and depression 0/10. Pt rates pain at 0/10. Pt reports no SI/HI/SIB and contracts for safety. Pt denies any hallucinations and not noted responding to any internal stimuli. Pt was medication compliant with no cheeking noted. No reported or observed medication side effects. Pt slept til 1700 then got up and took a shower. Pt stated he felt great after his nap. Pt reported he had a bad morning until he took haldol and benadryl. Pt discussed his HS dose of risperidone and stated \"I think I could go up to the 1 mg like I was on at home\". Encouraged pt to discuss this with his provider tomorrow. Pt requested and received prn melatonin and benadryl at HS for sleep. Pt also stated he isn't going to contest the Hand at his hearing on Monday. Pt reported that the risperidone is helping him. Pt was mostly isolative to his room this shift. Continue current POC.     Plan of Care Reviewed With: patient Plan of Care Reviewed With: patient     Overall Patient Progress: improvingOverall Patient Progress: improving     Upon interview, patient reported he had a good night sleep, felt pretty good upon awakening with his breakfast waiting for him, patient asks \"is not that cool?\"  To wake up in the morning and the food is ready.  However patient complained about the vegetable and hummus he previously requested for lunch, that he is tired of them giving him double portion of the vegetable instead of just a portion.  Writer explained that dietary probably thought he needed a double portion of vegetable since he had an order for a double portion of meal previously.    Patient reflected on the incoming Hand hearing, asking this writer if he could increase his risperidone to 1 " mg that he was taking prior to hospitalization, at the same time patient was complaining about side effects from risperidone that it is causing him not to be able to ejaculate.  Writer informed the patient there are other options on the Hand list that will not produce the sexual side effects.  Writer discussed about Abilify, Zyprexa, Invega and few others.  Patient shared that he is fear stemmed from starting a new medication that might prolong his hospital stay.  He understands that if he started on Invega, he has to have the first 2 doses of Invega Sustenna in the hospital prior to discharge. Patient does not want to stay more than already anticipated following the Hand commitment.    Writer and the patient went back and forth over this issue, patient wanted writer to put him on 1 mg of risperidone, writer explained to the patient the goal of Hand medications, that 1 mg of risperidone would not make much recovery sense.  If he wants to be on risperidone, the dose has to be higher than that, but since he has been complaining of sexual side effects, this provider will not place him on risperidone even though the patient claimed that he will endure the side effect.    Patient educated on setting realistic goals, that he should remember this is a medication that he has to take for a long time, enduring side effect is about of the options, no one should have to endure a devastating side effects in the first place, more so this has no place in the medical practice.    Patient finally agreed to Invega, that would be receiving it from his outpatient provider.  The benefits and risks of Invega was highlighted to the patient including the process he has to undergo, starting with the 3 mg dose to be titrated up to 6 mg or 9 mg before he starts to receive the Invega Sustenna.    Patient denied auditory/visual hallucinations, he denied suicidal/homicidal ideations and thoughts of self-harm.  Patient seems to have no more  "questions or concerns at this time.  Will wait for the outcome of the Hand hearing before commencing the neuroleptic.               Mental Status Exam:  General appearance: Awake, dressed in hospital yellow scrubs, appeared at stated age  Attitude:  Cooperative  Affect:  slightly anxious  Mood: \"very good\"   Speech:  Normal rate, tone and volume.  Eye contact:  good.    Psychomotor behavior : Calm  Gait: steady   Abnormal movements:  none  Delusions: denied  Hallucinations: denied  Thoughts: Goal oriented, appropriate  Associations: No Loosening association  Judgement: fair  Insight: limited  Cognitions: intact in conversation  Memory:  intact in conversation  Orientation: normal    Not suicidal. denied  Not homicidal: denied        Current Facility-Administered Medications                     Current Facility-Administered Medications   Medication Dose Route Frequency Provider Last Rate Last Admin    acetaminophen (TYLENOL) tablet 650 mg  650 mg Oral Q4H PRN Javier Card MD        alum & mag hydroxide-simethicone (MAALOX) suspension 30 mL  30 mL Oral Q4H PRN Javier Card MD        benztropine (COGENTIN) tablet 1 mg  1 mg Oral BID PRN Javier Card MD        haloperidol (HALDOL) tablet 5 mg  5 mg Oral Q8H PRN Satya Garza MD   5 mg at 06/11/24 2042     And    LORazepam (ATIVAN) tablet 2 mg  2 mg Oral Q8H PRSatya Bauer MD   2 mg at 06/11/24 2042     And    diphenhydrAMINE (BENADRYL) capsule 50 mg  50 mg Oral Q8H PRN Satya Garza MD   50 mg at 06/11/24 2042    haloperidol lactate (HALDOL) injection 5 mg  5 mg Intramuscular Q8H PRSatya Bauer MD         And    LORazepam (ATIVAN) injection 2 mg  2 mg Intramuscular Q8H PRSatya Bauer MD         And    diphenhydrAMINE (BENADRYL) injection 50 mg  50 mg Intramuscular Q8H PRSatya Bauer MD        divalproex sodium delayed-release (DEPAKOTE) DR tablet 1,500 mg  1,500 mg Oral At Bedtime Javier Card, " MD   1,500 mg at 06/11/24 1937    hydrOXYzine HCl (ATARAX) tablet 50 mg  50 mg Oral TID PRN Javier Card MD   50 mg at 06/12/24 1124    melatonin tablet 3 mg  3 mg Oral At Bedtime PRN Javier Card MD   3 mg at 06/10/24 2014    nicotine (NICORETTE) gum 2 mg  2 mg Buccal Q1H PRN Javier Card MD   2 mg at 06/12/24 1333    risperiDONE (risperDAL) tablet 1 mg  1 mg Oral Satya Montez MD   1 mg at 06/12/24 1007    risperiDONE (risperDAL) tablet 4 mg  0.5 mg Oral At Bedtime MARIO Salguero, CNP   4 mg at 06/11/24 1938    senna-docusate (SENOKOT-S/PERICOLACE) 8.6-50 MG per tablet 1 tablet  1 tablet Oral BID PRN Javier Card MD             Recent Results[]Expand by Default             Recent Results (from the past 168 hour(s))   Valproic acid     Collection Time: 06/11/24  9:50 AM   Result Value Ref Range     Valproic acid 92.7   ug/mL   Comprehensive metabolic panel     Collection Time: 06/11/24  9:50 AM   Result Value Ref Range     Sodium 139 135 - 145 mmol/L     Potassium 3.9 3.4 - 5.3 mmol/L     Carbon Dioxide (CO2) 27 22 - 29 mmol/L     Anion Gap 10 7 - 15 mmol/L     Urea Nitrogen 7.8 6.0 - 20.0 mg/dL     Creatinine 0.84 0.67 - 1.17 mg/dL     GFR Estimate >90 >60 mL/min/1.73m2     Calcium 9.6 8.6 - 10.0 mg/dL     Chloride 102 98 - 107 mmol/L     Glucose 125 (H) 70 - 99 mg/dL     Alkaline Phosphatase 51 40 - 150 U/L     AST 12 0 - 45 U/L     ALT 18 0 - 70 U/L     Protein Total 6.4 6.4 - 8.3 g/dL     Albumin 4.2 3.5 - 5.2 g/dL     Bilirubin Total 0.2 <=1.2 mg/dL   Hemoglobin A1c     Collection Time: 06/11/24  9:50 AM   Result Value Ref Range     Hemoglobin A1C 5.4 <5.7 %   Lipid panel     Collection Time: 06/11/24  9:50 AM   Result Value Ref Range     Cholesterol 157 <200 mg/dL     Triglycerides 191 (H) <150 mg/dL     Direct Measure HDL 31 (L) >=40 mg/dL     LDL Cholesterol Calculated 88 <=100 mg/dL     Non HDL Cholesterol 126 <130 mg/dL   CBC with platelets and differential     Collection Time:  06/11/24  9:50 AM   Result Value Ref Range     WBC Count 4.9 4.0 - 11.0 10e3/uL     RBC Count 4.84 4.40 - 5.90 10e6/uL     Hemoglobin 13.9 13.3 - 17.7 g/dL     Hematocrit 41.4 40.0 - 53.0 %     MCV 86 78 - 100 fL     MCH 28.7 26.5 - 33.0 pg     MCHC 33.6 31.5 - 36.5 g/dL     RDW 12.9 10.0 - 15.0 %     Platelet Count 190 150 - 450 10e3/uL     % Neutrophils 50 %     % Lymphocytes 40 %     % Monocytes 5 %     % Eosinophils 4 %     % Basophils 1 %     % Immature Granulocytes 0 %     NRBCs per 100 WBC 0 <1 /100     Absolute Neutrophils 2.5 1.6 - 8.3 10e3/uL     Absolute Lymphocytes 2.0 0.8 - 5.3 10e3/uL     Absolute Monocytes 0.2 0.0 - 1.3 10e3/uL     Absolute Eosinophils 0.2 0.0 - 0.7 10e3/uL     Absolute Basophils 0.0 0.0 - 0.2 10e3/uL     Absolute Immature Granulocytes 0.0 <=0.4 10e3/uL     Absolute NRBCs 0.0 10e3/uL            Changes made today 6/28/24  No medication changes.  Vegetable as one portion only for lunch        Review of systems:      For physical examination and 12 point review of system please, see note of Dr. Clarke from 6/5/24.  I reviewed that note and agree with it.          DIagnoses:      Bipolar affective disorder, jose, severe with psychosis.          Assessment and Plan:      Assessment and Hospital Summary:  Venancio Edgar is a 28-year old male with history of bipolar disorder, who arrived at the ED very animated and appeared somewhat manic.  Patient presented with verbal agitation, significant behavior change, paranoia, physical aggression. Per DEC note Pt presents to the ED via EMS after becoming physically and verbally agitated at his group home towards staff and a community member while outside. When meeting with Writer, Pt presented with a labile mood, quickly changing from being cooperative to Writer to yelling at Writer, stating he will not participate, questioning Writer, laughing out loud while talking about his medications with volume steadily increasing. Writer attempted to  "collect information of what has been going on with Pt, yet Pt denied any concerns and then started stating Writer was asking \"the wrong questions\" and is trying to keep Pt in the hospital. Writer discussed how he was trying to let Pt share his perspective and understanding of the situation. Pt continued to be verbally escalated, stating staff was \"retarded\", not asking the right questions, and not needing to speak with a therapist. Writer terminated the assessment given Pt's elevated and labile mood and Pt presenting as experiencing disorganized thought as evidenced by answering and responding to Writer's questions with answers that did not pertain to the question asked.      Per DEC note Patient's CM is planning to revoke his PD. Will for now continue on 72 hour hold. Patient's decompensation is likely due to poor compliance with meds. He was restarted on PTA meds and Risperdal dose was increased with his permission. Will continue to provide support and structure.          Target Psychiatric Symptoms and Interventions     1. Education given regarding diagnostic and treatment options with risks, benefits and alternatives and adequate verbalization of understanding.  2. Admitted on 6/7/2024 to station 12N..  Precautions placed includes:Assault precautions, suicide precautions  3. Medications: 6/7/2024: PTA medications reviewed.     4. Medications:  Hospital  Alum & Mag hydroxide-simethicone ( Maalox) suspension 30 mg oral every 4 hours as needed indigestion  Benztropine (Cogentin) tablet 1 mg 2 times daily as needed extrapyramidal symptoms  -Vitals and neuro checks with PRN blood pressure meds for SBP > 200  Code 1 restrictive unit  Full code  Risperidone 0.5 mg at bedtime.       Divalproex sodium delayed release (Depakote DR) tablet 1,250 mg at bedtime  Haloperidol (Haldol) tablet 5 mg oral every 8 hours as needed agitation  And  Diphenhydramine (Benadryl) capsule 50 mg oral every 8 hours as needed " agitation  Haloperidol lactate (Haldol) injection 5 mg intramuscular every 8 hours as needed agitation  And  Diphenhydramine (Benadryl) injection 50 mg intramuscular every 8 hours as needed agitation  Gabapentin (Neurontin) capsule 200 mg 3 times daily as needed anxiety  Hydroxyzine HCl (Atarax) tablet 50 mg oral 3 times daily as needed anxiety  Legal status Court Hold  No COVID-19 virus (coronavirus) screening test needed or already ordered/completed  Routine programming  [place]-docusate (Senokot-S/Marisela-Colace) 8.6-50 mg, 1 tablet oral 2 times daily as needed constipation  Status 15 every 15-minute safety check  Suicide precaution suicide risk Low  Melatonin tablet 3 mg at bedtime as needed sleep  Vital signs and pain assessment  Weight patient routine every TU, TH, SA.  5. Consultations:  Hospitalist to follow as needed.  IM to follow for every medical conditions.  6. Structure and Supervision  Unit [address].  Barriers to Discharge: Danisha, agitation.  7.   is following in regards to collecting and reviewing collateral information, referrals and disposition planning.  Legal: Court hold - notice of intent to revoke stay of commitment   Yazan submitted 6/18 - Yazan hearing is July 2nd   Referrals: CTC to facilitate all referrals  Care Coordination: CTC to coordinate care with external providers  Placement: CTC to facilitate placement following symptom stabilization  Anticipated Discharge: 14 days      . Further treatment programming to be determined throughout the hospital course.     Risk Assessment: [facility] RISK ASSESSMENT: Patient able to contract for safety and Patient on precautions     This note was created with help of Dragon dictation system. Grammatical / typing errors are not intentional.  Risks, benefits, and alternatives discussed at length with patient.         Acute Medical Problems and Treatments:  Acute medical concerns:  - No acute medical concerns     Pertinent labs/imaging:  Recent  Results   Recent Results             Recent Results (from the past 504 hour(s))   Valproic acid     Collection Time: 06/11/24  9:50 AM   Result Value Ref Range     Valproic acid 92.7   ug/mL   Comprehensive metabolic panel     Collection Time: 06/11/24  9:50 AM   Result Value Ref Range     Sodium 139 135 - 145 mmol/L     Potassium 3.9 3.4 - 5.3 mmol/L     Carbon Dioxide (CO2) 27 22 - 29 mmol/L     Anion Gap 10 7 - 15 mmol/L     Urea Nitrogen 7.8 6.0 - 20.0 mg/dL     Creatinine 0.84 0.67 - 1.17 mg/dL     GFR Estimate >90 >60 mL/min/1.73m2     Calcium 9.6 8.6 - 10.0 mg/dL     Chloride 102 98 - 107 mmol/L     Glucose 125 (H) 70 - 99 mg/dL     Alkaline Phosphatase 51 40 - 150 U/L     AST 12 0 - 45 U/L     ALT 18 0 - 70 U/L     Protein Total 6.4 6.4 - 8.3 g/dL     Albumin 4.2 3.5 - 5.2 g/dL     Bilirubin Total 0.2 <=1.2 mg/dL   Hemoglobin A1c     Collection Time: 06/11/24  9:50 AM   Result Value Ref Range     Hemoglobin A1C 5.4 <5.7 %   Lipid panel     Collection Time: 06/11/24  9:50 AM   Result Value Ref Range     Cholesterol 157 <200 mg/dL     Triglycerides 191 (H) <150 mg/dL     Direct Measure HDL 31 (L) >=40 mg/dL     LDL Cholesterol Calculated 88 <=100 mg/dL     Non HDL Cholesterol 126 <130 mg/dL   CBC with platelets and differential     Collection Time: 06/11/24  9:50 AM   Result Value Ref Range     WBC Count 4.9 4.0 - 11.0 10e3/uL     RBC Count 4.84 4.40 - 5.90 10e6/uL     Hemoglobin 13.9 13.3 - 17.7 g/dL     Hematocrit 41.4 40.0 - 53.0 %     MCV 86 78 - 100 fL     MCH 28.7 26.5 - 33.0 pg     MCHC 33.6 31.5 - 36.5 g/dL     RDW 12.9 10.0 - 15.0 %     Platelet Count 190 150 - 450 10e3/uL     % Neutrophils 50 %     % Lymphocytes 40 %     % Monocytes 5 %     % Eosinophils 4 %     % Basophils 1 %     % Immature Granulocytes 0 %     NRBCs per 100 WBC 0 <1 /100     Absolute Neutrophils 2.5 1.6 - 8.3 10e3/uL     Absolute Lymphocytes 2.0 0.8 - 5.3 10e3/uL     Absolute Monocytes 0.2 0.0 - 1.3 10e3/uL     Absolute  Eosinophils 0.2 0.0 - 0.7 10e3/uL     Absolute Basophils 0.0 0.0 - 0.2 10e3/uL     Absolute Immature Granulocytes 0.0 <=0.4 10e3/uL     Absolute NRBCs 0.0 10e3/uL   Valproic acid     Collection Time: 06/14/24  2:20 PM   Result Value Ref Range     Valproic acid 75.3   ug/mL   Ammonia     Collection Time: 06/14/24  2:20 PM   Result Value Ref Range     Ammonia 85 (H) 16 - 60 umol/L   Ammonia     Collection Time: 06/19/24 10:28 AM   Result Value Ref Range     Ammonia 47 16 - 60 umol/L   Valproic acid     Collection Time: 06/19/24 10:28 AM   Result Value Ref Range     Valproic acid 75.2   ug/mL   Extra Green Top (Lithium Heparin) Tube     Collection Time: 06/19/24 10:28 AM   Result Value Ref Range     Hold Specimen [facility]

## 2024-06-28 NOTE — CARE PLAN
"  Rehab Group    Start time: 1030  End time: 1130  Patient time total: 45 minutes    attended full group    #5 attended   Group Type: OT Clinic   Group Topic Covered: activity therapy and coping skills     Group Session Detail:  The purpose of occupational therapy clinic is to facilitate coping skill exploration, creative expression within personally meaningful activities, and clinical observation of social, cognitive, and kinesthetic performance skills.      Patient Response/Contribution:  positive affect and cooperative with task     Patient Detail:    Pt reported doing well during check-in, stated his meeting with his provider went well, and he was hopeful for discharge soon.  Pt spent time in OT clinic touching up a wood owl painting from a previous day, and started a new project.  Worked fairly quickly, not taking time to use smaller brushes for smaller detailed areas, \"it looks fine like this\".  Enjoyed selecting music, and listening to music peers selected/discussing genere, artists, etc.        Train & Education Service Per Session 45 + Minutes () OT Group code    Patient Active Problem List   Diagnosis    Bipolar disorder (H)    ADHD (attention deficit hyperactivity disorder)    Bipolar I disorder with jose (H)       "

## 2024-06-28 NOTE — PLAN OF CARE
"Individual Therapy Note      Date of Service: June 28, 2024    Patient: Venancio goes by \"Venancio,\" uses he/him pronouns    Individuals Present: Venancio & MARLENE Medina    Session start: 1200  Session end: 1230  Session duration in minutes: 30      Modality Used: Person Centered, Brief Therapy, and Solution Focused    Goals: safety plan, verbal processing    Patient Description of current symptoms: some anxiety, frustration     Mental Status Exam:   Attitude: cooperative  Eye Contact: fair  Mood: anxious  Affect: mood congruent  Speech: clear, coherent and pressured speech  Psychomotor Behavior: no evidence of tardive dyskinesia, dystonia, or tics  Thought Process:  goal oriented  Associations: loosening of associations present  Thought Content: no evidence of suicidal ideation or homicidal ideation, no auditory hallucinations present, and no visual hallucinations present  Insight: limited  Judgement: limited  Attention Span and Concentration: intact    Pt progress: Met pt in his room to go over a safety plan. Pt has limited insight into his symptoms or mental health. Pt shared frustration with group home staff b/c they called the police on him. Writer shared it sounded like their experience with him ws different that was he was experiencing. Pt struggled to think or talk about things that are outside of his perceived experience. When other things are brought up pt immediately shuts down any conversation about it. Pt was able to get through the safety plan questions but struggled to answer most questions without support.     Treatment Objective(s) Addressed:   The focus of this session was on identifying and practicing coping strategies, processing feelings related to hospital stay, safety planning, and identifying an appropriate aftercare plan     Progress Towards Goals and Assessment of Patient:   Patient is making progress towards treatment goals as evidenced by less agitation.       Therapeutic " Intervention(s):   Provided active listening, unconditional positive regard, and validation.   Engaged in safety planning identifying coping skills, warning signs, health support and resources and Engaged in guided discovery, explored patient's perspectives and helped expand them through socratic dialogue      Plan/next step: Engaged in unit programming  Check in with therapist as needed      85828 - Psychotherapy (with patient) - 30 (16-37*) min    Patient Active Problem List   Diagnosis    Bipolar disorder (H)    ADHD (attention deficit hyperactivity disorder)    Bipolar I disorder with jose (H)

## 2024-06-28 NOTE — PLAN OF CARE
BEH IP Unit Acuity Rating Score (UARS)  Patient is given one point for every criteria they meet.    CRITERIA SCORING   On a 72 hour hold, court hold, committed, stay of commitment, or revocation. 1    Patient LOS on BEH unit exceeds 20 days. 0  LOS: 21   Patient under guardianship, 55+, otherwise medically complex, or under age 11. 0   Suicide ideation without relief of precipitating factors. 0   Current plan for suicide. 0   Current plan for homicide. 0   Imminent risk or actual attempt to seriously harm another without relief of factors precipitating the attempt. 1   Severe dysfunction in daily living (ex: complete neglect for self care, extreme disruption in vegetative function, extreme deterioration in social interactions). 1   Recent (last 7 days) or current physical aggression in the ED or on unit. 0   Restraints or seclusion episode in past 72 hours. 0   Recent (last 7 days) or current verbal aggression, agitation, yelling, etc., while in the ED or unit. 0   Active psychosis. 1   Need for constant or near constant redirection (from leaving, from others, etc).  1   Intrusive or disruptive behaviors. 1   Patient requires 3 or more hours of individualized nursing care per 8-hour shift (i.e. for ADLs, meds, therapeutic interventions). 0   TOTAL 6

## 2024-06-28 NOTE — PLAN OF CARE
"Pt has a elated affect with anxious mood. Pt rates anxiety at 4/10 and depression 0/10. Pt rates pain at 0/10. Pt reports no SI/HI/SIB and contracts for safety. Pt denies any hallucinations and not noted responding to any internal stimuli. Pt was medication compliant with no cheeking noted. No reported or observed medication side effects. Pt slept til 1700 then got up and took a shower. Pt stated he felt great after his nap. Pt reported he had a bad morning until he took haldol and benadryl. Pt discussed his HS dose of risperidone and stated \"I think I could go up to the 1 mg like I was on at home\". Encouraged pt to discuss this with his provider tomorrow. Pt requested and received prn melatonin and benadryl at HS for sleep. Pt also stated he isn't going to contest the Hand at his hearing on Monday. Pt reported that the risperidone is helping him. Pt was mostly isolative to his room this shift. Continue current POC.    Plan of Care Reviewed With: patient Plan of Care Reviewed With: patient    Overall Patient Progress: improvingOverall Patient Progress: improving           "

## 2024-06-28 NOTE — PLAN OF CARE
"  Problem: Manic Behavior Episode  Goal: Decreased Manic Symptoms  Outcome: Progressing     Problem: Psychotic Signs/Symptoms  Goal: Improved Mood Symptoms (Psychotic Signs/Symptoms)  Outcome: Progressing   Goal Outcome Evaluation:    Plan of Care Reviewed With: patient        Patient woke up this morning stating he slept well and that he was feeling good. Patient was bright and interactive. Patient reported that he would be getting agitated later today because he stated he always gets agitated in the late morning/early afternoon. Writer suggested the use of coping skills and/or distraction if he knows that he often gets escalated at that time. Patient was resistant to the idea of coping skills, and stated he felt it would only work if he was able to play a certain video game that we do not have downloaded due to the file being too large. Patient states he wakes up, its a new day, he finds something to do (video game today), and then after that he gets bored as he runs out of things to do. Patient stated that he \"loves\" taking haldol and benadryl because it put him to sleep and he \"loves taking a nap in the middle of the day\". Writer explained that the use of haldol and benadryl together is supposed to be used for severe agitation, and patient stated this is how he often feels on the inside. He said he would try to use more coping skills today and avoid the use of haldol and benadryl, especially because he wanted to be awake to talk to his therapist later. Patient denies SI, HI, AVH. Patient took PRN Hydroxyzine late morning per his request due to his report of 6/10 anxiety. Patient stated it was effective at follow up. In the early afternoon patient approached writer and asked for his \"haldol and benadryl\". Writer asked patient what was going on that he needed this, as patient did not show outward signs of agitation. Patient stated that he did not want to talk about it but that he was feeling agitated. Writer " "suggested to patient that he try his other PRN for anxiety, gabapentin. Writer discussed this with another nurse who has worked with him in the past. The other nurse also tried to suggest this to patient. Writer tried telling patient that this likely will not be prescribed after discharge and that our duty is to try less restrictive interventions first. Patient was not open to this education and started raising his voice and becoming agitated at the idea that we were suggesting something else. Patient stated \"I need it here\", stating that being in the hospital makes him agitated. Writer gave him the medications. Writer spoke about this to the therapist, who had recently met with patient doing safety planning. The therapist stated she had met with patient just prior and that he appeared calm and that when they finished her meeting patient stated \"I need to go get my meds but they probably won't give them to me even though my doctor says I can have them\".  Patient went to sleep after receiving these medications and he was woken up by the  informing him of an update regarding his discharge plan, that his group home is stating that they might not accept him back, but it is still under consideration. Patient was upset by this and went to make a phone call. Another patient patted patient on the head. Patient yelled loudly and aggressively \"Don't fucking pet me, I'm not a fucking dog\". Patient was sitting down during this time and there was no physical aggression. Writer was occupied covering a 1:1 of another patient during this time, but the charge nurse spoke to patient and was able to help him deescalate. Patient called his mom, and he was frustrated by this conversation as well. Writer validated and empathized with his frustration about the unclear information from the group home.This seemed to help patient. Writer also offered patient PRN gabapentin and patient accepted this. Patient was informed that the "  is diligently working on getting more information, and he asked if the social worked would update him before she leaves. The  went to talk to him right then and told him she would let him know if she got any more updates before she leaves. This seemed to further ease patient's anxiety.

## 2024-06-29 PROCEDURE — 250N000013 HC RX MED GY IP 250 OP 250 PS 637: Performed by: PSYCHIATRY & NEUROLOGY

## 2024-06-29 PROCEDURE — 250N000013 HC RX MED GY IP 250 OP 250 PS 637: Performed by: CLINICAL NURSE SPECIALIST

## 2024-06-29 PROCEDURE — 124N000002 HC R&B MH UMMC

## 2024-06-29 RX ADMIN — NICOTINE 1 PATCH: 21 PATCH, EXTENDED RELEASE TRANSDERMAL at 09:29

## 2024-06-29 RX ADMIN — HALOPERIDOL 5 MG: 5 TABLET ORAL at 10:44

## 2024-06-29 RX ADMIN — NICOTINE POLACRILEX 4 MG: 4 GUM, CHEWING BUCCAL at 09:29

## 2024-06-29 RX ADMIN — HYDROXYZINE HYDROCHLORIDE 50 MG: 50 TABLET ORAL at 09:29

## 2024-06-29 RX ADMIN — DIVALPROEX SODIUM 1250 MG: 500 TABLET, DELAYED RELEASE ORAL at 19:01

## 2024-06-29 RX ADMIN — Medication 3 MG: at 19:01

## 2024-06-29 RX ADMIN — DIPHENHYDRAMINE HYDROCHLORIDE 50 MG: 50 CAPSULE ORAL at 10:44

## 2024-06-29 RX ADMIN — RISPERIDONE 0.5 MG: 0.5 TABLET, FILM COATED ORAL at 19:00

## 2024-06-29 ASSESSMENT — ACTIVITIES OF DAILY LIVING (ADL)
ADLS_ACUITY_SCORE: 30
DRESS: INDEPENDENT;SCRUBS (BEHAVIORAL HEALTH)
ORAL_HYGIENE: INDEPENDENT
ADLS_ACUITY_SCORE: 30
ADLS_ACUITY_SCORE: 30
HYGIENE/GROOMING: HANDWASHING;INDEPENDENT
ADLS_ACUITY_SCORE: 30
DRESS: SCRUBS (BEHAVIORAL HEALTH);INDEPENDENT
ORAL_HYGIENE: INDEPENDENT
ADLS_ACUITY_SCORE: 30
LAUNDRY: WITH SUPERVISION
ADLS_ACUITY_SCORE: 30
HYGIENE/GROOMING: INDEPENDENT

## 2024-06-29 NOTE — PROGRESS NOTES
Izzy Newman, RN  Registered Nurse  Mental Health     Progress Notes  Addendum     Date of Service: 6/29/2024 11:27 AM  Creation Time: 6/29/2024 11:27 AM     Addendum         Combination PRN Medication Administration

## 2024-06-29 NOTE — PLAN OF CARE
Problem: Sleep Disturbance  Goal: Adequate Sleep/Rest  Outcome: Progressing   Goal Outcome Evaluation:    Patient appeared to sleep 6 hours this night shift.  No prns or snacks given or requested.  No concerns were reported or noted.

## 2024-06-29 NOTE — PLAN OF CARE
He was sleeping early in the shift and woke up before dinner time.  He denies pain/discomfort and cold, flu like symptoms when asked.  He denies problematic anxiety, depression, suicidal and homicidal ideations.  He denies hallucinations.  He states that he is bored while being in the hospital.  Education was given to avoid patient in room 146 and agrees to do so.  He agrees to come to staff for concerns.  PRN melatonin with evening medications.

## 2024-06-29 NOTE — PLAN OF CARE
"Goal Outcome Evaluation:    Pt requested po haldol and benadryl for his \"agitation.\" He said prn Vistaril given earlier this morning was not effective. Pt had been calm and in good spirits. His mood was \"neutral, leaning toward sad.\" Pt states this is somewhat due to feeling bored on the unit, on weekends. He had denied all MH, but stated he usually gets very anxious at around 1100. Pt said he would go to OT, if held-to finish his art project. He reports he is \"very social.\" Pt is in the milieu, walks around both sides of unit, has headphones. He also said his mom may visit sometime today. Pt's goals for the day are \"to play switch, and listen to music.\"        3915) Pt is awake, resting in his rm. He did not attend grp, today. Pt said the haldol and benadryl really helped with his \"agitation.\" No behavioral concerns.         "

## 2024-06-29 NOTE — PROGRESS NOTES
"Combination PRN Medication Administration    Medications Administered: Benadryl+Haldol combination    What patient symptom(s) led to the administration of these medications?  \"agitation,unrelieved anxiety\"     What interventions were attempted to avoid use of these medication (including other PRN medications)?     Hydroxyzine, verbal support/reassurance    What were the patient's response(s) to the interventions?     Pt feels calmer, less anxious/agitated      Provider notified: Mariano Pacheco       Date: 06/29/24        Time: 1120    Izzy Newman RN   "

## 2024-06-30 PROCEDURE — 124N000002 HC R&B MH UMMC

## 2024-06-30 PROCEDURE — 250N000013 HC RX MED GY IP 250 OP 250 PS 637: Performed by: PSYCHIATRY & NEUROLOGY

## 2024-06-30 PROCEDURE — 250N000013 HC RX MED GY IP 250 OP 250 PS 637: Performed by: CLINICAL NURSE SPECIALIST

## 2024-06-30 RX ADMIN — DIVALPROEX SODIUM 1250 MG: 500 TABLET, DELAYED RELEASE ORAL at 20:14

## 2024-06-30 RX ADMIN — HYDROXYZINE HYDROCHLORIDE 50 MG: 50 TABLET ORAL at 10:20

## 2024-06-30 RX ADMIN — NICOTINE POLACRILEX 4 MG: 4 GUM, CHEWING BUCCAL at 11:46

## 2024-06-30 RX ADMIN — DIPHENHYDRAMINE HYDROCHLORIDE 50 MG: 50 CAPSULE ORAL at 12:18

## 2024-06-30 RX ADMIN — NICOTINE POLACRILEX 4 MG: 4 GUM, CHEWING BUCCAL at 09:16

## 2024-06-30 RX ADMIN — GABAPENTIN 200 MG: 100 CAPSULE ORAL at 11:46

## 2024-06-30 RX ADMIN — HALOPERIDOL 5 MG: 5 TABLET ORAL at 12:18

## 2024-06-30 RX ADMIN — Medication 3 MG: at 20:14

## 2024-06-30 RX ADMIN — NICOTINE POLACRILEX 4 MG: 4 GUM, CHEWING BUCCAL at 10:21

## 2024-06-30 RX ADMIN — RISPERIDONE 0.5 MG: 0.5 TABLET, FILM COATED ORAL at 20:14

## 2024-06-30 ASSESSMENT — ACTIVITIES OF DAILY LIVING (ADL)
HYGIENE/GROOMING: INDEPENDENT
ADLS_ACUITY_SCORE: 30
DRESS: INDEPENDENT
ADLS_ACUITY_SCORE: 30
ORAL_HYGIENE: INDEPENDENT
ADLS_ACUITY_SCORE: 30

## 2024-06-30 NOTE — PLAN OF CARE
"Goal Outcome Evaluation:    Problem: Impaired Interpersonal Functioning  Goal: Improved Emotion and Mood (Impaired Interpersonal Functioning)  Outcome: Progressing     Pt presents calm, cooperative, but labile. Pt denies SI/HI/SIB, hallucinations, and depression but reports increasing anxiety. Pt speech is loud and pressured at times. Pt explains to writer at the beginning of shift that his anxiety is manageable, but he would request PRN at a later time.   Pt split time between room and milieu. When out in the milieu, Pt was very interactive with peers and staff. Pt spent time watching television and playing the [place] with a peer.     Pt explains to writer that he uses Hydroxyzine first for anxiety, but he starts getting agitated after and Hydroxyzine doesn't help. Pt explains once he get's agitated, Haldol/Benadryl combination \"really helps a lot\". Writer spoke with Provider Dr. Heard about Pt requesting Haldol/Benadryl and that he is making this request before he even reports needing it. Provider informed writer that at this time, he is comfortable with Pt taking this medication if requested based on symptoms he is feeling, but that this should be discussed tomorrow with the regular provider team to make sure the team is aware.     Pt eventually did come up to writer to request Hydroxyzine. Around 1115, Pt appeared to start showing symptoms of agitation and stated \"as soon as I'm off this phone call, we REALLY have to talk\". After Pt's phone call with a friend, Pt came up to writer and stated, \"that phone call really helped to calm me down, so I don't need it anymore, but I would like Gabapentin\" and informed writer that he \"can't promise\" he won't ask for it later.  Later in shift, Pt asked for Ruffle Chips that he has in his locker. Writer informed him he doesn't have an order but can speak with the provider tomorrow. Pt became very upset and started raising his voice saying write is wrong because " "he has had them before. After some back and forth where Pt argued being allowed to have them, he started to become agitated and loudly stated \"well than just give me my haldol benadryl because I'm about to freak the fuck out otherwise\". Pt received PRN Benadryl/Haldol and no behavioral issues arose after this.     Pt had a visit with mother and father which went well. His parents dropped off chips and crocs which he put on right away but the chips were placed in locker as Pt has no current order.     Pt does not report concerns with bowel/bladder. Pt denies pain. Pt requests PRN Hydroxyzine, Benadryl/Haldol, and Nicotine.   VSS, medication compliant, behaviorally in control most of shift.     "

## 2024-07-01 ENCOUNTER — MEDICAL CORRESPONDENCE (OUTPATIENT)
Dept: HEALTH INFORMATION MANAGEMENT | Facility: CLINIC | Age: 29
End: 2024-07-01
Payer: COMMERCIAL

## 2024-07-01 PROCEDURE — 124N000002 HC R&B MH UMMC

## 2024-07-01 PROCEDURE — 99232 SBSQ HOSP IP/OBS MODERATE 35: CPT | Performed by: CLINICAL NURSE SPECIALIST

## 2024-07-01 PROCEDURE — 250N000013 HC RX MED GY IP 250 OP 250 PS 637: Performed by: CLINICAL NURSE SPECIALIST

## 2024-07-01 PROCEDURE — 250N000013 HC RX MED GY IP 250 OP 250 PS 637: Performed by: PSYCHIATRY & NEUROLOGY

## 2024-07-01 RX ADMIN — NICOTINE POLACRILEX 4 MG: 4 GUM, CHEWING BUCCAL at 09:17

## 2024-07-01 RX ADMIN — HALOPERIDOL 5 MG: 5 TABLET ORAL at 13:24

## 2024-07-01 RX ADMIN — HYDROXYZINE HYDROCHLORIDE 50 MG: 50 TABLET ORAL at 11:39

## 2024-07-01 RX ADMIN — NICOTINE POLACRILEX 4 MG: 4 GUM, CHEWING BUCCAL at 12:31

## 2024-07-01 RX ADMIN — GABAPENTIN 200 MG: 100 CAPSULE ORAL at 12:31

## 2024-07-01 RX ADMIN — RISPERIDONE 0.5 MG: 0.5 TABLET, FILM COATED ORAL at 19:03

## 2024-07-01 RX ADMIN — DIPHENHYDRAMINE HYDROCHLORIDE 50 MG: 50 CAPSULE ORAL at 20:50

## 2024-07-01 RX ADMIN — DIVALPROEX SODIUM 1250 MG: 500 TABLET, DELAYED RELEASE ORAL at 19:03

## 2024-07-01 RX ADMIN — DIPHENHYDRAMINE HYDROCHLORIDE 50 MG: 50 CAPSULE ORAL at 13:24

## 2024-07-01 RX ADMIN — Medication 3 MG: at 19:03

## 2024-07-01 ASSESSMENT — ACTIVITIES OF DAILY LIVING (ADL)
ADLS_ACUITY_SCORE: 30
ORAL_HYGIENE: INDEPENDENT
ADLS_ACUITY_SCORE: 30
HYGIENE/GROOMING: INDEPENDENT
ADLS_ACUITY_SCORE: 30
DRESS: SCRUBS (BEHAVIORAL HEALTH);INDEPENDENT
ADLS_ACUITY_SCORE: 30

## 2024-07-01 NOTE — PLAN OF CARE
[facility] IP Unit Acuity Rating Score (UARS)  Patient is given one point for every criteria they meet.    CRITERIA SCORING   On a 72 hour hold, court hold, committed, stay of commitment, or revocation. 1    Patient LOS on [facility] unit exceeds 20 days. 1  LOS: 24   Patient under guardianship, 55+, otherwise medically complex, or under age 11. 0   Suicide ideation without relief of precipitating factors. 0   Current plan for suicide. 0   Current plan for homicide. 0   Imminent risk or actual attempt to seriously harm another without relief of factors precipitating the attempt. 1   Severe dysfunction in daily living (ex: complete neglect for self care, extreme disruption in vegetative function, extreme deterioration in social interactions). 1   Recent (last 7 days) or current physical aggression in the ED or on unit. 0   Restraints or seclusion episode in past 72 hours. 0   Recent (last 7 days) or current verbal aggression, agitation, yelling, etc., while in the ED or unit. Last 6/26/24 1   Active psychosis. 0   Need for constant or near constant redirection (from leaving, from others, etc).  0   Intrusive or disruptive behaviors. 0   Patient requires 3 or more hours of individualized nursing care per 8-hour shift (i.e. for ADLs, meds, therapeutic interventions). 0   TOTAL 5

## 2024-07-01 NOTE — PROGRESS NOTES
The patient's care was discussed with the treatment team during the daily team meeting and/or staff's chart notes were reviewed.  Staff report  Pt slept 6.75 hours overnight.  Evening staff report: Pt has a blunted affect with anxious mood. Pt was guarded during check in. Pt refused VS. Pt rates anxiety at 2/10 and depression 3/10. Pt rates pain at 0/10. Pt reports no SI/HI/SIB and contracts for safety. Pt denies any hallucinations and not noted responding to any internal stimuli. Pt was medication compliant. No reported or observed medication side effects. Pt was visible on unit only for supper. Pt was withdrawn to himself and isolative to his room. Continue current POC.     Plan of Care Reviewed With: patient Plan of Care Reviewed With: patient     Overall Patient Progress: no changeOverall Patient Progress: no change    Upon interview, patient shared that he had a bad day on Friday after receiving information that he would not be able to go to his group home in [place] where he came from.  Patient anxiety about this stemmed from his perception that getting him a new placement might take a longer time therefore he stated he had increased anxiety about not having a place to go.  Writer alleviated patient's anxiety that the CTC is working relentlessly to ensure he gets a placement before the Friday discharge.    Patient shared that he was very excited about having to get out of here on Friday until the news came in that he would not be allowed in his group home.  Writer corrected the notion that he would not be allowed in his group home, that they only wanted him in a higher level of care within [facility], and that patient should not misconstrue that as rejection.    Patient reported he was anxious this morning and requested for as needed hydroxyzine but unfortunately he medication was out and his primary nurse reassured him that the medication will be ordered from the pharmacy, that up to this time of  "interview the medication is not yet available.  Patient was reassured that his nurse would contact the movement the medication is available.    Patient shared that when he is out of here he contact his outpatient provider that he does not want to take his Adderall and Vyvanse anymore that he believes that this is the only way he would not return to the hospital.  Patient commented that hydroxyzine is effective for his anxiety no longer than 1 hour, that Haldol and Benadryl are more effective for hours and that they also work for his abdominal pain.  Patient reported that the only trade-off is that hydroxyzine and Benadryl making him drowsy.    Patient denied auditory/visual hallucinations, he denied suicidal/homicidal ideation, rated his depression as 4/10, anxiety as 6 about 10.  Patient reported playing a lot of video games today, he mentions about his oncoming court Hand hearing tomorrow at 9:30 AM.    Patient asked for an order to eat his ruffles brought by his mom, stated that they are now in his locker here.  Writer answered and clarified all the questions and concerns that the patient had.    The CTC reported that she got an information that the patient could discharge to his group home pending when the [facility] which is a higher level of care 1 1 is open running.  The CTC as updated the patient with this latest development, and the patient is once again excited.                  Mental Status Exam:  General appearance: Awake, dressed in hospital yellow scrubs, appeared at stated age  Attitude:  Cooperative  Affect:   anxious  Mood: \"Okay\"   Speech:  A bit pressured and loud.  Eye contact:  good.    Psychomotor behavior : Fairly restless  Gait: steady   Abnormal movements:  none  Delusions: denied  Hallucinations: denied  Thoughts: Goal oriented, appropriate  Associations: No Loosening association  Judgement: fair  Insight: limited  Cognitions: intact in conversation  Memory:  intact in " conversation  Orientation: normal    Not suicidal. denied  Not homicidal: denied        Current Facility-Administered Medications                     Current Facility-Administered Medications   Medication Dose Route Frequency Provider Last Rate Last Admin    acetaminophen (TYLENOL) tablet 650 mg  650 mg Oral Q4H PRN Javier Card MD        alum & mag hydroxide-simethicone (MAALOX) suspension 30 mL  30 mL Oral Q4H PRN Javier Card MD        benztropine (COGENTIN) tablet 1 mg  1 mg Oral BID PRN Javier Card MD        haloperidol (HALDOL) tablet 5 mg  5 mg Oral Q8H PRN Satya Garza MD   5 mg at 06/11/24 2042     And    LORazepam (ATIVAN) tablet 2 mg  2 mg Oral Q8H PRN Satya Garza MD   2 mg at 06/11/24 2042     And    diphenhydrAMINE (BENADRYL) capsule 50 mg  50 mg Oral Q8H PRN Satya Garza MD   50 mg at 06/11/24 2042    haloperidol lactate (HALDOL) injection 5 mg  5 mg Intramuscular Q8H PRN Satya Garza MD         And    LORazepam (ATIVAN) injection 2 mg  2 mg Intramuscular Q8H PRN Satya Garza MD         And    diphenhydrAMINE (BENADRYL) injection 50 mg  50 mg Intramuscular Q8H PRN Satya Garza MD        divalproex sodium delayed-release (DEPAKOTE) DR tablet 1,500 mg  1,500 mg Oral At Bedtime Javier Card MD   1,500 mg at 06/11/24 1937    hydrOXYzine HCl (ATARAX) tablet 50 mg  50 mg Oral TID PRN Javier Card MD   50 mg at 06/12/24 1124    melatonin tablet 3 mg  3 mg Oral At Bedtime PRN Javier Card MD   3 mg at 06/10/24 2014    nicotine (NICORETTE) gum 2 mg  2 mg Buccal Q1H PRN Javier Card MD   2 mg at 06/12/24 1333    risperiDONE (risperDAL) tablet 1 mg  1 mg Oral Satya Montez MD   1 mg at 06/12/24 1007    risperiDONE (risperDAL) tablet 4 mg  0.5 mg Oral At Bedtime MARIO Salguero, CNP   4 mg at 06/11/24 1938    senna-docusate (SENOKOT-S/PERICOLACE) 8.6-50 MG per tablet 1 tablet  1 tablet Oral BID Javier Bragg MD              Recent Results[]Expand by Default             Recent Results (from the past 168 hour(s))   Valproic acid     Collection Time: 06/11/24  9:50 AM   Result Value Ref Range     Valproic acid 92.7   ug/mL   Comprehensive metabolic panel     Collection Time: 06/11/24  9:50 AM   Result Value Ref Range     Sodium 139 135 - 145 mmol/L     Potassium 3.9 3.4 - 5.3 mmol/L     Carbon Dioxide (CO2) 27 22 - 29 mmol/L     Anion Gap 10 7 - 15 mmol/L     Urea Nitrogen 7.8 6.0 - 20.0 mg/dL     Creatinine 0.84 0.67 - 1.17 mg/dL     GFR Estimate >90 >60 mL/min/1.73m2     Calcium 9.6 8.6 - 10.0 mg/dL     Chloride 102 98 - 107 mmol/L     Glucose 125 (H) 70 - 99 mg/dL     Alkaline Phosphatase 51 40 - 150 U/L     AST 12 0 - 45 U/L     ALT 18 0 - 70 U/L     Protein Total 6.4 6.4 - 8.3 g/dL     Albumin 4.2 3.5 - 5.2 g/dL     Bilirubin Total 0.2 <=1.2 mg/dL   Hemoglobin A1c     Collection Time: 06/11/24  9:50 AM   Result Value Ref Range     Hemoglobin A1C 5.4 <5.7 %   Lipid panel     Collection Time: 06/11/24  9:50 AM   Result Value Ref Range     Cholesterol 157 <200 mg/dL     Triglycerides 191 (H) <150 mg/dL     Direct Measure HDL 31 (L) >=40 mg/dL     LDL Cholesterol Calculated 88 <=100 mg/dL     Non HDL Cholesterol 126 <130 mg/dL   CBC with platelets and differential     Collection Time: 06/11/24  9:50 AM   Result Value Ref Range     WBC Count 4.9 4.0 - 11.0 10e3/uL     RBC Count 4.84 4.40 - 5.90 10e6/uL     Hemoglobin 13.9 13.3 - 17.7 g/dL     Hematocrit 41.4 40.0 - 53.0 %     MCV 86 78 - 100 fL     MCH 28.7 26.5 - 33.0 pg     MCHC 33.6 31.5 - 36.5 g/dL     RDW 12.9 10.0 - 15.0 %     Platelet Count 190 150 - 450 10e3/uL     % Neutrophils 50 %     % Lymphocytes 40 %     % Monocytes 5 %     % Eosinophils 4 %     % Basophils 1 %     % Immature Granulocytes 0 %     NRBCs per 100 WBC 0 <1 /100     Absolute Neutrophils 2.5 1.6 - 8.3 10e3/uL     Absolute Lymphocytes 2.0 0.8 - 5.3 10e3/uL     Absolute Monocytes 0.2 0.0 - 1.3 10e3/uL      "Absolute Eosinophils 0.2 0.0 - 0.7 10e3/uL     Absolute Basophils 0.0 0.0 - 0.2 10e3/uL     Absolute Immature Granulocytes 0.0 <=0.4 10e3/uL     Absolute NRBCs 0.0 10e3/uL            Changes made today 7/1//24        Review of systems:      For physical examination and 12 point review of system please, see note of Dr. Clarke from 6/5/24.  I reviewed that note and agree with it.          DIagnoses:      Bipolar affective disorder, jose, severe with psychosis.          Assessment and Plan:      Assessment and Hospital Summary:  Venancio Edgar is a 28-year old male with history of bipolar disorder, who arrived at the ED very animated and appeared somewhat manic.  Patient presented with verbal agitation, significant behavior change, paranoia, physical aggression. Per DEC note Pt presents to the ED via EMS after becoming physically and verbally agitated at his group home towards staff and a community member while outside. When meeting with Writer, Pt presented with a labile mood, quickly changing from being cooperative to Writer to yelling at Writer, stating he will not participate, questioning Writer, laughing out loud while talking about his medications with volume steadily increasing. Writer attempted to collect information of what has been going on with Pt, yet Pt denied any concerns and then started stating Writer was asking \"the wrong questions\" and is trying to keep Pt in the hospital. Writer discussed how he was trying to let Pt share his perspective and understanding of the situation. Pt continued to be verbally escalated, stating staff was \"retarded\", not asking the right questions, and not needing to speak with a therapist. Writer terminated the assessment given Pt's elevated and labile mood and Pt presenting as experiencing disorganized thought as evidenced by answering and responding to Writer's questions with answers that did not pertain to the question asked.      Per DEC note Patient's CM is planning " to revoke his PD. Will for now continue on 72 hour hold. Patient's decompensation is likely due to poor compliance with meds. He was restarted on PTA meds and Risperdal dose was increased with his permission. Will continue to provide support and structure.          Target Psychiatric Symptoms and Interventions     1. Education given regarding diagnostic and treatment options with risks, benefits and alternatives and adequate verbalization of understanding.  2. Admitted on 6/7/2024 to station 12N..  Precautions placed includes:Assault precautions, suicide precautions  3. Medications: 6/7/2024: PTA medications reviewed.     4. Medications:  Hospital  Alum & Mag hydroxide-simethicone ( Maalox) suspension 30 mg oral every 4 hours as needed indigestion  Benztropine (Cogentin) tablet 1 mg 2 times daily as needed extrapyramidal symptoms  -Vitals and neuro checks with PRN blood pressure meds for SBP > 200  Code 1 restrictive unit  Full code  Risperidone 0.5 mg at bedtime.       Divalproex sodium delayed release (Depakote DR) tablet 1,250 mg at bedtime  Haloperidol (Haldol) tablet 5 mg oral every 8 hours as needed agitation  And  Diphenhydramine (Benadryl) capsule 50 mg oral every 8 hours as needed agitation  Haloperidol lactate (Haldol) injection 5 mg intramuscular every 8 hours as needed agitation  And  Diphenhydramine (Benadryl) injection 50 mg intramuscular every 8 hours as needed agitation  Gabapentin (Neurontin) capsule 200 mg 3 times daily as needed anxiety  Hydroxyzine HCl (Atarax) tablet 50 mg oral 3 times daily as needed anxiety  Legal status Court Hold  No COVID-19 virus (coronavirus) screening test needed or already ordered/completed  Routine programming  [place]-docusate (Senokot-S/Marisela-Colace) 8.6-50 mg, 1 tablet oral 2 times daily as needed constipation  Status 15 every 15-minute safety check  Suicide precaution suicide risk Low  Melatonin tablet 3 mg at bedtime as needed sleep  Vital signs and pain  assessment  Weight patient routine every TU, TH, SA.  5. Consultations:  Hospitalist to follow as needed.  IM to follow for every medical conditions.  6. Structure and Supervision  Unit 12 N.  Barriers to Discharge: Danisha, agitation.  7.   is following in regards to collecting and reviewing collateral information, referrals and disposition planning.  Legal: Court hold - notice of intent to revoke stay of commitment   Yazan submitted 6/18 - Hand hearing is July 2nd   Referrals: CTC to facilitate all referrals  Care Coordination: CTC to coordinate care with external providers  Placement: CTC to facilitate placement following symptom stabilization  Anticipated Discharge: 14 days      . Further treatment programming to be determined throughout the hospital course.     [facility] Patient able to contract for safety and Patient on precautions     This note was created with help of Dragon dictation system. Grammatical / typing errors are not intentional.  Risks, benefits, and alternatives discussed at length with patient.         Acute Medical Problems and Treatments:  Acute medical concerns:  - No acute medical concerns     Pertinent labs/imaging:  Recent Results   Recent Results             Recent Results (from the past 504 hour(s))   Valproic acid     Collection Time: 06/11/24  9:50 AM   Result Value Ref Range     Valproic acid 92.7   ug/mL   Comprehensive metabolic panel     Collection Time: 06/11/24  9:50 AM   Result Value Ref Range     Sodium 139 135 - 145 mmol/L     Potassium 3.9 3.4 - 5.3 mmol/L     Carbon Dioxide (CO2) 27 22 - 29 mmol/L     Anion Gap 10 7 - 15 mmol/L     Urea Nitrogen 7.8 6.0 - 20.0 mg/dL     Creatinine 0.84 0.67 - 1.17 mg/dL     GFR Estimate >90 >60 mL/min/1.73m2     Calcium 9.6 8.6 - 10.0 mg/dL     Chloride 102 98 - 107 mmol/L     Glucose 125 (H) 70 - 99 mg/dL     Alkaline Phosphatase 51 40 - 150 U/L     AST 12 0 - 45 U/L     ALT 18 0 - 70 U/L      Protein Total 6.4 6.4 - 8.3 g/dL     Albumin 4.2 3.5 - 5.2 g/dL     Bilirubin Total 0.2 <=1.2 mg/dL   Hemoglobin A1c     Collection Time: 06/11/24  9:50 AM   Result Value Ref Range     Hemoglobin A1C 5.4 <5.7 %   Lipid panel     Collection Time: 06/11/24  9:50 AM   Result Value Ref Range     Cholesterol 157 <200 mg/dL     Triglycerides 191 (H) <150 mg/dL     Direct Measure HDL 31 (L) >=40 mg/dL     LDL Cholesterol Calculated 88 <=100 mg/dL     Non HDL Cholesterol 126 <130 mg/dL   CBC with platelets and differential     Collection Time: 06/11/24  9:50 AM   Result Value Ref Range     WBC Count 4.9 4.0 - 11.0 10e3/uL     RBC Count 4.84 4.40 - 5.90 10e6/uL     Hemoglobin 13.9 13.3 - 17.7 g/dL     Hematocrit 41.4 40.0 - 53.0 %     MCV 86 78 - 100 fL     MCH 28.7 26.5 - 33.0 pg     MCHC 33.6 31.5 - 36.5 g/dL     RDW 12.9 10.0 - 15.0 %     Platelet Count 190 150 - 450 10e3/uL     % Neutrophils 50 %     % Lymphocytes 40 %     % Monocytes 5 %     % Eosinophils 4 %     % Basophils 1 %     % Immature Granulocytes 0 %     NRBCs per 100 WBC 0 <1 /100     Absolute Neutrophils 2.5 1.6 - 8.3 10e3/uL     Absolute Lymphocytes 2.0 0.8 - 5.3 10e3/uL     Absolute Monocytes 0.2 0.0 - 1.3 10e3/uL     Absolute Eosinophils 0.2 0.0 - 0.7 10e3/uL     Absolute Basophils 0.0 0.0 - 0.2 10e3/uL     Absolute Immature Granulocytes 0.0 <=0.4 10e3/uL     Absolute NRBCs 0.0 10e3/uL   Valproic acid     Collection Time: 06/14/24  2:20 PM   Result Value Ref Range     Valproic acid 75.3   ug/mL   Ammonia     Collection Time: 06/14/24  2:20 PM   Result Value Ref Range     Ammonia 85 (H) 16 - 60 umol/L   Ammonia     Collection Time: 06/19/24 10:28 AM   Result Value Ref Range     Ammonia 47 16 - 60 umol/L   Valproic acid     Collection Time: 06/19/24 10:28 AM   Result Value Ref Range     Valproic acid 75.2   ug/mL   Extra Green Top (Lithium Heparin) Tube     Collection Time: 06/19/24 10:28 AM   Result Value Ref Range     Hold Specimen JI

## 2024-07-01 NOTE — PLAN OF CARE
Team Note Due:  Monday     Assessment/Intervention/Current Symtoms and Care Coordination:  Chart review and met with team, discussed pt progress, symptomology, and response to treatment. Discussed the discharge plan and any potential impediments to discharge.      Pt has final Hand hearing 7/2 - Pt will be medically stable to discharge 7/5/24. Writer contacted Natacha who states there will be a meeting regarding pt tomorrow. Writer got a message from José Miguel Loyola stating pt will be allowed to discharge to the [facility] he came from until [facility] which is a higher level of care 1:1 is up and running.     Writer met with pt who reports he was feeling anxious about where he would discharge. Writer explained he would discharge to his current location and be moved when [facility] is up and running. Pt expressed excitement about the plan and called his mother who is agreeable to come pick him up at noon on Friday and taking him to get a haircut. Writer obtained RENNY for [facility] added to coordinator list for psych appt.         Discharge Plan or Goal:  Discharge to different facility within the [place] agency with higher level of care ([facility] [facility]'s idea) vs. return to [facility] through [facility] [place] [facility].     Was living at:  [facility] through [facility] [place] [facility]  [address] [place] [address]     Barriers to Discharge:  Symptomology - jose, agitation      Referral Status:  TBD     Legal Status:  COURT HOLD - notice of intent to revoke stay of commitment   Hand submitted 6/18 - Hand hearing is July 2nd   [facility]: [facility]  File Number: [id number]     Pt's  is:  Carlos Gonzales  [employer]  Ph:  [phone]     Beth Garza    [address] [place] [address]  Phone: [phone]  [email or web address]     Contacts:  [facility]:  Norman Loyola <[email or web address]> and John Espana  <[email or web address]> and Tal Ken as Norman Loyola is away.   Darline Romo ( for snf): [phone] - RENNY on file for snf staff  Bree (snf ): [phone] RENNY on file for group home staff  Email: [email or web address]     :  Chelsea Brennan ([facility]  Ph: [phone] - Commitment CM no RENNY needed   [email or web address]  [facility]  Ph: [phone]  Christi (member of case management team): [phone]     Medication Management/Psychiatry:  Name/Clinic: [facility] - Pt declines RENNY for Psychiatry  Number: [phone]     Other:  Yolanda (mom): [phone] - Pt declines RENNY for Mom      Upcoming Meetings and Dates/Important Information and next steps:  Will need discharge appt for psychiatry prior to leaving  COS and PD     Upcoming Hand hearing 7/2 at 9:30AM.      Schedule discharge care meeting or phone call with [place] and Pt to discuss discharge option - they want him to potentially move to a higher needs home within their agency. They have not got back to me. I have emailed them several times stating if they want to discuss other alternative placements within their agency we need to figure out what that is - I haven't told Pt yet as I haven't heard what they are thinking. It is either a higher level of care within [place] vs. Returning to same group home at [place] ([facility]). Pt wants to return back to same [facility] group home.

## 2024-07-01 NOTE — DISCHARGE INSTRUCTIONS
Behavioral Discharge Planning and Instructions    Summary: You were admitted on 6/7/2024  due to Psychotic Symptomology, Manic Symptomology, and Agressive Behaviors.  You were treated by  Mariano MARTIN and Javier Card MD  and discharged on 7/10/24 from Station 12 to Group Home    Main Diagnosis: Bipolar affective disorder, jose, severe with psychosis    Commitment:  You [facility], 20[facility] and you are being discharged on a Provisional Discharge Agreement which shall remain in effect for the duration of the Commitment which expires on 12/13/2024. and Hand: You are also court ordered to take the medications that the doctor ordered for you.     Health Care Follow-up:   Psychiatry appointment: Friday, July 12, 2024 @ 2:10pm In-person   Provider: MARIO Weinberg CNP   Location: [place] [address]  Phone: [phone]  Fax: [phone]     You received the Invega loading dose on 7/9/24. You must follow up with [facility] in 5 days.   Meds sent to [facility].     HUC TO FAX AVS to above appointment, please.    Information will be faxed to your outpatient providers to ensure a healthy continuity of care for you.     Attend all scheduled appointments with your outpatient providers. Call at least 24 hours in advance if you need to reschedule an appointment to ensure continued access to your outpatient providers.     Major Treatments, Procedures and Findings:  You were provided with: a psychiatric assessment, assessed for medical stability, medication evaluation and/or management, group therapy, individual therapy, and milieu management    Symptoms to Report: feeling more aggressive, increased confusion, losing more sleep, mood getting worse, or thoughts of suicide    Early warning signs can include: increased depression or anxiety sleep  disturbances increased thoughts or behaviors of suicide or self-harm  increased unusual thinking, such as paranoia or hearing voices    Safety and Wellness:  Take all medicines as directed.  Make no changes unless your doctor suggests them.      Follow treatment recommendations.  Refrain from alcohol and non-prescribed drugs.  If there is a concern for safety, call 911.    Resources:   Mental Health Crisis Resources  Throughout [place]: call **CRISIS (**364895)  Crisis Text Line: is available for free, 24/7 by texting [place] to 429979  Suicide Awareness Voices of Education (SAVE) (www.save.org): 130-845-XZPF (8721)  The National Suicide Prevention Lifeline is now: 988 Suicide and Crisis Lifeline. Call 988 anytime.  National [place] on Mental Illness (www.[place].torsten.org): 478.804.5452 or 747-252-3336.  Qxhk4tvib: text the word LIFE to 78493 for immediate support and crisis intervention  Mental Health Consumer/Survivor Network of [place] (www.mhcsn.net): 822.767.3712 or 555-622-4140  Mental Health Association of [place] (www.mentalhealth.org): 688.492.5215 or 279-197-4541  Peer Support Connection [place] Warmline (PSC) 1-212.231.4809 Available from 5pm - 9am (7 days a week/365 days a year)  [facility] 1-905.625.4902 Community Outreach for Psych Emergencies    General Medication Instructions:   See your medication sheet(s) for instructions.   Take all medicines as directed.  Make no changes unless your doctor suggests them.   Go to all your doctor visits.  Be sure to have all your required lab tests. This way, your medicines can be refilled on time.  Do not use any drugs not prescribed by your doctor.  Avoid alcohol.    Advance Directives:   Scanned document on file with [place]? No scanned doc  Is document scanned? Pt states no documents  Honoring Choices Your Rights Handout: Informed and given  Was more information offered? Pt declined    The Treatment team has appreciated the opportunity to work with you. If you have any  questions or concerns about your recent admission, you can contact the unit which can receive your call 24 hours a day, 7 days a week. They will be able to get in touch with a Provider if needed. The unit number is [phone].

## 2024-07-01 NOTE — PLAN OF CARE
Pt has a labile affect with anxious mood. Pt endorsed anxiety that increased as the morning went on. Pt requested Hydroxyzine at 1130 for anxiety at 8/10. Pt also stated he was feeling agitated. Pt noted to be restless at this time. Pt returned at 1230 requesting haldol and benadryl because the hydroxyzine was not working. Pt agreed to try gabapentin and continue with coping skills. Pt used coping skills of music, video games, groups and a shower. Pt continued to express increased anxiety and agitation and received haldol and benadryl. Pt reported that was most effective. Pt denies any depression. Pt rates pain at 0/10. Pt reports no SI/HI/SIB and contracts for safety. Pt denies any hallucinations and not noted responding to any internal stimuli. Pt was medication compliant. No reported or observed medication side effects. Pt was visible on unit pacing and social with select peers. It was discussed decided in team meeting that no outside food will be allowed per the policy. Continue current POC.    Plan of Care Reviewed With: patient Plan of Care Reviewed With: patient    Overall Patient Progress: no changeOverall Patient Progress: no change

## 2024-07-01 NOTE — CARE PLAN
Pt attended 20 minutes of the OT group.  Congruent and engaged. Polite to writer and peers. Demonstrated consistent performance skills as observed on previous dates - independent with all simple tasks. Observed with calming effect while completing project. Quickly finished project from last week, selected a song, complimented peer on their selection, then was in/out the rest of the morning.  Excited to share he will be discharging on Friday.

## 2024-07-01 NOTE — PLAN OF CARE
Pt has a blunted affect with anxious mood. Pt was guarded during check in. Pt refused VS. Pt rates anxiety at 2/10 and depression 3/10. Pt rates pain at 0/10. Pt reports no SI/HI/SIB and contracts for safety. Pt denies any hallucinations and not noted responding to any internal stimuli. Pt was medication compliant. No reported or observed medication side effects. Pt was visible on unit only for supper. Pt was withdrawn to himself and isolative to his room. Continue current POC.    Plan of Care Reviewed With: patient Plan of Care Reviewed With: patient    Overall Patient Progress: no changeOverall Patient Progress: no change

## 2024-07-02 PROCEDURE — 124N000002 HC R&B MH UMMC

## 2024-07-02 PROCEDURE — 250N000013 HC RX MED GY IP 250 OP 250 PS 637: Performed by: PSYCHIATRY & NEUROLOGY

## 2024-07-02 PROCEDURE — 250N000013 HC RX MED GY IP 250 OP 250 PS 637: Performed by: CLINICAL NURSE SPECIALIST

## 2024-07-02 PROCEDURE — G0177 OPPS/PHP; TRAIN & EDUC SERV: HCPCS

## 2024-07-02 PROCEDURE — 99232 SBSQ HOSP IP/OBS MODERATE 35: CPT | Performed by: CLINICAL NURSE SPECIALIST

## 2024-07-02 RX ADMIN — NICOTINE POLACRILEX 4 MG: 4 GUM, CHEWING BUCCAL at 09:48

## 2024-07-02 RX ADMIN — HALOPERIDOL 5 MG: 5 TABLET ORAL at 12:57

## 2024-07-02 RX ADMIN — NICOTINE POLACRILEX 4 MG: 4 GUM, CHEWING BUCCAL at 16:00

## 2024-07-02 RX ADMIN — Medication 3 MG: at 19:06

## 2024-07-02 RX ADMIN — RISPERIDONE 0.5 MG: 0.5 TABLET, FILM COATED ORAL at 19:06

## 2024-07-02 RX ADMIN — GABAPENTIN 200 MG: 100 CAPSULE ORAL at 11:25

## 2024-07-02 RX ADMIN — DIPHENHYDRAMINE HYDROCHLORIDE 50 MG: 50 CAPSULE ORAL at 12:55

## 2024-07-02 RX ADMIN — DIPHENHYDRAMINE HYDROCHLORIDE 50 MG: 50 CAPSULE ORAL at 20:19

## 2024-07-02 RX ADMIN — HYDROXYZINE HYDROCHLORIDE 50 MG: 50 TABLET ORAL at 09:48

## 2024-07-02 RX ADMIN — NICOTINE POLACRILEX 4 MG: 4 GUM, CHEWING BUCCAL at 18:01

## 2024-07-02 RX ADMIN — NICOTINE POLACRILEX 4 MG: 4 GUM, CHEWING BUCCAL at 08:38

## 2024-07-02 RX ADMIN — DIVALPROEX SODIUM 1250 MG: 500 TABLET, DELAYED RELEASE ORAL at 19:05

## 2024-07-02 ASSESSMENT — ACTIVITIES OF DAILY LIVING (ADL)
ADLS_ACUITY_SCORE: 30
DRESS: SCRUBS (BEHAVIORAL HEALTH);INDEPENDENT
ADLS_ACUITY_SCORE: 30
ORAL_HYGIENE: INDEPENDENT
ADLS_ACUITY_SCORE: 30
ORAL_HYGIENE: INDEPENDENT
ADLS_ACUITY_SCORE: 30
ADLS_ACUITY_SCORE: 30
DRESS: INDEPENDENT
ADLS_ACUITY_SCORE: 30
HYGIENE/GROOMING: INDEPENDENT
ADLS_ACUITY_SCORE: 30
ADLS_ACUITY_SCORE: 30
HYGIENE/GROOMING: INDEPENDENT
ADLS_ACUITY_SCORE: 30

## 2024-07-02 NOTE — PLAN OF CARE
Patient appears to be making good progress toward mental health recovery with a significant improvement in manic symptoms noted.  Venancio presents as psychiatrically stable and at or near his baseline.  He endorses persistent stress and anxiety related to his involuntary hospitalization and length-of-stay.  He is future-oriented and looking forward to returning to his group home on Friday.  He presents as calm, pleasant, and cooperative with a bright affect.  He denies that he is experiencing any self harm or aggressive ideations at this time. Venancio has been behaviorally controlled, polite, and socially appropriate with both unit staff and peers.  He accepted all of his scheduled medications without incident this shift, and no adverse side effects have been reported or observed.  He requested and was given PRN Melatonin 3 mg at 19:06- and later PRN Benadryl 50 mg at 20:19- to target insomnia.  He denies any acute physical concerns at this time.  /81 (BP Location: Right arm)   Pulse 90   Temp 98.3  F (36.8  C) (Oral)   Resp 16   Wt 92.1 kg (203 lb 1.6 oz)   SpO2 96%

## 2024-07-02 NOTE — PROGRESS NOTES
"The patient's care was discussed with the treatment team during the daily team meeting and/or staff's chart notes were reviewed.  Staff report  Pt slept 6.5 hours overnight.  Evening staff report: Patient appears at or near his psychiatric baseline.  Venancio presents as calm, pleasant, and cooperative on approach.  His manic symptoms have improved significantly over the balance of his hospital stay.  Venancio reports that he is feeling stable and ready to discharge back to his group home.  He continues to experience some stress and anxiety related to being in the hospital, but he appears to be coping much better with his persistent symptoms.  Venancoi demonstrated good behavioral control this shift with no episodes of agitation or aggressive behavior noted this shift.  He accepted all of his HS medications without incident this evening, and no adverse side effects have been reported or observed.  He requested and was given PRN Melatonin 3 mg at 19:03- and later PRN Benadryl 50 mg at 20:50- to target insomnia.  He denies any acute physical concerns at this time. /81   Pulse 78   Temp 97.8  F (36.6  C) (Oral)   Resp 16   Wt 92.1 kg (203 lb 1.6 oz)   SpO2 96%     Upon interview, patient attended the court hearing this morning, he was interviewed immediately after the court hearing while waiting to get his as needed hydroxyzine for anxiety from his RN. Patient shared that the Court went okay, stating \"the court went the way it needed to go, I did not want to argue with anything.\"  Patient reported some anxiety but glad that the process is over.  Writer commended the patient on how he handled the Hand hearing with maturity.    Patient expresses his fears and concerns that staff might not giving the Benadryl/Haldol for agitation on the ground that he will not have access to these medications when he gets to his group home.  He asked this writer to clarify that he will be able to take it before discharge because of " "his anxiety level.  Writer again reiterated the need for him to call down the frequency of Taking the Emergency Medication so as to be able to adjust in his group home without it.  Patient stated that he will be able to adjust well at the group home because he has enough resources to keep his anxiety down.  Writer discussed alternative anxiety medication that patient could benefit from, talked about BuSpar but patient seems not to be interested at this time.    Patient mentioned that he has some people who are happy for him and there for him in addition to the many coping activities such as video games, computer at his disposal, use of coffee when needed and he Sarahi off other activities.    Patient again highlighted his future goals to take a course from [employer] in java script and java boot camp to be able to get an IT job.  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    Patient reported that he was not given his ruffle yesterday, still waiting on this provider to authorize that.  Discussed with the patient that it seems to be against the unit policy as it was discussed with the manager yesterday.  Writer informed the patient that his primary RN is because the issue with the manager and apparently was told that request was not going to be granted.    Patient denied auditory/visual hallucinations, denied suicidal/homicidal ideations and thoughts of self-harm.  Patient had all his questions and concerns answered and clarified.              Mental Status Exam:  General appearance: Awake, dressed in hospital yellow scrubs, appeared at stated age  Attitude:  Cooperative  Affect:   anxious  Mood: \"Okay\"   Speech:  normal volume and tone  Eye contact:  good.    Psychomotor behavior : Fairly calm  Gait: steady   Abnormal movements:  none  Delusions: denied  Hallucinations: denied  Thoughts: Goal oriented, " appropriate  Associations: No Loosening association  Judgement: fair  Insight: limited  Cognitions: intact in conversation  Memory:  intact in conversation  Orientation: normal    Not suicidal. denied  Not homicidal: denied        Current Facility-Administered Medications                     Current Facility-Administered Medications   Medication Dose Route Frequency Provider Last Rate Last Admin    acetaminophen (TYLENOL) tablet 650 mg  650 mg Oral Q4H PRN Javier Card MD        alum & mag hydroxide-simethicone (MAALOX) suspension 30 mL  30 mL Oral Q4H PRN Javier Card MD        benztropine (COGENTIN) tablet 1 mg  1 mg Oral BID PRN Javier Card MD        haloperidol (HALDOL) tablet 5 mg  5 mg Oral Q8H PRN Satya Garza MD   5 mg at 06/11/24 2042     And    LORazepam (ATIVAN) tablet 2 mg  2 mg Oral Q8H PRN Satya Garza MD   2 mg at 06/11/24 2042     And    diphenhydrAMINE (BENADRYL) capsule 50 mg  50 mg Oral Q8H PRN Satya Garza MD   50 mg at 06/11/24 2042    haloperidol lactate (HALDOL) injection 5 mg  5 mg Intramuscular Q8H PRN Satya Garza MD         And    LORazepam (ATIVAN) injection 2 mg  2 mg Intramuscular Q8H PRN Satya Garza MD         And    diphenhydrAMINE (BENADRYL) injection 50 mg  50 mg Intramuscular Q8H PRN Satya Garza MD        divalproex sodium delayed-release (DEPAKOTE) DR tablet 1,500 mg  1,500 mg Oral At Bedtime Javier Card MD   1,500 mg at 06/11/24 1937    hydrOXYzine HCl (ATARAX) tablet 50 mg  50 mg Oral TID PRN Javier Card MD   50 mg at 06/12/24 1124    melatonin tablet 3 mg  3 mg Oral At Bedtime PRN Javier Card MD   3 mg at 06/10/24 2014    nicotine (NICORETTE) gum 2 mg  2 mg Buccal Q1H PRN Javier Card MD   2 mg at 06/12/24 1333    risperiDONE (risperDAL) tablet 1 mg  1 mg Oral Satya Montez MD   1 mg at 06/12/24 1007    risperiDONE (risperDAL) tablet 4 mg  0.5 mg Oral At Bedtime MARIO Salguero,  CNP   4 mg at 06/11/24 1938    senna-docusate (SENOKOT-S/PERICOLACE) 8.6-50 MG per tablet 1 tablet  1 tablet Oral BID PRN Javier Card MD             Recent Results[]Expand by Default             Recent Results (from the past 168 hour(s))   Valproic acid     Collection Time: 06/11/24  9:50 AM   Result Value Ref Range     Valproic acid 92.7   ug/mL   Comprehensive metabolic panel     Collection Time: 06/11/24  9:50 AM   Result Value Ref Range     Sodium 139 135 - 145 mmol/L     Potassium 3.9 3.4 - 5.3 mmol/L     Carbon Dioxide (CO2) 27 22 - 29 mmol/L     Anion Gap 10 7 - 15 mmol/L     Urea Nitrogen 7.8 6.0 - 20.0 mg/dL     Creatinine 0.84 0.67 - 1.17 mg/dL     GFR Estimate >90 >60 mL/min/1.73m2     Calcium 9.6 8.6 - 10.0 mg/dL     Chloride 102 98 - 107 mmol/L     Glucose 125 (H) 70 - 99 mg/dL     Alkaline Phosphatase 51 40 - 150 U/L     AST 12 0 - 45 U/L     ALT 18 0 - 70 U/L     Protein Total 6.4 6.4 - 8.3 g/dL     Albumin 4.2 3.5 - 5.2 g/dL     Bilirubin Total 0.2 <=1.2 mg/dL   Hemoglobin A1c     Collection Time: 06/11/24  9:50 AM   Result Value Ref Range     Hemoglobin A1C 5.4 <5.7 %   Lipid panel     Collection Time: 06/11/24  9:50 AM   Result Value Ref Range     Cholesterol 157 <200 mg/dL     Triglycerides 191 (H) <150 mg/dL     Direct Measure HDL 31 (L) >=40 mg/dL     LDL Cholesterol Calculated 88 <=100 mg/dL     Non HDL Cholesterol 126 <130 mg/dL   CBC with platelets and differential     Collection Time: 06/11/24  9:50 AM   Result Value Ref Range     WBC Count 4.9 4.0 - 11.0 10e3/uL     RBC Count 4.84 4.40 - 5.90 10e6/uL     Hemoglobin 13.9 13.3 - 17.7 g/dL     Hematocrit 41.4 40.0 - 53.0 %     MCV 86 78 - 100 fL     MCH 28.7 26.5 - 33.0 pg     MCHC 33.6 31.5 - 36.5 g/dL     RDW 12.9 10.0 - 15.0 %     Platelet Count 190 150 - 450 10e3/uL     % Neutrophils 50 %     % Lymphocytes 40 %     % Monocytes 5 %     % Eosinophils 4 %     % Basophils 1 %     % Immature Granulocytes 0 %     NRBCs per 100 WBC 0 <1 /100  "    Absolute Neutrophils 2.5 1.6 - 8.3 10e3/uL     Absolute Lymphocytes 2.0 0.8 - 5.3 10e3/uL     Absolute Monocytes 0.2 0.0 - 1.3 10e3/uL     Absolute Eosinophils 0.2 0.0 - 0.7 10e3/uL     Absolute Basophils 0.0 0.0 - 0.2 10e3/uL     Absolute Immature Granulocytes 0.0 <=0.4 10e3/uL     Absolute NRBCs 0.0 10e3/uL            Changes made today 7/1//24        Review of systems:      For physical examination and 12 point review of system please, see note of Dr. Clarke from 6/5/24.  I reviewed that note and agree with it.          DIagnoses:      Bipolar affective disorder, jose, severe with psychosis.          Assessment and Plan:      Assessment and Hospital Summary:  Vneancio Edgar is a 28-year old male with history of bipolar disorder, who arrived at the ED very animated and appeared somewhat manic.  Patient presented with verbal agitation, significant behavior change, paranoia, physical aggression. Per DEC note Pt presents to the ED via EMS after becoming physically and verbally agitated at his group home towards staff and a community member while outside. When meeting with Writer, Pt presented with a labile mood, quickly changing from being cooperative to Writer to yelling at Writer, stating he will not participate, questioning Writer, laughing out loud while talking about his medications with volume steadily increasing. Writer attempted to collect information of what has been going on with Pt, yet Pt denied any concerns and then started stating Writer was asking \"the wrong questions\" and is trying to keep Pt in the hospital. Writer discussed how he was trying to let Pt share his perspective and understanding of the situation. Pt continued to be verbally escalated, stating staff was \"retarded\", not asking the right questions, and not needing to speak with a therapist. Writer terminated the assessment given Pt's elevated and labile mood and Pt presenting as experiencing disorganized thought as evidenced by " answering and responding to Writer's questions with answers that did not pertain to the question asked.      Per DEC note Patient's CM is planning to revoke his PD. Will for now continue on 72 hour hold. Patient's decompensation is likely due to poor compliance with meds. He was restarted on PTA meds and Risperdal dose was increased with his permission. Will continue to provide support and structure.          Target Psychiatric Symptoms and Interventions     1. Education given regarding diagnostic and treatment options with risks, benefits and alternatives and adequate verbalization of understanding.  2. Admitted on 6/7/2024 to station 12N..  Precautions placed includes:Assault precautions, suicide precautions  3. Medications: 6/7/2024: PTA medications reviewed.     4. Medications:  Hospital  Alum & Mag hydroxide-simethicone ( Maalox) suspension 30 mg oral every 4 hours as needed indigestion  Benztropine (Cogentin) tablet 1 mg 2 times daily as needed extrapyramidal symptoms  -Vitals and neuro checks with PRN blood pressure meds for SBP > 200  Code 1 restrictive unit  Full code  Risperidone 0.5 mg at bedtime.       Divalproex sodium delayed release (Depakote DR) tablet 1,250 mg at bedtime  Haloperidol (Haldol) tablet 5 mg oral every 8 hours as needed agitation  And  Diphenhydramine (Benadryl) capsule 50 mg oral every 8 hours as needed agitation  Haloperidol lactate (Haldol) injection 5 mg intramuscular every 8 hours as needed agitation  And  Diphenhydramine (Benadryl) injection 50 mg intramuscular every 8 hours as needed agitation  Gabapentin (Neurontin) capsule 200 mg 3 times daily as needed anxiety  Hydroxyzine HCl (Atarax) tablet 50 mg oral 3 times daily as needed anxiety  Legal status Court Hold  No COVID-19 virus (coronavirus) screening test needed or already ordered/completed  Routine programming  [place]-docusate (Senokot-S/Marisela-Colace) 8.6-50 mg, 1 tablet oral 2 times daily as needed constipation  Status 15  every 15-minute safety check  Suicide precaution suicide risk Low  Melatonin tablet 3 mg at bedtime as needed sleep  Vital signs and pain assessment  Weight patient routine every TU, TH, SA.  5. Consultations:  Hospitalist to follow as needed.  IM to follow for every medical conditions.  6. Structure and Supervision  Unit 12 N.  Barriers to Discharge: Danisha, agitation.  7.   is following in regards to collecting and reviewing collateral information, referrals and disposition planning.  Legal: Court hold - notice of intent to revoke stay of commitment   Yazan submitted 6/18 - Yazan hearing is July 2nd   Referrals: CTC to facilitate all referrals  Care Coordination: CTC to coordinate care with external providers  Placement: CTC to facilitate placement following symptom stabilization  Anticipated Discharge: 14 days      . Further treatment programming to be determined throughout the hospital course.     Risk Assessment: [facility] RISK ASSESSMENT: Patient able to contract for safety and Patient on precautions     This note was created with help of Dragon dictation system. Grammatical / typing errors are not intentional.  Risks, benefits, and alternatives discussed at length with patient.         Acute Medical Problems and Treatments:  Acute medical concerns:  - No acute medical concerns     Pertinent labs/imaging:  Recent Results   Recent Results             Recent Results (from the past 504 hour(s))   Valproic acid     Collection Time: 06/11/24  9:50 AM   Result Value Ref Range     Valproic acid 92.7   ug/mL   Comprehensive metabolic panel     Collection Time: 06/11/24  9:50 AM   Result Value Ref Range     Sodium 139 135 - 145 mmol/L     Potassium 3.9 3.4 - 5.3 mmol/L     Carbon Dioxide (CO2) 27 22 - 29 mmol/L     Anion Gap 10 7 - 15 mmol/L     Urea Nitrogen 7.8 6.0 - 20.0 mg/dL     Creatinine 0.84 0.67 - 1.17 mg/dL     GFR Estimate >90 >60 mL/min/1.73m2     Calcium 9.6 8.6 - 10.0 mg/dL     Chloride 102  98 - 107 mmol/L     Glucose 125 (H) 70 - 99 mg/dL     Alkaline Phosphatase 51 40 - 150 U/L     AST 12 0 - 45 U/L     ALT 18 0 - 70 U/L     Protein Total 6.4 6.4 - 8.3 g/dL     Albumin 4.2 3.5 - 5.2 g/dL     Bilirubin Total 0.2 <=1.2 mg/dL   Hemoglobin A1c     Collection Time: 06/11/24  9:50 AM   Result Value Ref Range     Hemoglobin A1C 5.4 <5.7 %   Lipid panel     Collection Time: 06/11/24  9:50 AM   Result Value Ref Range     Cholesterol 157 <200 mg/dL     Triglycerides 191 (H) <150 mg/dL     Direct Measure HDL 31 (L) >=40 mg/dL     LDL Cholesterol Calculated 88 <=100 mg/dL     Non HDL Cholesterol 126 <130 mg/dL   CBC with platelets and differential     Collection Time: 06/11/24  9:50 AM   Result Value Ref Range     WBC Count 4.9 4.0 - 11.0 10e3/uL     RBC Count 4.84 4.40 - 5.90 10e6/uL     Hemoglobin 13.9 13.3 - 17.7 g/dL     Hematocrit 41.4 40.0 - 53.0 %     MCV 86 78 - 100 fL     MCH 28.7 26.5 - 33.0 pg     MCHC 33.6 31.5 - 36.5 g/dL     RDW 12.9 10.0 - 15.0 %     Platelet Count 190 150 - 450 10e3/uL     % Neutrophils 50 %     % Lymphocytes 40 %     % Monocytes 5 %     % Eosinophils 4 %     % Basophils 1 %     % Immature Granulocytes 0 %     NRBCs per 100 WBC 0 <1 /100     Absolute Neutrophils 2.5 1.6 - 8.3 10e3/uL     Absolute Lymphocytes 2.0 0.8 - 5.3 10e3/uL     Absolute Monocytes 0.2 0.0 - 1.3 10e3/uL     Absolute Eosinophils 0.2 0.0 - 0.7 10e3/uL     Absolute Basophils 0.0 0.0 - 0.2 10e3/uL     Absolute Immature Granulocytes 0.0 <=0.4 10e3/uL     Absolute NRBCs 0.0 10e3/uL   Valproic acid     Collection Time: 06/14/24  2:20 PM   Result Value Ref Range     Valproic acid 75.3   ug/mL   Ammonia     Collection Time: 06/14/24  2:20 PM   Result Value Ref Range     Ammonia 85 (H) 16 - 60 umol/L   Ammonia     Collection Time: 06/19/24 10:28 AM   Result Value Ref Range     Ammonia 47 16 - 60 umol/L   Valproic acid     Collection Time: 06/19/24 10:28 AM   Result Value Ref Range     Valproic acid 75.2   ug/mL   Extra  Green Top (Lithium Heparin) Tube     Collection Time: 06/19/24 10:28 AM   Result Value Ref Range     Hold Specimen JIC

## 2024-07-02 NOTE — PLAN OF CARE
Care Coordinator confirmed the following appointment:     Psychiatry appointment: Monday, July 8, 2024 @ 11:15am In-person   Provider: MARIO Weinberg CNP   Location: [facility], [address] [place] [address]  Phone: [phone]  Fax: [phone]   HUC TO FAX AVS to above appointment, please    CC updated CTC and AVS

## 2024-07-02 NOTE — PLAN OF CARE
BEH IP Unit Acuity Rating Score (UARS)  Patient is given one point for every criteria they meet.    CRITERIA SCORING   On a 72 hour hold, court hold, committed, stay of commitment, or revocation. 1    Patient LOS on BEH unit exceeds 20 days. 1  LOS: 25   Patient under guardianship, 55+, otherwise medically complex, or under age 11. 0   Suicide ideation without relief of precipitating factors. 0   Current plan for suicide. 0   Current plan for homicide. 0   Imminent risk or actual attempt to seriously harm another without relief of factors precipitating the attempt. 1   Severe dysfunction in daily living (ex: complete neglect for self care, extreme disruption in vegetative function, extreme deterioration in social interactions). 1   Recent (last 7 days) or current physical aggression in the ED or on unit. 0   Restraints or seclusion episode in past 72 hours. 0   Recent (last 7 days) or current verbal aggression, agitation, yelling, etc., while in the ED or unit. Last 6/26/24 1   Active psychosis. 0   Need for constant or near constant redirection (from leaving, from others, etc).  0   Intrusive or disruptive behaviors. 0   Patient requires 3 or more hours of individualized nursing care per 8-hour shift (i.e. for ADLs, meds, therapeutic interventions). 0   TOTAL 5

## 2024-07-02 NOTE — PLAN OF CARE
Team Note Due:  Monday     Assessment/Intervention/Current Symtoms and Care Coordination:  Chart review and met with team, discussed pt progress, symptomology, and response to treatment. Discussed the discharge plan and any potential impediments to discharge.      Writer met with pt who was in a good mood completed court hearing. Court pw not received as of yet. Pt reports feeling fine and looking forward to discharge.      Discharge Plan or Goal:  Pt will be allowed to discharge to the [facility] he came from until [facility] [place] which is a higher level of care 1:1 is up and running.     Was living at:  [facility] through [facility]  [address] [place] [address]     Barriers to Discharge:  Symptomology - jose, agitation      Referral Status:  None     Legal Status:  COURT HOLD - notice of intent to revoke stay of commitment   Hand submitted 6/18 - Hand hearing is July 2nd   [facility]  File Number: [id number]     Pt's  is:  Carlos Gonazles  [place]  Ph:  [phone]     Beth Garza    [address] [place] [address]  Phone: [phone]  [email or web address].     Contacts:  [facility]:  Norman Loyola <[email or web address]> and John Espana <[email or web address]> and Tal Ken as Norman Steineruer is away.   Darline Romo ( for [facility]): [phone] - RENNY on file for [facility] staff  Bree ([facility] ): [phone] RENNY on file for group home staff  Email: [email or web address]     :  Chelsea Brennan ([facility]  Ph: [phone] - Commitment CM no RENNY needed   [email or web address].  [facility]  Ph: [phone]  Christi (member of case management team): [phone]     Medication Management/Psychiatry:  Name/Clinic: [facility]  Number: [phone]  [phone]     Other:  Yolanda (mom): [phone] - Pt declines RENNY for Mom      Upcoming Meetings and Dates/Important Information and next steps:  Will need discharge appt for psychiatry prior to leaving  COS and PD     Upcoming Hand hearing 7/2 at 9:30AM.      Schedule discharge care meeting or phone call with [facility] and Pt to discuss discharge option - they want him to potentially move to a higher needs home within their agency. They have not got back to me. I have emailed them several times stating if they want to discuss other alternative placements within their agency we need to figure out what that is - I haven't told Pt yet as I haven't heard what they are thinking. It is either a higher level of care within [facility] vs. Returning to same group home at [facility] ([facility]). Pt wants to return back to same [facility] home.

## 2024-07-02 NOTE — PLAN OF CARE
Problem: Sleep Disturbance  Goal: Adequate Sleep/Rest  Outcome: Progressing   Goal Outcome Evaluation:    Patient appeared to sleep 6.5 hours this night shift.  No prns or snacks given or requested.  No concerns were reported or noted.  Court this morning at 0930.

## 2024-07-02 NOTE — PLAN OF CARE
Pt has a elated affect with anxious mood attending groups. Pt rates anxiety at 8/10 and depression 0/10. Pt received several meds for anxiety/agitation. Pt received hydroxyzine at 0948, gabapentin at 1125, haldol and benadryl at 1255. Pt work on using coping skills without any success. Pt was very excited about having a discharge plan of Friday at 1200. Pt rates pain at 0/10. Pt reports no SI/HI/SIB and contracts for safety. Pt denies any hallucinations and not noted responding to any internal stimuli. Pt was medication compliant. No reported or observed medication side effects. Pt was visible on unit and social. Continue current POC.      Plan of Care Reviewed With: patient Plan of Care Reviewed With: patient    Overall Patient Progress: improvingOverall Patient Progress: improving

## 2024-07-02 NOTE — PLAN OF CARE
Patient appears at or near his psychiatric baseline.  Venancio presents as calm, pleasant, and cooperative on approach.  His manic symptoms have improved significantly over the balance of his hospital stay.  Venancio reports that he is feeling stable and ready to discharge back to his group home.  He continues to experience some stress and anxiety related to being in the hospital, but he appears to be coping much better with his persistent symptoms.  Venancio demonstrated good behavioral control this shift with no episodes of agitation or aggressive behavior noted this shift.  He accepted all of his HS medications without incident this evening, and no adverse side effects have been reported or observed.  He requested and was given PRN Melatonin 3 mg at 19:03- and later PRN Benadryl 50 mg at 20:50- to target insomnia.  He denies any acute physical concerns at this time. /81   Pulse 78   Temp 97.8  F (36.6  C) (Oral)   Resp 16   Wt 92.1 kg (203 lb 1.6 oz)   SpO2 96%

## 2024-07-02 NOTE — PLAN OF CARE
"  Rehab Group    Start time: 1030  End time: 1215  Patient time total: 45 minutes    attended full group    #6 attended   Group Type: OT Clinic   Group Topic Covered: coping skills     Group Session Detail:    Intervention: OT Clinic with 5 peers. Pt participated in a OT Clinic group to facilitate coping skills exploration and creative expression through personally meaningful activities, and to encourage utilization of these healthy coping skills to promote overall health and wellness. Group included clinical observation of social, cognitive and kinesthetic performance skills to inform treatment and safe discharge planning.    Mood/Affect:  Pleasant       Plan: Patient encouraged to maintain attendance for continued ongoing support in working towards occupational therapy goals to support overall treatment/care.        Patient Detail:    Approached writer as they entered unit, requesting to join group. Was an active participant during first part of group, working on a [employer] project.     During this time began discussing how he has difficulty falling asleep at night without medications. Notes that he gets \"a terrible anxiety feeling in my whole body\". Further states that box breathing and other coping skills don't work, that only the medications work. Writer offered other suggestions to coping skills to trial prior to going to sleep and when feeling this anxiety. Was somewhat receptive, however does not feel hat anything other than the meds will work. States \"I start to feel that terrible feeling and I just don't want to be conscious at that point, I want to be asleep. So the meds work\".      Train & Education Service Per Session 45 + Minutes () OT Group code    Patient Active Problem List   Diagnosis    Bipolar disorder (H)    ADHD (attention deficit hyperactivity disorder)    Bipolar I disorder with jose (H)      "

## 2024-07-03 PROCEDURE — 250N000013 HC RX MED GY IP 250 OP 250 PS 637: Performed by: PSYCHIATRY & NEUROLOGY

## 2024-07-03 PROCEDURE — G0177 OPPS/PHP; TRAIN & EDUC SERV: HCPCS

## 2024-07-03 PROCEDURE — 250N000013 HC RX MED GY IP 250 OP 250 PS 637: Performed by: CLINICAL NURSE SPECIALIST

## 2024-07-03 PROCEDURE — 124N000002 HC R&B MH UMMC

## 2024-07-03 PROCEDURE — 99232 SBSQ HOSP IP/OBS MODERATE 35: CPT | Performed by: CLINICAL NURSE SPECIALIST

## 2024-07-03 RX ADMIN — HYDROXYZINE HYDROCHLORIDE 50 MG: 50 TABLET ORAL at 17:25

## 2024-07-03 RX ADMIN — NICOTINE POLACRILEX 4 MG: 4 GUM, CHEWING BUCCAL at 08:50

## 2024-07-03 RX ADMIN — NICOTINE POLACRILEX 4 MG: 4 GUM, CHEWING BUCCAL at 17:01

## 2024-07-03 RX ADMIN — DIPHENHYDRAMINE HYDROCHLORIDE 50 MG: 50 CAPSULE ORAL at 11:23

## 2024-07-03 RX ADMIN — NICOTINE POLACRILEX 4 MG: 4 GUM, CHEWING BUCCAL at 10:07

## 2024-07-03 RX ADMIN — HYDROXYZINE HYDROCHLORIDE 50 MG: 50 TABLET ORAL at 09:19

## 2024-07-03 RX ADMIN — NICOTINE POLACRILEX 4 MG: 4 GUM, CHEWING BUCCAL at 15:57

## 2024-07-03 RX ADMIN — RISPERIDONE 0.5 MG: 0.5 TABLET, FILM COATED ORAL at 19:23

## 2024-07-03 RX ADMIN — HALOPERIDOL 5 MG: 5 TABLET ORAL at 11:23

## 2024-07-03 RX ADMIN — GABAPENTIN 200 MG: 100 CAPSULE ORAL at 10:07

## 2024-07-03 RX ADMIN — Medication 3 MG: at 19:24

## 2024-07-03 RX ADMIN — DIVALPROEX SODIUM 1250 MG: 500 TABLET, DELAYED RELEASE ORAL at 19:23

## 2024-07-03 ASSESSMENT — ACTIVITIES OF DAILY LIVING (ADL)
ADLS_ACUITY_SCORE: 30
ADLS_ACUITY_SCORE: 30
ORAL_HYGIENE: INDEPENDENT
ADLS_ACUITY_SCORE: 30
ADLS_ACUITY_SCORE: 30
HYGIENE/GROOMING: INDEPENDENT
ADLS_ACUITY_SCORE: 30
LAUNDRY: UNABLE TO COMPLETE
HYGIENE/GROOMING: INDEPENDENT
DRESS: INDEPENDENT
DRESS: INDEPENDENT
ADLS_ACUITY_SCORE: 30
ORAL_HYGIENE: INDEPENDENT
ADLS_ACUITY_SCORE: 30

## 2024-07-03 NOTE — PLAN OF CARE
Team Note Due:  Monday     Assessment/Intervention/Current Symtoms and Care Coordination:  Chart review and met with team, discussed pt progress, symptomology, and response to treatment. Discussed the discharge plan and any potential impediments to discharge.      Writer met with pt who was in a good mood finished up playing a game. Court pw not received, writer contacted [facility] who states pw is awaiting signature by  in another [facility]. Court will be closed due to the holiday and writer will follow up on Friday.      Discharge Plan or Goal:  Pt will be allowed to discharge to the [facility] he came from until [facility] which is a higher level of care 1:1 is up and running.     Was living at:  [facility] through [facility]  [address] [place] [address]     Barriers to Discharge:  Symptomology - jose, agitation      Referral Status:  None     Legal Status:  COURT HOLD - notice of intent to revoke stay of commitment   Hand submitted 6/18 - Hand hearing completed-July 2nd   County: [facility]  File Number: [id number]     Pt's  is:  Carlos Gonzales  [place] [employer]  Ph:  [phone]     Beth Garza    [address] [place] [address]  Phone: [phone]  [email or web address].     Contacts:  [facility]:  Norman Loyola <[email or web address]> and John Espana <[email or web address]> and Tal Ken as Norman Loyola is away.   Darline Romo ( for [facility]): [phone] - RENNY on file for [facility] staff  Bree ([facility] ): [phone] RENNY on file for group home staff  Email: [email or web address]     :  Chelsea Brennan ([facility]  Ph: [phone] - Commitment CM no RENNY needed   [email or web address].  [facility]  Ph: [phone]  Christi (member of case management team): [phone]     Medication  Management/Psychiatry:  Name/Clinic: [facility] - Pt declines [facility] for Psychiatry  Number: [phone]     Other:  Yolanda (mom): [phone] - Pt declines [facility] for Mom      Upcoming Meetings and Dates/Important Information and next steps:  Will need discharge appt for psychiatry prior to leaving  COS and PD     Upcoming Hand hearing 7/2 at 9:30AM.      Schedule discharge care meeting or phone call with [facility] and Pt to discuss discharge option - they want him to potentially move to a higher needs home within their agency. They have not got back to me. I have emailed them several times stating if they want to discuss other alternative placements within their agency we need to figure out what that is - I haven't told Pt yet as I haven't heard what they are thinking. It is either a higher level of care within [facility] vs. Returning to same group home at [facility] ([facility]). Pt wants to return back to same [facility] group home.

## 2024-07-03 NOTE — PLAN OF CARE
Rehab Group    Start time: 1015  End time: 1145  Patient time total: 50 minutes    attended full group    #5 attended   Group Type: OT Clinic   Group Topic Covered: coping skills     Group Session Detail:    Intervention: OT Clinic with 4 peers. Pt participated in a OT Clinic group to facilitate coping skills exploration and creative expression through personally meaningful activities, and to encourage utilization of these healthy coping skills to promote overall health and wellness. Group included clinical observation of social, cognitive and kinesthetic performance skills to inform treatment and safe discharge planning.    Mood/Affect: Pleasant      Plan: Patient encouraged to maintain attendance for continued ongoing support in working towards occupational therapy goals to support overall treatment/care.        Patient Detail:    Joined at start of group, requesting to begin working on a specific project. Once setup, worked ind on this until it was completed. Was social off and on during this time with writer and other peers in the group. Appeared brighter in this interaction than in previous weeks. Continues to express excitement for upcoming planned discharge later this week.      No Charge    Patient Active Problem List   Diagnosis    Bipolar disorder (H)    ADHD (attention deficit hyperactivity disorder)    Bipolar I disorder with jose (H)

## 2024-07-03 NOTE — PLAN OF CARE
07/02/24 2133   Individualization/Patient Specific Goals   Patient Personal Strengths expressive of emotions;resilient   Patient Vulnerabilities traumatic event;lacks insight into illness;history of unsuccessful treatment   Anxieties, Fears or Concerns Pt reports anxiety regarding changing group homes   Interprofessional Rounds   Summary Venancio presents with symptoms of jose and limited insight into his current mental health functioning. He is med compliant and appears symptoms of jose has decreased   Participants advanced practice nurse;nursing;CTC;psychiatrist   Behavioral Team Discussion   Participants Mariano Pacheco CNP,  Psychiatrist; Cari LEYVA, Alysha RN Charge   Progress Pt is improving appears less anxious and manic as he is able to go back to his previous group home.   Anticipated length of stay 3 days   Continued Stay Criteria/Rationale Symptom stabilization and safe discharge planning   Medical/Physical See H&P   Precautions See below   Plan Discharge back to group home   Rationale for change in precautions or plan N/A   Safety Plan Completed with therapist   Anticipated Discharge Disposition group home     PRECAUTIONS AND SAFETY    Behavioral Orders   Procedures    Assault precautions    Code 1 - Restrict to Unit    Routine Programming     As clinically indicated    Status 15     Every 15 minutes.    Suicide precautions: Suicide Risk: LOW; Clinical rationale to override score: modification to the care environment     Patients on Suicide Precautions should have a Combination Diet ordered that includes a Diet selection(s) AND a Behavioral Tray selection for Safe Tray - with utensils, or Safe Tray - NO utensils       Order Specific Question:   Suicide Risk     Answer:   LOW     Order Specific Question:   Clinical rationale to override score:     Answer:   modification to the care environment       Safety  Safety WDL: WDL  Patient Location: patient room, own  Observed Behavior:  sitting  Observed Behavior (Comment): screaming, clenching fists, threats towards peer.  Safety Measures: suicide assessment completed, suicide check-in completed, safety rounds completed  Diversional Activity: art work  Suicidality: Status 15, Behavioral scrubs (pajamas)  Assault: status 15, private room, behavioral scrubs (paekaterinamas)  Elopement Interventions: status 15

## 2024-07-03 NOTE — PROGRESS NOTES
12 Hour Chart Check   The patient's care was discussed with the treatment team during the daily team meeting and/or staff's chart notes were reviewed.  Staff report  Pt slept 5.75 hours overnight. Evening staff report: Patient appears to be making good progress toward mental health recovery with a significant improvement in manic symptoms noted.  Venancio presents as psychiatrically stable and at or near his baseline.  He endorses persistent stress and anxiety related to his involuntary hospitalization and length-of-stay.  He is future-oriented and looking forward to returning to his group home on Friday.  He presents as calm, pleasant, and cooperative with a bright affect.  He denies that he is experiencing any self harm or aggressive ideations at this time. Venancio has been behaviorally controlled, polite, and socially appropriate with both unit staff and peers.  He accepted all of his scheduled medications without incident this shift, and no adverse side effects have been reported or observed.  He requested and was given PRN Melatonin 3 mg at 19:06- and later PRN Benadryl 50 mg at 20:19- to target insomnia.  He denies any acute physical concerns at this time.  /81 (BP Location: Right arm)   Pulse 90   Temp 98.3  F (36.8  C) (Oral)   Resp 16   Wt 92.1 kg (203 lb 1.6 oz)   SpO2 96%       Upon interview, patient reported that he slept well but this morning was rough, he hinted that he received hydroxyzine at 0900 and when he does not work for his anxiety, he had to request gabapentin at 1015, still believes that the anxiety only down a bit, asking if he could request for Benadryl and Haldol after this interview.    Patient reported that the medication is working, that he had no side effect from any of the medications only that the as needed hydroxyzine does not last long.  Patient shared that waiting for the court Hand paperwork as it will affect his discharge on 5 July increased his anxiety.    Writer tried to alleviate  "patient's fear that after receiving the court determination and paperwork, patient could be started on Invega 6 mg today and to have the long-acting injection of Invega Sustenna loading dose as early as Friday.  The plan will be for him to receive the second dose next Tuesday as outpatient.  The caveat is that we still need to wait for the court paper before we can prescribe the Hand medication.    Patient seems fairly relieved that he will still be able to discharge on Friday as scheduled if everything went right today, he is agreeable to receive the second dose of Invega Sustenna from his outpatient psychiatrist, followed by a monthly Invega shot.    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  Patient reported that staff did not allow him to eat his ruffle.    Patient denied auditory/visual hallucinations, he denied suicidal/homicidal ideation and thoughts of self-harm.               Mental Status Exam:  General appearance: Awake, dressed in hospital yellow scrubs, appeared at stated age  Attitude:  Cooperative  Affect:   anxious  Mood: \"Okay\"   Speech:  normal volume and tone  Eye contact:  good.    Psychomotor behavior : Fidgety  Gait: steady   Abnormal movements:  none  Delusions: denied  Hallucinations: denied  Thoughts: Goal oriented, appropriate  Associations: No Loosening association  Judgement: fair  Insight: limited  Cognitions: intact in conversation  Memory:  intact in conversation  Orientation: normal    Not suicidal. denied  Not homicidal: denied        Current Facility-Administered Medications                     Current Facility-Administered Medications   Medication Dose Route Frequency Provider Last Rate Last Admin    acetaminophen (TYLENOL) tablet 650 mg  650 mg Oral Q4H PRN Javier Card MD        alum & mag hydroxide-simethicone (MAALOX) suspension 30 mL  30 mL Oral " Q4H PRN Javier Card MD        benztropine (COGENTIN) tablet 1 mg  1 mg Oral BID PRN Javier Card MD        haloperidol (HALDOL) tablet 5 mg  5 mg Oral Q8H PRN Satya Garza MD   5 mg at 06/11/24 2042     And    LORazepam (ATIVAN) tablet 2 mg  2 mg Oral Q8H PRN Satya Garza MD   2 mg at 06/11/24 2042     And    diphenhydrAMINE (BENADRYL) capsule 50 mg  50 mg Oral Q8H PRN Satya Garza MD   50 mg at 06/11/24 2042    haloperidol lactate (HALDOL) injection 5 mg  5 mg Intramuscular Q8H PRN Satya Garza MD         And    LORazepam (ATIVAN) injection 2 mg  2 mg Intramuscular Q8H PRN Satya Garza MD         And    diphenhydrAMINE (BENADRYL) injection 50 mg  50 mg Intramuscular Q8H PRN Satya Garza MD        divalproex sodium delayed-release (DEPAKOTE) DR tablet 1,500 mg  1,500 mg Oral At Bedtime Javier Card MD   1,500 mg at 06/11/24 1937    hydrOXYzine HCl (ATARAX) tablet 50 mg  50 mg Oral TID PRN Javier Card MD   50 mg at 06/12/24 1124    melatonin tablet 3 mg  3 mg Oral At Bedtime PRN Javier Card MD   3 mg at 06/10/24 2014    nicotine (NICORETTE) gum 2 mg  2 mg Buccal Q1H PRN Javier Card MD   2 mg at 06/12/24 1333    risperiDONE (risperDAL) tablet 1 mg  1 mg Oral Satya Montez MD   1 mg at 06/12/24 1007    risperiDONE (risperDAL) tablet 4 mg  0.5 mg Oral At Bedtime MARIO Salguero, CNP   4 mg at 06/11/24 1938    senna-docusate (SENOKOT-S/PERICOLACE) 8.6-50 MG per tablet 1 tablet  1 tablet Oral BID PRN Javier Card MD             Recent Results[]Expand by Default             Recent Results (from the past 168 hour(s))   Valproic acid     Collection Time: 06/11/24  9:50 AM   Result Value Ref Range     Valproic acid 92.7   ug/mL   Comprehensive metabolic panel     Collection Time: 06/11/24  9:50 AM   Result Value Ref Range     Sodium 139 135 - 145 mmol/L     Potassium 3.9 3.4 - 5.3 mmol/L     Carbon Dioxide (CO2) 27 22 - 29 mmol/L      Anion Gap 10 7 - 15 mmol/L     Urea Nitrogen 7.8 6.0 - 20.0 mg/dL     Creatinine 0.84 0.67 - 1.17 mg/dL     GFR Estimate >90 >60 mL/min/1.73m2     Calcium 9.6 8.6 - 10.0 mg/dL     Chloride 102 98 - 107 mmol/L     Glucose 125 (H) 70 - 99 mg/dL     Alkaline Phosphatase 51 40 - 150 U/L     AST 12 0 - 45 U/L     ALT 18 0 - 70 U/L     Protein Total 6.4 6.4 - 8.3 g/dL     Albumin 4.2 3.5 - 5.2 g/dL     Bilirubin Total 0.2 <=1.2 mg/dL   Hemoglobin A1c     Collection Time: 06/11/24  9:50 AM   Result Value Ref Range     Hemoglobin A1C 5.4 <5.7 %   Lipid panel     Collection Time: 06/11/24  9:50 AM   Result Value Ref Range     Cholesterol 157 <200 mg/dL     Triglycerides 191 (H) <150 mg/dL     Direct Measure HDL 31 (L) >=40 mg/dL     LDL Cholesterol Calculated 88 <=100 mg/dL     Non HDL Cholesterol 126 <130 mg/dL   CBC with platelets and differential     Collection Time: 06/11/24  9:50 AM   Result Value Ref Range     WBC Count 4.9 4.0 - 11.0 10e3/uL     RBC Count 4.84 4.40 - 5.90 10e6/uL     Hemoglobin 13.9 13.3 - 17.7 g/dL     Hematocrit 41.4 40.0 - 53.0 %     MCV 86 78 - 100 fL     MCH 28.7 26.5 - 33.0 pg     MCHC 33.6 31.5 - 36.5 g/dL     RDW 12.9 10.0 - 15.0 %     Platelet Count 190 150 - 450 10e3/uL     % Neutrophils 50 %     % Lymphocytes 40 %     % Monocytes 5 %     % Eosinophils 4 %     % Basophils 1 %     % Immature Granulocytes 0 %     NRBCs per 100 WBC 0 <1 /100     Absolute Neutrophils 2.5 1.6 - 8.3 10e3/uL     Absolute Lymphocytes 2.0 0.8 - 5.3 10e3/uL     Absolute Monocytes 0.2 0.0 - 1.3 10e3/uL     Absolute Eosinophils 0.2 0.0 - 0.7 10e3/uL     Absolute Basophils 0.0 0.0 - 0.2 10e3/uL     Absolute Immature Granulocytes 0.0 <=0.4 10e3/uL     Absolute NRBCs 0.0 10e3/uL            Changes made today 7/3//24    No Medication changes made        Review of systems:      For physical examination and 12 point review of system please, see note of Dr. Clarke from 6/5/24.  I reviewed that note and agree with it.    "       DIagnoses:      Bipolar affective disorder, jose, severe with psychosis.          Assessment and Plan:      Assessment and Hospital Summary:  Venancio Edgar is a 28-year old male with history of bipolar disorder, who arrived at the ED very animated and appeared somewhat manic.  Patient presented with verbal agitation, significant behavior change, paranoia, physical aggression. Per DEC note Pt presents to the ED via EMS after becoming physically and verbally agitated at his group home towards staff and a community member while outside. When meeting with Writer, Pt presented with a labile mood, quickly changing from being cooperative to Writer to yelling at Writer, stating he will not participate, questioning Writer, laughing out loud while talking about his medications with volume steadily increasing. Writer attempted to collect information of what has been going on with Pt, yet Pt denied any concerns and then started stating Writer was asking \"the wrong questions\" and is trying to keep Pt in the hospital. Writer discussed how he was trying to let Pt share his perspective and understanding of the situation. Pt continued to be verbally escalated, stating staff was \"retarded\", not asking the right questions, and not needing to speak with a therapist. Writer terminated the assessment given Pt's elevated and labile mood and Pt presenting as experiencing disorganized thought as evidenced by answering and responding to Writer's questions with answers that did not pertain to the question asked.      Per DEC note Patient's CM is planning to revoke his PD. Will for now continue on 72 hour hold. Patient's decompensation is likely due to poor compliance with meds. He was restarted on PTA meds and Risperdal dose was increased with his permission. Will continue to provide support and structure.          Target Psychiatric Symptoms and Interventions     1. Education given regarding diagnostic and treatment options with " risks, benefits and alternatives and adequate verbalization of understanding.  2. Admitted on 6/7/2024 to station 12N..  Precautions placed includes:Assault precautions, suicide precautions  3. Medications: 6/7/2024: PTA medications reviewed.     4. Medications:  Hospital  Alum & Mag hydroxide-simethicone ( Maalox) suspension 30 mg oral every 4 hours as needed indigestion  Benztropine (Cogentin) tablet 1 mg 2 times daily as needed extrapyramidal symptoms  -Vitals and neuro checks with PRN blood pressure meds for SBP > 200  Code 1 restrictive unit  Full code  Risperidone 0.5 mg at bedtime.       Divalproex sodium delayed release (Depakote DR) tablet 1,250 mg at bedtime  Haloperidol (Haldol) tablet 5 mg oral every 8 hours as needed agitation  And  Diphenhydramine (Benadryl) capsule 50 mg oral every 8 hours as needed agitation  Haloperidol lactate (Haldol) injection 5 mg intramuscular every 8 hours as needed agitation  And  Diphenhydramine (Benadryl) injection 50 mg intramuscular every 8 hours as needed agitation  Gabapentin (Neurontin) capsule 200 mg 3 times daily as needed anxiety  Hydroxyzine HCl (Atarax) tablet 50 mg oral 3 times daily as needed anxiety  Legal status Court Hold  No COVID-19 virus (coronavirus) screening test needed or already ordered/completed  Routine programming  Adri-docusate (Senokot-S/Marisela-Colace) 8.6-50 mg, 1 tablet oral 2 times daily as needed constipation  Status 15 every 15-minute safety check  Suicide precaution suicide risk Low  Melatonin tablet 3 mg at bedtime as needed sleep  Vital signs and pain assessment  Weight patient routine every TU, TH, SA.  5. Consultations:  Hospitalist to follow as needed.  IM to follow for every medical conditions.  6. Structure and Supervision  Unit 12 N.  Barriers to Discharge: Danisha, agitation.  7.   is following in regards to collecting and reviewing collateral information, referrals and disposition planning.  Legal: Court hold - notice  of intent to revoke stay of commitment   Yazan submitted 6/18 - Yazan hearing is July 2nd   Referrals: CTC to facilitate all referrals  Care Coordination: CTC to coordinate care with external providers  Placement: CTC to facilitate placement following symptom stabilization  Anticipated Discharge: 14 days      . Further treatment programming to be determined throughout the hospital course.     [facility] Patient able to contract for safety and Patient on precautions     This note was created with help of Dragon dictation system. Grammatical / typing errors are not intentional.  Risks, benefits, and alternatives discussed at length with patient.         Acute Medical Problems and Treatments:  Acute medical concerns:  - No acute medical concerns     Pertinent labs/imaging:  Recent Results   Recent Results             Recent Results (from the past 504 hour(s))   Valproic acid     Collection Time: 06/11/24  9:50 AM   Result Value Ref Range     Valproic acid 92.7   ug/mL   Comprehensive metabolic panel     Collection Time: 06/11/24  9:50 AM   Result Value Ref Range     Sodium 139 135 - 145 mmol/L     Potassium 3.9 3.4 - 5.3 mmol/L     Carbon Dioxide (CO2) 27 22 - 29 mmol/L     Anion Gap 10 7 - 15 mmol/L     Urea Nitrogen 7.8 6.0 - 20.0 mg/dL     Creatinine 0.84 0.67 - 1.17 mg/dL     GFR Estimate >90 >60 mL/min/1.73m2     Calcium 9.6 8.6 - 10.0 mg/dL     Chloride 102 98 - 107 mmol/L     Glucose 125 (H) 70 - 99 mg/dL     Alkaline Phosphatase 51 40 - 150 U/L     AST 12 0 - 45 U/L     ALT 18 0 - 70 U/L     Protein Total 6.4 6.4 - 8.3 g/dL     Albumin 4.2 3.5 - 5.2 g/dL     Bilirubin Total 0.2 <=1.2 mg/dL   Hemoglobin A1c     Collection Time: 06/11/24  9:50 AM   Result Value Ref Range     Hemoglobin A1C 5.4 <5.7 %   Lipid panel     Collection Time: 06/11/24  9:50 AM   Result Value Ref Range     Cholesterol 157 <200 mg/dL     Triglycerides 191 (H) <150 mg/dL     Direct Measure HDL 31 (L) >=40  mg/dL     LDL Cholesterol Calculated 88 <=100 mg/dL     Non HDL Cholesterol 126 <130 mg/dL   CBC with platelets and differential     Collection Time: 06/11/24  9:50 AM   Result Value Ref Range     WBC Count 4.9 4.0 - 11.0 10e3/uL     RBC Count 4.84 4.40 - 5.90 10e6/uL     Hemoglobin 13.9 13.3 - 17.7 g/dL     Hematocrit 41.4 40.0 - 53.0 %     MCV 86 78 - 100 fL     MCH 28.7 26.5 - 33.0 pg     MCHC 33.6 31.5 - 36.5 g/dL     RDW 12.9 10.0 - 15.0 %     Platelet Count 190 150 - 450 10e3/uL     % Neutrophils 50 %     % Lymphocytes 40 %     % Monocytes 5 %     % Eosinophils 4 %     % Basophils 1 %     % Immature Granulocytes 0 %     NRBCs per 100 WBC 0 <1 /100     Absolute Neutrophils 2.5 1.6 - 8.3 10e3/uL     Absolute Lymphocytes 2.0 0.8 - 5.3 10e3/uL     Absolute Monocytes 0.2 0.0 - 1.3 10e3/uL     Absolute Eosinophils 0.2 0.0 - 0.7 10e3/uL     Absolute Basophils 0.0 0.0 - 0.2 10e3/uL     Absolute Immature Granulocytes 0.0 <=0.4 10e3/uL     Absolute NRBCs 0.0 10e3/uL   Valproic acid     Collection Time: 06/14/24  2:20 PM   Result Value Ref Range     Valproic acid 75.3   ug/mL   Ammonia     Collection Time: 06/14/24  2:20 PM   Result Value Ref Range     Ammonia 85 (H) 16 - 60 umol/L   Ammonia     Collection Time: 06/19/24 10:28 AM   Result Value Ref Range     Ammonia 47 16 - 60 umol/L   Valproic acid     Collection Time: 06/19/24 10:28 AM   Result Value Ref Range     Valproic acid 75.2   ug/mL   Extra Green Top (Lithium Heparin) Tube     Collection Time: 06/19/24 10:28 AM   Result Value Ref Range     Hold Specimen [facility]

## 2024-07-03 NOTE — PLAN OF CARE
Pt had an uneventful shift this day. Pt mood euthymic with bright full range affect. Pt struggled a bit with anxiety this morning requesting hydroxyzine and finally benadryl/haldol. Pt stated he tried to use coping skills and distraction with groups but could feel himself getting increasingly worked up. After the benadryl/haldol, pt appeared less tense with reported relief. Pt appears more comfortable and less tense than when RN writer last cared for pt 2 weeks ago.    Pt denies SI, HI and thoughts of hurting others. Pt denies depression and A/VH. Pt participated in groups and had appropriate interactions with staff and peers.    VS reviewed: /84 (BP Location: Right arm)   Pulse 86   Temp 97.4  F (36.3  C) (Oral)   Resp 16   Wt 92.1 kg (203 lb 1.6 oz)   SpO2 96%  . Patient denies  pain.    Length of stay: 26

## 2024-07-03 NOTE — PLAN OF CARE
BEH IP Unit Acuity Rating Score (UARS)  Patient is given one point for every criteria they meet.    CRITERIA SCORING   On a 72 hour hold, court hold, committed, stay of commitment, or revocation. 1    Patient LOS on BEH unit exceeds 20 days. 1  LOS: 26   Patient under guardianship, 55+, otherwise medically complex, or under age 11. 0   Suicide ideation without relief of precipitating factors. 0   Current plan for suicide. 0   Current plan for homicide. 0   Imminent risk or actual attempt to seriously harm another without relief of factors precipitating the attempt. 1   Severe dysfunction in daily living (ex: complete neglect for self care, extreme disruption in vegetative function, extreme deterioration in social interactions). 1   Recent (last 7 days) or current physical aggression in the ED or on unit.  0   Restraints or seclusion episode in past 72 hours. 0   Recent (last 7 days) or current verbal aggression, agitation, yelling, etc., while in the ED or unit. Last 6/26/24 0   Active psychosis. 0   Need for constant or near constant redirection (from leaving, from others, etc).  0   Intrusive or disruptive behaviors. 0   Patient requires 3 or more hours of individualized nursing care per 8-hour shift (i.e. for ADLs, meds, therapeutic interventions). 0   TOTAL 4

## 2024-07-04 PROCEDURE — 250N000013 HC RX MED GY IP 250 OP 250 PS 637: Performed by: CLINICAL NURSE SPECIALIST

## 2024-07-04 PROCEDURE — G0177 OPPS/PHP; TRAIN & EDUC SERV: HCPCS

## 2024-07-04 PROCEDURE — 250N000013 HC RX MED GY IP 250 OP 250 PS 637: Performed by: PSYCHIATRY & NEUROLOGY

## 2024-07-04 PROCEDURE — 124N000002 HC R&B MH UMMC

## 2024-07-04 RX ADMIN — NICOTINE POLACRILEX 4 MG: 4 GUM, CHEWING BUCCAL at 10:54

## 2024-07-04 RX ADMIN — HYDROXYZINE HYDROCHLORIDE 50 MG: 50 TABLET ORAL at 09:38

## 2024-07-04 RX ADMIN — GABAPENTIN 200 MG: 100 CAPSULE ORAL at 11:40

## 2024-07-04 RX ADMIN — NICOTINE POLACRILEX 4 MG: 4 GUM, CHEWING BUCCAL at 09:26

## 2024-07-04 RX ADMIN — Medication 3 MG: at 19:00

## 2024-07-04 RX ADMIN — DIVALPROEX SODIUM 1250 MG: 500 TABLET, DELAYED RELEASE ORAL at 19:00

## 2024-07-04 RX ADMIN — RISPERIDONE 0.5 MG: 0.5 TABLET, FILM COATED ORAL at 19:00

## 2024-07-04 ASSESSMENT — ACTIVITIES OF DAILY LIVING (ADL)
ADLS_ACUITY_SCORE: 30
DRESS: INDEPENDENT
ADLS_ACUITY_SCORE: 30
LAUNDRY: UNABLE TO COMPLETE
ORAL_HYGIENE: INDEPENDENT
ADLS_ACUITY_SCORE: 30
HYGIENE/GROOMING: INDEPENDENT
ADLS_ACUITY_SCORE: 30

## 2024-07-04 NOTE — PLAN OF CARE
Pt had an uneventful shift this day. Pt mood euthymic with bright full range affect. Pt struggled a bit with anxiety this morning requesting hydroxyzine and gabapentin with reported relief, pt appeared less tense with reported relief.     Pt denies SI, HI and thoughts of hurting others. Pt reports fleeting feelings of  depression due to having to stay in the hospital over the weekend. Pt denies A/VH. Pt participated in groups and had appropriate interactions with staff and peers.

## 2024-07-04 NOTE — PLAN OF CARE
Pt has a elated affect with anxious mood. Pt rates anxiety at 4/10 and depression 0/10. Pt was very excited about having a discharge plan of Friday at 1200. Received a call from the provider that the court has not sent the paper work for his Hand medications therefore his discharge will be delayed til next week. Information was relayed to pt and he was disappointed but agreeable. Pt had good insight and judgement regarding staying til next week. Pt did request hydroxyzine for anxiety after receiving the news with some relief. Pt rates pain at 0/10. Pt reports no SI/HI/SIB and contracts for safety. Pt denies any hallucinations and not noted responding to any internal stimuli. Pt was medication compliant. No reported or observed medication side effects. Pt was visible on unit and social. Continue current POC.     Plan of Care Reviewed With: patient Plan of Care Reviewed With: patient    Overall Patient Progress: improvingOverall Patient Progress: improving

## 2024-07-04 NOTE — PLAN OF CARE
Goal Outcome Evaluation:  Problem: Sleep Disturbance  Goal: Adequate Sleep/Rest  Outcome: Progressing       Pt in bed at beginning of shift, breathing quiet and unlabored. Pt slept through shift. Pt slept 6.5 hours.      No pt complaints or concerns at this time.      No PRNs given. Will continue to monitor.

## 2024-07-05 ENCOUNTER — MEDICAL CORRESPONDENCE (OUTPATIENT)
Dept: HEALTH INFORMATION MANAGEMENT | Facility: CLINIC | Age: 29
End: 2024-07-05
Payer: COMMERCIAL

## 2024-07-05 PROCEDURE — 250N000013 HC RX MED GY IP 250 OP 250 PS 637: Performed by: CLINICAL NURSE SPECIALIST

## 2024-07-05 PROCEDURE — 250N000013 HC RX MED GY IP 250 OP 250 PS 637: Performed by: PSYCHIATRY & NEUROLOGY

## 2024-07-05 PROCEDURE — 99232 SBSQ HOSP IP/OBS MODERATE 35: CPT | Performed by: CLINICAL NURSE SPECIALIST

## 2024-07-05 PROCEDURE — 124N000002 HC R&B MH UMMC

## 2024-07-05 RX ORDER — PALIPERIDONE 3 MG/1
6 TABLET, EXTENDED RELEASE ORAL DAILY
Status: DISCONTINUED | OUTPATIENT
Start: 2024-07-05 | End: 2024-07-10 | Stop reason: HOSPADM

## 2024-07-05 RX ADMIN — HALOPERIDOL 5 MG: 5 TABLET ORAL at 12:03

## 2024-07-05 RX ADMIN — DIVALPROEX SODIUM 1250 MG: 500 TABLET, DELAYED RELEASE ORAL at 19:09

## 2024-07-05 RX ADMIN — DIPHENHYDRAMINE HYDROCHLORIDE 50 MG: 50 CAPSULE ORAL at 12:02

## 2024-07-05 RX ADMIN — Medication 3 MG: at 19:09

## 2024-07-05 RX ADMIN — PALIPERIDONE 6 MG: 3 TABLET, EXTENDED RELEASE ORAL at 16:48

## 2024-07-05 RX ADMIN — RISPERIDONE 0.5 MG: 0.5 TABLET, FILM COATED ORAL at 19:09

## 2024-07-05 RX ADMIN — NICOTINE POLACRILEX 4 MG: 4 GUM, CHEWING BUCCAL at 09:59

## 2024-07-05 RX ADMIN — GABAPENTIN 200 MG: 100 CAPSULE ORAL at 09:59

## 2024-07-05 RX ADMIN — HYDROXYZINE HYDROCHLORIDE 50 MG: 50 TABLET ORAL at 08:52

## 2024-07-05 ASSESSMENT — ACTIVITIES OF DAILY LIVING (ADL)
ADLS_ACUITY_SCORE: 30

## 2024-07-05 NOTE — PLAN OF CARE
Team Note Due:  Monday     Assessment/Intervention/Current Symtoms and Care Coordination:  Chart review and met with team, discussed pt progress, symptomology, and response to treatment. Discussed the discharge plan and any potential impediments to discharge.      Writer met with pt who was excited due to courts being open today. Writer advised would follow up with them for pw. Writer contacted [facility] and pw still have not been signed by the . Per  they will inform the  this is high priority as Hand meds are pending and is delaying pt's discharge. Ph call from  Bree @[phone] inquiring about discharge advised Hand order has not been received and there is medication pt would need to start when that is received. Writer advised per provider if pw is received today pt may discharge on Monday. Per [facility]   is in [place] and they are not sure if the  is in actually due to the holiday took CTC's number to call back. Writer contacted [facility] and moved pt's psych appointment to 7/12/24 @2:10pm in person.    Writer received Hand ordered forwarded to provider. Writer provided pt with a copy.      Discharge Plan or Goal:  Pt will be allowed to discharge to the [facility] until [facility] which is a higher level of care 1:1 is up and running.     Was living at:  [facility] through [place]  [address] [place] [address]     Barriers to Discharge:  Symptomology - jose, agitation      Referral Status:  None     Legal Status:  Committed w/Hand- Medications: Haldol, Risperdal, Invega, Zyprexa, Seroquel, and Abilify  County: [facility]  File Number: [id number]     Pt's  is:  Carlos Gonzales  [employer]  Ph:  [phone]     Beth Garza    [address] [place] [address]  Phone: [phone]  [email or web address]     Contacts:  [facility]:  Norman Loyola  <[email or web address]> and John Espana <[email or web address]> and Tal Ken as Norman Loyola is away.   Darline Romo ( for [facility]): [phone] - RENNY on file for [facility] staff  Bree ([facility] ): [phone] RENNY on file for group home staff  Email: [email or web address]     :  Chelsea Brennan ([facility]  Ph: [phone] - Commitment CM no RENNY needed   [email or web address]  [facility]  Ph: [phone]  Christi (member of case management team): [phone]     Medication Management/Psychiatry:  Name/Clinic: [facility] - Pt declines [facility] for Psychiatry  Number: [phone]     Other:  Yolanda (mom): [phone] - Pt declines [facility] for Mom      Upcoming Meetings and Dates/Important Information and next steps:  Will need COS and PD

## 2024-07-05 NOTE — CARE PLAN
Rehab Group    Start time: 1030  End time: 1115  Patient time total: 45 minutes    attended full group    #4 attended   Group Type: OT Clinic   Group Topic Covered: activity therapy       Group Session Detail:  Pt attended OT clinic group, was able to initiate task and ask for help as needed. Pt demonstrated good planning, task focus, and problem solving. Appeared comfortable interacting with peers.     Patient Response/Contribution:  positive affect and cooperative with task       Patient Detail:    During check-in, writer commented on his planned discharge tomorrow, though pt updated writer that he is likely not leaving tomorrow due to court paperwork.  Pt expressed coping well with this, as he is understanding of the situation, and knows he will be able to discharge next week.  Acknowledged in the recent past, he may not have handled it so well.  Pt (and peers) were not interested in much of a discussion group, much prefer activity based OT group, where he chose to draw, take turns selecting/discussing music, and socialize.         Train & Education Service Per Session 45 + Minutes () OT Group code    Patient Active Problem List   Diagnosis    Bipolar disorder (H)    ADHD (attention deficit hyperactivity disorder)    Bipolar I disorder with jose (H)        Patient tolerated procedure well.

## 2024-07-05 NOTE — PROGRESS NOTES
The patient's care was discussed with the treatment team during the daily team meeting and/or staff's chart notes were reviewed.  Staff report  Pt slept 6.5 hours overnight. Evening staff report: Pt endorsed anxiety and rated at 3/10, depression 4/10, but denies pain, SI/SIB/HI/AVH, and contracted for safety. Pt is isolative and withdrawn to room, but comes out for meals. Pt is pleasant with a bright affect. Affect is consistent with mood. Judgment and insight is appropriate to situation. Thought content presents as relevant, thought process presents as logical. Pt is eating and drinking adequately. Pt is medication compliant. No medication adverse effects noted. No safety or behavioral concerns noted. Will continue with same plan of care.      Upon interview, patient reported that he understands we have not received  the court paperwork to legally start his Invega medication.  Patient seems to be a little bit restless but understanding, he shared that  his major fear is not to be able to discharge next week if the court paperwork continues to delay.  Writer alleviates patient's fear, reassuring him that if we get the paperwork today he will be discharging by Wednesday worse case scenario but might be as easily as Monday.    Writer informed the patient that he will not be here on Thursday and Friday but will be able to discharge the patient on Wednesday subjects to getting the paperwork from the court.  Writer commended the patient for handling the situation with maturity.  Patient's fear seems to be allayed by this reassurance that he would be discharging next week.    Patient asked this writer if he could have his emergency medication Benadryl and Haldol for anxiety.  Writer stated which is okay for him to have the medications if he feels anxious but should not request for it more than one time daily.    Patient seems to be hopeful and future oriented, he denied auditory/visual hallucinations, denied  "suicidal/homicidal ideations and thoughts of self-harm.  Writer received a message from the [facility] this afternoon that she has received the court Hand paperwork, writer has prescribed the Invega 6 mg for the patient to start this evening at 3 pm.  If everything goes well patient will be discharging on Monday or Wednesday.              Mental Status Exam:  General appearance: Awake, dressed in hospital yellow scrubs, appeared at stated age  Attitude:  Cooperative  Affect:   anxious  Mood: \"Anxious\"   Speech:  normal volume and tone  Eye contact:  good.    Psychomotor behavior : Fidgety in bed  Gait: steady   Abnormal movements:  none  Delusions: denied  Hallucinations: denied  Thoughts: Goal oriented, appropriate  Associations: No Loosening association  Judgement: fair  Insight: limited  Cognitions: intact in conversation  Memory:  intact in conversation  Orientation: normal    Not suicidal. denied  Not homicidal: denied        Current Facility-Administered Medications                     Current Facility-Administered Medications   Medication Dose Route Frequency Provider Last Rate Last Admin    acetaminophen (TYLENOL) tablet 650 mg  650 mg Oral Q4H PRN Javier Card MD        alum & mag hydroxide-simethicone (MAALOX) suspension 30 mL  30 mL Oral Q4H PRN Javier Card MD        benztropine (COGENTIN) tablet 1 mg  1 mg Oral BID PRN Javier Card MD        haloperidol (HALDOL) tablet 5 mg  5 mg Oral Q8H PRN Satya Garza MD   5 mg at 06/11/24 2042     And    LORazepam (ATIVAN) tablet 2 mg  2 mg Oral Q8H PRSatya Bauer MD   2 mg at 06/11/24 2042     And    diphenhydrAMINE (BENADRYL) capsule 50 mg  50 mg Oral Q8H PRSatya Bauer MD   50 mg at 06/11/24 2042    haloperidol lactate (HALDOL) injection 5 mg  5 mg Intramuscular Q8H PRSatya Bauer MD         And    LORazepam (ATIVAN) injection 2 mg  2 mg Intramuscular Q8H PRSatya Bauer MD         And    diphenhydrAMINE " (BENADRYL) injection 50 mg  50 mg Intramuscular Q8H PRN Satya Garza MD        divalproex sodium delayed-release (DEPAKOTE) DR tablet 1,500 mg  1,500 mg Oral At Bedtime Javier Card MD   1,500 mg at 06/11/24 1937    hydrOXYzine HCl (ATARAX) tablet 50 mg  50 mg Oral TID PRN Javier Card MD   50 mg at 06/12/24 1124    melatonin tablet 3 mg  3 mg Oral At Bedtime PRN Javier Card MD   3 mg at 06/10/24 2014    nicotine (NICORETTE) gum 2 mg  2 mg Buccal Q1H PRN Javier Card MD   2 mg at 06/12/24 1333    risperiDONE (risperDAL) tablet 1 mg  1 mg Oral QAM Satya Garza MD   1 mg at 06/12/24 1007    risperiDONE (risperDAL) tablet 4 mg  0.5 mg Oral At Bedtime MARIO Salguero, CNP   4 mg at 06/11/24 1938    senna-docusate (SENOKOT-S/PERICOLACE) 8.6-50 MG per tablet 1 tablet  1 tablet Oral BID PRN Javier Card MD             Recent Results[]Expand by Default             Recent Results (from the past 168 hour(s))   Valproic acid     Collection Time: 06/11/24  9:50 AM   Result Value Ref Range     Valproic acid 92.7   ug/mL   Comprehensive metabolic panel     Collection Time: 06/11/24  9:50 AM   Result Value Ref Range     Sodium 139 135 - 145 mmol/L     Potassium 3.9 3.4 - 5.3 mmol/L     Carbon Dioxide (CO2) 27 22 - 29 mmol/L     Anion Gap 10 7 - 15 mmol/L     Urea Nitrogen 7.8 6.0 - 20.0 mg/dL     Creatinine 0.84 0.67 - 1.17 mg/dL     GFR Estimate >90 >60 mL/min/1.73m2     Calcium 9.6 8.6 - 10.0 mg/dL     Chloride 102 98 - 107 mmol/L     Glucose 125 (H) 70 - 99 mg/dL     Alkaline Phosphatase 51 40 - 150 U/L     AST 12 0 - 45 U/L     ALT 18 0 - 70 U/L     Protein Total 6.4 6.4 - 8.3 g/dL     Albumin 4.2 3.5 - 5.2 g/dL     Bilirubin Total 0.2 <=1.2 mg/dL   Hemoglobin A1c     Collection Time: 06/11/24  9:50 AM   Result Value Ref Range     Hemoglobin A1C 5.4 <5.7 %   Lipid panel     Collection Time: 06/11/24  9:50 AM   Result Value Ref Range     Cholesterol 157 <200 mg/dL     Triglycerides 191 (H)  <150 mg/dL     Direct Measure HDL 31 (L) >=40 mg/dL     LDL Cholesterol Calculated 88 <=100 mg/dL     Non HDL Cholesterol 126 <130 mg/dL   CBC with platelets and differential     Collection Time: 06/11/24  9:50 AM   Result Value Ref Range     WBC Count 4.9 4.0 - 11.0 10e3/uL     RBC Count 4.84 4.40 - 5.90 10e6/uL     Hemoglobin 13.9 13.3 - 17.7 g/dL     Hematocrit 41.4 40.0 - 53.0 %     MCV 86 78 - 100 fL     MCH 28.7 26.5 - 33.0 pg     MCHC 33.6 31.5 - 36.5 g/dL     RDW 12.9 10.0 - 15.0 %     Platelet Count 190 150 - 450 10e3/uL     % Neutrophils 50 %     % Lymphocytes 40 %     % Monocytes 5 %     % Eosinophils 4 %     % Basophils 1 %     % Immature Granulocytes 0 %     NRBCs per 100 WBC 0 <1 /100     Absolute Neutrophils 2.5 1.6 - 8.3 10e3/uL     Absolute Lymphocytes 2.0 0.8 - 5.3 10e3/uL     Absolute Monocytes 0.2 0.0 - 1.3 10e3/uL     Absolute Eosinophils 0.2 0.0 - 0.7 10e3/uL     Absolute Basophils 0.0 0.0 - 0.2 10e3/uL     Absolute Immature Granulocytes 0.0 <=0.4 10e3/uL     Absolute NRBCs 0.0 10e3/uL            Changes made today 7/5//24     Invega 6 mg oral daily         Review of systems:      For physical examination and 12 point review of system please, see note of Dr. Clarke from 6/5/24.  I reviewed that note and agree with it.          DIagnoses:      Bipolar affective disorder, jose, severe with psychosis.          Assessment and Plan:      Assessment and Hospital Summary:  Venancio Edgar is a 28-year old male with history of bipolar disorder, who arrived at the ED very animated and appeared somewhat manic.  Patient presented with verbal agitation, significant behavior change, paranoia, physical aggression. Per DEC note Pt presents to the ED via EMS after becoming physically and verbally agitated at his group home towards staff and a community member while outside. When meeting with Writer, Pt presented with a labile mood, quickly changing from being cooperative to Writer to yelling at Writer,  "stating he will not participate, questioning Writer, laughing out loud while talking about his medications with volume steadily increasing. Writer attempted to collect information of what has been going on with Pt, yet Pt denied any concerns and then started stating Writer was asking \"the wrong questions\" and is trying to keep Pt in the hospital. Writer discussed how he was trying to let Pt share his perspective and understanding of the situation. Pt continued to be verbally escalated, stating staff was \"retarded\", not asking the right questions, and not needing to speak with a therapist. Writer terminated the assessment given Pt's elevated and labile mood and Pt presenting as experiencing disorganized thought as evidenced by answering and responding to Writer's questions with answers that did not pertain to the question asked.      Per DEC note Patient's CM is planning to revoke his PD. Will for now continue on 72 hour hold. Patient's decompensation is likely due to poor compliance with meds. He was restarted on PTA meds and Risperdal dose was increased with his permission. Will continue to provide support and structure.          Target Psychiatric Symptoms and Interventions     1. Education given regarding diagnostic and treatment options with risks, benefits and alternatives and adequate verbalization of understanding.  2. Admitted on 6/7/2024 to station 12N..  Precautions placed includes:Assault precautions, suicide precautions  3. Medications: 6/7/2024: PTA medications reviewed.     4. Medications:  Hospital  Alum & Mag hydroxide-simethicone ( Maalox) suspension 30 mg oral every 4 hours as needed indigestion  Benztropine (Cogentin) tablet 1 mg 2 times daily as needed extrapyramidal symptoms  -Vitals and neuro checks with PRN blood pressure meds for SBP > 200  Code 1 restrictive unit  Full code  Invega 6 mg oral daily  Risperidone 0.5 mg at bedtime.       Divalproex sodium delayed release (Depakote DR) tablet " 1,250 mg at bedtime  Haloperidol (Haldol) tablet 5 mg oral every 8 hours as needed agitation  And  Diphenhydramine (Benadryl) capsule 50 mg oral every 8 hours as needed agitation  Haloperidol lactate (Haldol) injection 5 mg intramuscular every 8 hours as needed agitation  And  Diphenhydramine (Benadryl) injection 50 mg intramuscular every 8 hours as needed agitation  Gabapentin (Neurontin) capsule 200 mg 3 times daily as needed anxiety  Hydroxyzine HCl (Atarax) tablet 50 mg oral 3 times daily as needed anxiety  Legal status Court Hold  No COVID-19 virus (coronavirus) screening test needed or already ordered/completed  Routine programming  [place]-docusate (Senokot-S/Marisela-Colace) 8.6-50 mg, 1 tablet oral 2 times daily as needed constipation  Status 15 every 15-minute safety check  Suicide precaution suicide risk Low  Melatonin tablet 3 mg at bedtime as needed sleep  Vital signs and pain assessment  Weight patient routine every TU, TH, SA.  5. Consultations:  Hospitalist to follow as needed.  IM to follow for every medical conditions.  6. Structure and Supervision  Unit [address].  Barriers to Discharge: Danisha, agitation.  7.   is following in regards to collecting and reviewing collateral information, referrals and disposition planning.  Legal: Court hold - notice of intent to revoke stay of commitment   Hand submitted 6/18 - [place] hearing is July 2nd   Referrals: CTC to facilitate all referrals  Care Coordination: CTC to coordinate care with external providers  Placement: CTC to facilitate placement following symptom stabilization  Anticipated Discharge: 14 days      . Further treatment programming to be determined throughout the hospital course.     [facility]: Patient able to contract for safety and Patient on precautions     This note was created with help of Dragon dictation system. Grammatical / typing errors are not intentional.  Risks, benefits, and alternatives  discussed at length with patient.         Acute Medical Problems and Treatments:  Acute medical concerns:  - No acute medical concerns     Pertinent labs/imaging:  Recent Results   Recent Results             Recent Results (from the past 504 hour(s))   Valproic acid     Collection Time: 06/11/24  9:50 AM   Result Value Ref Range     Valproic acid 92.7   ug/mL   Comprehensive metabolic panel     Collection Time: 06/11/24  9:50 AM   Result Value Ref Range     Sodium 139 135 - 145 mmol/L     Potassium 3.9 3.4 - 5.3 mmol/L     Carbon Dioxide (CO2) 27 22 - 29 mmol/L     Anion Gap 10 7 - 15 mmol/L     Urea Nitrogen 7.8 6.0 - 20.0 mg/dL     Creatinine 0.84 0.67 - 1.17 mg/dL     GFR Estimate >90 >60 mL/min/1.73m2     Calcium 9.6 8.6 - 10.0 mg/dL     Chloride 102 98 - 107 mmol/L     Glucose 125 (H) 70 - 99 mg/dL     Alkaline Phosphatase 51 40 - 150 U/L     AST 12 0 - 45 U/L     ALT 18 0 - 70 U/L     Protein Total 6.4 6.4 - 8.3 g/dL     Albumin 4.2 3.5 - 5.2 g/dL     Bilirubin Total 0.2 <=1.2 mg/dL   Hemoglobin A1c     Collection Time: 06/11/24  9:50 AM   Result Value Ref Range     Hemoglobin A1C 5.4 <5.7 %   Lipid panel     Collection Time: 06/11/24  9:50 AM   Result Value Ref Range     Cholesterol 157 <200 mg/dL     Triglycerides 191 (H) <150 mg/dL     Direct Measure HDL 31 (L) >=40 mg/dL     LDL Cholesterol Calculated 88 <=100 mg/dL     Non HDL Cholesterol 126 <130 mg/dL   CBC with platelets and differential     Collection Time: 06/11/24  9:50 AM   Result Value Ref Range     WBC Count 4.9 4.0 - 11.0 10e3/uL     RBC Count 4.84 4.40 - 5.90 10e6/uL     Hemoglobin 13.9 13.3 - 17.7 g/dL     Hematocrit 41.4 40.0 - 53.0 %     MCV 86 78 - 100 fL     MCH 28.7 26.5 - 33.0 pg     MCHC 33.6 31.5 - 36.5 g/dL     RDW 12.9 10.0 - 15.0 %     Platelet Count 190 150 - 450 10e3/uL     % Neutrophils 50 %     % Lymphocytes 40 %     % Monocytes 5 %     % Eosinophils 4 %     % Basophils 1 %     % Immature Granulocytes 0 %     NRBCs per 100 WBC 0  <1 /100     Absolute Neutrophils 2.5 1.6 - 8.3 10e3/uL     Absolute Lymphocytes 2.0 0.8 - 5.3 10e3/uL     Absolute Monocytes 0.2 0.0 - 1.3 10e3/uL     Absolute Eosinophils 0.2 0.0 - 0.7 10e3/uL     Absolute Basophils 0.0 0.0 - 0.2 10e3/uL     Absolute Immature Granulocytes 0.0 <=0.4 10e3/uL     Absolute NRBCs 0.0 10e3/uL   Valproic acid     Collection Time: 06/14/24  2:20 PM   Result Value Ref Range     Valproic acid 75.3   ug/mL   Ammonia     Collection Time: 06/14/24  2:20 PM   Result Value Ref Range     Ammonia 85 (H) 16 - 60 umol/L   Ammonia     Collection Time: 06/19/24 10:28 AM   Result Value Ref Range     Ammonia 47 16 - 60 umol/L   Valproic acid     Collection Time: 06/19/24 10:28 AM   Result Value Ref Range     Valproic acid 75.2   ug/mL   Extra Green Top (Lithium Heparin) Tube     Collection Time: 06/19/24 10:28 AM   Result Value Ref Range     Hold Specimen JI

## 2024-07-05 NOTE — PLAN OF CARE
"Assumed care for pt at 11:30 AM.     Pt endorses anxiety \"beyond\" a 10/10. Given PRN Atarax by other RN this AM, with patient subsequently requesting and given Benadryl + Haldol combo, indicating that Atarax was ineffective.   Endorses depression rated 6/10. Denies SI/HI, A/VH. Future-oriented. Speech is linear, logical, loud. Sleeping near end of shift.     Denies pain, denies having any acute physical concerns at this time. No adverse effects of medication noted or reported.   "

## 2024-07-05 NOTE — PLAN OF CARE
BEH IP Unit Acuity Rating Score (UARS)  Patient is given one point for every criteria they meet.     CRITERIA SCORING   On a 72 hour hold, court hold, committed, stay of commitment, or revocation. 1    Patient LOS on BEH unit exceeds 20 days. 1  LOS: 28   Patient under guardianship, 55+, otherwise medically complex, or under age 11. 0   Suicide ideation without relief of precipitating factors. 0   Current plan for suicide. 0   Current plan for homicide. 0   Imminent risk or actual attempt to seriously harm another without relief of factors precipitating the attempt. 1   Severe dysfunction in daily living (ex: complete neglect for self care, extreme disruption in vegetative function, extreme deterioration in social interactions). 1   Recent (last 7 days) or current physical aggression in the ED or on unit.  0   Restraints or seclusion episode in past 72 hours. 0   Recent (last 7 days) or current verbal aggression, agitation, yelling, etc., while in the ED or unit. Last 6/26/24 0   Active psychosis. 0   Need for constant or near constant redirection (from leaving, from others, etc).  0   Intrusive or disruptive behaviors. 0   Patient requires 3 or more hours of individualized nursing care per 8-hour shift (i.e. for ADLs, meds, therapeutic interventions). 0   TOTAL 4

## 2024-07-05 NOTE — PLAN OF CARE
Problem: Adult Behavioral Health Plan of Care  Goal: Plan of Care Review  Outcome: Progressing  Flowsheets (Taken 7/4/2024 1800)  Patient Agreement with Plan of Care: agrees     Problem: Psychotic Signs/Symptoms  Goal: Improved Mood Symptoms (Psychotic Signs/Symptoms)  Outcome: Progressing  Intervention: Optimize Emotion and Mood  Recent Flowsheet Documentation  Taken 7/4/2024 1800 by Elijah Kelly RN  Supportive Measures:   active listening utilized   decision-making supported   self-responsibility promoted   verbalization of feelings encouraged  Diversional Activity: television   Goal Outcome Evaluation:    Plan of Care Reviewed With: patient    Pt endorsed anxiety and rated at 3/10, depression 4/10, but denies pain, SI/SIB/HI/AVH, and contracted for safety. Pt is isolative and withdrawn to room, but comes out for meals. Pt is pleasant with a bright affect. Affect is consistent with mood. Judgment and insight is appropriate to situation. Thought content presents as relevant, thought process presents as logical. Pt is eating and drinking adequately. Pt is medication compliant. No medication adverse effects noted. No safety or behavioral concerns noted. Will continue with same plan of care.

## 2024-07-05 NOTE — PLAN OF CARE
Initial meeting note:    Therapist introduced self to patient and discussed psychotherapy service available to patient.     Pt response: Pt expressed interest in meeting with therapist    Plan: Made plan to meet with pt again; began identifying goals     Met with pt for about 10 minutes. Pt shared sadness and frustration around his discharge delay but was also very understanding and mellow about about it. Pt shared he feels better now than when he first arrived and shared he feels more collected. Pt ended our conversation to call his mom.     Checked back in with pt later and he did not want to talk.

## 2024-07-06 PROCEDURE — 250N000013 HC RX MED GY IP 250 OP 250 PS 637: Performed by: CLINICAL NURSE SPECIALIST

## 2024-07-06 PROCEDURE — 250N000013 HC RX MED GY IP 250 OP 250 PS 637: Performed by: PSYCHIATRY & NEUROLOGY

## 2024-07-06 PROCEDURE — 124N000002 HC R&B MH UMMC

## 2024-07-06 RX ADMIN — RISPERIDONE 0.5 MG: 0.5 TABLET, FILM COATED ORAL at 19:14

## 2024-07-06 RX ADMIN — NICOTINE POLACRILEX 4 MG: 4 GUM, CHEWING BUCCAL at 12:28

## 2024-07-06 RX ADMIN — HYDROXYZINE HYDROCHLORIDE 50 MG: 50 TABLET ORAL at 12:28

## 2024-07-06 RX ADMIN — PALIPERIDONE 6 MG: 3 TABLET, EXTENDED RELEASE ORAL at 09:11

## 2024-07-06 RX ADMIN — NICOTINE POLACRILEX 4 MG: 4 GUM, CHEWING BUCCAL at 15:20

## 2024-07-06 RX ADMIN — DIPHENHYDRAMINE HYDROCHLORIDE 50 MG: 50 CAPSULE ORAL at 13:32

## 2024-07-06 RX ADMIN — HALOPERIDOL 5 MG: 5 TABLET ORAL at 13:32

## 2024-07-06 RX ADMIN — DIVALPROEX SODIUM 1250 MG: 500 TABLET, DELAYED RELEASE ORAL at 19:13

## 2024-07-06 RX ADMIN — Medication 3 MG: at 19:13

## 2024-07-06 RX ADMIN — NICOTINE POLACRILEX 4 MG: 4 GUM, CHEWING BUCCAL at 08:38

## 2024-07-06 RX ADMIN — NICOTINE POLACRILEX 4 MG: 4 GUM, CHEWING BUCCAL at 13:33

## 2024-07-06 ASSESSMENT — ACTIVITIES OF DAILY LIVING (ADL)
ADLS_ACUITY_SCORE: 30
ORAL_HYGIENE: INDEPENDENT
ADLS_ACUITY_SCORE: 30
DRESS: INDEPENDENT;SCRUBS (BEHAVIORAL HEALTH)
HYGIENE/GROOMING: INDEPENDENT
ADLS_ACUITY_SCORE: 30

## 2024-07-06 NOTE — PLAN OF CARE
Problem: Adult Behavioral Health Plan of Care  Goal: Adheres to Safety Considerations for Self and Others  Outcome: Progressing  Intervention: Develop and Maintain Individualized Safety Plan  Recent Flowsheet Documentation  Taken 7/6/2024 8413 by Layne Shah RN  Safety Measures: safety rounds completed     Problem: Sleep Disturbance  Goal: Adequate Sleep/Rest  Outcome: Progressing   Goal Outcome Evaluation:       Patient slept all night without any concern. Safety checks were conducted, breathing was normal,no pain reported and patient did not seem to be in any distress.      Sleep Hour: 6.5

## 2024-07-06 NOTE — PLAN OF CARE
Patient presents as calm, pleasant, and cooperative on approach.   Venancio has demonstrated good frustration tolerance and mood stability.  He has been behaviorally controlled over the balance of this day shift.  He denies that he is experiencing any manic/ psychotic symptoms or dangerous ideations at this time.  He accepted his scheduled dose of Invega this AM, but he refused his nicotine patch.  He requested and was given a dose of PRN Hydroxyzine 50 mg at 12:28 for c/o anxiety; he reported minimal relief when later reassessed.  At that point, Venancio reported that his severe anxiety was progressing to mounting agitation.  He requested and was given a dose of PRN Haldol 5 mg and PRN Benadryl 50 mg at 13:32 for c/o agitation; he reported significant relief when later reassessed. Venancio denies any SE's or acute physical concerns at this time. /78   Pulse 86   Temp 98  F (36.7  C) (Temporal)   Resp 16   Wt 92.1 kg (203 lb 1.6 oz)   SpO2 99%

## 2024-07-06 NOTE — PLAN OF CARE
Problem: Adult Inpatient Plan of Care  Goal: Plan of Care Review  Outcome: Progressing     Alertness: Alert and oriented  Affect: bright  Mood: happy; hopefull  Appearance: well-groomed,showered  Thought Process: Linear  Thought Content: Denies auditory hallucinations; paranoia and thoughts of harming self/others.   Anxiety & Depression: Endorses improvements of anxiety & depression; denies symptoms today.   Skin: WNL per patient report.  Bowel/bladder: Denies bowel and bladder.   Appetite: Ate 100% of his dinner tray  Medications: Started Invega 6 mg with the goal to switch GAMEZ Invega prior to discharge.   PRN medications this shift: Melatonin 3 mg to aid sleep    Milieu Participation: Patient was present in the milieu and appeared social and interactive with peers. Patient has a full range affect, normal speech and some insight into his situation. Patient voices their needs appropriately. No acute behavioral concern with patient this shift.     Patient is eating, hydrating, and sleeping adequately. Patient is medication compliant with reminders. Medication side effects were not observed or reported this shift. Patient denies pain/ physical discomforts. No acute medical concern. VS WNL /76 (BP Location: Left arm)   Pulse 92   Temp 98  F (36.7  C) (Oral)   Resp 18   Wt 92.1 kg (203 lb 1.6 oz)   SpO2 94%

## 2024-07-07 PROCEDURE — 250N000013 HC RX MED GY IP 250 OP 250 PS 637: Performed by: CLINICAL NURSE SPECIALIST

## 2024-07-07 PROCEDURE — 124N000002 HC R&B MH UMMC

## 2024-07-07 PROCEDURE — 250N000013 HC RX MED GY IP 250 OP 250 PS 637: Performed by: PSYCHIATRY & NEUROLOGY

## 2024-07-07 RX ADMIN — DIVALPROEX SODIUM 1250 MG: 500 TABLET, DELAYED RELEASE ORAL at 19:08

## 2024-07-07 RX ADMIN — GABAPENTIN 200 MG: 100 CAPSULE ORAL at 10:04

## 2024-07-07 RX ADMIN — Medication 3 MG: at 19:08

## 2024-07-07 RX ADMIN — PALIPERIDONE 6 MG: 3 TABLET, EXTENDED RELEASE ORAL at 08:56

## 2024-07-07 RX ADMIN — DIPHENHYDRAMINE HYDROCHLORIDE 50 MG: 50 CAPSULE ORAL at 20:13

## 2024-07-07 RX ADMIN — NICOTINE POLACRILEX 4 MG: 4 GUM, CHEWING BUCCAL at 10:04

## 2024-07-07 RX ADMIN — NICOTINE POLACRILEX 4 MG: 4 GUM, CHEWING BUCCAL at 08:56

## 2024-07-07 RX ADMIN — RISPERIDONE 0.5 MG: 0.5 TABLET, FILM COATED ORAL at 19:08

## 2024-07-07 RX ADMIN — HALOPERIDOL 5 MG: 5 TABLET ORAL at 11:02

## 2024-07-07 RX ADMIN — DIPHENHYDRAMINE HYDROCHLORIDE 50 MG: 50 CAPSULE ORAL at 11:02

## 2024-07-07 RX ADMIN — HYDROXYZINE HYDROCHLORIDE 50 MG: 50 TABLET ORAL at 09:04

## 2024-07-07 ASSESSMENT — ACTIVITIES OF DAILY LIVING (ADL)
ADLS_ACUITY_SCORE: 30
LAUNDRY: UNABLE TO COMPLETE
ADLS_ACUITY_SCORE: 30
DRESS: INDEPENDENT;SCRUBS (BEHAVIORAL HEALTH)
ADLS_ACUITY_SCORE: 30
ORAL_HYGIENE: INDEPENDENT
ADLS_ACUITY_SCORE: 30
HYGIENE/GROOMING: INDEPENDENT
ADLS_ACUITY_SCORE: 30

## 2024-07-07 NOTE — PLAN OF CARE
"Goal Outcome Evaluation:      Pt was calm and cooperative today. He spent most of the shift out in the milieu, watching tv, and hanging out in his room.  Denies all mental health symptoms including paranoia,delusions, visual and command hallucinations ,SI,SIB,and HI. Committed to safe behavior. Did endorse anxiety, and depression .  Did talk about frustration with d/ c and feeling \"sad\". Looking forward to go home, play video gamed and find a job he enjoys. Also expressed he thinks his medication regimen is working for him.No medical concerns. No behavior concerns. Denies pain. No med side effects observed or noted. Pt went to bed around 19:30 complaining of fatigue.     /69 (BP Location: Right arm)   Pulse 95   Temp 98.5  F (36.9  C) (Temporal)   Resp 16   Wt 92.1 kg (203 lb 1.6 oz)   SpO2 97%        Last 24H PRN:     diphenhydrAMINE (BENADRYL) capsule 50 mg, 50 mg at 07/06/24 1332 **AND** haloperidol (HALDOL) tablet 5 mg, 5 mg at 07/06/24 1332    hydrOXYzine HCl (ATARAX) tablet 50 mg, 50 mg at 07/06/24 1228    melatonin tablet 3 mg, 3 mg at 07/06/24 1913    nicotine polacrilex (NICORETTE) gum 4 mg, 4 mg at 07/06/24 1520     "

## 2024-07-07 NOTE — PLAN OF CARE
Patient presents as calm, pleasant, and cooperative on approach.  Venancio reports that he is feeling stable and ready to discharge back to his group home. He continues to experience some stress and anxiety related to being in the hospital.  Venancio demonstrated good behavioral control this shift with no episodes of intrusive or aggressive behavior noted this shift. He accepted all of his scheduled medications without incident this morning- with the exception of his Nicotine patch, which he has refused for several days- and no adverse side effects have been reported or observed.     He requested and was given the following PRN medications this shift:    PRN Hydroxyzine 50 mg at 09:04 for c/o anxiety (patient reports minimal relief upon reassessment an hour later).     PRN Gabapentin 200 mg at 10:04 for c/o  worsening anxiety (patient reports insufficient relief upon reassessment an hour later)     PRN Haldol 5 mg and PRN Benadryl 50 mg at 11:02.   He reported moderate relief upon reassessment.    He denies any acute physical concerns at this time.  /76   Pulse 98   Temp 98  F (36.7  C) (Temporal)   Resp 16   Wt 92.1 kg (203 lb 1.6 oz)   SpO2 96%

## 2024-07-08 PROCEDURE — 124N000002 HC R&B MH UMMC

## 2024-07-08 PROCEDURE — 250N000013 HC RX MED GY IP 250 OP 250 PS 637: Performed by: CLINICAL NURSE SPECIALIST

## 2024-07-08 PROCEDURE — 250N000013 HC RX MED GY IP 250 OP 250 PS 637: Performed by: PSYCHIATRY & NEUROLOGY

## 2024-07-08 PROCEDURE — 99232 SBSQ HOSP IP/OBS MODERATE 35: CPT | Performed by: CLINICAL NURSE SPECIALIST

## 2024-07-08 PROCEDURE — G0177 OPPS/PHP; TRAIN & EDUC SERV: HCPCS

## 2024-07-08 RX ADMIN — NICOTINE POLACRILEX 4 MG: 4 GUM, CHEWING BUCCAL at 10:42

## 2024-07-08 RX ADMIN — DIVALPROEX SODIUM 1250 MG: 500 TABLET, DELAYED RELEASE ORAL at 19:02

## 2024-07-08 RX ADMIN — HYDROXYZINE HYDROCHLORIDE 50 MG: 50 TABLET ORAL at 10:42

## 2024-07-08 RX ADMIN — PALIPERIDONE 6 MG: 3 TABLET, EXTENDED RELEASE ORAL at 08:41

## 2024-07-08 RX ADMIN — GABAPENTIN 200 MG: 100 CAPSULE ORAL at 12:02

## 2024-07-08 RX ADMIN — RISPERIDONE 0.5 MG: 0.5 TABLET, FILM COATED ORAL at 19:04

## 2024-07-08 RX ADMIN — DIPHENHYDRAMINE HYDROCHLORIDE 50 MG: 50 CAPSULE ORAL at 12:43

## 2024-07-08 RX ADMIN — HALOPERIDOL 5 MG: 5 TABLET ORAL at 12:43

## 2024-07-08 RX ADMIN — Medication 3 MG: at 19:02

## 2024-07-08 RX ADMIN — NICOTINE POLACRILEX 4 MG: 4 GUM, CHEWING BUCCAL at 08:42

## 2024-07-08 ASSESSMENT — ACTIVITIES OF DAILY LIVING (ADL)
ADLS_ACUITY_SCORE: 30
ORAL_HYGIENE: INDEPENDENT
ADLS_ACUITY_SCORE: 30
DRESS: SCRUBS (BEHAVIORAL HEALTH);INDEPENDENT
ADLS_ACUITY_SCORE: 30
HYGIENE/GROOMING: INDEPENDENT
ADLS_ACUITY_SCORE: 30

## 2024-07-08 NOTE — CONSULTS
Consulted to run a test claim for [employer]kip Navas.    Patient has pharmacy benefits through [email or web address]. Per insurance, the following are covered and preferred under the patient's plans:     Invega Sustenna - $0    Note: For GAMEZ medications, if being injected in-clinic and not in a home setting, many clinics will not use home supply for administration as they cannot verify the conditions under which the medication is kept. These injections will have to be ordered and administered as in-clinic medications under medical benefit.    If patient is receiving this injection in-clinic, medication will need to be billed under medical benefits. Discharge pharmacy liaison is unable to verify coverage under the medical benefit. To verify coverage under medical benefits, patient, SW, RNCC, CM, or other member of care team may::    Contact Member Services department of patient's insurance, OR   Complete a cost of care estimate request by calling [phone] or via [email or web address]  Only valid to check benefits if receiving injection at an [facility] or Infusion Site      When an outpatient order for this injection is placed in chart along with an appointment for administration at an [facility]/infusion center, coverage will be secured by the CAM and Infusion Finance teams. Additional questions should be directed to the CAM or Infusion Finance teams:     CAM Finance team Infusion Finance team   [facility]: P CAM  P INFUSION FINANCE SPECIALISTS   Email Group: DEPT-CAM-FINANCE-SPECIALIST DEPT-INF-FINANCE-KWL-[email or web address]   Phone: [phone]        Please feel free to contact me with any other test claims, prior authorizations, or insurance questions regarding outpatient medications.     Thanks!      Taylor Rahman CPhT  Discharge Pharmacy Liaison  [facility]  Pronouns:  She/Her/Hers    Securely message with P[employer], Epic Secure Chat, or [employer]  Phone: [phone]  Fax: [phone]  [email or web address]

## 2024-07-08 NOTE — PLAN OF CARE
Presents as calm, cooperative. Visible in milieu, social.  Pleasant, engages in appropriate humor. Anxiety 3/10. Denies depression. Denies A/VH. Denies SI/HI.     Took scheduled medication without incident. PRN melatonin.     Denies pain. Denies having any physical concerns a this time. No adverse effects of medication noted or reported.      /80 (BP Location: Right arm)   Pulse 104   Temp 97.2  F (36.2  C) (Temporal)   Resp 16   Wt 92.1 kg (203 lb 1.6 oz)   SpO2 98%

## 2024-07-08 NOTE — CARE PLAN
"  Rehab Group    Start time: 1030  End time: 1115  Patient time total: 45 minutes    attended full group    #4 attended   Group Type: general health and coping   Group Topic Covered: coping skills     Group Session Detail:  Pt attended a self reflection OT discussion.  Participants were guided through discussion questions that reflected on the their challenges, stressors, and ways to improve \"their corner of the world\".     Patient Response/Contribution:  positive affect, cooperative with task, supportive of peers, and socially appropriate     Patient Detail:    Pt was an active participant.  Demonstrated active listening to others, provided helpful feedback. Pt shares he's been playing a particular video game to help pass time and provide structure during unstructured time.  Pt also shares he's taking a particular PRN to \"improve his corner of the world\", acknowledges this won't be available to him outside of the hospital, though also states he won't need it, as he'll be in a different environment.        Train & Education Service Per Session 45 + Minutes () OT Group code    Patient Active Problem List   Diagnosis    Bipolar disorder (H)    ADHD (attention deficit hyperactivity disorder)    Bipolar I disorder with jose (H)       "

## 2024-07-08 NOTE — PROGRESS NOTES
Uneventful shift for pt, he has been isolative in room sleeping and relaxing. Per check in he denies all mental health symptoms including anxiety, depression, paranoia and delusions. He denies SI/SIB/HI and also denies hearing voices. Venancio denies pain and denies any medication side effects. No medical concerns noted this shift. He states he has a good appetite and is also hydrating fine.     Melatonin given at 1908 for sleep; it pt complained that it did not help him sleep He asked for Benadryl 50 mg and was given one dose at 2013.     /65   Pulse 107   Temp 97.7  F (36.5  C) (Temporal)   Resp 16   Wt 92.1 kg (203 lb 1.6 oz)   SpO2 96%

## 2024-07-08 NOTE — PLAN OF CARE
Team Note Due:  Monday     Assessment/Intervention/Current Symtoms and Care Coordination:  Chart review and met with team, discussed pt progress, symptomology, and response to treatment. Discussed the discharge plan and any potential impediments to discharge.      Per team, provider is waiting to see if Invega is approved. Provider stated if not he may amend porter.  I checked with Taylor RUEDA at pharmacy who stated Invega is approved.     Invega Sustenna is covered under the plan, specifically under the pharmacy benefits.  If receiving in clinic, you will need to touch base with clinic/medical benefit as they often bill the injection under medical benefit. (If [place], I believe they bill through the pharmacy benefit)    I emailed [facility] to ask which pharmacy they want the Invega script sent to, Pt will get loading dose tomorrow and then need another dose 5 days after. I also asked who will be picking pt up on Wednesday and/or do they want him cabbed.     I met w pt to confirm his Invega is covered and he can discharge Wednesday. He is happy. He shared his mom plans to pick him up. He gave me permission to call her to confirm. I did call and confirmed. She will pick Pt up Wed at 12:30PM. Yolanda (mom): [phone]. I updated his team.    Attempted to call [facility] about which pharmacy as well. Will wait to follow up again tomorrow.     I completed provisional discharge and sent to Chelsea Brennan for signature.      Discharge Plan or Goal:    Discharge Wednesday July 10th - Time: 12:30PM.  Transportation: Mom Yolanda will  from here.     Location: [facility] through [facility]  [address] [place] [address]     Barriers to Discharge:  Waiting for Invega loading dose then discharge Wednesday.      Referral Status:  None     Legal Status:  Committed w/[facility]- Medications: Haldol, Risperdal, Invega, Zyprexa, Seroquel, and Abilify  [facility]: [facility]  File Number: [id number]     Pt's   is:  Carlos Gonzales  [place] [employer]  Ph:  [phone]     Beth Garza    [address] [place] [address]  Phone: [phone]  [email or web address]     Contacts:  [facility]:  Norman Loyola <[email or web address]> and John Espana <[email or web address]> and Tal Ken as Norman Loyola is away.   Darline Romo ( for retirement): [phone] - RENNY on file for retirement staff  Bree (retirement ): [phone] RENNY on file for group home staff  Email: [email or web address]     :  Chelsea Brennan ([facility]  Ph: [phone] - Commitment CM no RENNY needed   [email or web address]..  [facility]  Ph: [phone]  Christi (member of case management team): [phone]     Medication Management/Psychiatry:  Psychiatry appointment: Friday, July 12, 2024 @ 2:10pm In-person   Provider: MARIO Weinberg CNP   Location: [facility], [address] [place] [address]  Phone: [phone]  Fax: [phone]     Other:  Yolanda (mom): [phone] - Pt declines RENNY for Mom      Upcoming Meetings and Dates/Important Information and next steps:  Will need COS and PD

## 2024-07-08 NOTE — PLAN OF CARE
BEH IP Unit Acuity Rating Score (UARS)  Patient is given one point for every criteria they meet.    CRITERIA SCORING   On a 72 hour hold, court hold, committed, stay of commitment, or revocation. 1    Patient LOS on BEH unit exceeds 20 days. 1  LOS: 31   Patient under guardianship, 55+, otherwise medically complex, or under age 11. 0   Suicide ideation without relief of precipitating factors. 0   Current plan for suicide. 0   Current plan for homicide. 0   Imminent risk or actual attempt to seriously harm another without relief of factors precipitating the attempt. 0   Severe dysfunction in daily living (ex: complete neglect for self care, extreme disruption in vegetative function, extreme deterioration in social interactions). 0   Recent (last 7 days) or current physical aggression in the ED or on unit. 0   Restraints or seclusion episode in past 72 hours. 0   Recent (last 7 days) or current verbal aggression, agitation, yelling, etc., while in the ED or unit. 0   Active psychosis. 0   Need for constant or near constant redirection (from leaving, from others, etc).  0   Intrusive or disruptive behaviors. 0   Patient requires 3 or more hours of individualized nursing care per 8-hour shift (i.e. for ADLs, meds, therapeutic interventions). 0   TOTAL 2

## 2024-07-08 NOTE — PROVIDER NOTIFICATION
07/08/24 1152   Individualization/Patient Specific Goals   Patient Personal Strengths expressive of emotions;resilient   Patient Vulnerabilities traumatic event;lacks insight into illness;history of unsuccessful treatment   Behavioral Team Discussion   Participants Mariano Pacheco CNP, Lilian LEYVA, RN Marin   Progress Pt is improving appears less anxious and manic as he is able to go back to his previous group home. Invega was approved. Discharge Wed 7/10 at 12:30PM   Continued Stay Criteria/Rationale Symptom stabilization and safe discharge planning   Medical/Physical See H&P   Precautions See below   Plan Discharge back to group home   Rationale for change in precautions or plan N/A   Safety Plan Completed with therapist   Anticipated Discharge Disposition group home     Goal Outcome Evaluation:               PRECAUTIONS AND SAFETY    Behavioral Orders   Procedures    Assault precautions    Code 1 - Restrict to Unit    Routine Programming     As clinically indicated    Status 15     Every 15 minutes.    Suicide precautions: Suicide Risk: LOW; Clinical rationale to override score: modification to the care environment     Patients on Suicide Precautions should have a Combination Diet ordered that includes a Diet selection(s) AND a Behavioral Tray selection for Safe Tray - with utensils, or Safe Tray - NO utensils       Order Specific Question:   Suicide Risk     Answer:   LOW     Order Specific Question:   Clinical rationale to override score:     Answer:   modification to the care environment       Safety  Safety WDL: WDL  Patient Location: patient room, own  Observed Behavior: sleeping  Observed Behavior (Comment): on the phone vs watching TV in room  Safety Measures: safety rounds completed  Diversional Activity: television  Suicidality: Status 15, Behavioral scrubs (pajamas), Minimal furniture in room, Minimal personal belongings in room  Assault: status 15, private room, behavioral scrubs (pajamas),  minimal furniture in room, minimal personal belongings in room  Elopement Interventions: status 15

## 2024-07-08 NOTE — CARE PLAN
Rehab Group    Start time: 1115  End time: 1200  Patient time total: 45 minutes    attended full group     #3 attended   Group Type: OT Clinic   Group Topic Covered: activity therapy     Group Session Detail:  OT clinic group provides an opportunity for individual-based goal directed activity within a group setting - allowing for interaction and socialization.  Hands-on task work help to build/restore/or maintain skills such as: focus, concentration, decision making, and problem solving.  Patients are encouraged to carry over potential new interests/hobbies learned in group following discharge.     Patient Response/Contribution:  positive affect, cooperative with task, and organized     Patient Detail:    Congruent and engaged. Polite to writer and peers. Demonstrated consistent performance skills as observed on previous dates - independent with all simple tasks. Observed with calming effect while completing project - watercolor painting while socializing/listening to music.          Train & Education Service Per Session 45 + Minutes () OT Group code    Patient Active Problem List   Diagnosis    Bipolar disorder (H)    ADHD (attention deficit hyperactivity disorder)    Bipolar I disorder with jose (H)

## 2024-07-08 NOTE — PLAN OF CARE
Assumed care for pt at 11AM.    Overtly calm, cooperative, pleasant, social. Full range of affect. Reported by previous nurse to have denied SI/HI, A/VH. Endorses anxiety, restlessness, and agitation. Given prn gabapentin, which pt reported as ineffective. Given Haldol + Benadryl combo, which pt reported as effective in reducing anxiety/agitation.     Visible in milieu, participated group, playing video game in [employer].    Denies pain. Refused VS assessment

## 2024-07-08 NOTE — PROGRESS NOTES
The patient's care was discussed with the treatment team during the daily team meeting and/or staff's chart notes were reviewed.  Staff report  Pt slept 6.5 hours overnight. Evening staff report: Uneventful shift for pt, he has been isolative in room sleeping and relaxing. Per check in he denies all mental health symptoms including anxiety, depression, paranoia and delusions. He denies SI/SIB/HI and also denies hearing voices. Venancio denies pain and denies any medication side effects. No medical concerns noted this shift. He states he has a good appetite and is also hydrating fine.      Melatonin given at 1908 for sleep; it pt complained that it did not help him sleep He asked for Benadryl 50 mg and was given one dose at 2013.      /65   Pulse 107   Temp 97.7  F (36.5  C) (Temporal)   Resp 16   Wt 92.1 kg (203 lb 1.6 oz)   SpO2 96%    Upon interview, patient reported he had a good weekend, played some cribbage with his peers.  He talked about a new game that was out yesterday, patient appears a little bit anxious, rocking himself back and forth in bed, focused on Wednesday discharge.  Patient reported that his mom will be picking him up at 12:30 PM on Wednesday.  Patient shared that his only concern is whether or not the pharmacy is able to confirm the insurance coverage for his Invega shot today which he believes could shift the discharge date forward.    Patient was reassured that we will get the pharmacy response today and that was the reason why the Invega shot was scheduled for tomorrow instead of today.  Patient is future oriented, he has started a post discharge planning, what he would do with the new game, his IT program and the temporary job he wishes to apply for.  stated he needed to join the occupational group after this assessment.    Patient reported that his anxiety level is reduced, believing that things will work out well for him as planned.  Patient denied auditory/visual hallucinations,  "he denied suicidal/homicidal ideation and thoughts of self-harm.  Patient seems to have no further questions or concerns at this time.    Pharmacist (Taylor Rahman) informed the team that Invega is approved and covered under the plan, specifically under the pharmacy benefit. The CTC (Lilian SIGALA) is working with the [facility] to coordinate with pharmacy to send the Invega script to.              Mental Status Exam:  General appearance: Awake, dressed in hospital yellow scrubs, appeared at stated age  Attitude:  Cooperative  Affect:   anxious,   Mood: \"Good\"   Speech:  normal volume and tone  Eye contact:  good.    Psychomotor behavior : Fidgety in bed  Gait: steady   Abnormal movements:  none  Delusions: denied  Hallucinations: denied  Thoughts: Goal oriented, appropriate  Associations: No Loosening association  Judgement: fair  Insight: limited  Cognitions: intact in conversation  Memory:  intact in conversation  Orientation: normal    Not suicidal. denied  Not homicidal: denied        Current Facility-Administered Medications                     Current Facility-Administered Medications   Medication Dose Route Frequency Provider Last Rate Last Admin    acetaminophen (TYLENOL) tablet 650 mg  650 mg Oral Q4H PRN Javier Card MD        alum & mag hydroxide-simethicone (MAALOX) suspension 30 mL  30 mL Oral Q4H PRN Javier Card MD        benztropine (COGENTIN) tablet 1 mg  1 mg Oral BID PRN Javier Card MD        haloperidol (HALDOL) tablet 5 mg  5 mg Oral Q8H PRN Satya Garza MD   5 mg at 06/11/24 2042     And    LORazepam (ATIVAN) tablet 2 mg  2 mg Oral Q8H PRSatya Bauer MD   2 mg at 06/11/24 2042     And    diphenhydrAMINE (BENADRYL) capsule 50 mg  50 mg Oral Q8H PRN Satya Garza MD   50 mg at 06/11/24 2042    haloperidol lactate (HALDOL) injection 5 mg  5 mg Intramuscular Q8H PRSatya Bauer MD         And    LORazepam (ATIVAN) injection 2 mg  2 mg Intramuscular " Q8H PRN Satya Garza MD         And    diphenhydrAMINE (BENADRYL) injection 50 mg  50 mg Intramuscular Q8H PRN Satya Garza MD        divalproex sodium delayed-release (DEPAKOTE) DR tablet 1,500 mg  1,500 mg Oral At Bedtime Javier Card MD   1,500 mg at 06/11/24 1937    hydrOXYzine HCl (ATARAX) tablet 50 mg  50 mg Oral TID PRN Javier Card MD   50 mg at 06/12/24 1124    melatonin tablet 3 mg  3 mg Oral At Bedtime PRN Javier Card MD   3 mg at 06/10/24 2014    nicotine (NICORETTE) gum 2 mg  2 mg Buccal Q1H PRN Javier Card MD   2 mg at 06/12/24 1333    risperiDONE (risperDAL) tablet 1 mg  1 mg Oral Satya Montez MD   1 mg at 06/12/24 1007    risperiDONE (risperDAL) tablet 4 mg  0.5 mg Oral At Bedtime MARIO Salguero, CNP   4 mg at 06/11/24 1938    senna-docusate (SENOKOT-S/PERICOLACE) 8.6-50 MG per tablet 1 tablet  1 tablet Oral BID PRN Javier Card MD             Recent Results[]Expand by Default             Recent Results (from the past 168 hour(s))   Valproic acid     Collection Time: 06/11/24  9:50 AM   Result Value Ref Range     Valproic acid 92.7   ug/mL   Comprehensive metabolic panel     Collection Time: 06/11/24  9:50 AM   Result Value Ref Range     Sodium 139 135 - 145 mmol/L     Potassium 3.9 3.4 - 5.3 mmol/L     Carbon Dioxide (CO2) 27 22 - 29 mmol/L     Anion Gap 10 7 - 15 mmol/L     Urea Nitrogen 7.8 6.0 - 20.0 mg/dL     Creatinine 0.84 0.67 - 1.17 mg/dL     GFR Estimate >90 >60 mL/min/1.73m2     Calcium 9.6 8.6 - 10.0 mg/dL     Chloride 102 98 - 107 mmol/L     Glucose 125 (H) 70 - 99 mg/dL     Alkaline Phosphatase 51 40 - 150 U/L     AST 12 0 - 45 U/L     ALT 18 0 - 70 U/L     Protein Total 6.4 6.4 - 8.3 g/dL     Albumin 4.2 3.5 - 5.2 g/dL     Bilirubin Total 0.2 <=1.2 mg/dL   Hemoglobin A1c     Collection Time: 06/11/24  9:50 AM   Result Value Ref Range     Hemoglobin A1C 5.4 <5.7 %   Lipid panel     Collection Time: 06/11/24  9:50 AM   Result Value Ref  Range     Cholesterol 157 <200 mg/dL     Triglycerides 191 (H) <150 mg/dL     Direct Measure HDL 31 (L) >=40 mg/dL     LDL Cholesterol Calculated 88 <=100 mg/dL     Non HDL Cholesterol 126 <130 mg/dL   CBC with platelets and differential     Collection Time: 06/11/24  9:50 AM   Result Value Ref Range     WBC Count 4.9 4.0 - 11.0 10e3/uL     RBC Count 4.84 4.40 - 5.90 10e6/uL     Hemoglobin 13.9 13.3 - 17.7 g/dL     Hematocrit 41.4 40.0 - 53.0 %     MCV 86 78 - 100 fL     MCH 28.7 26.5 - 33.0 pg     MCHC 33.6 31.5 - 36.5 g/dL     RDW 12.9 10.0 - 15.0 %     Platelet Count 190 150 - 450 10e3/uL     % Neutrophils 50 %     % Lymphocytes 40 %     % Monocytes 5 %     % Eosinophils 4 %     % Basophils 1 %     % Immature Granulocytes 0 %     NRBCs per 100 WBC 0 <1 /100     Absolute Neutrophils 2.5 1.6 - 8.3 10e3/uL     Absolute Lymphocytes 2.0 0.8 - 5.3 10e3/uL     Absolute Monocytes 0.2 0.0 - 1.3 10e3/uL     Absolute Eosinophils 0.2 0.0 - 0.7 10e3/uL     Absolute Basophils 0.0 0.0 - 0.2 10e3/uL     Absolute Immature Granulocytes 0.0 <=0.4 10e3/uL     Absolute NRBCs 0.0 10e3/uL            Changes made today 7/8//24     Invega Sustenna 234 mg once for 7/9/2024        Review of systems:      For physical examination and 12 point review of system please, see note of Dr. Clarke from 6/5/24.  I reviewed that note and agree with it.          DIagnoses:      Bipolar affective disorder, jose, severe with psychosis.          Assessment and Plan:      Assessment and Hospital Summary:  Venancio Edgar is a 28-year old male with history of bipolar disorder, who arrived at the ED very animated and appeared somewhat manic.  Patient presented with verbal agitation, significant behavior change, paranoia, physical aggression. Per DEC note Pt presents to the ED via EMS after becoming physically and verbally agitated at his group home towards staff and a community member while outside. When meeting with Writer, Pt presented with a labile  "mood, quickly changing from being cooperative to Writer to yelling at Writer, stating he will not participate, questioning Writer, laughing out loud while talking about his medications with volume steadily increasing. Writer attempted to collect information of what has been going on with Pt, yet Pt denied any concerns and then started stating Writer was asking \"the wrong questions\" and is trying to keep Pt in the hospital. Writer discussed how he was trying to let Pt share his perspective and understanding of the situation. Pt continued to be verbally escalated, stating staff was \"retarded\", not asking the right questions, and not needing to speak with a therapist. Writer terminated the assessment given Pt's elevated and labile mood and Pt presenting as experiencing disorganized thought as evidenced by answering and responding to Writer's questions with answers that did not pertain to the question asked.      Per DEC note Patient's CM is planning to revoke his PD. Will for now continue on 72 hour hold. Patient's decompensation is likely due to poor compliance with meds. He was restarted on PTA meds and Risperdal dose was increased with his permission. Will continue to provide support and structure.          Target Psychiatric Symptoms and Interventions     1. Education given regarding diagnostic and treatment options with risks, benefits and alternatives and adequate verbalization of understanding.  2. Admitted on 6/7/2024 to station 12N..  Precautions placed includes:Assault precautions, suicide precautions  3. Medications: 6/7/2024: PTA medications reviewed.     4. Medications:  Hospital  Alum & Mag hydroxide-simethicone ( Maalox) suspension 30 mg oral every 4 hours as needed indigestion  Benztropine (Cogentin) tablet 1 mg 2 times daily as needed extrapyramidal symptoms  -Vitals and neuro checks with PRN blood pressure meds for SBP > 200  Code 1 restrictive unit  Full code  Invega 6 mg oral daily  Invega sustanna " 234 mg once   Risperidone 0.5 mg at bedtime.       Divalproex sodium delayed release (Depakote DR) tablet 1,250 mg at bedtime  Haloperidol (Haldol) tablet 5 mg oral every 8 hours as needed agitation  And  Diphenhydramine (Benadryl) capsule 50 mg oral every 8 hours as needed agitation  Haloperidol lactate (Haldol) injection 5 mg intramuscular every 8 hours as needed agitation  And  Diphenhydramine (Benadryl) injection 50 mg intramuscular every 8 hours as needed agitation  Gabapentin (Neurontin) capsule 200 mg 3 times daily as needed anxiety  Hydroxyzine HCl (Atarax) tablet 50 mg oral 3 times daily as needed anxiety  Legal status Court Hold  No COVID-19 virus (coronavirus) screening test needed or already ordered/completed  Routine programming  [place]-docusate (Senokot-S/Marisela-Colace) 8.6-50 mg, 1 tablet oral 2 times daily as needed constipation  Status 15 every 15-minute safety check  Suicide precaution suicide risk Low  Melatonin tablet 3 mg at bedtime as needed sleep  Vital signs and pain assessment  Weight patient routine every TU, TH, SA.  5. Consultations:  Hospitalist to follow as needed.  IM to follow for every medical conditions.  6. Structure and Supervision  Unit 12 [address].  Barriers to Discharge: Danisha, agitation.  7.   is following in regards to collecting and reviewing collateral information, referrals and disposition planning.  Legal: Court hold - notice of intent to revoke stay of commitment   Yazan submitted 6/18 - Yazan hearing is July 2nd   Referrals: CTC to facilitate all referrals  Care Coordination: CTC to coordinate care with external providers  Placement: CTC to facilitate placement following symptom stabilization  Anticipated Discharge: 14 days      . Further treatment programming to be determined throughout the hospital course.     [facility] Patient able to contract for safety and Patient on precautions     This note was created with help of Dragon  dictation system. Grammatical / typing errors are not intentional.  Risks, benefits, and alternatives discussed at length with patient.         Acute Medical Problems and Treatments:  Acute medical concerns:  - No acute medical concerns     Pertinent labs/imaging:  Recent Results   Recent Results             Recent Results (from the past 504 hour(s))   Valproic acid     Collection Time: 06/11/24  9:50 AM   Result Value Ref Range     Valproic acid 92.7   ug/mL   Comprehensive metabolic panel     Collection Time: 06/11/24  9:50 AM   Result Value Ref Range     Sodium 139 135 - 145 mmol/L     Potassium 3.9 3.4 - 5.3 mmol/L     Carbon Dioxide (CO2) 27 22 - 29 mmol/L     Anion Gap 10 7 - 15 mmol/L     Urea Nitrogen 7.8 6.0 - 20.0 mg/dL     Creatinine 0.84 0.67 - 1.17 mg/dL     GFR Estimate >90 >60 mL/min/1.73m2     Calcium 9.6 8.6 - 10.0 mg/dL     Chloride 102 98 - 107 mmol/L     Glucose 125 (H) 70 - 99 mg/dL     Alkaline Phosphatase 51 40 - 150 U/L     AST 12 0 - 45 U/L     ALT 18 0 - 70 U/L     Protein Total 6.4 6.4 - 8.3 g/dL     Albumin 4.2 3.5 - 5.2 g/dL     Bilirubin Total 0.2 <=1.2 mg/dL   Hemoglobin A1c     Collection Time: 06/11/24  9:50 AM   Result Value Ref Range     Hemoglobin A1C 5.4 <5.7 %   Lipid panel     Collection Time: 06/11/24  9:50 AM   Result Value Ref Range     Cholesterol 157 <200 mg/dL     Triglycerides 191 (H) <150 mg/dL     Direct Measure HDL 31 (L) >=40 mg/dL     LDL Cholesterol Calculated 88 <=100 mg/dL     Non HDL Cholesterol 126 <130 mg/dL   CBC with platelets and differential     Collection Time: 06/11/24  9:50 AM   Result Value Ref Range     WBC Count 4.9 4.0 - 11.0 10e3/uL     RBC Count 4.84 4.40 - 5.90 10e6/uL     Hemoglobin 13.9 13.3 - 17.7 g/dL     Hematocrit 41.4 40.0 - 53.0 %     MCV 86 78 - 100 fL     MCH 28.7 26.5 - 33.0 pg     MCHC 33.6 31.5 - 36.5 g/dL     RDW 12.9 10.0 - 15.0 %     Platelet Count 190 150 - 450 10e3/uL     % Neutrophils 50 %     % Lymphocytes 40 %     % Monocytes  5 %     % Eosinophils 4 %     % Basophils 1 %     % Immature Granulocytes 0 %     NRBCs per 100 WBC 0 <1 /100     Absolute Neutrophils 2.5 1.6 - 8.3 10e3/uL     Absolute Lymphocytes 2.0 0.8 - 5.3 10e3/uL     Absolute Monocytes 0.2 0.0 - 1.3 10e3/uL     Absolute Eosinophils 0.2 0.0 - 0.7 10e3/uL     Absolute Basophils 0.0 0.0 - 0.2 10e3/uL     Absolute Immature Granulocytes 0.0 <=0.4 10e3/uL     Absolute NRBCs 0.0 10e3/uL   Valproic acid     Collection Time: 06/14/24  2:20 PM   Result Value Ref Range     Valproic acid 75.3   ug/mL   Ammonia     Collection Time: 06/14/24  2:20 PM   Result Value Ref Range     Ammonia 85 (H) 16 - 60 umol/L   Ammonia     Collection Time: 06/19/24 10:28 AM   Result Value Ref Range     Ammonia 47 16 - 60 umol/L   Valproic acid     Collection Time: 06/19/24 10:28 AM   Result Value Ref Range     Valproic acid 75.2   ug/mL   Extra Green Top (Lithium Heparin) Tube     Collection Time: 06/19/24 10:28 AM   Result Value Ref Range     Hold Specimen [facility]

## 2024-07-08 NOTE — PLAN OF CARE
"Goal Outcome Evaluation:    Problem: Psychotic Signs/Symptoms  Goal: Improved Mood Symptoms (Psychotic Signs/Symptoms)  Outcome: Progressing     Pt presents calm, cooperative, and pleasant. Pt denies SI/HI/SI, hallucinations, and depression but endorses anxiety. Pt reports that he is feeling \"pretty good\" and feels ready to discharge home in two days. Pt reports that there are some problems he is dealing with, but when asked to clarify, Pt reports \"nothing that can't be fixed when I get out of here\" and did not specify further.    Pt split time between room and milieu. Pt was social with peers and staff while in the milieu.    Pt did not report concerns with bowel/bladder. Pt denies pain. Pt requested PRN hydroxyzine for anxiety and Nicotine throughout the shift,   Vital signs declined, medication compliant, behaviorally in control.       "

## 2024-07-09 PROCEDURE — G0177 OPPS/PHP; TRAIN & EDUC SERV: HCPCS

## 2024-07-09 PROCEDURE — 124N000002 HC R&B MH UMMC

## 2024-07-09 PROCEDURE — 250N000013 HC RX MED GY IP 250 OP 250 PS 637: Performed by: PSYCHIATRY & NEUROLOGY

## 2024-07-09 PROCEDURE — 250N000013 HC RX MED GY IP 250 OP 250 PS 637: Performed by: CLINICAL NURSE SPECIALIST

## 2024-07-09 PROCEDURE — 99232 SBSQ HOSP IP/OBS MODERATE 35: CPT | Performed by: CLINICAL NURSE SPECIALIST

## 2024-07-09 RX ORDER — DIVALPROEX SODIUM 250 MG/1
1250 TABLET, DELAYED RELEASE ORAL AT BEDTIME
Qty: 150 TABLET | Refills: 0 | Status: ON HOLD | OUTPATIENT
Start: 2024-07-09 | End: 2024-07-31

## 2024-07-09 RX ADMIN — NICOTINE POLACRILEX 4 MG: 4 GUM, CHEWING BUCCAL at 11:02

## 2024-07-09 RX ADMIN — DIPHENHYDRAMINE HYDROCHLORIDE 50 MG: 50 CAPSULE ORAL at 12:36

## 2024-07-09 RX ADMIN — NICOTINE POLACRILEX 4 MG: 4 GUM, CHEWING BUCCAL at 13:14

## 2024-07-09 RX ADMIN — NICOTINE POLACRILEX 4 MG: 4 GUM, CHEWING BUCCAL at 08:47

## 2024-07-09 RX ADMIN — NICOTINE POLACRILEX 4 MG: 4 GUM, CHEWING BUCCAL at 09:57

## 2024-07-09 RX ADMIN — GABAPENTIN 200 MG: 100 CAPSULE ORAL at 10:48

## 2024-07-09 RX ADMIN — DIVALPROEX SODIUM 1250 MG: 500 TABLET, DELAYED RELEASE ORAL at 19:35

## 2024-07-09 RX ADMIN — RISPERIDONE 0.5 MG: 0.5 TABLET, FILM COATED ORAL at 19:36

## 2024-07-09 RX ADMIN — PALIPERIDONE 6 MG: 3 TABLET, EXTENDED RELEASE ORAL at 08:47

## 2024-07-09 RX ADMIN — HYDROXYZINE HYDROCHLORIDE 50 MG: 50 TABLET ORAL at 09:35

## 2024-07-09 RX ADMIN — HALOPERIDOL 5 MG: 5 TABLET ORAL at 12:36

## 2024-07-09 RX ADMIN — Medication 3 MG: at 19:36

## 2024-07-09 ASSESSMENT — ACTIVITIES OF DAILY LIVING (ADL)
LAUNDRY: UNABLE TO COMPLETE
ADLS_ACUITY_SCORE: 30
ORAL_HYGIENE: INDEPENDENT
ORAL_HYGIENE: INDEPENDENT
ADLS_ACUITY_SCORE: 30
DRESS: INDEPENDENT
ADLS_ACUITY_SCORE: 30
HYGIENE/GROOMING: INDEPENDENT
ADLS_ACUITY_SCORE: 30
DRESS: SCRUBS (BEHAVIORAL HEALTH)
ADLS_ACUITY_SCORE: 30
LAUNDRY: UNABLE TO COMPLETE
ADLS_ACUITY_SCORE: 30
HYGIENE/GROOMING: HANDWASHING;SHOWER;INDEPENDENT
ADLS_ACUITY_SCORE: 30
ADLS_ACUITY_SCORE: 30

## 2024-07-09 NOTE — PLAN OF CARE
Goal Outcome Evaluation:    Plan of Care Reviewed With: patient Plan of Care Reviewed With: patient    Overall Patient Progress: no changeOverall Patient Progress: no change     Patient present in the milieu throughout the day pacing the halls, social and interactive with staff and peers and attending group. Patient upon approach full range in affect at times appearing tense and anxious. Patient reports he overall is feeling improved with current medications though still experiencing high anxiety and agitation throughout the day. Patient independent with requesting PRN medications and demonstrating coping techniques including attending groups, music, and showering which he later reported as therapeutic.    Patient reporting increased anxiety 7/10 early in the day and requested for PRN hydroxyzine.  A short time later continued to report anxiety 6/10 and requested PRN Gabapentin. Patient reporting relief with medication. Patient after lunch and group reported agitation and requested for PRN Benadryl and Haldol with plan to shower. Patient after showering reporting relief and appearing much calmer in affect.  Patient denied SI/SIB, AH/VH, or thoughts of harming others.    Patient cooperative with vital sign assessments which were stable.  Patient diet good eating 100% of meals and numerous snacks throughout the day. Patient independent with requesting and accepting of scheduled medications. Patient independent with identifying needs and completing ADL's including showering.

## 2024-07-09 NOTE — PLAN OF CARE
Rehab Group    Start time: 1015  End time: 1145  Patient time total: 50 minutes    attended full group     #3 attended   Group Type: OT Clinic   Group Topic Covered: coping skills     Group Session Detail:    Intervention: OT Clinic with 2 peers. Pt participated in a OT Clinic group to facilitate coping skills exploration and creative expression through personally meaningful activities, and to encourage utilization of these healthy coping skills to promote overall health and wellness. Group included clinical observation of social, cognitive and kinesthetic performance skills to inform treatment and safe discharge planning    Mood/Affect:  Pleasant       Plan: Patient encouraged to maintain attendance for continued ongoing support in working towards occupational therapy goals to support overall treatment/care.        Patient Detail:    Joined part way through group after meeting with provider. Expressed anxiety regarding discharge tomorrow, however that it was a nervous-excited type feeling. Is most looking forward getting in touch with friends after leaving here. 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   Was social with writer and others during this time. At first did not care to work on a project, assisted another peer in looking through beds and later engaged with kinetic sand for sensory activity which he expressed enjoyment with.      Train & Education Service Per Session 45 + Minutes () OT Group code    Patient Active Problem List   Diagnosis    Bipolar disorder (H)    ADHD (attention deficit hyperactivity disorder)    Bipolar I disorder with jose (H)

## 2024-07-09 NOTE — PLAN OF CARE
Team Note Due:  Monday     Assessment/Intervention/Current Symtoms and Care Coordination:  Chart review and met with team, discussed pt progress, symptomology, and response to treatment. Discussed the discharge plan and any potential impediments to discharge.      I spoke with Bree Martines at [facility] and he stated they use [facility] Ph: [phone] [address] for OP pharmacy. I also shared with Bree that he will be getting his loading dose of Invega today and they will have to follow up with [place] for the next dose in 5 days. I also confirmed with Bree that Pt's mom will pick him up and drive him there tomorrow at 12:30 PM.     I met with patient and he is excited to discharge tomorrow. He was playing video games in the lounge.  We reviewed and he signed his provisional discharge. Declined having any questions about this. I sent this to his commitment CM again for her signature. I provided him with a copy.     AVS is prepped and complete.   COS is ready for tomorrow.     Discharge Plan or Goal:    Discharge Wednesday July 10th - Time: 12:30PM.  Transportation: Mom Yolanda will  from here.     Location: [facility] through [address] [address]    Discharge Pharmacy:  [facility] Ph: [phone] [address]      Barriers to Discharge:  Waiting for Invega loading dose then discharge Wednesday.      Referral Status:  None     Legal Status:  Committed w/Hand- Medications: Haldol, Risperdal, Invega, Zyprexa, Seroquel, and Abilify  [facility]  File Number: [id number]     Pt's  is:  Carlos Gonzales  [employer]  Ph:  [phone]     Beth Garza    [address] [place] [address]  Phone: [phone]  [email or web address].     Contacts:  [facility]:  Norman Loyola <[email or web address]> and John Espana <[email or web address]> and  Tal Ken as Norman Loyola is away.   Darline Romo ( for retirement): [phone] - RENNY on file for retirement staff  Bree (retirement ): [phone] RENNY on file for group home staff  Email: [email or web address]     :  Chelsea Brennan ([facility]  Ph: [phone] - Commitment CM no RENNY needed   [email or web address]..  [facility]  Ph: [phone]  Christi (member of case management team): [phone]     Medication Management/Psychiatry:  Psychiatry appointment: Friday, July 12, 2024 @ 2:10pm In-person   Provider: MARIO Weinberg CNP   Location: [place] [address]  Phone: [phone]  Fax: [phone]     Other:  Yolanda (mom): [phone] - Pt declines RENNY for Mom      Upcoming Meetings and Dates/Important Information and next steps:  Will need COS and PD

## 2024-07-09 NOTE — PLAN OF CARE
BEH IP Unit Acuity Rating Score (UARS)  Patient is given one point for every criteria they meet.    CRITERIA SCORING   On a 72 hour hold, court hold, committed, stay of commitment, or revocation. 1    Patient LOS on BEH unit exceeds 20 days. 1  LOS: 32   Patient under guardianship, 55+, otherwise medically complex, or under age 11. 0   Suicide ideation without relief of precipitating factors. 0   Current plan for suicide. 0   Current plan for homicide. 0   Imminent risk or actual attempt to seriously harm another without relief of factors precipitating the attempt. 0   Severe dysfunction in daily living (ex: complete neglect for self care, extreme disruption in vegetative function, extreme deterioration in social interactions). 0   Recent (last 7 days) or current physical aggression in the ED or on unit. 0   Restraints or seclusion episode in past 72 hours. 0   Recent (last 7 days) or current verbal aggression, agitation, yelling, etc., while in the ED or unit. 0   Active psychosis. 0   Need for constant or near constant redirection (from leaving, from others, etc).  0   Intrusive or disruptive behaviors. 0   Patient requires 3 or more hours of individualized nursing care per 8-hour shift (i.e. for ADLs, meds, therapeutic interventions). 0   TOTAL 2

## 2024-07-09 NOTE — PLAN OF CARE
Problem: Sleep Disturbance  Goal: Adequate Sleep/Rest  Outcome: Progressing   Goal Outcome Evaluation:    Patient appeared to sleep 6.75 hours this night shift.  No prns or snacks given or requested.  No concerns were reported or noted. Probable discharge to group home tomorrow.

## 2024-07-09 NOTE — PROGRESS NOTES
"The patient's care was discussed with the treatment team during the daily team meeting and/or staff's chart notes were reviewed.  Staff report  Pt slept 6.75 hours overnight. Evening staff report: Presents as calm, cooperative. Visible in milieu, social.  Pleasant, engages in appropriate humor. Anxiety 3/10. Denies depression. Denies A/VH. Denies SI/HI.      Took scheduled medication without incident. PRN melatonin.      Denies pain. Denies having any physical concerns a this time. No adverse effects of medication noted or reported.       /80 (BP Location: Right arm)   Pulse 104   Temp 97.2  F (36.2  C) (Temporal)   Resp 16   Wt 92.1 kg (203 lb 1.6 oz)   SpO2 98%     Upon interview, patient reported his mood is \"super happy and so excited.\"  Patient shared that his mother will be here between 1230 and 1 PM to pick him up, stated he is still in disbelief that he is actually discharging tomorrow.  Patient currently ask I have not received my Invega shot, I'm I going to get it today?  Patient reassured that the Invega injection is scheduled for 11 AM.    Patient reported that his medications are working well, stating \"everything has been going smoothly.\"  Patient denied auditory hallucinations stating \"I never had auditory hallucination, never taking psychedelic marijuana so I do not hear voices.\"  Patient shared that he was trying to stay \"on top of my schizoaffective.\"    Patient highlighted his post discharge plan to get a part time job and then enroll with [employer] for his IT program pursuit, do Java Script.  Patient was encouraged to follow his dream.  Writer discussed safety plan with the patient and informed him that the therapist and the nurses will further enforce the safety plan with him before discharging.  Patient is looking forward to talking his Invega shot and be prepared to get out of here tomorrow afternoon.  Patient had all his questions answered, and all his concerns clarified.  " "Patient seems to have no further questions at this time.               Mental Status Exam:  General appearance: Awake, dressed in hospital yellow scrubs, appeared at stated age  Attitude:  Cooperative  Affect:   less anxious,   Mood: \"super happy\"   Speech:  normal volume and tone  Eye contact:  good.    Psychomotor behavior : Fidgety in bed  Gait: steady   Abnormal movements:  none  Delusions: denied  Hallucinations: denied  Thoughts: Goal oriented, appropriate  Associations: No Loosening association  Judgement: fair  Insight: limited  Cognitions: intact in conversation  Memory:  intact in conversation  Orientation: normal    Not suicidal. denied  Not homicidal: denied        Current Facility-Administered Medications                     Current Facility-Administered Medications   Medication Dose Route Frequency Provider Last Rate Last Admin    acetaminophen (TYLENOL) tablet 650 mg  650 mg Oral Q4H PRN Javier Card MD        alum & mag hydroxide-simethicone (MAALOX) suspension 30 mL  30 mL Oral Q4H PRN Javier Card MD        benztropine (COGENTIN) tablet 1 mg  1 mg Oral BID PRN Javier Card MD        haloperidol (HALDOL) tablet 5 mg  5 mg Oral Q8H PRN Satya Garza MD   5 mg at 06/11/24 2042     And    LORazepam (ATIVAN) tablet 2 mg  2 mg Oral Q8H PRN Satya Garza MD   2 mg at 06/11/24 2042     And    diphenhydrAMINE (BENADRYL) capsule 50 mg  50 mg Oral Q8H PRN Satya Garza MD   50 mg at 06/11/24 2042    haloperidol lactate (HALDOL) injection 5 mg  5 mg Intramuscular Q8H PRSatya Bauer MD         And    LORazepam (ATIVAN) injection 2 mg  2 mg Intramuscular Q8H PRSatya Bauer MD         And    diphenhydrAMINE (BENADRYL) injection 50 mg  50 mg Intramuscular Q8H PRSatya Bauer MD        divalproex sodium delayed-release (DEPAKOTE) DR tablet 1,500 mg  1,500 mg Oral At Bedtime Javier Card MD   1,500 mg at 06/11/24 1937    hydrOXYzine HCl (ATARAX) " tablet 50 mg  50 mg Oral TID PRN Javier Card MD   50 mg at 06/12/24 1124    melatonin tablet 3 mg  3 mg Oral At Bedtime PRN Javier Card MD   3 mg at 06/10/24 2014    nicotine (NICORETTE) gum 2 mg  2 mg Buccal Q1H PRN Javier Card MD   2 mg at 06/12/24 1333    risperiDONE (risperDAL) tablet 1 mg  1 mg Oral Satya Montez MD   1 mg at 06/12/24 1007    risperiDONE (risperDAL) tablet 4 mg  0.5 mg Oral At Bedtime MARIO Salguero, CNP   4 mg at 06/11/24 1938    senna-docusate (SENOKOT-S/PERICOLACE) 8.6-50 MG per tablet 1 tablet  1 tablet Oral BID PRN Javier Card MD             Recent Results[]Expand by Default             Recent Results (from the past 168 hour(s))   Valproic acid     Collection Time: 06/11/24  9:50 AM   Result Value Ref Range     Valproic acid 92.7   ug/mL   Comprehensive metabolic panel     Collection Time: 06/11/24  9:50 AM   Result Value Ref Range     Sodium 139 135 - 145 mmol/L     Potassium 3.9 3.4 - 5.3 mmol/L     Carbon Dioxide (CO2) 27 22 - 29 mmol/L     Anion Gap 10 7 - 15 mmol/L     Urea Nitrogen 7.8 6.0 - 20.0 mg/dL     Creatinine 0.84 0.67 - 1.17 mg/dL     GFR Estimate >90 >60 mL/min/1.73m2     Calcium 9.6 8.6 - 10.0 mg/dL     Chloride 102 98 - 107 mmol/L     Glucose 125 (H) 70 - 99 mg/dL     Alkaline Phosphatase 51 40 - 150 U/L     AST 12 0 - 45 U/L     ALT 18 0 - 70 U/L     Protein Total 6.4 6.4 - 8.3 g/dL     Albumin 4.2 3.5 - 5.2 g/dL     Bilirubin Total 0.2 <=1.2 mg/dL   Hemoglobin A1c     Collection Time: 06/11/24  9:50 AM   Result Value Ref Range     Hemoglobin A1C 5.4 <5.7 %   Lipid panel     Collection Time: 06/11/24  9:50 AM   Result Value Ref Range     Cholesterol 157 <200 mg/dL     Triglycerides 191 (H) <150 mg/dL     Direct Measure HDL 31 (L) >=40 mg/dL     LDL Cholesterol Calculated 88 <=100 mg/dL     Non HDL Cholesterol 126 <130 mg/dL   CBC with platelets and differential     Collection Time: 06/11/24  9:50 AM   Result Value Ref Range     WBC Count  4.9 4.0 - 11.0 10e3/uL     RBC Count 4.84 4.40 - 5.90 10e6/uL     Hemoglobin 13.9 13.3 - 17.7 g/dL     Hematocrit 41.4 40.0 - 53.0 %     MCV 86 78 - 100 fL     MCH 28.7 26.5 - 33.0 pg     MCHC 33.6 31.5 - 36.5 g/dL     RDW 12.9 10.0 - 15.0 %     Platelet Count 190 150 - 450 10e3/uL     % Neutrophils 50 %     % Lymphocytes 40 %     % Monocytes 5 %     % Eosinophils 4 %     % Basophils 1 %     % Immature Granulocytes 0 %     NRBCs per 100 WBC 0 <1 /100     Absolute Neutrophils 2.5 1.6 - 8.3 10e3/uL     Absolute Lymphocytes 2.0 0.8 - 5.3 10e3/uL     Absolute Monocytes 0.2 0.0 - 1.3 10e3/uL     Absolute Eosinophils 0.2 0.0 - 0.7 10e3/uL     Absolute Basophils 0.0 0.0 - 0.2 10e3/uL     Absolute Immature Granulocytes 0.0 <=0.4 10e3/uL     Absolute NRBCs 0.0 10e3/uL            Changes made today 7/8//24   No medication changes  Discharge orders placed        Review of systems:      For physical examination and 12 point review of system please, see note of Dr. Clarke from 6/5/24.  I reviewed that note and agree with it.          DIagnoses:      Bipolar affective disorder, jose, severe with psychosis.          Assessment and Plan:      Assessment and Hospital Summary:  Venancio Edgar is a 28-year old male with history of bipolar disorder, who arrived at the ED very animated and appeared somewhat manic.  Patient presented with verbal agitation, significant behavior change, paranoia, physical aggression. Per DEC note Pt presents to the ED via EMS after becoming physically and verbally agitated at his group home towards staff and a community member while outside. When meeting with Writer, Pt presented with a labile mood, quickly changing from being cooperative to Writer to yelling at Writer, stating he will not participate, questioning Writer, laughing out loud while talking about his medications with volume steadily increasing. Writer attempted to collect information of what has been going on with Pt, yet Pt denied any  "concerns and then started stating Writer was asking \"the wrong questions\" and is trying to keep Pt in the hospital. Writer discussed how he was trying to let Pt share his perspective and understanding of the situation. Pt continued to be verbally escalated, stating staff was \"retarded\", not asking the right questions, and not needing to speak with a therapist. Writer terminated the assessment given Pt's elevated and labile mood and Pt presenting as experiencing disorganized thought as evidenced by answering and responding to Writer's questions with answers that did not pertain to the question asked.      Per DEC note Patient's CM is planning to revoke his PD. Will for now continue on 72 hour hold. Patient's decompensation is likely due to poor compliance with meds. He was restarted on PTA meds and Risperdal dose was increased with his permission. Will continue to provide support and structure.          Target Psychiatric Symptoms and Interventions     1. Education given regarding diagnostic and treatment options with risks, benefits and alternatives and adequate verbalization of understanding.  2. Admitted on 6/7/2024 to station 12N..  Precautions placed includes:Assault precautions, suicide precautions  3. Medications: 6/7/2024: PTA medications reviewed.     4. Medications:  Hospital  Alum & Mag hydroxide-simethicone ( Maalox) suspension 30 mg oral every 4 hours as needed indigestion  Benztropine (Cogentin) tablet 1 mg 2 times daily as needed extrapyramidal symptoms  -Vitals and neuro checks with PRN blood pressure meds for SBP > 200  Code 1 restrictive unit  Full code  Invega 6 mg oral daily  Invega sustanna 234 mg once   Risperidone 0.5 mg at bedtime.       Divalproex sodium delayed release (Depakote DR) tablet 1,250 mg at bedtime  Haloperidol (Haldol) tablet 5 mg oral every 8 hours as needed agitation  And  Diphenhydramine (Benadryl) capsule 50 mg oral every 8 hours as needed agitation  Haloperidol lactate " (Haldol) injection 5 mg intramuscular every 8 hours as needed agitation  And  Diphenhydramine (Benadryl) injection 50 mg intramuscular every 8 hours as needed agitation  Gabapentin (Neurontin) capsule 200 mg 3 times daily as needed anxiety  Hydroxyzine HCl (Atarax) tablet 50 mg oral 3 times daily as needed anxiety  Legal status Court Hold  No COVID-19 virus (coronavirus) screening test needed or already ordered/completed  Routine programming  [place]-docusate (Senokot-S/Marisela-Colace) 8.6-50 mg, 1 tablet oral 2 times daily as needed constipation  Status 15 every 15-minute safety check  Suicide precaution suicide risk Low  Melatonin tablet 3 mg at bedtime as needed sleep  Vital signs and pain assessment  Weight patient routine every TU, TH, SA.  5. Consultations:  Hospitalist to follow as needed.  IM to follow for every medical conditions.  6. Structure and Supervision  [address].  Barriers to Discharge: Danisha, agitation.  7.   is following in regards to collecting and reviewing collateral information, referrals and disposition planning.  Legal: Court hold - notice of intent to revoke stay of commitment   Yazan submitted 6/18 - Yazan hearing is July 2nd   Referrals: CTC to facilitate all referrals  Care Coordination: CTC to coordinate care with external providers  Placement: CTC to facilitate placement following symptom stabilization  Anticipated Discharge: 14 days      . Further treatment programming to be determined throughout the hospital course.     [facility] Patient able to contract for safety and Patient on precautions     This note was created with help of Dragon dictation system. Grammatical / typing errors are not intentional.  Risks, benefits, and alternatives discussed at length with patient.         Acute Medical Problems and Treatments:  Acute medical concerns:  - No acute medical concerns     Pertinent labs/imaging:  Recent Results   Recent Results              Recent Results (from the past 504 hour(s))   Valproic acid     Collection Time: 06/11/24  9:50 AM   Result Value Ref Range     Valproic acid 92.7   ug/mL   Comprehensive metabolic panel     Collection Time: 06/11/24  9:50 AM   Result Value Ref Range     Sodium 139 135 - 145 mmol/L     Potassium 3.9 3.4 - 5.3 mmol/L     Carbon Dioxide (CO2) 27 22 - 29 mmol/L     Anion Gap 10 7 - 15 mmol/L     Urea Nitrogen 7.8 6.0 - 20.0 mg/dL     Creatinine 0.84 0.67 - 1.17 mg/dL     GFR Estimate >90 >60 mL/min/1.73m2     Calcium 9.6 8.6 - 10.0 mg/dL     Chloride 102 98 - 107 mmol/L     Glucose 125 (H) 70 - 99 mg/dL     Alkaline Phosphatase 51 40 - 150 U/L     AST 12 0 - 45 U/L     ALT 18 0 - 70 U/L     Protein Total 6.4 6.4 - 8.3 g/dL     Albumin 4.2 3.5 - 5.2 g/dL     Bilirubin Total 0.2 <=1.2 mg/dL   Hemoglobin A1c     Collection Time: 06/11/24  9:50 AM   Result Value Ref Range     Hemoglobin A1C 5.4 <5.7 %   Lipid panel     Collection Time: 06/11/24  9:50 AM   Result Value Ref Range     Cholesterol 157 <200 mg/dL     Triglycerides 191 (H) <150 mg/dL     Direct Measure HDL 31 (L) >=40 mg/dL     LDL Cholesterol Calculated 88 <=100 mg/dL     Non HDL Cholesterol 126 <130 mg/dL   CBC with platelets and differential     Collection Time: 06/11/24  9:50 AM   Result Value Ref Range     WBC Count 4.9 4.0 - 11.0 10e3/uL     RBC Count 4.84 4.40 - 5.90 10e6/uL     Hemoglobin 13.9 13.3 - 17.7 g/dL     Hematocrit 41.4 40.0 - 53.0 %     MCV 86 78 - 100 fL     MCH 28.7 26.5 - 33.0 pg     MCHC 33.6 31.5 - 36.5 g/dL     RDW 12.9 10.0 - 15.0 %     Platelet Count 190 150 - 450 10e3/uL     % Neutrophils 50 %     % Lymphocytes 40 %     % Monocytes 5 %     % Eosinophils 4 %     % Basophils 1 %     % Immature Granulocytes 0 %     NRBCs per 100 WBC 0 <1 /100     Absolute Neutrophils 2.5 1.6 - 8.3 10e3/uL     Absolute Lymphocytes 2.0 0.8 - 5.3 10e3/uL     Absolute Monocytes 0.2 0.0 - 1.3 10e3/uL     Absolute Eosinophils 0.2 0.0 - 0.7 10e3/uL      Absolute Basophils 0.0 0.0 - 0.2 10e3/uL     Absolute Immature Granulocytes 0.0 <=0.4 10e3/uL     Absolute NRBCs 0.0 10e3/uL   Valproic acid     Collection Time: 06/14/24  2:20 PM   Result Value Ref Range     Valproic acid 75.3   ug/mL   Ammonia     Collection Time: 06/14/24  2:20 PM   Result Value Ref Range     Ammonia 85 (H) 16 - 60 umol/L   Ammonia     Collection Time: 06/19/24 10:28 AM   Result Value Ref Range     Ammonia 47 16 - 60 umol/L   Valproic acid     Collection Time: 06/19/24 10:28 AM   Result Value Ref Range     Valproic acid 75.2   ug/mL   Extra Green Top (Lithium Heparin) Tube     Collection Time: 06/19/24 10:28 AM   Result Value Ref Range     Hold Specimen JI

## 2024-07-10 ENCOUNTER — DOCUMENTATION ONLY (OUTPATIENT)
Dept: BEHAVIORAL HEALTH | Facility: CLINIC | Age: 29
End: 2024-07-10
Payer: COMMERCIAL

## 2024-07-10 VITALS
TEMPERATURE: 97.9 F | HEART RATE: 95 BPM | SYSTOLIC BLOOD PRESSURE: 113 MMHG | WEIGHT: 203.1 LBS | RESPIRATION RATE: 16 BRPM | DIASTOLIC BLOOD PRESSURE: 83 MMHG | OXYGEN SATURATION: 97 %

## 2024-07-10 PROCEDURE — 99238 HOSP IP/OBS DSCHRG MGMT 30/<: CPT | Performed by: CLINICAL NURSE SPECIALIST

## 2024-07-10 PROCEDURE — 250N000013 HC RX MED GY IP 250 OP 250 PS 637: Performed by: CLINICAL NURSE SPECIALIST

## 2024-07-10 PROCEDURE — 250N000013 HC RX MED GY IP 250 OP 250 PS 637: Performed by: PSYCHIATRY & NEUROLOGY

## 2024-07-10 RX ORDER — DEXTROAMPHETAMINE SACCHARATE, AMPHETAMINE ASPARTATE, DEXTROAMPHETAMINE SULFATE AND AMPHETAMINE SULFATE 5; 5; 5; 5 MG/1; MG/1; MG/1; MG/1
20 TABLET ORAL DAILY
Status: SHIPPED
Start: 2024-07-10 | End: 2024-07-15

## 2024-07-10 RX ORDER — LISDEXAMFETAMINE DIMESYLATE 20 MG/1
20 CAPSULE ORAL EVERY MORNING
Status: SHIPPED
Start: 2024-07-10 | End: 2024-07-15

## 2024-07-10 RX ORDER — PALIPERIDONE 6 MG/1
6 TABLET, EXTENDED RELEASE ORAL DAILY
Qty: 10 TABLET | Refills: 0 | Status: ON HOLD | OUTPATIENT
Start: 2024-07-11 | End: 2024-07-31

## 2024-07-10 RX ADMIN — GABAPENTIN 200 MG: 100 CAPSULE ORAL at 11:06

## 2024-07-10 RX ADMIN — PALIPERIDONE 6 MG: 3 TABLET, EXTENDED RELEASE ORAL at 09:42

## 2024-07-10 RX ADMIN — HYDROXYZINE HYDROCHLORIDE 50 MG: 50 TABLET ORAL at 09:42

## 2024-07-10 RX ADMIN — NICOTINE POLACRILEX 4 MG: 4 GUM, CHEWING BUCCAL at 09:43

## 2024-07-10 ASSESSMENT — ACTIVITIES OF DAILY LIVING (ADL)
ADLS_ACUITY_SCORE: 30
ADLS_ACUITY_SCORE: 30
HYGIENE/GROOMING: HANDWASHING;SHOWER;INDEPENDENT
ORAL_HYGIENE: INDEPENDENT
ADLS_ACUITY_SCORE: 30
DRESS: SCRUBS (BEHAVIORAL HEALTH);INDEPENDENT
ADLS_ACUITY_SCORE: 30
LAUNDRY: UNABLE TO COMPLETE

## 2024-07-10 NOTE — PROGRESS NOTES
Prior Authorization **INITIATED**    Medication: PALIPERIDONE ER 6 MG PO TB24  Insurance Company: [employer] - Phone [phone] Fax [phone]  Pharmacy Filling the Rx: [place], [place] - [address]  Filling Pharmacy Phone: [phone]  Filling Pharmacy Fax: [phone]  Start Date: 7/10/2024  Reference #: CoverMyMeds Key: [id number] - [place] Case ID: [id number]  Comments:  Discharge pharmacy sent patient with supply while auth is pending. Will retroactively bill once determination received.      Taylor Rahman CPhT  Discharge Pharmacy Liaison  [facility]  Pronouns: She/Her/Hers    Securely message with [employer], [employer] Secure Chat, or [employer]  Phone: [phone]  Fax: [phone]  [email or web address]

## 2024-07-10 NOTE — PLAN OF CARE
Problem: Adult Inpatient Plan of Care  Goal: Plan of Care Review  Description: The Plan of Care Review/Shift note should be completed every shift.  The Outcome Evaluation is a brief statement about your assessment that the patient is improving, declining, or no change.  This information will be displayed automatically on your shift  note.  Outcome: Adequate for Care Transition  Flowsheets (Taken 7/10/2024 0864)  Overall Patient Progress: improving   Goal Outcome Evaluation:    Plan of Care Reviewed With: patient Plan of Care Reviewed With: patient    Overall Patient Progress: improvingOverall Patient Progress: improving     Patient present in the milieu throughout the day pacing the halls, social and interactive with staff and peers and attending group. Patient upon approach full range in affect at times appearing tense and anxious. Patient reports he overall is feeling improved and ready for discharge today. Patient oriented x4 and demonstrating insight into care plan and discharge review. Patient independent with requesting PRN medications and demonstrating coping techniques including attending groups, music, and showering which he later reported as therapeutic. Patient states plan using coping skills as well as friends and family to cope and manage symptoms post discharge.    Patient reporting increased anxiety 7/10 early in the day and requested for PRN hydroxyzine.  A short time later continued to report anxiety 7/10 and requested PRN Gabapentin. Patient reporting relief with medication. Patient denied SI/SIB, AH/VH, or thoughts of harming others.    Patient cooperative with vital sign assessments which were stable.  Patient diet good eating 100% of meals and numerous snacks throughout the day. Patient independent with requesting and accepting of scheduled medications. Patient independent with identifying needs and completing ADL's including showering.    RN writer reviewed AVS with patient which he stated  understanding with and was receptive of discharge education. Patient provided with personal clothes and upon discharge received all personal belongings back. Patient ambulatory upon discharge with his mother providing transportation back to group home.

## 2024-07-10 NOTE — DISCHARGE SUMMARY
Psychiatric Discharge Summary    Venancio Edgar MRN# [id number]   Age: 28 year old YOB: 1995     Date of Admission:  6/7/2024  Date of Discharge:  7/10/2024  Admitting Physician:  Javier Card MD  Discharge Physician:  Satya Garza MD (Contact: [phone])       Mariano Pacheco, DNP, APRN, CNP ([phone])         Event Leading to Hospitalization:     Venancio Edgar is a 28 year old male brought by EMS from his group home.  He has a diagnosis of bipolar disorder and ADHD.  He says that he is on Adderall, risperidone, and Vyvanse.  He also says that he has had Depakote prescribed in the past although he does not like to take it.  The patient was concerned about the welfare of his brother today although it is not entirely clear why.  He called police who came out to his residence.  He was reportedly behaving erratically and was brought into the ED.  The patient does seem to jump from topic to topic and is quite animated.  He makes Oriental orthodox statements.  [facility]        See Admission note by by admitting physician/nurse-practitioner for additional details.          DIagnoses:   (F31.10) Bipolar I disorder with jose (H)  (primary encounter diagnosis)  Bipolar affective disorder, jose, severe with psychosis.         Labs:     Recent Results (from the past 672 hour(s))   Valproic acid    Collection Time: 06/14/24  2:20 PM   Result Value Ref Range    Valproic acid 75.3   ug/mL   Ammonia    Collection Time: 06/14/24  2:20 PM   Result Value Ref Range    Ammonia 85 (H) 16 - 60 umol/L   Ammonia    Collection Time: 06/19/24 10:28 AM   Result Value Ref Range    Ammonia 47 16 - 60 umol/L   Valproic acid    Collection Time: 06/19/24 10:28 AM   Result Value Ref Range    Valproic acid 75.2   ug/mL   Extra Green Top (Lithium Heparin) Tube    Collection Time: 06/19/24 10:28 AM   Result Value Ref Range    Hold Specimen [facility]                "Consults:   IM IP Consult  ENT IP Consult  Psychiatry IP Consult       Hospital Course:   Venancio Edgar was admitted to Station [facility] on 6/7/24 with attending Dr. Davis and Hospitalist Maraino Pacheco, DNP, APRN, CNP. on a 72 hour mental health hold. The patient was placed under status 15 (15 minute checks) and  Precautions  to ensure patient safety.   6/7/24: Patient admitted to Station 12, appeared very disorganized, tangential, manic with pressure speech. Patient was slightly intrusive but able to redirect. Patient blamed [facility] for being restrained. Stated \"They didn't give me attention, I screamed at them.\"  6/8/24: Patient was present in the milieu, social and interacted with staff/peers. Behavior mostly appropriate with intrusive and poor boundary. Patient was occupied with the thoughts that his brother was in danger. Called but the brother did not pick the phone and patient was not able to leave a VM.  Writer spoke with Chelsea (General acute hospital , [phone]). Writer provided update on Pt's presentation and report from assessment and staff report. She shared Pt may need to remain in the hospital for additional care. Writer reiterated concern of medication noncompliance and decompensation, being verbally and physically agitated with group home staff, a community member, and staff here at the ED. Chelsea stated she will pursue revocation of provisional discharge. Writer provided fax number for court order once obtained. She reports she will file later today   6/9/24:Writer spoke with Chelsea (General acute hospital , [phone]). Writer provided update on Pt's presentation and report from assessment and staff report. She shared Pt may need to remain in the hospital for additional care. Writer reiterated concern of medication noncompliance and decompensation, being verbally and physically agitated with group home staff, a community member, and staff here at the ED. Chelsea stated she will pursue " "revocation of provisional discharge. Writer provided fax number for court order once obtained. She reports she will file later today   6/10/24 By staff report, aggressive with a peer.    He is very upset about being diagnosed as \"Manic\".    6/14/24: writer Mariano Pacheco, DNP, APRN, CNP assumed patient care from today. Patient manifested series of mood lability from paranoia, to expansive, to physical and verbal aggression, total meltdown and being apologetic, it was really dramatic. Later requested prn emergency medications, Haldol, benadryl and ativan for extreme agitation. Ammonia level 85 placed on Lactulose. Valproic acid level was 75.3 Depakote decreased from 1500 mg to 1250 mg    6/17/24:Patient attempted to use the unit phone unsuccessfully, Cooperative with assessment stating \"I kept my self schizoaffective during the weekend. Stating that he believed schizoaffective could be cured without using a pill. Patient continue to refuse Risperidone stating he made him not to be able to ejaculate or reach orgasm. Reported he could not take Zyprexa because of bothersome side effects.   While writer was assessing another patient, he over heard patient yelled out loudly, staff reported patient was palpably agitated, verbally coursing, posturing towards staff with a closed fist wanting to punch the staff, but other staff intervened.  Patient yelled out the more and slammed the door.  He was immediately giving an emergency medications: Benadryl+Haldol+ Ativan combination. Later, staff reported that patient was threatening suicide, placed on SIO 2:1 at 5  feet for safety.     6/18/24: Patient reported a high anxiety level but shared that occupational therapy helped.  Patient persistently request for Ativan believing that is the only medication that will help him.  Writer discussed with the patient the intent to file a Hand order with [facility] because patient has not been taking medications since admission, and " "considering the number of days he had been here without taking medication and the problems that brought him lingers, it amounts to wasting of his time.  Patient himself informed this writer he was admitted on 7 June, that he has been here for 11 days.  Writer made the patient understand that the goal of admitting him would be defeated if patient's condition does not improve due to not taking medications.    6/19/20:Patient stated he felt it is unnecessary to put him on Hand medications, requesting writer call [facility] and convince them he had been taking his medications and attending groups. Writer discussed about Invega, an alternative medication to Risperidone.Patient stated he would do Invega to please staff. He  later agreed to take 0.5 mg of Risperidone at bedtime, stating he was on 0.25 mg at bedtime PTA. Patient continue to receive emergency medications for increased anxiety. Rechecked Ammonia WNL 47.    6/20/21: Patient appears animated, labile and paranoid but minimally cooperative stated that he is \"doing good.\"  Patient reported that the as needed medication helped his mood, stating that his mood was not pleasant prior to taking the as needed medication.  Patient continued to apologize for his behavior of yesterday.     6/21/24: Pt expresses frustration with hospitalization and a desire to be out of the hospital by the 4th of July. Pt talked with writer regarding a video games that will release soon that he is excited about. Complained of impacted wax in the ears, IM IP Consult placed.    6/24/24: patient stated \"I am doing great.\"  Patient reported the weekend was really good, stated \"no outbursts, I requested for Haldol plus Benadryl was very effective and the staff helped me to use coping skills.  Patient seems elated as he was discussing with this provider.  Patient briefly talked about his room change, stating another patient with schizoaffective was newly admitted into pod 1, feared they will " always be at [facility], and so she requested a transfer to pod 2 to avoid conflicts.  Writer commended that effort as good and therapeutic.     Writer encouraged the patient to use more of coping skills than taking Haldol plus Benadryl, reminding the patient that he may not have access to this medication while back to the community patient seems to demonstrate understanding by voicing that he will reduce the frequency of asking for the medications.    6/25/24: Patient continue to report high anxiety between 11 and 1 pm, receiving haldol and Benadryl. Low frustration tolerance is abating. Patient requested for double portion of meal, more vegetable and Hummus.    6/26/24: Patient shared that he was in good spirit, analyzed how many days to discharge. Patient hypothesized that he would be Jarvised, then would be on Hand medication and monitored for 7 days and then discharge.    6/27/28: Patient reported uneventful day, counting the number of days to his court hearing. Continued to take emergency medications    6/28/24: Patient requested if writer could increase his Risperidone to 1 mg at bedtime, to start receiving that like a Hand medication, patient informed that the dose does not meet the recommended dose for Hand medication.    7/1/24: Discussed discharge with the patient following the Hand petition, patient shared that he was not going to contest his Hand medications so he could get out of here fast.    7/2/24: Underwent Court hearing for Hand, patient waved his right to contest. Committed with Hand but paperwork did not come until 7/5/24 7/5/24: received court paperwork from [facility] that patient was committed with Hand. Started on Invega oral tab 6 mg    7/9/24: Received the loading dose of Invega 234 mg, discharge order placed for 7/10/24    7/10/24: Mother to pick patient up at 1 pm. Patient hyperexcited that he is discharging today to his Group home.  Met with the patient this  morning, patient reported that the day is going so slowly, cannot wait to get out of here.  Patient reported a high anxiety level for which he had received hydroxyzine and waiting to receive gabapentin.  Informed the patient that script for his PTA medication for ADHD was sent to his [place] pharmacy.  Patient was very excited to hear this, however he was informed that his outpatient provider will take care of that.  Patient shares that if in crisis he would call the crisis line instead of 911.    Venancio Edgar did participate in groups and was visible in the milieu.     The patient's symptoms of Bipolar affective disorder has improved.     Venancio Edgar was released to group home. At the time of discharge Venancio Edgar was determined to not be a danger to himself or others.          Discharge Medications:     Current Discharge Medication List        START taking these medications    Details   !! paliperidone (INVEGA SUSTENNA) 117 MG/0.75ML HUBERT Inject 0.75 mLs (117 mg) into the muscle once for 1 dose  Qty: 0.75 mL, Refills: 0    Comments: First maintenance dose due 8/15/24  Associated Diagnoses: Bipolar I disorder with jose (H)      !! paliperidone (INVEGA SUSTENNA) 156 MG/ML HUBERT injection Inject 1 mL (156 mg) into the muscle once for 1 dose  Qty: 1 mL, Refills: 0    Comments: 2nd loading dose due at 7/15/24  Associated Diagnoses: Bipolar I disorder with jose (H)       !! - Potential duplicate medications found. Please discuss with provider.        CONTINUE these medications which have CHANGED    Details   divalproex sodium delayed-release (DEPAKOTE) 250 MG DR tablet Take 5 tablets (1,250 mg) by mouth at bedtime  Qty: 150 tablet, Refills: 0    Associated Diagnoses: Bipolar I disorder with jose (H)           STOP taking these medications       amphetamine-dextroamphetamine (ADDERALL) 20 MG tablet Comments:   Reason for Stopping:         benztropine (COGENTIN) 1 MG tablet Comments:   Reason for Stopping:          hydrOXYzine HCl (ATARAX) 50 MG tablet Comments:   Reason for Stopping:         lisdexamfetamine (VYVANSE) 30 MG capsule Comments:   Reason for Stopping:         risperiDONE (RISPERDAL) 2 MG tablet Comments:   Reason for Stopping:                    Psychiatric Examination:   Appearance:  awake, alert, adequately groomed, dressed in hospital scrubs, and appeared as age stated  Attitude:  cooperative  Eye Contact:  good  Mood:  good and happy  Affect:  appropriate and in normal range and mood congruent  Speech:  clear, coherent and normal prosody  Psychomotor Behavior:  no evidence of tardive dyskinesia, dystonia, or tics  Thought Process:  logical and goal oriented  Associations:  no loose associations  Thought Content:  no evidence of suicidal ideation or homicidal ideation  Insight:  fair  Judgment:  intact  Oriented to:  time, person, and place  Attention Span and Concentration:  intact  Recent and Remote Memory:  intact  Language: Able to read and write  Fund of Knowledge: appropriate  Muscle Strength and Tone: normal  Gait and Station: Normal         Discharge Plan:     Behavioral Discharge Planning and Instructions     Summary: You were admitted on 6/7/2024  due to Psychotic Symptomology, Manic Symptomology, and Agressive Behaviors.  You were treated by  Mariano MARTIN and Javier Card MD  and discharged on 7/10/24 from [facility] to Group Home     Main Diagnosis: Bipolar affective disorder, jose, severe with psychosis     Commitment:  You were dually committed to the [facility] and the Commissioner of Human Services on June 13, 2024 and you are being discharged on a Provisional Discharge Agreement which shall remain in effect for the duration of the Commitment which expires on 12/13/2024. and Hand: You are also court ordered to take the medications that the doctor ordered for you.      Health Care Follow-up:   Psychiatry appointment: Friday, July 12, 2024 @ 2:10pm In-person    Provider: MARIO Weinberg CNP   Location: [facility], [address], [address], [place] [address]  Phone: [phone]  Fax: [phone]      You received the Invega loading dose on 7/9/24. You must follow up with Joan in 5 days.   Meds sent to [facility].      HUC TO FAX AVS to above appointment, please.     Information will be faxed to your outpatient providers to ensure a healthy continuity of care for you.      Attend all scheduled appointments with your outpatient providers. Call at least 24 hours in advance if you need to reschedule an appointment to ensure continued access to your outpatient providers.      Major Treatments, Procedures and Findings:  You were provided with: a psychiatric assessment, assessed for medical stability, medication evaluation and/or management, group therapy, individual therapy, and milieu management     Symptoms to Report: feeling more aggressive, increased confusion, losing more sleep, mood getting worse, or thoughts of suicide     Early warning signs can include: increased depression or anxiety sleep disturbances increased thoughts or behaviors of suicide or self-harm  increased unusual thinking, such as paranoia or hearing voices     Safety and Wellness:  Take all medicines as directed.  Make no changes unless your doctor suggests them.      Follow treatment recommendations.  Refrain from alcohol and non-prescribed drugs.  If there is a concern for safety, call 911.     Resources:   Mental Health Crisis Resources  Throughout [place]: call **CRISIS (**624332)  Crisis Text Line: is available for free, 24/7 by texting [place] to 491913  Suicide Awareness Voices of Education (SAVE) (www.save.org): 517-963-UORE (8963)  The National Suicide Prevention Lifeline is now: 988 Suicide and Crisis Lifeline. Call 988 anytime.  National [place] on Mental Illness (www.[place].torsten.org): 175.314.8695 or 420-647-8154.  Iqmd5vdif: text the word LIFE to 27791 for  immediate support and crisis intervention  Mental Health Consumer/Survivor Network of [place] (www.mhcsn.net): 704-420-7356 or 332-985-9064  Mental Health Association of [place] (www.mentalhealth.org): 724.789.1057 or 767-523-9708  Peer Support Connection [place] Warmline (PSC) 1-304.428.3444 Available from 5pm - 9am (7 days a week/365 days a year)  [facility] 1-498.288.5507 Community Outreach for Psych Emergencies     General Medication Instructions:   See your medication sheet(s) for instructions.   Take all medicines as directed.  Make no changes unless your doctor suggests them.   Go to all your doctor visits.  Be sure to have all your required lab tests. This way, your medicines can be refilled on time.  Do not use any drugs not prescribed by your doctor.  Avoid alcohol.     Advance Directives:   Scanned document on file with [employer]? No scanned doc  Is document scanned? Pt states no documents  Honoring Choices Your Rights Handout: Informed and given  Was more information offered? Pt declined     The Treatment team has appreciated the opportunity to work with you. If you have any questions or concerns about your recent admission, you can contact the unit which can receive your call 24 hours a day, 7 days a week. They will be able to get in touch with a Provider if needed. The unit number is [phone].      Attestation:  The patient has been seen and evaluated by me,  Mariano Pacheco, GAVI, APRN, CNP

## 2024-07-10 NOTE — PLAN OF CARE
Team Note Due:  Monday     Assessment/Intervention/Current Symtoms and Care Coordination:  Chart review and met with team, discussed pt progress, symptomology, and response to treatment. Discussed the discharge plan and any potential impediments to discharge.      Pt leaving today, mom picking up at 1PM to discharge to residential.   I faxed the signed PD and COS and sent to the [facility] court. I verified the fax # with Jordan Garibay court administration.     Discharge Plan or Goal:    Discharge Wednesday July 10th - Time: 12:30PM.  Transportation: Jamaal Guerrero will  from here.     Location: [facility]  [address] [place] [address]    Discharge Pharmacy:  [facility] Ph: [phone]  [facility]      Barriers to Discharge:  Waiting for Invega loading dose then discharge Wednesday.      Referral Status:  None     Legal Status:  Committed w/Hand- Medications: Haldol, Risperdal, Invega, Zyprexa, Seroquel, and Abilify  County: [facility]  File Number: [id number]     Pt's  is:  Carlos Gonzales  [place] [employer]  Ph:  [phone]     Beth Garza    [address] [place] [address]  Phone: [phone]  [email or web address].     Contacts:  [facility]:  Norman Loyola <[email or web address]> and John Espana <[email or web address]> and Tal Ken as Norman Loyola is away.   Darline Romo ( for residential): [phone] - RENNY on file for residential staff  Bree (residential ): [phone] RENNY on file for group home staff  Email: [email or web address]     :  Chelsea Brennan ([facility]  Ph: [phone] - Commitment CM no RENNY needed   [email or web address].  [facility]  Ph: [phone]  Christi (member of case management team): [phone]     Medication Management/Psychiatry:  Psychiatry appointment:  Friday, July 12, 2024 @ 2:10pm In-person   Provider: MARIO Weinberg CNP   Location: [facility], [address] [facility] [address], [place] [address]  Phone: [phone]  Fax: [phone]     Other:  Yolanda (mom): [phone] - Pt declines RENNY for Mom      Upcoming Meetings and Dates/Important Information and next steps:  Will need COS and PD

## 2024-07-10 NOTE — PLAN OF CARE
Problem: Sleep Disturbance  Goal: Adequate Sleep/Rest  Outcome: Progressing   Goal Outcome Evaluation:    Patient appeared to sleep 7 hours this night shift.  No prns or snacks given or requested.  No concerns were reported or noted.  Planned discharge to [facility] at 1230 today.

## 2024-07-10 NOTE — PLAN OF CARE
"\"I don't know what the depakote is for.\"  \"Melatonin is my favourite medication.\"  \"I don't know why I ended up here anyway.\"  \"I'm glad I finally get to go.\"    Mostly isolative but bright with elevated mood on approach. Denies thoughts of harm to anyone. Limited insight and judgement but hopeful about the future. Able to maintain eye contact and demonstrating linear thought process in conversation.    Anticipated discharge tomorrow 7/10/24,  by mother 12:30 to 13:00 to return to .     Plan: Monitor and document mood and behaviour, thought process and content. Establish and maintain therapeutic relationship. Educate about diagnoses, medications, treatment, legal status, plan of care. Address preexisting and concurrent medical concerns.     P:Unstable mood  G:Stable mood  O:Progressing      "

## 2024-07-10 NOTE — PLAN OF CARE
BEH IP Unit Acuity Rating Score (UARS)  Patient is given one point for every criteria they meet.    CRITERIA SCORING   On a 72 hour hold, court hold, committed, stay of commitment, or revocation. 1    Patient LOS on BEH unit exceeds 20 days. 1  LOS: 33   Patient under guardianship, 55+, otherwise medically complex, or under age 11. 0   Suicide ideation without relief of precipitating factors. 0   Current plan for suicide. 0   Current plan for homicide. 0   Imminent risk or actual attempt to seriously harm another without relief of factors precipitating the attempt. 0   Severe dysfunction in daily living (ex: complete neglect for self care, extreme disruption in vegetative function, extreme deterioration in social interactions). 0   Recent (last 7 days) or current physical aggression in the ED or on unit. 0   Restraints or seclusion episode in past 72 hours. 0   Recent (last 7 days) or current verbal aggression, agitation, yelling, etc., while in the ED or unit. 0   Active psychosis. 0   Need for constant or near constant redirection (from leaving, from others, etc).  0   Intrusive or disruptive behaviors. 0   Patient requires 3 or more hours of individualized nursing care per 8-hour shift (i.e. for ADLs, meds, therapeutic interventions). 0   TOTAL 2

## 2024-07-11 NOTE — PROGRESS NOTES
Prior Authorization **APPROVED**    Authorization Effective Date: 6/10/2024  Authorization Expiration Date: 7/10/2025  Medication: PALIPERIDONE ER 6 MG PO TB24  Approved Dose/Quantity: 1 tablet daily  Reference #: CoverMyMeds Key: [id number] - [place] Case ID: [id number], [employer] Case ID: [id number]  Insurance Company: MEDICA - Site Tour [phone] Fax [phone]  Expected CoPay: $ 0    CoPay Card Available: No    Foundation Assistance Needed: -  Which Pharmacy is filling the prescription (Not needed for infusion/clinic administered): [facility] [address]  Pharmacy Notified: Yes  Patient Notified: YEs  Comments:  Discharge pharmacy sent patient with supply while auth is pending. *Retroactively Billed*            Taylor Rahman CPhT  Discharge Pharmacy Liaison  [facility]  Pronouns: She/Her/Hers    Securely message with xiao qu wu you, Epic Secure Chat, or Emer[employer]See  Phone: [phone]  Fax: [phone]  [email or web address]

## 2024-07-14 ENCOUNTER — HOSPITAL ENCOUNTER (EMERGENCY)
Facility: CLINIC | Age: 29
Discharge: HOME | End: 2024-07-15
Attending: EMERGENCY MEDICINE | Admitting: EMERGENCY MEDICINE
Payer: COMMERCIAL

## 2024-07-14 DIAGNOSIS — Z86.59 HISTORY OF BIPOLAR DISORDER: ICD-10-CM

## 2024-07-14 DIAGNOSIS — R46.89 DISORGANIZED BEHAVIOR: ICD-10-CM

## 2024-07-14 PROCEDURE — 99285 EMERGENCY DEPT VISIT HI MDM: CPT | Performed by: EMERGENCY MEDICINE

## 2024-07-14 PROCEDURE — 99283 EMERGENCY DEPT VISIT LOW MDM: CPT | Performed by: EMERGENCY MEDICINE

## 2024-07-14 ASSESSMENT — ACTIVITIES OF DAILY LIVING (ADL): ADLS_ACUITY_SCORE: 35

## 2024-07-14 ASSESSMENT — COLUMBIA-SUICIDE SEVERITY RATING SCALE - C-SSRS
1. IN THE PAST MONTH, HAVE YOU WISHED YOU WERE DEAD OR WISHED YOU COULD GO TO SLEEP AND NOT WAKE UP?: NO
6. HAVE YOU EVER DONE ANYTHING, STARTED TO DO ANYTHING, OR PREPARED TO DO ANYTHING TO END YOUR LIFE?: NO
2. HAVE YOU ACTUALLY HAD ANY THOUGHTS OF KILLING YOURSELF IN THE PAST MONTH?: NO

## 2024-07-15 ENCOUNTER — TELEPHONE (OUTPATIENT)
Dept: BEHAVIORAL HEALTH | Facility: CLINIC | Age: 29
End: 2024-07-15

## 2024-07-15 ENCOUNTER — HOSPITAL ENCOUNTER (INPATIENT)
Facility: CLINIC | Age: 29
LOS: 15 days | Discharge: PSYCHIATRIC OR SUBSTANCE USE FACILITY | DRG: 885 | End: 2024-08-02
Attending: FAMILY MEDICINE | Admitting: PSYCHIATRY & NEUROLOGY
Payer: COMMERCIAL

## 2024-07-15 VITALS
HEART RATE: 100 BPM | DIASTOLIC BLOOD PRESSURE: 80 MMHG | OXYGEN SATURATION: 100 % | SYSTOLIC BLOOD PRESSURE: 125 MMHG | RESPIRATION RATE: 16 BRPM | TEMPERATURE: 97.7 F

## 2024-07-15 DIAGNOSIS — F31.2 SEVERE MANIC BIPOLAR 1 DISORDER WITH PSYCHOTIC BEHAVIOR (H): ICD-10-CM

## 2024-07-15 DIAGNOSIS — F31.10 BIPOLAR I DISORDER WITH MANIA (H): ICD-10-CM

## 2024-07-15 DIAGNOSIS — F12.10 CANNABIS ABUSE: ICD-10-CM

## 2024-07-15 DIAGNOSIS — F17.290 CIGAR SMOKER: Primary | ICD-10-CM

## 2024-07-15 LAB
AMPHETAMINES UR QL SCN: ABNORMAL
BARBITURATES UR QL SCN: ABNORMAL
BENZODIAZ UR QL SCN: ABNORMAL
BZE UR QL SCN: ABNORMAL
CANNABINOIDS UR QL SCN: ABNORMAL
FENTANYL UR QL: ABNORMAL
OPIATES UR QL SCN: ABNORMAL
PCP QUAL URINE (ROCHE): ABNORMAL

## 2024-07-15 PROCEDURE — 250N000011 HC RX IP 250 OP 636: Performed by: FAMILY MEDICINE

## 2024-07-15 PROCEDURE — 80307 DRUG TEST PRSMV CHEM ANLYZR: CPT | Performed by: FAMILY MEDICINE

## 2024-07-15 PROCEDURE — 99285 EMERGENCY DEPT VISIT HI MDM: CPT | Performed by: FAMILY MEDICINE

## 2024-07-15 PROCEDURE — 250N000013 HC RX MED GY IP 250 OP 250 PS 637: Performed by: FAMILY MEDICINE

## 2024-07-15 PROCEDURE — 96372 THER/PROPH/DIAG INJ SC/IM: CPT | Performed by: FAMILY MEDICINE

## 2024-07-15 RX ORDER — OLANZAPINE 10 MG/2ML
10 INJECTION, POWDER, FOR SOLUTION INTRAMUSCULAR DAILY PRN
Status: DISCONTINUED | OUTPATIENT
Start: 2024-07-15 | End: 2024-07-18

## 2024-07-15 RX ORDER — POLYETHYLENE GLYCOL 3350 17 G
2 POWDER IN PACKET (EA) ORAL
Status: DISCONTINUED | OUTPATIENT
Start: 2024-07-15 | End: 2024-07-19 | Stop reason: DRUGHIGH

## 2024-07-15 RX ORDER — POLYETHYLENE GLYCOL 3350 17 G
2 POWDER IN PACKET (EA) ORAL
Status: DISCONTINUED | OUTPATIENT
Start: 2024-07-15 | End: 2024-07-15 | Stop reason: HOSPADM

## 2024-07-15 RX ORDER — HALOPERIDOL 5 MG/ML
5 INJECTION INTRAMUSCULAR ONCE
Status: COMPLETED | OUTPATIENT
Start: 2024-07-15 | End: 2024-07-15

## 2024-07-15 RX ORDER — OLANZAPINE 10 MG/1
10 TABLET, ORALLY DISINTEGRATING ORAL
Status: COMPLETED | OUTPATIENT
Start: 2024-07-15 | End: 2024-07-16

## 2024-07-15 RX ORDER — LORAZEPAM 2 MG/ML
2 INJECTION INTRAMUSCULAR ONCE
Status: COMPLETED | OUTPATIENT
Start: 2024-07-15 | End: 2024-07-15

## 2024-07-15 RX ORDER — DIPHENHYDRAMINE HYDROCHLORIDE 50 MG/ML
50 INJECTION INTRAMUSCULAR; INTRAVENOUS ONCE
Status: COMPLETED | OUTPATIENT
Start: 2024-07-15 | End: 2024-07-15

## 2024-07-15 RX ORDER — PALIPERIDONE 6 MG/1
6 TABLET, EXTENDED RELEASE ORAL DAILY
Status: DISCONTINUED | OUTPATIENT
Start: 2024-07-15 | End: 2024-07-24

## 2024-07-15 RX ADMIN — NICOTINE POLACRILEX 2 MG: 2 LOZENGE ORAL at 18:43

## 2024-07-15 RX ADMIN — HALOPERIDOL LACTATE 5 MG: 5 INJECTION, SOLUTION INTRAMUSCULAR at 17:33

## 2024-07-15 RX ADMIN — PALIPERIDONE 6 MG: 6 TABLET, EXTENDED RELEASE ORAL at 21:31

## 2024-07-15 RX ADMIN — DIPHENHYDRAMINE HYDROCHLORIDE 50 MG: 50 INJECTION, SOLUTION INTRAMUSCULAR; INTRAVENOUS at 17:33

## 2024-07-15 RX ADMIN — NICOTINE POLACRILEX 2 MG: 2 LOZENGE ORAL at 17:58

## 2024-07-15 RX ADMIN — NICOTINE POLACRILEX 2 MG: 2 LOZENGE ORAL at 19:46

## 2024-07-15 RX ADMIN — LORAZEPAM 2 MG: 2 INJECTION INTRAMUSCULAR; INTRAVENOUS at 17:33

## 2024-07-15 RX ADMIN — DIVALPROEX SODIUM 1250 MG: 500 TABLET, DELAYED RELEASE ORAL at 21:32

## 2024-07-15 ASSESSMENT — ACTIVITIES OF DAILY LIVING (ADL)
ADLS_ACUITY_SCORE: 35

## 2024-07-15 ASSESSMENT — COLUMBIA-SUICIDE SEVERITY RATING SCALE - C-SSRS
6. HAVE YOU EVER DONE ANYTHING, STARTED TO DO ANYTHING, OR PREPARED TO DO ANYTHING TO END YOUR LIFE?: NO
2. HAVE YOU ACTUALLY HAD ANY THOUGHTS OF KILLING YOURSELF IN THE PAST MONTH?: NO
1. IN THE PAST MONTH, HAVE YOU WISHED YOU WERE DEAD OR WISHED YOU COULD GO TO SLEEP AND NOT WAKE UP?: NO

## 2024-07-15 NOTE — ED TRIAGE NOTES
Pt BIBA. Pt called EMS stating he took mushrooms at 3pm. Hx of schizophrenia. Pt requesting Adderal for sleep. Endorses AH/VH and displaying manic behaviors   in EMS

## 2024-07-15 NOTE — ED NOTES
Triage & Transition Services, Extended Care     [facility] is [facility]- [phone].    Lauren Pugh, [facility]   Extended Care Coordinator   [phone]

## 2024-07-15 NOTE — ED NOTES
Bed: URE-DAVID  Expected date:   Expected time:   Means of arrival:   Comments:  Hold Polo [address] 26 y/o drug induced Danisha

## 2024-07-15 NOTE — ED NOTES
Bed: [facility]  Expected date: 7/15/24  Expected time: 4:20 PM  Means of arrival: Ambulance  Comments:  [facility] 29[facility] male, hearing voices

## 2024-07-15 NOTE — ED NOTES
[facility] ED Mental Health Handoff Note:       Brief HPI:  This is a 28 year old male signed out to me by Dr. Garcia.  See initial ED Provider note for full details of the presentation. Patient with recent 5 week admission for Bipolar. Patient reportedly called 911 reportin that he took mushrooms, seems hyperfocused on mac and cheese and Adderall. Interval history is pertinent for patient being monitored for mental status clearing, will need DEC assessment.    Home meds reviewed and ordered/administered: No    Medically stable for inpatient mental health admission: Yes.    Evaluated by mental health: No. Awaiting clinical sobriety before evaluation.    Safety concerns: At the time I received sign out, there were no safety concerns.    Hold Status:  Active Orders   Legal    Health Officer Authority to Detain (TASIA)     Frequency: Effective Now     Start Date/Time: 07/14/24 8542      Number of Occurrences: Until Specified     Order Comments: This patient presented with circumstances that have led me to be reasonably suspicious that the patient is experiencing psychosis. The patient's judgment to this situation appears to be impaired. Given the circumstances in which the patient presented, it is likely that the patient is at significant risk of harming self or others if this situation is not investigated further. I am highly concerned that the patient is mentally ill and currently cannot safely care for oneself. This represents endangerment to the patient's well-being and safety, and I am placing a Health Officer Authority hold upon the patient at this time.         Exam:   Patient Vitals for the past 24 hrs:   BP Temp Temp src Pulse Resp SpO2   07/14/24 2311 (!) 143/68 97.7  F (36.5  C) Oral (!) 122 18 97 %       ED Course:    Medications - No data to display         There were no significant events during my shift.  Mental status is clearing appropriately.    Patient was signed out to the oncoming provider,  Dr. Zhang      Impression:    ICD-10-CM    1. Disorganized behavior  R46.89       2. History of bipolar disorder  Z86.59           Plan:    Awaiting mental health evaluation/recommendations.      RESULTS:   No results found for this visit on 07/14/24 (from the past 24 hour(s)).          MD Steve Pimentel Barrett Parker, MD  07/15/24 0902

## 2024-07-15 NOTE — ED TRIAGE NOTES
Triage Assessment (Adult)       Row Name 07/15/24 1201          Triage Assessment    Airway WDL WDL        Respiratory WDL    Respiratory WDL WDL        Skin Circulation/Temperature WDL    Skin Circulation/Temperature WDL WDL        Cardiac WDL    Cardiac WDL WDL

## 2024-07-15 NOTE — DISCHARGE INSTRUCTIONS
Per your commitment, you are advised to abstain from drug and alcohol use. Please take your medications as prescribed and stay in touch with your . Utilize your [facility] mental health crisis team as needed. They are available .     Substance Use Disorder Direct Access Resources    It is recommended that you abstain from all mood altering chemicals. Please contact the sober support hotline ([phone]) as needed; phones are answered 24 hours a day, 7 days a week.    To access substance use treatment you must have a comprehensive assessment completed to begin any treatment program.     Community VANIA Evaluations: Clients may call their county for a full list of providers - Availability and services listed belo are subject to change, please call the provider to confirm    [facility]  [phone]  [address] [place] [address], [address]  *Please call the above number to schedule a comprehensive assessment for determination of level of care needs. In person and virtual appointments available Mon-Fri.    [facility], [facility] [address], [address] Floor, [address], [place] [address] (next to the outpatient lab)    Phone: [phone]   Provides bridging services to people with Opiate Use Disorders (OUD) seeking care. This is a front door to Medication Assisted Treatments (MAT), ages 16+  Walk In hours: Monday-Friday 9:00am-3:00pm    [facility]  [phone]  Walk in Assessments: Mon-Friday 7a-1:45p  [address], [place], [address] [place] [address] Recovery [facility]  Central Access [phone]  [address] [place] [address], [address]  *by appointment only    [place]  [phone] (phone consultation available )  Locations in: [place], [place], [facility], and [place], [place]  [place] virtual IOP [phone] or visit [email or web address]   Also offers LGBTQ programming     [facility]  [place]  [phone]  [address]  [place], [place], [address]  *Currently only offered via telehealth - call to set up an appointment    [facility] Mental [facility]  [address], [place], [address]  [facility]  For more information to to make a referral call:  [phone]  Walk-in on Fridays  9-11 a.m.    [facility]  [phone]  [address] [place], [place], [address]  *available by appointments only    [facility]  [phone]  [address] [place], [place], [address]  *available by appointment only    [facility]  [phone]  [address] [place], [place], [address]  *walk in assessments available M-F starting at 7 am.    [facility]  [phone]  Locations: [facility], [facility], and [place]  *Walk in assessments availble M-F starting at 8 am -virtual only    [facility]  [phone]  [address] [place] [address]    [facility]  Virtual + Locations: [place], [place], [place], [place], [facility], [facility], [place], [place]   [phone]  *available by appointment only    [facility]  [phone]  [address]  [place], [place], [address]    [facility]  [phone]  [address], [place], [address]  *available by appointment    [facility]  [phone]  [address] [place], [place], [address]  *walk ins available M-TH from 9-3    [facility]  MAT program: [phone]  [address] [place], [place], [address]    [facility]  [phone]  Same day substance use disorder assessments are available Monday - Friday, via walk-in or by appointment at the [place] location.  [address], [address], [place] [address]     [facility] - adolescent and adult SUDs services  [phone]  Offer services Monday through Friday, as well as evening hours Monday through  Thursday. Normally, a first appointment will be scheduled within one week  [email or web address]  Locations all over [place]    If you are intoxicated, you may be required to detox at a detox facility before starting treatment. The following are detox facilities that you can self present to. All detox facilities are able to help you complete an assessment prior to discharge if you choose:    [facility]: Arrive at a [facility] for immediate medical evaluation    [facility]: [address] [place], [place], [address].         [phone]    [facility]: [address] [place], [place], [address]  [phone]     [facility] [place] [facility]): [address] [place], [place], [address] [phone]     [facility]: [address] [place], [place], [address], [phone]         Ways to help cope with sobriety:    -- Take prescribed medicines as scheduled  -- Keep follow-up appointments  -- Talk to others about your concerns  -- Get regular exercise  -- Practice deep breathing skills  -- Eat a healthy diet  -- Use community resources, including hotline numbers, [facility] crisis and support meetings  -- Stay sober and avoid places/people/things associated with substance use  --Maintain a daily schedule/routine  --Get at least 7-8 hours of sleep per night  --Create a list 10--20 healthy activities that you can do that are enjoyable and do not involve substance use  --Create daily goals (approx. 1-4 goals) per day and work to achieve them throughout the day.       Free Resources:    [place] [facility] ([facility])  [facility] connects people seeking recovery to resources that help foster and sustain long-term recovery. Whether you are seeking resources for treatment, transportation, housing, job training, education, health care or other pathways to recovery, [facility] is a great place to start.  Phone: 256.577.9501.  www.[facility].org (Great listing of all types of recovery and non-recovery related resources)    Alcoholics Anonymous  Phone: 9-263-ALCOHOL  Website: HTTP://WWW.AA.ORG/  [facility] [place] (589-944-5173 or http://a[place].org)  [facility] [place] (604-756-6436 or www.aastpa[place].org)     Narcotics Anonymous  Phone: 649.806.1848  Website: www.[employer]ifi.    [facility] [address], [place], [place],  Phone: [phone]  Drop-in Hours: Monday-Friday 9-11:30 am. By appointment at other times.  Provides: [facility] is a drop-in center on the [facility] that provides a safe space for individuals who are homeless and have a history of chemical use. Sobriety is not a requirement but drugs and alcohol are not allowed on the property.  Services: Non-clients can access drop-in services such as Recovery and Harm Reduction Groups, referrals to case management, community activities, shower facilities, and a pool table. Individuals who are homeless and have chemical health needs may be eligible for enrollment into [facility]'s case management program. Clients and  work together to access benefits, treatment, health care, shelter, and external housing resources.

## 2024-07-15 NOTE — ED PROVIDER NOTES
"    [facility] EMERGENCY DEPARTMENT ([facility])    7/14/24       ED PROVIDER NOTE    History     Chief Complaint   Patient presents with    Psychiatric Evaluation     Pt making illogical statements and is unable to verbalize what brings pt into ED. Pt appearing manic and anxious, unable to sit still     HPI  Venancio Edgar is a 28 year old male who was brought into the emergency department via medics today for mental health evaluation and possible substance abuse issues.  Record review shows that he was recently in manage to the hospital for 33 days, admitted on June 7, 2024 and discharged just 4 days ago on July 10, 2024.  He was diagnosed with bipolar disorder, severe, with psychosis.    Here in the emergency department the patient states he is here for \"mac and cheese\".  When asked why he came to the emergency department tonight, he continues to ask for macaroni and cheese.  He then asked what time it was, and when told the time he says \"then it's definitely time for me to have macaroni and cheese \".  He denies any mental health illness, and denies taking any drugs, but then corrects himself and states that he took Adderall. Then he changes his story and says that he took Adderall a few days ago.  When asked if he had taken anything else, he states \"a bottle of water \".  EMS reported that he took mushrooms at approximately 3 PM.  It was noted that he was requesting Adderall for sleep.  He is not able to contribute meaningfully to his history whatsoever.      This part of the medical record was transcribed by Fatou De Jesus Medical Scribe, from a dictation done by Jose Echols MD.   Past Medical History  Past Medical History:   Diagnosis Date    ADHD (attention deficit hyperactivity disorder)     Bipolar disorder (H)     DORV with doubly committed VSD     Stimulant abuse (H)     Substance-induced psychotic disorder (H)     Vitamin D deficiency      History reviewed. No pertinent surgical " history.  amphetamine-dextroamphetamine (ADDERALL) 20 MG tablet  divalproex sodium delayed-release (DEPAKOTE) 250 MG DR tablet  lisdexamfetamine (VYVANSE) 20 MG capsule  [START ON 8/15/2024] paliperidone (INVEGA SUSTENNA) 117 MG/0.75ML HUBERT  paliperidone (INVEGA SUSTENNA) 156 MG/ML HUBERT injection  paliperidone ER (INVEGA) 6 MG 24 hr tablet      No Known Allergies  Family History  No family history on file.  Social History       A complete review of systems was performed with pertinent positives and negatives noted in the HPI, and all other systems negative.    Physical Exam   BP: (!) 143/68  Pulse: (!) 122  Temp: 97.7  F (36.5  C)  Resp: 18  SpO2: 97 %  Physical Exam  Vitals and nursing note reviewed.   Constitutional:       General: He is not in acute distress.     Appearance: He is not diaphoretic.      Comments: Adult male, awake, not really able to contribute to history meaningfully.  Tangential, answers are nonsensical.   HENT:      Head: Atraumatic.      Mouth/Throat:      Mouth: Mucous membranes are moist.      Pharynx: Oropharynx is clear. No oropharyngeal exudate.   Eyes:      General: No scleral icterus.     Pupils: Pupils are equal, round, and reactive to light.   Cardiovascular:      Rate and Rhythm: Normal rate.      Pulses: Normal pulses.      Heart sounds: No murmur heard.  Pulmonary:      Effort: No respiratory distress.      Breath sounds: Normal breath sounds.   Abdominal:      General: Bowel sounds are normal.      Palpations: Abdomen is soft.      Tenderness: There is no abdominal tenderness.   Musculoskeletal:         General: No tenderness.   Skin:     General: Skin is warm.      Findings: No rash.   Neurological:      General: No focal deficit present.   Psychiatric:      Comments: Adult male, tangential, disorganized, able to briefly be redirected.  Not agitated or aggressive, but not able to follow a conversation.  Denies SI/HI.  Answers to questions are totally nonsensical.  Seems  hyperfocused on macaroni and cheese.         ED Course, Procedures, & Data      Procedures               No results found for any visits on 07/14/24.  Medications - No data to display  Labs Ordered and Resulted from Time of ED Arrival to Time of ED Departure - No data to display  No orders to display          Critical care was not performed.     Medical Decision Making  The patient's presentation was of high complexity (a chronic illness severe exacerbation, progression, or side effect of treatment).    The patient's evaluation involved:  review of external note(s) from 2 sources (see separate area of note for details)  ordering and/or review of 2 test(s) in this encounter (see separate area of note for details)  discussion of management or test interpretation with another health professional (see separate area of note for details)    The patient's management necessitated further care after sign-out to Dr. Wilson (see their note for further management).    Assessment & Plan    Patient presents with the above complaints.  On initial evaluation patient is disorganized, tangential, and is not following the conversation whatsoever.  He is continually fixated on macaroni and cheese and adderall.  He is tachycardic in the 120s.  Reviewed recent prolonged hospitalization, and it does appear that his symptoms at the moment may very well be consistent with his symptoms that brought him to the hospital previously with his bipolar disorder with psychosis.  Patient will need to be monitored and will have to have a DEC assessment.  Anticipate that he may potentially need admission.  Patient will be signed out pending DEC assessment.    I have reviewed the nursing notes. I have reviewed the findings, diagnosis, plan and need for follow up with the patient.    New Prescriptions    No medications on file       Final diagnoses:   Disorganized behavior   History of bipolar disorder       Eden Garcia MD  [facility]  EMERGENCY DEPARTMENT  7/14/2024     Eden Garcia MD  07/15/24 0024

## 2024-07-15 NOTE — ED PROVIDER NOTES
"    [facility] [place] ([facility])    7/15/24      ED PROVIDER NOTE   Hallway B    History     Chief Complaint   Patient presents with    Hallucinations     Having auditory hallucinations; Group home called 911, found patient eating grass; per EMS, patient is oriented but easily goes off tangents; patient was discharged AMA yesterday     The history is provided by the patient and medical records.     Venancio Edgar is a 28 year old male with history of bipolar disorder and ADHD, recent lengthy hospitalization from 6/7-7/10/2024 for bipolar 1 disorder with jose and psychosis who presents from group home via 911 emergency department with auditory hallucinations and unusual behaviors.  Group home staff found patient eating grass.  EMS noted that he was oriented but tangential.  Of note, patient was seen in the emergency department yesterday after exhibiting disorganized behavior.  He was seen by Dr. Wilson and len and evaluated by DEC  this morning.  He had bought Gummies and weed, used these substances and started acting erratically, harassing residents, staff and neighbors.  He later went to go look inside peoples cars and was agitated.  Group home staff called 911 as they were unable to get him to calm down.  He was initially seen by Dr. Wilson and then signed out to Dr. Zhang for psychiatric observation and DEC  evaluation.  He did calm down during the hours that he spent in the ED.  From DEC  note this morning at 11:32 AM: \"Additional collateral information:  Writer spoke with pt's CADI  Yajaira [phone] and   Chelsea [phone] at [facility]. They have been in contact with [facility] and are aware of events leading to ED visit. They state that current home is a temporary residence until they can get him to a home with increased supervision. Pt's PD is not revoked at this time. They verbalized understanding of plan for pt to return to " "group home.\"  Apparently appeared to be hallucinating, was exhibiting tangential speech, getting in all fours and eating grass.  The patient tells me \"I need to go to station 12\".  He states he is not suicidal or homicidal but starts laughing uncontrollably.    Past Medical History  Past Medical History:   Diagnosis Date    ADHD (attention deficit hyperactivity disorder)     Bipolar disorder (H)     DORV with doubly committed VSD     Stimulant abuse (H)     Substance-induced psychotic disorder (H)     Vitamin D deficiency      History reviewed. No pertinent surgical history.  amphetamine-dextroamphetamine (ADDERALL) 20 MG tablet  divalproex sodium delayed-release (DEPAKOTE) 250 MG DR tablet  lisdexamfetamine (VYVANSE) 20 MG capsule  [START ON 8/15/2024] paliperidone (INVEGA SUSTENNA) 117 MG/0.75ML HUBERT  paliperidone (INVEGA SUSTENNA) 156 MG/ML HUBERT injection  paliperidone ER (INVEGA) 6 MG 24 hr tablet      No Known Allergies  Family History  History reviewed. No pertinent family history.  Social History   Social History     Tobacco Use    Smoking status: Every Day     Types: Vaping Device    Smokeless tobacco: Never   Substance Use Topics    Alcohol use: Not Currently    Drug use: Yes     Types: Marijuana     Comment: last used 2 days ago and maybe delta 9      A medically appropriate review of systems was performed with pertinent positives and negatives noted in the HPI, and all other systems negative.    Physical Exam   BP: 118/77  Pulse: 84  Temp: 97.7  F (36.5  C)  Resp: 18  SpO2: 98 %  Physical Exam  Vitals and nursing note reviewed.   Constitutional:       General: He is not in acute distress.     Appearance: Normal appearance. He is not toxic-appearing.   HENT:      Head: Atraumatic.   Eyes:      General: No scleral icterus.     Conjunctiva/sclera: Conjunctivae normal.   Cardiovascular:      Rate and Rhythm: Normal rate.      Heart sounds: Normal heart sounds.   Pulmonary:      Effort: Pulmonary effort is " normal. No respiratory distress.      Breath sounds: Normal breath sounds.   Abdominal:      Palpations: Abdomen is soft.      Tenderness: There is no abdominal tenderness.   Musculoskeletal:         General: No deformity.      Cervical back: Neck supple.   Skin:     General: Skin is warm.   Neurological:      General: No focal deficit present.      Mental Status: He is alert.   Psychiatric:         Attention and Perception: He is inattentive. He perceives auditory hallucinations.         Mood and Affect: Mood is elated.         Speech: Speech is rapid and pressured and tangential.         Behavior: Behavior is cooperative.         Thought Content: Thought content is delusional. Thought content does not include homicidal or suicidal ideation.         Cognition and Memory: Cognition is impaired.         Judgment: Judgment is inappropriate.           ED Course, Procedures, & Data      Procedures                 Results for orders placed or performed during the hospital encounter of 07/15/24   Urine Drug Screen Panel     Status: Abnormal   Result Value Ref Range    Amphetamines Urine Screen Negative Screen Negative    Barbituates Urine Screen Negative Screen Negative    Benzodiazepine Urine Screen Negative Screen Negative    Cannabinoids Urine Screen Positive (A) Screen Negative    Cocaine Urine Screen Negative Screen Negative    Fentanyl Qual Urine Screen Negative Screen Negative    Opiates Urine Screen Negative Screen Negative    PCP Urine Screen Negative Screen Negative   Urine Drug Screen     Status: Abnormal    Narrative    The following orders were created for panel order Urine Drug Screen.  Procedure                               Abnormality         Status                     ---------                               -----------         ------                     Urine Drug Screen Panel[[id number]]      Abnormal            Final result                 Please view results for these tests on the individual orders.      Medications   nicotine (COMMIT) lozenge 2 mg (2 mg Buccal $Given 7/15/24 1843)   divalproex sodium delayed-release (DEPAKOTE) DR tablet 1,250 mg (has no administration in time range)   paliperidone ER (INVEGA) 24 hr tablet 6 mg (has no administration in time range)   OLANZapine zydis (zyPREXA) ODT tab 10 mg (has no administration in time range)   OLANZapine (zyPREXA) injection 10 mg (has no administration in time range)   haloperidol lactate (HALDOL) injection 5 mg (5 mg Intramuscular $Given 7/15/24 1733)   LORazepam (ATIVAN) injection 2 mg (2 mg Intramuscular $Given 7/15/24 1733)   diphenhydrAMINE (BENADRYL) injection 50 mg (50 mg Intramuscular $Given 7/15/24 1733)     Labs Ordered and Resulted from Time of ED Arrival to Time of ED Departure   URINE DRUG SCREEN PANEL - Abnormal       Result Value    Amphetamines Urine Screen Negative      Barbituates Urine Screen Negative      Benzodiazepine Urine Screen Negative      Cannabinoids Urine Screen Positive (*)     Cocaine Urine Screen Negative      Fentanyl Qual Urine Screen Negative      Opiates Urine Screen Negative      PCP Urine Screen Negative       No orders to display          Critical care was not performed.     Medical Decision Making  The patient's presentation was of high complexity (a chronic illness severe exacerbation, progression, or side effect of treatment).    The patient's evaluation involved:  an assessment requiring an independent historian (collateral information from staff and EMS personnel)  review of external note(s) from 1 sources (review of documentation from recent ED observation study and recent hospitalization 7/10 to 7/11/2024)  ordering and/or review of 2 test(s) in this encounter (see separate area of note for details)  discussion of management or test interpretation with another health professional (DEC )    The patient's management necessitated moderate risk (prescription drug management including medications given in the  ED) and high risk (a decision regarding hospitalization).    Assessment & Plan    Patient with a history of bipolar disorder, ADHD, and substance abuse who has a provisional discharge with a Hand order who was just discharged from the ED 4 hours ago after exhibiting bizarre behavior in the context of using hallucinogenic mushrooms and Gummies.  At the time of discharge was still somewhat erratic, tangential, and delusional but was not felt to be holdable.  Now returns EMS again due to erratic bizarre behavior, disorganized thought processes.  On exam his vitals are normal.  He is eating a sandwich.  He appears to respond to internal stimuli.  He is tangential and somewhat disorganized but pleasant.  He laughs uncontrollably for no reason but several times.  He appears in no medical distress.  The patient was also seen by the [facility] , please refer to their extensive note/evaluation which was reviewed with me and is documented in EPIC on 7/15/2024 for further details.  Patient who has been hospitalized and in observation care several times recently who continues to exhibit manic and bizarre disorganized inappropriate behavior.  He is at times intrusive and posturing though did not become physically violent.  He was given IM Haldol and Ativan, with minimal change in his behaviors.  Patient recently failed to stay in observation care return to the ED less than 6 hours later and appears to need inpatient hospitalization for stabilization.  Will place the patient on a 72-hour hold.  He has a MICD commitment with Hand order and will discuss with  any plans for revocation of provisional discharge which reportedly the  declined yesterday.    I have reviewed the nursing notes. I have reviewed the findings, diagnosis, plan and need for follow up with the patient.    New Prescriptions    No medications on file       Final diagnoses:   Severe manic bipolar 1 disorder with psychotic behavior (H)    Cannabis abuse     I, Shivani Holt, am serving as a trained medical scribe to document services personally performed by Dennys Waters MD based on the provider's statements to me on July 15, 2024.  This document has been checked and approved by the attending provider.    I, Dennys Waters MD, was physically present and have reviewed and verified the accuracy of this note documented by Shivani Holt, medical scribe.      Dennys Waters MD   [facility] EMERGENCY DEPARTMENT  7/15/2024        Dennys Waters MD  07/15/24 1938

## 2024-07-15 NOTE — ED NOTES
Pt moved from hallway B to room 12 for being disruptive and following staff around.  B52 ordered to be administered. Pt agreed to take injections.

## 2024-07-15 NOTE — DISCHARGE SUMMARY
ED Observation Discharge Summary  [facility]  Discharge Date: 7/15/2024    Venancio Edgar MRN: [id number]   Age: 28 year old YOB: 1995     Brief HPI & Initial ED Course     Chief Complaint   Patient presents with    Psychiatric Evaluation     Pt making illogical statements and is unable to verbalize what brings pt into ED. Pt appearing manic and anxious, unable to sit still     HPI  Venancio Edgar is a 28 year old male with PMH notable for mental health issues who presented to the ED with a psychiatric concern. .      The patient was evaluated in the emergency department by a physician and DEC behavioral health . The DEC  recommended observation and discharge after period of observation. See separate DEC note from this encounter for details on the assessment. The patient's psychiatric state was such that he would benefit from ongoing monitoring. Observation care was initiated with the plan including serial assessments of psychiatric condition, potential administration of medications if indicated, and further disposition pending the patient's psychiatric course during the monitoring period.     See ED Observation H&P for further details on the patient's presenting history and initial evaluation.     Physical Exam   BP: (!) 143/68  Pulse: (!) 122  Temp: 97.7  F (36.5  C)  Resp: 18  SpO2: 97 %    Physical Exam  General: . Appears stated age.   HENT: MMM, no oropharyngeal lesions  Eyes: PERRL, normal sclerae   Cardio: Regular rate, extremities well perfused  Resp: Normal work of breathing, normal respiratory rate  Neuro: alert and fully oriented. CN II-XII grossly intact. Grossly normal strength and sensation in all extremities.   MSK: no deformities.   Integumentary/Skin: no rash visualized, normal color  Psych: No active suicidal or homicidal ideation  Results      Procedures           Labs Ordered and Resulted from Time of ED Arrival to Time of ED Departure -  No data to display         Observation Course   The patient was found to have a psychiatric condition that would benefit from an observation stay in the emergency department for further psychiatric stabilization and/or coordination of a safe disposition. The plan upon observation admission included serial assessments of psychiatric condition, potential administration of medications if indicated, further disposition pending the patient's psychiatric course during the monitoring period.     Serial assessments of the patient's psychiatric condition were performed. Nursing notes were reviewed. During the observation period, the patient did require medications for agitation, and did not require restraints/seclusion for patient and/or provider safety.        After the period in observation care, the patient's circumstances and mental state were safe for outpatient management. After counseling on the diagnosis, work-up, and treatment plan, the patient was discharged. Close follow-up with a psychiatrist and/or therapist was recommended and community psychiatric resources were provided. Patient is to return to the ED if any urgent or potentially life-threatening concerns.      Discharge Diagnoses:   Final diagnoses:   Disorganized behavior   History of bipolar disorder       --  Alen Zhang MD  [facility] EMERGENCY DEPARTMENT  7/15/2024

## 2024-07-15 NOTE — CONSULTS
Diagnostic Evaluation Consultation  Crisis Assessment    Patient Name: Venancio Edgar  Age:  28 year old  Legal Sex: male  Gender Identity: male  Pronouns: he/him  Race: Choose not to Answer  Ethnicity: Not  or   Language: English    Patient was assessed: In person   Crisis Assessment Start Date: 07/15/24  Crisis Assessment Start Time: 1045  Crisis Assessment Stop Time: 1100  Patient location: [facility] EMERGENCY DEPARTMENT                             ED15    Referral Data and Chief Complaint  Venancio Edgar presents to the ED via EMS. Patient is presenting to the ED for the following concerns: Verbal agitation, Significant behavioral change, Paranoia, Physical aggression, Substance use, Intoxication.   Factors that make the mental health crisis life threatening or complex are:  Manic type behavior, substance use.    Informed Consent and Assessment Methods  Explained the crisis assessment process, including applicable information disclosures and limits to confidentiality, assessed understanding of the process, and obtained consent to proceed with the assessment.  Assessment methods included conducting a formal interview with patient, review of medical records, collaboration with medical staff, and obtaining relevant collateral information from family and community providers when available.  : done     Patient response to interventions: acceptance expressed, verbalizes understanding  Coping skills were attempted to reduce the crisis:  Asking for help from staff.     History of the Crisis   Pt presents to the ED via EMS after becoming increasingly agitated at group home in the context of recent substance use. Pt was cooperative but rambling and exhibiting nonsensical speech shortly after arrival, repeatedly saying he was here for 'mac n cheese.' Pt slept for several hours and assessed after he woke. He tells writer that he took 'mushroom gummies' yesterday afternoon. He continues to present  with some mild signs of jose, likely substance induced given his report of recent use. He is restless, distracted with labile mood. He is able to be redirected. He is oriented x5 and denies SI/HI/plan/intent, commits to safety to discharge to group home. He reports understanding that he is under commitment and therefore advised to abstain from substances. He is future-oriented and is taking medications as prescribed. Pt was recently discharged from [facility] on 7/10 (admitted on 6/7). He is under commitment and was provisionally discharged to group home. He has   as well as CADI services. He has psychiatry follow-up.    Brief Psychosocial History  Family:  Single, Children no  Support System:  Parent(s), Facility resident(s)/Staff  Employment Status:  unemployed  Source of Income:  disability  Financial Environmental Concerns:  none  Current Hobbies:  television/movies/videos  Barriers in Personal Life:  mental health concerns, behavioral concerns    Significant Clinical History  Current Anxiety Symptoms:  racing thoughts  Current Depression/Trauma:  difficulty concentrating, impaired decision making  Current Somatic Symptoms:  racing thoughts  Current Psychosis/Thought Disturbance:  distractability, flight of ideas  Current Eating Symptoms:  loss of appetite  Chemical Use History:  Alcohol: None  Benzodiazepines: None  Opiates: None  Cocaine: None  Marijuana: Occasional  Other Use: Other (comments) (Reporting 'mushroom gummies' one day ago.)  Last Use:: 07/14/24   Past diagnosis:  Bipolar Disorder, ADHD  Family history:  Bipolar Disorder, Schizophrenia  Past treatment:  Civil Commitment, Inpatient Hospitalization, Supportive Living Environment (group home, penitentiary house, etc), Psychiatric Medication Management, Individual therapy  Details of most recent treatment:  Lives in group home,  and CADI , psychiatry and therapy.  Other relevant history:  Pt is on a stay of commitment through  [facility].     Collateral Information  Is there collateral information: Yes     Collateral information name, relationship, phone number:  Diogenes Khan  Bree Ali: [phone]    What happened today: Bree states that pt was fine for two days after he was discharged from [facility] last week. He started to appear 'hyper' on Friday and got worse over the weekend, being loud, playing video games online, not making sense, acting up, yelling. Yesterday he went to the store, bought gummies and weed ('tar' per staff) and smoked it all. He started 'harassing' residents, staff and the neighbors. The neighbors called the  but pt was not brought in that time. Pt was going outside and looking in people's cars. Group home staff called 911 after they were unable to get pt to calm down.     What is different about patient's functioning: Patient has not been sleeping, since the last time he returned from the hospital.     Has patient made comments about wanting to kill themselves/others: no    If d/c is recommended, can they take part in safety/aftercare planning:   (Bebe said to call back after 9 am to talk to Rebecca, the [place] lead, at [phone].)    Additional collateral information:  Writer spoke with pt's CADI  Yajaira [phone] and   Chelsea [phone] at [facility]. They have been in contact with [facility] and are aware of events leading to ED visit. They state that current home is a temporary residence until they can get him to a home with increased supervision. Pt's PD is not revoked at this time. They verbalized understanding of plan for pt to return to [facility].     Risk Assessment  [place] Suicide Severity Rating Scale Full Clinical Version:  Suicidal Ideation  Q6 Suicide Behavior (Lifetime): no    [place] Suicide Severity Rating Scale Recent:   Suicidal Ideation (Recent)  Q1 Wished to be Dead (Past Month): no  Q2 Suicidal Thoughts (Past Month):  no  Level of Risk per Screen: no risks indicated  Intensity of Ideation (Recent)  Most Severe Ideation Rating (Past 1 Month):  (n/a)  Frequency (Past 1 Month):  (n/a)  Duration (Past 1 Month):  (n/a)  Controllability (Past 1 Month):  (n/a)  Deterrents (Past 1 Month): Does not apply  Reasons for Ideation (Past 1 Month): Does not apply  Suicidal Behavior (Recent)  Actual Attempt (Past 3 Months): No  Total Number of Actual Attempts (Past 3 Months): 0  Has subject engaged in non-suicidal self-injurious behavior? (Past 3 Months): No  Interrupted Attempts (Past 3 Months): No  Total Number of Interrupted Attempts (Past 3 Months): 0  Aborted or Self-Interrupted Attempt (Past 3 Months): No  Total Number of Aborted or Self-Interrupted Attempts (Past 3 Months): 0  Preparatory Acts or Behavior (Past 3 Months): No  Total Number of Preparatory Acts (Past 3 Months): 0    Environmental or Psychosocial Events: neither working nor attending school, impulsivity/recklessness, unemployment/underemployment, challenging interpersonal relationships  Protective Factors: Protective Factors: lives in a responsibly safe and stable environment, supportive ongoing medical and mental health care relationships, able to access care without barriers    Does the patient have thoughts of harming others? Feels Like Hurting Others: no  Previous Attempt to Hurt Others: no  Is the patient engaging in sexually inappropriate behavior?: no    Is the patient engaging in sexually inappropriate behavior?  no        Mental Status Exam   Affect: Dramatic, Labile  Appearance: Appropriate  Attention Span/Concentration: Inattentive  Eye Contact: Intense    Fund of Knowledge: Appropriate   Language /Speech Content: Expressive Speech  Language /Speech Volume: Normal  Language /Speech Rate/Productions: Normal, Hyperverbal  Recent Memory: Variable  Remote Memory: Variable  Mood: Euphoric  Orientation to Person: Yes   Orientation to Place: Yes  Orientation to Time of  Day: Yes  Orientation to Date: Yes     Situation (Do they understand why they are here?): Yes  Psychomotor Behavior: Normal, Hyperactive  Thought Content: Clear, Paranoia  Thought Form: Flight of Ideas     Medication  Psychotropic medications:   Medication Orders - Psychiatric (From admission, onward)      Start     Dose/Rate Route Frequency Ordered Stop    07/15/24 1130  nicotine (COMMIT) lozenge 2 mg         2 mg Buccal EVERY 1 HOUR PRN 07/15/24 1130      07/15/24 1052  nicotine polacrilex (NICORETTE) gum 4 mg         4 mg Buccal EVERY 1 HOUR PRN 07/15/24 1053               Current Care Team  Patient Care Team:  No Ref-Primary, Physician as PCP - General    Diagnosis  Patient Active Problem List   Diagnosis Code    Bipolar disorder (H) F31.9    ADHD (attention deficit hyperactivity disorder) F90.9    Bipolar I disorder with jose (H) F31.10       Primary Problem This Admission  Active Hospital Problems    Bipolar disorder (H)      Clinical Summary and Substantiation of Recommendations   Patient with hx Bipolar I Disorder, recent IP admission from 6/7/24-7/10/24, provisionally discharged back to group home. Pt displayed manic type symptoms that appear to be mostly likely substance induced as pt reports to recent use one day ago of 'mushroom gummies.' At this time, pt is not assessed with immediate risk of harm to self or others at this time, commits to safety at home and does not meet criteria for IP admission. Recommend d/c back to group home, f/u with , psychiatry and therapy as previously established.    Patient coping skills attempted to reduce the crisis:  Asking for help from staff.    Disposition  Recommended disposition: Individual Therapy, Medication Management, Group Home        Reviewed case and recommendations with attending provider. Attending Name: Alen Zhang MD       Attending concurs with disposition: yes       Patient and/or validated legal guardian concurs with disposition:    yes       Final disposition:  discharge    Legal status on admission: Voluntary/Patient has signed consent for treatment    Assessment Details   Total duration spent with the patient: 15 min     CPT code(s) utilized: Non-Billable    ALFREDA Ramsey, Psychotherapist  DEC - Triage & Transition Services  Callback: [phone]

## 2024-07-16 ENCOUNTER — TELEPHONE (OUTPATIENT)
Dept: BEHAVIORAL HEALTH | Facility: CLINIC | Age: 29
End: 2024-07-16
Payer: COMMERCIAL

## 2024-07-16 LAB
ALBUMIN SERPL BCG-MCNC: 4.3 G/DL (ref 3.5–5.2)
ALP SERPL-CCNC: 55 U/L (ref 40–150)
ALT SERPL W P-5'-P-CCNC: 20 U/L (ref 0–70)
ANION GAP SERPL CALCULATED.3IONS-SCNC: 16 MMOL/L (ref 7–15)
AST SERPL W P-5'-P-CCNC: 15 U/L (ref 0–45)
BASOPHILS # BLD AUTO: 0.1 10E3/UL (ref 0–0.2)
BASOPHILS NFR BLD AUTO: 1 %
BILIRUB SERPL-MCNC: 0.3 MG/DL
BUN SERPL-MCNC: 8.5 MG/DL (ref 6–20)
CALCIUM SERPL-MCNC: 8.9 MG/DL (ref 8.6–10)
CHLORIDE SERPL-SCNC: 101 MMOL/L (ref 98–107)
CREAT SERPL-MCNC: 0.84 MG/DL (ref 0.67–1.17)
EGFRCR SERPLBLD CKD-EPI 2021: >90 ML/MIN/1.73M2
EOSINOPHIL # BLD AUTO: 0.6 10E3/UL (ref 0–0.7)
EOSINOPHIL NFR BLD AUTO: 7 %
ERYTHROCYTE [DISTWIDTH] IN BLOOD BY AUTOMATED COUNT: 13.5 % (ref 10–15)
GLUCOSE SERPL-MCNC: 167 MG/DL (ref 70–99)
HCO3 SERPL-SCNC: 22 MMOL/L (ref 22–29)
HCT VFR BLD AUTO: 44.5 % (ref 40–53)
HGB BLD-MCNC: 14.8 G/DL (ref 13.3–17.7)
IMM GRANULOCYTES # BLD: 0 10E3/UL
IMM GRANULOCYTES NFR BLD: 0 %
LYMPHOCYTES # BLD AUTO: 3.5 10E3/UL (ref 0.8–5.3)
LYMPHOCYTES NFR BLD AUTO: 37 %
MCH RBC QN AUTO: 29.1 PG (ref 26.5–33)
MCHC RBC AUTO-ENTMCNC: 33.3 G/DL (ref 31.5–36.5)
MCV RBC AUTO: 87 FL (ref 78–100)
MONOCYTES # BLD AUTO: 0.7 10E3/UL (ref 0–1.3)
MONOCYTES NFR BLD AUTO: 7 %
NEUTROPHILS # BLD AUTO: 4.7 10E3/UL (ref 1.6–8.3)
NEUTROPHILS NFR BLD AUTO: 48 %
NRBC # BLD AUTO: 0 10E3/UL
NRBC BLD AUTO-RTO: 0 /100
PLATELET # BLD AUTO: 234 10E3/UL (ref 150–450)
POTASSIUM SERPL-SCNC: 3.8 MMOL/L (ref 3.4–5.3)
PROT SERPL-MCNC: 7.2 G/DL (ref 6.4–8.3)
RBC # BLD AUTO: 5.09 10E6/UL (ref 4.4–5.9)
SARS-COV-2 RNA RESP QL NAA+PROBE: NEGATIVE
SODIUM SERPL-SCNC: 139 MMOL/L (ref 135–145)
WBC # BLD AUTO: 9.6 10E3/UL (ref 4–11)

## 2024-07-16 PROCEDURE — 99223 1ST HOSP IP/OBS HIGH 75: CPT | Mod: GC | Performed by: STUDENT IN AN ORGANIZED HEALTH CARE EDUCATION/TRAINING PROGRAM

## 2024-07-16 PROCEDURE — 82040 ASSAY OF SERUM ALBUMIN: CPT | Performed by: FAMILY MEDICINE

## 2024-07-16 PROCEDURE — 85049 AUTOMATED PLATELET COUNT: CPT | Performed by: FAMILY MEDICINE

## 2024-07-16 PROCEDURE — 250N000011 HC RX IP 250 OP 636: Performed by: EMERGENCY MEDICINE

## 2024-07-16 PROCEDURE — 87635 SARS-COV-2 COVID-19 AMP PRB: CPT | Performed by: FAMILY MEDICINE

## 2024-07-16 PROCEDURE — 250N000013 HC RX MED GY IP 250 OP 250 PS 637: Performed by: FAMILY MEDICINE

## 2024-07-16 PROCEDURE — 36415 COLL VENOUS BLD VENIPUNCTURE: CPT | Performed by: FAMILY MEDICINE

## 2024-07-16 RX ORDER — ONDANSETRON 4 MG/1
4 TABLET, ORALLY DISINTEGRATING ORAL ONCE
Status: COMPLETED | OUTPATIENT
Start: 2024-07-16 | End: 2024-07-16

## 2024-07-16 RX ORDER — ONDANSETRON 4 MG/1
4 TABLET, ORALLY DISINTEGRATING ORAL EVERY 6 HOURS PRN
Status: DISCONTINUED | OUTPATIENT
Start: 2024-07-16 | End: 2024-08-02 | Stop reason: HOSPADM

## 2024-07-16 RX ADMIN — NICOTINE POLACRILEX 2 MG: 2 LOZENGE ORAL at 19:44

## 2024-07-16 RX ADMIN — DIVALPROEX SODIUM 1250 MG: 500 TABLET, DELAYED RELEASE ORAL at 22:45

## 2024-07-16 RX ADMIN — ONDANSETRON 4 MG: 4 TABLET, ORALLY DISINTEGRATING ORAL at 13:21

## 2024-07-16 RX ADMIN — NICOTINE POLACRILEX 2 MG: 2 LOZENGE ORAL at 21:07

## 2024-07-16 RX ADMIN — NICOTINE POLACRILEX 2 MG: 2 LOZENGE ORAL at 08:51

## 2024-07-16 RX ADMIN — OLANZAPINE 10 MG: 10 TABLET, ORALLY DISINTEGRATING ORAL at 19:44

## 2024-07-16 RX ADMIN — PALIPERIDONE 6 MG: 6 TABLET, EXTENDED RELEASE ORAL at 08:59

## 2024-07-16 RX ADMIN — NICOTINE POLACRILEX 2 MG: 2 LOZENGE ORAL at 10:34

## 2024-07-16 RX ADMIN — NICOTINE POLACRILEX 2 MG: 2 LOZENGE ORAL at 16:14

## 2024-07-16 NOTE — ED NOTES
IP MH Referral Acuity Rating Score (RARS)    LMHP complete at referral to IP MH, with DEC; and, daily while awaiting IP MH placement. Call score to PPS.  CRITERIA SCORING   New 72 HH and Involuntary for IP MH (not adolescent) 1/1   Boarding over 24 hours 0/1   Vulnerable adult at least 55+ with multiple co morbidities; or, Patient age 11 or under 0/1   Suicide ideation without relief of precipitating factors 0/1   Current plan for suicide 0/1   Current plan for homicide 0/1   Imminent risk or actual attempt to seriously harm another without relief of factors precipitating the attempt 0/1   Severe dysfunction in daily living (ex: complete neglect for self care, extreme disruption in vegetative function, extreme deterioration in social interactions) 0/1   Recent (last 2 weeks) or current physical aggression in the ED 1/1   Restraints or seclusion episode in ED 0/1   Verbal aggression, agitation, yelling, etc., while in the ED 1/1   Active psychosis with psychomotor agitation or catatonia 0/1   Need for constant or near constant redirection (from leaving, from others, etc).  1/1   Intrusive or disruptive behaviors 1/1   TOTAL Acuity Total Score: 5

## 2024-07-16 NOTE — TELEPHONE ENCOUNTER
[place]: [place]  Access Inpatient Bed Call Log 7/15/24  @4:00 PM:  Intake has called facilities that have not updated the bed status within the last 12 hours.                                           [facility] is posting 0 beds.                     [facility] is posting  3  beds. [phone];              [facility] is posting 0 beds. Negative covid required              [facility] is posting 0 beds. Neg covid. No high school/Elisa-psych. [phone].                [facility] is posting 0 beds. [phone]              [facility] is posting 0 beds. [phone]               [facility] is posting 0 beds. Ages 18-35. Negative covid. [phone].               [facility] is posting 0 beds.               [facility] is posting 0 beds [phone]                   [facility] is posting 2 beds. LOW acuity ONLY. Mixed unit 12+. Negative covid- [phone]              [facility] has 0 beds posted. No aggression. Negative Covid. Low acuity.              [facility] is posting 0 beds. Negative covid. [phone]; per call at 7:06 am, recording said adult and adol units are at cap.              [facility] ([place]) is posting 2 beds. Low acuity only. Neg covid.  [phone]               [facility] is posting 1 beds. Low acuity. No current aggression.                [facility] ([place]) is posting 0 beds available. Negative covid.  [phone].                  [facility] is posting 1 bed. Low acuity. 72 HH hold preferred. Negative covid required. [phone];.              [facility] ([place]) is posting 7 beds. Low acuity only. Neg covid.  [phone]; per call at 7:13 am to Katerin, they have 5 beds avail.                 [facility] [place] is posting  4  beds.  Negative covid required.   Vol only, No history of aggression, violence, or assault. No sexual offenders. No  72  holds. [phone]                    [facility] is posting  2  beds. Negative covid required.  (Must have the cognitive ability to do programming. No aggressive or violent behavior or recent HX in the last 2 yrs. MH must be primary.) Always low acuity. [phone]               [facility] has 0 beds posted. Negative covid required.  Low acuity only. Violence and aggression capped.  [phone];             [facility] is posting 3 beds. Low acuity, Negative covid required. [phone];.              [facility] [place] posting 4 beds Negative covid required.  [phone]; per call at 7:20 am to MYRTLE [place], they have 9 step-down/low acuity beds avail.              [facility] [place] is posting 2 beds. Negative covid. LOW acuity. (No lines, drains, or tubes, oxygen, CPAP, IV, etc.) Must Have a Ride Home. [phone]; per call at 7:25 am to Erma, they may possibly have some beds avail but she is not sure yet.              [facility] is posting   5 beds. [phone]; Negative covid. (No. lines, drains, or tubes, oxygen, CPAP, IV, etc.);.              [facility] is posting 8 beds. No covid test required. [phone] ; [place]; per call at 7:29 am to [facility], they have no adult beds avail and have 3 child/adol beds avail.         Pt remains on the work list pending appropriate bed availability.

## 2024-07-16 NOTE — ED PROVIDER NOTES
[facility] ED Mental Health Handoff Note:     Brief HPI:  This is a 28 year old male signed out to me by Dr. Wilson.  See initial ED Provider note for full details of the presentation. Interval history is pertinent for no acute concerns.  Patient is being admitted for bizarre behavior.  He has a known history of jose and psychosis.  Patient is on a 72-hour hold..    Home meds reviewed and ordered/administered: yes    Medically stable for inpatient mental health admission: Yes.    Evaluated by mental health: Yes. The recommendation is for inpatient mental health treatment. Bed search in process    Safety concerns: At the time I received sign out, there were no safety concerns.    Hold Status:  Active Orders   Legal    Emergency Hospitalization Hold (72 Hr Hold)     Frequency: Effective Now     Start Date/Time: 07/15/24 1931      Number of Occurrences: Until Specified            Exam:   Patient Vitals for the past 24 hrs:   BP Temp Temp src Pulse Resp SpO2   07/16/24 0929 108/68 -- -- 70 -- 98 %   07/16/24 0850 96/55 97.4  F (36.3  C) Oral 67 16 99 %   07/15/24 1635 118/77 97.7  F (36.5  C) Oral 84 18 98 %           ED Course:    Medications   nicotine (COMMIT) lozenge 2 mg (2 mg Buccal $Given 7/16/24 1034)   divalproex sodium delayed-release (DEPAKOTE) DR tablet 1,250 mg (1,250 mg Oral $Given 7/15/24 2132)   paliperidone ER (INVEGA) 24 hr tablet 6 mg (6 mg Oral $Given 7/16/24 0859)   OLANZapine zydis (zyPREXA) ODT tab 10 mg (has no administration in time range)   OLANZapine (zyPREXA) injection 10 mg (has no administration in time range)   haloperidol lactate (HALDOL) injection 5 mg (5 mg Intramuscular $Given 7/15/24 1733)   LORazepam (ATIVAN) injection 2 mg (2 mg Intramuscular $Given 7/15/24 1733)   diphenhydrAMINE (BENADRYL) injection 50 mg (50 mg Intramuscular $Given 7/15/24 1733)   ondansetron (ZOFRAN ODT) ODT tab 4 mg (4 mg Oral Not Given 7/16/24 1006)            There were no significant events during my  shift.    Patient was signed out to the oncoming provider.       Impression:    ICD-10-CM    1. Severe manic bipolar 1 disorder with psychotic behavior (H)  F31.2       2. Cannabis abuse  F12.10           Plan:    Awaiting inpatient mental health admission/transfer.      RESULTS:   Results for orders placed or performed during the hospital encounter of 07/15/24 (from the past 24 hour(s))   Urine Drug Screen     Status: Abnormal    Collection Time: 07/15/24  4:49 PM    Narrative    The following orders were created for panel order Urine Drug Screen.  Procedure                               Abnormality         Status                     ---------                               -----------         ------                     Urine Drug Screen Panel[[id number]]      Abnormal            Final result                 Please view results for these tests on the individual orders.   Urine Drug Screen Panel     Status: Abnormal    Collection Time: 07/15/24  4:49 PM   Result Value Ref Range    Amphetamines Urine Screen Negative Screen Negative    Barbituates Urine Screen Negative Screen Negative    Benzodiazepine Urine Screen Negative Screen Negative    Cannabinoids Urine Screen Positive (A) Screen Negative    Cocaine Urine Screen Negative Screen Negative    Fentanyl Qual Urine Screen Negative Screen Negative    Opiates Urine Screen Negative Screen Negative    PCP Urine Screen Negative Screen Negative   [facility] Assessment Consult Order:     Status: None ()    Collection Time: 07/15/24  5:00 PM    Narrative    Calos Balbuena     7/15/2024  7:49 PM  Diagnostic Evaluation Consultation  Crisis Assessment    Patient Name: Venancio Edgar  Age:  28 year old  Legal Sex: male  Gender Identity: male  Pronouns:   Race: Choose not to Answer  Ethnicity: Not  or   Language: English      Patient was assessed: Virtual: Plerts   Crisis Assessment Start Date: 07/15/24  Crisis Assessment Start Time:  "1849  Crisis Assessment Stop Time: 1915  Patient location: [facility] EMERGENCY DEPARTMENT                             ED12    Referral Data and Chief Complaint  Venancio Edgar presents to the ED via EMS. Patient is presenting   to the ED for the following concerns:  .   Factors that make the   mental health crisis life threatening or complex are:  Patient   struggling with ongoing manic behaviors.      Informed Consent and Assessment Methods  Explained the crisis assessment process, including applicable   information disclosures and limits to confidentiality, assessed   understanding of the process, and obtained consent to proceed   with the assessment.  Assessment methods included conducting a   formal interview with patient, review of medical records,   collaboration with medical staff, and obtaining relevant   collateral information from family and community providers when   available.  : done     Patient response to interventions: unacceptance expressed, needs   reinforcement  Coping skills were attempted to reduce the crisis:  pt willing to   answer questions in assessment     History of the Crisis   Patient is a 28-year-old male with a history of bipolar and ADHD   who comes in the hospital brought in by ambulance due to   disorganized behavior and jose while at his group home.  Patient   states that he is \"having an adult emergency\" and reports that no   one is able to tell who he is anymore and feels that he is   unrecognizable by others.  Patient states that he came to the   hospital also because he felt that others want him to discuss   whether or not he should be on Adderall.  Patient currently lives   in a group home where he has been for the last year and feels   that things there have been generally well.  He reports that a   staff member called 911 because \"staff are always involved bro.\"        He denies any excessive worry or racing thoughts however states   that he needs to use a " "fidget otherwise \"I am going to die\".    Patient began speaking about his virtual reality headset that he   uses at home and began to yell saying that \"I'm not using my   sanity right now!\"  When inquiring about the patient's sleep and   appetite, he reports that we are to look in his chart and noticed   that he sees \"Dr. Eat Well, that's what I do, I eat well. Look it   up!\"  Patient was intermittently leaving the interview at times   due to ongoing frustration and agitation.    Brief Psychosocial History  Family:  Single, Children no  Support System:  Parent(s), Facility resident(s)/Staff  Employment Status:  unemployed  Source of Income:  disability  Financial Environmental Concerns:  none  Current Hobbies:  television/movies/videos  Barriers in Personal Life:       Significant Clinical History  Current Anxiety Symptoms:  racing thoughts  Current Depression/Trauma:  difficulty concentrating, impaired   decision making, irritable  Current Somatic Symptoms:  racing thoughts  Current Psychosis/Thought Disturbance:     Current Eating Symptoms:     Chemical Use History:  Alcohol: None  Benzodiazepines: None  Opiates: None  Cocaine: None  Marijuana: None   Past diagnosis:  ADHD, Bipolar Disorder  Family history:  Schizophrenia, Bipolar Disorder  Past treatment:  Civil Commitment, Inpatient Hospitalization,   Supportive Living Environment (group home, custodial house, etc),   Psychiatric Medication Management, Individual therapy  Details of most recent treatment:     Other relevant history:  Pt is on a stay of commitment through   [facility].       Collateral Information  Is there collateral information: No     Collateral information name, relationship, phone number:  [facility]  Bree Martines: [phone], left  for   group home to call back    What happened today:       What is different about patient's functioning:       Concern about alcohol/drug use:      What do you think the patient needs: "      Has patient made comments about wanting to kill   themselves/others:      If d/c is recommended, can they take part in safety/aftercare   planning:       Additional collateral information:        Risk Assessment  [place] Suicide Severity Rating Scale Full Clinical Version:  Suicidal Ideation  Q1 Wish to be Dead (Lifetime): No  Q2 Non-Specific Active Suicidal Thoughts (Lifetime): No  Q6 Suicide Behavior (Lifetime): no     Suicidal Behavior (Lifetime)  Actual Attempt (Lifetime): No  Has subject engaged in non-suicidal self-injurious behavior?   (Lifetime): No  Interrupted Attempts (Lifetime): No  Aborted or Self-Interrupted Attempt (Lifetime): No  Preparatory Acts or Behavior (Lifetime): No    [place] Suicide Severity Rating Scale Recent:   Suicidal Ideation (Recent)  Q1 Wished to be Dead (Past Month): no  Q2 Suicidal Thoughts (Past Month): no  Level of Risk per Screen: no risks indicated          Environmental or Psychosocial Events: challenging interpersonal   relationships, impulsivity/recklessness,   unemployment/underemployment, neither working nor attending   school  Protective Factors: Protective Factors: lives in a responsibly   safe and stable environment, able to access care without   barriers, supportive ongoing medical and mental health care   relationships    Does the patient have thoughts of harming others? Feels Like   Hurting Others: no  Previous Attempt to Hurt Others: no  Current presentation: Irritable  Is the patient engaging in sexually inappropriate behavior?: no  Duty to warn initiated: no    Is the patient engaging in sexually inappropriate behavior?  no          Mental Status Exam   Affect: Labile, Dramatic  Appearance: Appropriate  Attention Span/Concentration: Attentive  Eye Contact: Engaged, Intense, Variable    Fund of Knowledge: Appropriate, Delayed   Language /Speech Content: Fluent  Language /Speech Volume: Normal  Language /Speech Rate/Productions: Normal, Hyperverbal  Recent  Memory: Variable  Remote Memory: Variable  Mood: Euphoric  Orientation to Person: Yes   Orientation to Place: Yes  Orientation to Time of Day: Yes  Orientation to Date: Yes     Situation (Do they understand why they are here?): Yes  Psychomotor Behavior: Normal  Thought Content: Clear  Thought Form: Tangential, Flight of Ideas     Mini-Cog Assessment  Number of Words Recalled:    Clock-Drawing Test:     Three Item Recall:    Mini-Cog Total Score:       Medication  Psychotropic medications:   Medication Orders - Psychiatric (From admission, onward)      Start     Dose/Rate Route Frequency Ordered Stop    07/15/24 2000  paliperidone ER (INVEGA) 24 hr tablet 6 mg         6 mg Oral DAILY 07/15/24 1935      07/15/24 1935  OLANZapine (zyPREXA) injection 10 mg        Note to Pharmacy: DO NOT USE THIS FIELD FOR ADMIN INSTRUCTIONS;   INFORMATION DOES NOT SHOW ON MAR. USE THE FIELD ABOVE MARKED   ADMIN INSTRUCTIONS    10 mg Intramuscular DAILY PRN 07/15/24 1935      07/15/24 1935  OLANZapine zydis (zyPREXA) ODT tab 10 mg        Note to Pharmacy: DO NOT USE THIS FIELD FOR ADMIN INSTRUCTIONS;   INFORMATION DOES NOT SHOW ON MAR. USE THE FIELD ABOVE MARKED   ADMIN INSTRUCTIONS    10 mg Oral ONCE PRN 07/15/24 1935      07/15/24 1732  nicotine (COMMIT) lozenge 2 mg         2 mg Buccal EVERY 1 HOUR PRN 07/15/24 1732               Current Care Team  Patient Care Team:  No Ref-Primary, Physician as PCP - General  Chelsea Valadez as  ( - Clinical)  Maru Jacinto as Psychiatrist (Psychiatry)    Diagnosis  Patient Active Problem List   Diagnosis Code    Bipolar disorder (H) F31.9    ADHD (attention deficit hyperactivity disorder) F90.9    Bipolar I disorder with jose (H) F31.10       Primary Problem This Admission  Active Hospital Problems    Bipolar disorder (H); F31.9        Clinical Summary and Substantiation of Recommendations   Patient comes in the hospital due to presenting with manic-like   symptoms where he is  highly disorganized in conversation and   tangential throughout the interview.  Patient required a B52   during his ER stay due to intrusive behaviors and difficulty   regulating emotion.  Patient was quite labile during the   assessment at 1 point he could be quite pleasant, however then   quickly shift to becoming quite irritable and upset where he   would leave the interview for several minutes and then upon   returning would become pleasant once more.  At times patient was   tearful when speaking about his appearance on camera.  Patient   had difficulty tracking conversations as well.  We were unable to   contact the group home during his stay however after discussion   with attending doctor the patient was placed on a 72-hour hold   due to disorganized behavior and manic like features.          Severe psychiatric, behavioral or other comorbid conditions are   appropriate for management at inpatient mental health as   indicated by at least one of the following: Psychiatric Symptoms,   Impaired impulse control, judgement, or insight  Severe dysfunction in daily living is present as indicated by at   least one of the following: Other evidence of severe dysfunction  Situation and expectations are appropriate for inpatient care:   Voluntary treatment at lower level of care is not feasible  Inpatient mental health services are necessary to meet patient   needs and at least one of the following: Specific condition   related to admission diagnosis is present and judged likely to   deteriorate in absence of treatment at proposed level of care      Patient coping skills attempted to reduce the crisis:  pt willing   to answer questions in assessment    Disposition  Recommended disposition: Inpatient Mental Health        Reviewed case and recommendations with attending provider.   Attending Name: Dr. Waters       Attending concurs with disposition: yes       Patient and/or validated legal guardian concurs with  disposition:     yes       Final disposition:  inpatient mental health    Legal status on admission:  72 hr hold    Assessment Details   Total duration spent with the patient: 26 min     CPT code(s) utilized: Non-Billable    Calos Balbuena Psychotherapist  [facility] - Triage & Transition Services  Callback: [phone]           Asymptomatic COVID-19 Virus (Coronavirus) by PCR Nasopharyngeal     Status: Normal    Collection Time: 07/16/24  9:01 AM    Specimen: Nasopharyngeal; Swab   Result Value Ref Range    SARS CoV2 PCR Negative Negative    Narrative    Testing was performed using the Xpert Xpress SARS-CoV-2 Assay on the Cepheid Gene-Xpert Instrument Systems. Additional information about this Emergency Use Authorization (EUA) assay can be found via the Lab Guide. This test should be ordered for the detection of SARS-CoV-2 in individuals who meet SARS-CoV-2 clinical and/or epidemiological criteria as well as from individuals without symptoms or other reasons to suspect COVID-19. Test performance for asymptomatic patients has only been established in anterior nasal swab specimens. This test is for in vitro diagnostic use under the FDA EUA for laboratories certified under CLIA to perform high complexity testing. This test has not been FDA cleared or approved. A negative result does not rule out the presence of PCR inhibitors in the specimen or target RNA concentration below the limit of detection for the assay. The possibility of a false negative should be considered if the patient's recent exposure or clinical presentation suggests COVID-19. This test was validated by the [facility]. This laboratory is certified under the Clinical Laboratory Improvement Amendments (CLIA) as qualified to perform high complexity laboratory testing.     CBC with platelets differential     Status: None    Collection Time: 07/16/24  9:24 AM    Narrative    The following orders were created for panel  order CBC with platelets differential.  Procedure                               Abnormality         Status                     ---------                               -----------         ------                     CBC with platelets and d...[[id number]]                      Final result                 Please view results for these tests on the individual orders.   CBC with platelets and differential     Status: None    Collection Time: 07/16/24  9:24 AM   Result Value Ref Range    WBC Count 9.6 4.0 - 11.0 10e3/uL    RBC Count 5.09 4.40 - 5.90 10e6/uL    Hemoglobin 14.8 13.3 - 17.7 g/dL    Hematocrit 44.5 40.0 - 53.0 %    MCV 87 78 - 100 fL    MCH 29.1 26.5 - 33.0 pg    MCHC 33.3 31.5 - 36.5 g/dL    RDW 13.5 10.0 - 15.0 %    Platelet Count 234 150 - 450 10e3/uL    % Neutrophils 48 %    % Lymphocytes 37 %    % Monocytes 7 %    % Eosinophils 7 %    % Basophils 1 %    % Immature Granulocytes 0 %    NRBCs per 100 WBC 0 <1 /100    Absolute Neutrophils 4.7 1.6 - 8.3 10e3/uL    Absolute Lymphocytes 3.5 0.8 - 5.3 10e3/uL    Absolute Monocytes 0.7 0.0 - 1.3 10e3/uL    Absolute Eosinophils 0.6 0.0 - 0.7 10e3/uL    Absolute Basophils 0.1 0.0 - 0.2 10e3/uL    Absolute Immature Granulocytes 0.0 <=0.4 10e3/uL    Absolute NRBCs 0.0 10e3/uL   Comprehensive metabolic panel     Status: Abnormal    Collection Time: 07/16/24  9:25 AM   Result Value Ref Range    Sodium 139 135 - 145 mmol/L    Potassium 3.8 3.4 - 5.3 mmol/L    Carbon Dioxide (CO2) 22 22 - 29 mmol/L    Anion Gap 16 (H) 7 - 15 mmol/L    Urea Nitrogen 8.5 6.0 - 20.0 mg/dL    Creatinine 0.84 0.67 - 1.17 mg/dL    GFR Estimate >90 >60 mL/min/1.73m2    Calcium 8.9 8.6 - 10.0 mg/dL    Chloride 101 98 - 107 mmol/L    Glucose 167 (H) 70 - 99 mg/dL    Alkaline Phosphatase 55 40 - 150 U/L    AST 15 0 - 45 U/L    ALT 20 0 - 70 U/L    Protein Total 7.2 6.4 - 8.3 g/dL    Albumin 4.3 3.5 - 5.2 g/dL    Bilirubin Total 0.3 <=1.2 mg/dL             Stella Ferris,  Stella Merchant MD  07/16/24 2273

## 2024-07-16 NOTE — TELEPHONE ENCOUNTER
R: [place]  Access Inpatient Bed Call Log 7/16/24 @ 7:06 am:     Intake has called facilities that have not updated the bed status within the last 12 hours.                                 [facility] is posting 0 beds.           [facility] is posting 3 beds. [phone];   1:32 PM No Answer  [facility] is posting 0 beds. Negative covid required    [facility] is posting 0 beds. Neg covid. No high school/Elisa-psych. [phone].  per call at 7:07 am to Arnold, they are at cap.   [facility] is posting 0 beds. [phone]    [facility] is posting 0 beds. [phone]     [facility] is posting 0 beds. Ages 18-35. Negative covid. [phone]. Per call at 7:08 am to Rosa, they are at cap for y/a, have 4 adol beds, no leatha beds, and no child beds avail.    [facility] is posting 0 beds.     [facility] is posting 0 beds [phone]         [facility] is posting 3 beds. LOW acuity ONLY. Mixed unit 12+. Negative covid- [phone]; per call at 7:11 am to Clarita, they are at cap currently.  Per call with Celia @1:33 PM Pt is not appropriate due to acuity  [facility] has 0 beds posted. No aggression. Negative Covid. Low acuity.    [facility] is posting 0 beds. Negative covid. [phone]; per call at 7:13 am, recording says adult and adol units are at cap.    [facility] ([place] is posting 1 bed. Low acuity only. Neg covid.  [phone]   Per call with Diandra @1:38 PM They are reviewing for open bed; please call again later  [facility] is posting 2 beds. Low acuity. No current aggression.    No beds currently available  [facility] ([place]) is posting 0 beds available. Negative covid.  [phone].        [facility] is posting 1 bed. Low acuity. 72 HH hold preferred. Negative covid required. [phone]; per call at 7:16 am to Loni, she does not know their availability but said we can call back later.  No beds  currently available  [facility] ([place] is posting 6 beds. Low acuity only. Neg covid.  [phone];  Per call with Diandra @1:38 PM They are reviewing for open bed; please call again later     [facility] [place] is posting 1 bed.  Negative covid required.   Vol only, No history of aggression, violence, or assault. No sexual offenders. No 72 HH holds. [phone]   Pt is not voluntary       [facility] is posting 1 bed. Negative covid required.  (Must have the cognitive ability to do programming. No aggressive or violent behavior or recent HX in the last 2 yrs. MH must be primary.) Always low acuity. [phone]     [facility] has 0 beds posted. Negative covid required.  Low acuity only. Violence and aggression capped.  [phone];   [facility] is posting 3 beds. Low acuity, Negative covid required. [phone]; per call at 7:19 am to Billie, they may possibly have a few beds avail but this is unclear at this time.  No beds currently available but willing to put Pt on the waiting list  [facility] posting 4 beds Negative covid required.  [phone]; per Blanca's call to MYRTLE Miner at 6:30 am, they have 5 step-down/low acuity beds avail.  Per call with Evelio @1:47 PM Juan will review Pt for [facility] is posting 2 beds. Negative covid. LOW acuity. (No lines, drains, or tubes, oxygen, CPAP, IV, etc.) Must Have a Ride Home. [phone]; per call at 7:27 am to Aleshia, they are at cap.    [facility] is posting 1 bed. [phone]; Negative covid. (No. lines, drains, or tubes, oxygen, CPAP, IV, etc.);    [facility] is posting 8 beds. No covid test required. [phone] ; [place]; per call at 7:30 am to Óscar, they have 2 adult male beds and no child/adol beds avail. She said they may have adult d/c's today but no d/c's for child/adol's.         Pt remains on the work list  pending appropriate bed availability.

## 2024-07-16 NOTE — TELEPHONE ENCOUNTER
S: [facility] Esmer , DEC  Jon  calling at 7:51 PM about a 28 year old/Male presenting with disorganized behavior and jose     B: Pt arrived via EMS. Presenting problem, stressors: none identifiable    Pt affect in ED: Labile and Agitated  Pt Dx: Bipolar Disorder and ADHD  Previous [facility] hx? Yes: [facility] June 2024  Pt denies SI   Hx of suicide attempt? No  Pt denies SIB  Pt denies HI   Pt denies hallucinations .   Pt RARS Score: 5    Hx of aggression/violence, sexual offenses, legal concerns, Epic care plan? describe: Aggressive hx at group home, intrusive in ED, required B52 to be calmed down in ED  Current concerns for aggression this visit? No  Does pt have a history of Civil Commitment? Yes, currently on [place] civil commitment  Is Pt their own guardian? Yes    Pt is prescribed medication. Is patient medication compliant? Yes  Pt endorses OP services: Psychiatrist  CD concerns: None  Acute or chronic medical concerns: No  Does Pt present with specific needs, assistive devices, or exclusionary criteria? None      Pt is ambulatory  Pt is able to perform ADLs independently      A: Pt to be reviewed for [facility] admission. Pt is on a 72HH, initiated Dr. Dennys Waters 7/15 @7:35 PM  Preferred placement: Statewide    COVID Symptoms: No  If yes, COVID test required   Utox: Positive for thc    CMP: Not ordered, intake requested lab  CBC: Not ordered, intake requested lab  HCG: N/A    R: Patient cleared and ready for behavioral bed placement: Yes  Pt placed on IP worklist? Yes    Does Patient need a Transfer Center request created? No, Pt is located within [facility] ED, [facility] ED, or [facility] ED

## 2024-07-16 NOTE — PROGRESS NOTES
"Triage & Transition Services, Extended Care     Therapy Progress Note    Patient: Venancio goes by \"Venancio,\" uses he/him pronouns  Date of Service: July 16, 2024  Site of Service: [facility] EMERGENCY DEPARTMENT                             ED16A  Patient was seen yes  Mode of Assessment: Virtual: iPad    Presentation Summary: Exchange greeting with patient. Introduced self and role. Discussed what brought pt into the ED. Pt reported his mental health but declined to answer. Pt is seen laughing and responding to internal stimuli. When asked what was so funny, pt did not answer and continue to laugh. Pt denies all mental health needs. Pt denies SI, HI, jose, and psychosis. Pt denies any auditory or visual hallucinations. Pt reported he needs to leave. Pt reported he does not need medications as he has an outpatient psych provider he is trying to see. Pt denies that he needs mental health services. Continues to respond to internal stimuli.    Therapeutic Intervention(s) Provided:      Current Symptoms: obsessions/compulsions avoidance, impaired decision making   impulsive, inattentive, displaces blame, distractability, auditory hallucinations      Mental Status Exam   Affect: Labile, Dramatic  Appearance: Appropriate  Attention Span/Concentration: Attentive  Eye Contact: Engaged, Intense, Variable    Fund of Knowledge: Appropriate, Delayed   Language /Speech Content: Fluent  Language /Speech Volume: Normal  Language /Speech Rate/Productions: Normal, Hyperverbal  Recent Memory: Variable  Remote Memory: Variable  Mood: Euphoric  Orientation to Person: Yes   Orientation to Place: Yes  Orientation to Time of Day: Yes  Orientation to Date: Yes     Situation (Do they understand why they are here?): Yes  Psychomotor Behavior: Normal  Thought Content: Clear  Thought Form: Tangential, Flight of Ideas    Treatment Objective(s) Addressed: rapport building, orienting the patient to therapy, assessing safety, processing " feelings, exploring obstacles to safety in the community    Patient Response to Interventions: eager to participate, no evidence of understanding    Progress Towards Goals: Patient Reports Symptoms Are: ongoing  Patient Progress Toward Goals: is not making progress  Comment: Pt is not forthcoming about his mental health and symptomology.  Next Step to Work Toward Discharge: patient ability to engage in safety planning  Ability to Engage Comment: Unable to enage in safety planning for a lower level of care.    Case Management: Case Management Included: collaborating with patient's support system  Details on Collaborating with Patient's Support System: [facility] CM: Chelsea Mika phone: [phone], Spoke with Rosa Bailon Supervisor ([phone])  Summary of Interaction: Was informed by Attempted to be in contact with pt's CM. LMV asking for a call back. Per Rosa: Will file a revoked provisional discharge as pt has not been doing well.    Plan: inpatient mental health  yes provider Dr. Ferris  no    Clinical Substantiation: Recommendation for inpatient mental health does not change at this time. Pt continues to present with difficulty tracking conversations, jose, responding to internal stimuli, and labile. Pt is not forthcoming about his mental health and denies drug use. Pt does not want to take medication or mental health services. Pt's  from [facility] will be revoking pt's provisional discharge.    Legal Status: Legal Status at Admission: 72 Hour Hold, Commitment  72 Hour Hold - Date/Time Initiated: 7/15 1931  72 Hour Hold - Date/Time Ends: 7/18 1931    Session Status: Time session started: 1000  Time session ended: 1016  Session Duration (minutes): 16 minutes  Session Number: 1  Anticipated number of sessions or this episode of care: 4    Time Spent: 16 minutes    CPT Code: CPT Codes: 76738 - Psychotherapy (with patient) - 30 (16-37*) min    Diagnosis:   Patient Active Problem List    Diagnosis Code    Bipolar disorder (H) F31.9    ADHD (attention deficit hyperactivity disorder) F90.9    Bipolar I disorder with jose (H) F31.10       Primary Problem This Admission: Active Hospital Problems    Bipolar disorder (H)        ALFREDA Mendoza   Licensed Mental Health Professional (LMHP), Extended Care  [phone]

## 2024-07-16 NOTE — TELEPHONE ENCOUNTER
R: [facility] Access Inpatient Bed Call Log  7/16/24 @ 1:00am   Intake has called facilities that have not updated their bed status within the last 12 hours.     *METRO:  [place] -- [facility]: @ capacity.  [facility]: POSTING 3 BEDS. Reporting no reviews overnight.  [place] -- [facility]: @ cap per website. Low acuity  [place] -- [facility]: @ cap per website. Low acuity only.  [place] -- [facility]: @ cap per website.  [facility]: @ cap per website.  [facility]/ beds: @ cap per website. Ages 18-35, Voluntary only, NO aggression/physical/sexual assault, violence hx or drug abuse, or psychosis. Negative Covid.  [place] -- [facility]: @ cap per website.  [place] -- [facility]: @ cap per website.  [facility]: @ cap per website. Do not review overnight.     *STATEWIDE (by distance):  [facility]: POSTING 3 BEDS. Mixed unit. Ages 12 and up/Low acuity only.   [facility] - @ cap per website. Low acuity, No aggression  [facility] -- @ cap per website.   [facility] - POSTING 2 BEDS. Low acuity only. No current aggression.  [facility] - POSTING 1 BED. Negative Covid. Lower acuity only.  [facility] - @ cap per website. Low acuity only. Prefer med-adjustment placements.  [facility] - POSTING 7 BEDS.  No aggression. - Only Low Acuity reviews  [place] - [facility]: POSTING 1 BED. No aggressive behaviors. Do not review overnight.  [place] -- [facility]: POSTING 2 BEDS.  No hx of aggression. No sexual offenders. Voluntary patients only.  [place] -- [facility]: POSTING 1 BED. Low acuity only. Must be able to do programming. No aggression/violent behavior in 2 years. No CD treatment.  [place] -- [facility]: @ cap per website. Negative Covid test. Must be low acuity ONLY.  [place] -- [facility]: POSTING 3 BEDS. Low  acuity. Negative Covid.   [place] -- [facility]: @ cap per website. Negative Covid Required.  [facility]: POSTING 2 BEDS. No hx of aggression/assault. No lines, drains or tubes. Does not provide detox or CD treatment. Require a confirmed ride upon discharge.  [facility]: POSTING 3 BEDS. Negative COVID. No medical devices.     Pt remains on waitlist pending appropriate placement availability

## 2024-07-16 NOTE — PHARMACY-ADMISSION MEDICATION HISTORY
Admission Medication History Completed by Pharmacy    See [facility] Admission Navigator for allergy information, preferred outpatient pharmacy, prior to admission medications and immunization status.     Medication history sources:  Discharge Summary from 7/10; Marlene     Pertinent changes made to PTA medication list:  Added: N/A  Deleted: N/A  Changed: N/A    Additional medication history information:   - Patient's second half of Invega Sustenna loading dose (156 mg) was NOT given today, 7/15 as scheduled.   - Patient not interviewed as part of this medication history in light of recent discharge. Additional OTC medications/supplements may not be reflected in the list below.     Prior to Admission medications    Medication Sig Last Dose Taking? Auth Provider Long Term End Date   divalproex sodium delayed-release (DEPAKOTE) 250 MG DR tablet Take 5 tablets (1,250 mg) by mouth at bedtime Past Week Yes Mariano Pacheco APRN CNP Yes    paliperidone ER (INVEGA) 6 MG 24 hr tablet Take 1 tablet (6 mg) by mouth daily Past Week Yes Mariano Pacheco APRN CNP Yes    paliperidone (INVEGA SUSTENNA) 117 MG/0.75ML HUBERT Inject 0.75 mLs (117 mg) into the muscle once for 1 dose   Mariano Pacheco APRN CNP Yes 8/15/24   paliperidone (INVEGA SUSTENNA) 156 MG/ML HUBERT injection Inject 1 mL (156 mg) into the muscle once for 1 dose   Mariano Pacheco APRN CNP Yes 7/15/24          Date completed: 07/15/24    Medication history completed by:   Maribell Stanley, PharmD  *[id number]

## 2024-07-16 NOTE — ED NOTES
Pt kept coming out of room, security had to redirect pt to go back to his room. He randomly starts rapping about situations.

## 2024-07-16 NOTE — CONSULTS
"Diagnostic Evaluation Consultation  Crisis Assessment    Patient Name: Venancio Edgar  Age:  28 year old  Legal Sex: male  Gender Identity: male  Pronouns:   Race: Choose not to Answer  Ethnicity: Not  or   Language: English      Patient was assessed: Virtual: [employer]   Crisis Assessment Start Date: 07/15/24  Crisis Assessment Start Time: 1849  Crisis Assessment Stop Time: 1915  Patient location: [facility] EMERGENCY DEPARTMENT                             ED12    Referral Data and Chief Complaint  Venancio Edgar presents to the ED via EMS. Patient is presenting to the ED for the following concerns:  .   Factors that make the mental health crisis life threatening or complex are:  Patient struggling with ongoing manic behaviors.      Informed Consent and Assessment Methods  Explained the crisis assessment process, including applicable information disclosures and limits to confidentiality, assessed understanding of the process, and obtained consent to proceed with the assessment.  Assessment methods included conducting a formal interview with patient, review of medical records, collaboration with medical staff, and obtaining relevant collateral information from family and community providers when available.  : done     Patient response to interventions: unacceptance expressed, needs reinforcement  Coping skills were attempted to reduce the crisis:  pt willing to answer questions in assessment     History of the Crisis   Patient is a 28-year-old male with a history of bipolar and ADHD who comes in the hospital brought in by ambulance due to disorganized behavior and jose while at his group home.  Patient states that he is \"having an adult emergency\" and reports that no one is able to tell who he is anymore and feels that he is unrecognizable by others.  Patient states that he came to the hospital also because he felt that others want him to discuss whether or not he should be on Adderall.  " "Patient currently lives in a group home where he has been for the last year and feels that things there have been generally well.  He reports that a staff member called 911 because \"staff are always involved bro.\"      He denies any excessive worry or racing thoughts however states that he needs to use a fidget otherwise \"I am going to die\".  Patient began speaking about his virtual reality headset that he uses at home and began to yell saying that \"I'm not using my sanity right now!\"  When inquiring about the patient's sleep and appetite, he reports that we are to look in his chart and noticed that he sees \"Dr. Eat Well, that's what I do, I eat well. Look it up!\"  Patient was intermittently leaving the interview at times due to ongoing frustration and agitation.    Brief Psychosocial History  Family:  Single, Children no  Support System:  Parent(s), Facility resident(s)/Staff  Employment Status:  unemployed  Source of Income:  disability  Financial Environmental Concerns:  none  Current Hobbies:  television/movies/videos  Barriers in Personal Life:       Significant Clinical History  Current Anxiety Symptoms:  racing thoughts  Current Depression/Trauma:  difficulty concentrating, impaired decision making, irritable  Current Somatic Symptoms:  racing thoughts  Current Psychosis/Thought Disturbance:     Current Eating Symptoms:     Chemical Use History:  Alcohol: None  Benzodiazepines: None  Opiates: None  Cocaine: None  Marijuana: None   Past diagnosis:  ADHD, Bipolar Disorder  Family history:  Schizophrenia, Bipolar Disorder  Past treatment:  Civil Commitment, Inpatient Hospitalization, Supportive Living Environment (group home, [facility], etc), Psychiatric Medication Management, Individual therapy  Details of most recent treatment:     Other relevant history:  Pt is on a stay of commitment through [facility].       Collateral Information  Is there collateral information: No     Collateral information name, " relationship, phone number:  Diogenes Khan  Bree Martines: [phone], left VM for group home to call back    What happened today:       What is different about patient's functioning:       Concern about alcohol/drug use:      What do you think the patient needs:      Has patient made comments about wanting to kill themselves/others:      If d/c is recommended, can they take part in safety/aftercare planning:       Additional collateral information:        Risk Assessment  [place] Suicide Severity Rating Scale Full Clinical Version:  Suicidal Ideation  Q1 Wish to be Dead (Lifetime): No  Q2 Non-Specific Active Suicidal Thoughts (Lifetime): No  Q6 Suicide Behavior (Lifetime): no     Suicidal Behavior (Lifetime)  Actual Attempt (Lifetime): No  Has subject engaged in non-suicidal self-injurious behavior? (Lifetime): No  Interrupted Attempts (Lifetime): No  Aborted or Self-Interrupted Attempt (Lifetime): No  Preparatory Acts or Behavior (Lifetime): No    [place] Suicide Severity Rating Scale Recent:   Suicidal Ideation (Recent)  Q1 Wished to be Dead (Past Month): no  Q2 Suicidal Thoughts (Past Month): no  Level of Risk per Screen: no risks indicated          Environmental or Psychosocial Events: challenging interpersonal relationships, impulsivity/recklessness, unemployment/underemployment, neither working nor attending school  Protective Factors: Protective Factors: lives in a responsibly safe and stable environment, able to access care without barriers, supportive ongoing medical and mental health care relationships    Does the patient have thoughts of harming others? Feels Like Hurting Others: no  Previous Attempt to Hurt Others: no  Current presentation: Irritable  Is the patient engaging in sexually inappropriate behavior?: no  Duty to warn initiated: no    Is the patient engaging in sexually inappropriate behavior?  no        Mental Status Exam   Affect: Labile, Dramatic  Appearance:  Appropriate  Attention Span/Concentration: Attentive  Eye Contact: Engaged, Intense, Variable    Fund of Knowledge: Appropriate, Delayed   Language /Speech Content: Fluent  Language /Speech Volume: Normal  Language /Speech Rate/Productions: Normal, Hyperverbal  Recent Memory: Variable  Remote Memory: Variable  Mood: Euphoric  Orientation to Person: Yes   Orientation to Place: Yes  Orientation to Time of Day: Yes  Orientation to Date: Yes     Situation (Do they understand why they are here?): Yes  Psychomotor Behavior: Normal  Thought Content: Clear  Thought Form: Tangential, Flight of Ideas     Mini-Cog Assessment  Number of Words Recalled:    Clock-Drawing Test:     Three Item Recall:    Mini-Cog Total Score:       Medication  Psychotropic medications:   Medication Orders - Psychiatric (From admission, onward)      Start     Dose/Rate Route Frequency Ordered Stop    07/15/24 2000  paliperidone ER (INVEGA) 24 hr tablet 6 mg         6 mg Oral DAILY 07/15/24 1935      07/15/24 1935  OLANZapine (zyPREXA) injection 10 mg        Note to Pharmacy: DO NOT USE THIS FIELD FOR ADMIN INSTRUCTIONS; INFORMATION DOES NOT SHOW ON MAR. USE THE FIELD ABOVE MARKED ADMIN INSTRUCTIONS    10 mg Intramuscular DAILY PRN 07/15/24 1935      07/15/24 1935  OLANZapine zydis (zyPREXA) ODT tab 10 mg        Note to Pharmacy: DO NOT USE THIS FIELD FOR ADMIN INSTRUCTIONS; INFORMATION DOES NOT SHOW ON MAR. USE THE FIELD ABOVE MARKED ADMIN INSTRUCTIONS    10 mg Oral ONCE PRN 07/15/24 1935      07/15/24 1732  nicotine (COMMIT) lozenge 2 mg         2 mg Buccal EVERY 1 HOUR PRN 07/15/24 1732               Current Care Team  Patient Care Team:  No Ref-Primary, Physician as PCP - General  Chelsea Valadez as  ( - Clinical)  Maru Jacinto as Psychiatrist (Psychiatry)    Diagnosis  Patient Active Problem List   Diagnosis Code    Bipolar disorder (H) F31.9    ADHD (attention deficit hyperactivity disorder) F90.9    Bipolar I disorder with  jose (H) F31.10       Primary Problem This Admission  Active Hospital Problems    Bipolar disorder (H); F31.9        Clinical Summary and Substantiation of Recommendations   Patient comes in the hospital due to presenting with manic-like symptoms where he is highly disorganized in conversation and tangential throughout the interview.  Patient required a B52 during his ER stay due to intrusive behaviors and difficulty regulating emotion.  Patient was quite labile during the assessment at 1 point he could be quite pleasant, however then quickly shift to becoming quite irritable and upset where he would leave the interview for several minutes and then upon returning would become pleasant once more.  At times patient was tearful when speaking about his appearance on camera.  Patient had difficulty tracking conversations as well.  We were unable to contact the group home during his stay however after discussion with attending doctor the patient was placed on a 72-hour hold due to disorganized behavior and manic like features.          Severe psychiatric, behavioral or other comorbid conditions are appropriate for management at inpatient mental health as indicated by at least one of the following: Psychiatric Symptoms, Impaired impulse control, judgement, or insight  Severe dysfunction in daily living is present as indicated by at least one of the following: Other evidence of severe dysfunction  Situation and expectations are appropriate for inpatient care: Voluntary treatment at lower level of care is not feasible  Inpatient mental health services are necessary to meet patient needs and at least one of the following: Specific condition related to admission diagnosis is present and judged likely to deteriorate in absence of treatment at proposed level of care      Patient coping skills attempted to reduce the crisis:  pt willing to answer questions in assessment    Disposition  Recommended disposition: Inpatient Mental  Health        Reviewed case and recommendations with attending provider. Attending Name: Dr. Waters       Attending concurs with disposition: yes       Patient and/or validated legal guardian concurs with disposition:   yes       Final disposition:  inpatient mental health    Legal status on admission:  72 hr hold    Assessment Details   Total duration spent with the patient: 26 min     CPT code(s) utilized: Non-Billable    Calos Balbuena Psychotherapist  [facility] - Triage & Transition Services  Callback: [phone]

## 2024-07-16 NOTE — TELEPHONE ENCOUNTER
R: per Andree in EC at 11:28 am via teams message, cm intends to revoke the provisional discharge.

## 2024-07-16 NOTE — CONSULTS
"    Initial Psychiatric Consult   Consult date: July 16, 2024         Reason for Consult, requesting source:    Reason for Consult: evaluation of patient with past history of Bipolar Disorder presenting with acute jose symptoms  Requesting source:     Labs and imaging reviewed        HPI:   Venancio Edgar is a 28 year old male with past psychiatric history of Bipolar 1 disorder and ADHD and medical history of DORV with doubly committed with VSD and vitamin D deficiency who presented to ED via EMS with acute manic behaviors. He was discharged from prolonged admission on Stn 12 7/10 on stay of commitment. Please see DEC note for further details, he had been seen by group home staff exhibiting bizarre and intrusive behaviors and was brought to the hospital. UDS positive for THC, though also told ED staff he took mushrooms.     Patients is pleasant, frequently laughing and smiling, and reports feeling \"really good\". He is a little disoriented with time (asked the time, and when told 10:45 asks \"at night?\"). Patient reports to be sleeping well, increase energy and gambling a few days ago. He denies any SI, HI or AH, VH. Venancio reports using recreational drugs like mushrooms (\"Mushroom road trip\" from smoke store\") and marijuanna recently. He also reports taking his medications since his discharge, though can only name Adderall, does not remember what other medications he is taking.     He was told he is on 72hh and will need to come back into the hospital because of his decompensation. At this point says the team is scaring him and wants to end interview. Encouraged him to reach out to his commitment .         Past Psychiatric History:   Past Diagnoses: Bipolar Disorder, ADHD  Medication Trials: Depakote, Atomoxetine, Haloperidol, Paliperidone  Past/Current Providers: Per last discharge note, was set up with provider at [facility], had visit scheduled 7/12  Psychiatric Hospitalizations: 4 per chart, most recent " June/July 2024  Suicide Attempts: Denied  Self Injury: Denied  History of Trauma: Denied  Past Psychosis: Yes  Past Danisha: Yes  Outpatient Programs: Did not endorse  Civil Commitment: Yes, per court records x3, and now currently on stay of commitment which is being revoked  ECT: No        Substance Use and History:   Significant use history or problems: Marijuana, psychedelics  Inpatient Treatment: None per chart   Outpatient Treatment: None per chart  Medication trials: None per chart  Medical Consequences: Ongoing substance use impairing mental health  Legal Consequences: None per chart    Social History     Tobacco Use    Smoking status: Every Day     Types: Vaping Device    Smokeless tobacco: Never   Substance Use Topics    Alcohol use: Not Currently           Past Medical History:   PAST MEDICAL HISTORY:   Past Medical History:   Diagnosis Date    ADHD (attention deficit hyperactivity disorder)     Bipolar disorder (H)     DORV with doubly committed VSD     Stimulant abuse (H)     Substance-induced psychotic disorder (H)     Vitamin D deficiency        PAST SURGICAL HISTORY: History reviewed. No pertinent surgical history.          Family History:   FAMILY HISTORY: History reviewed. No pertinent family history.        Social History:     Current Living arrangement: Group home  Relationships: Single  Children: No  Occupation/Finances: Unemployed, SSI  Local Support: Parents, Facility residents/Staff         Physical ROS:   The 10 point Review of Systems is negative other than noted in the HPI or here.           Medications:     Current Facility-Administered Medications   Medication Dose Route Frequency Provider Last Rate Last Admin    divalproex sodium delayed-release (DEPAKOTE) DR tablet 1,250 mg  1,250 mg Oral At Bedtime Dennys Waters MD   1,250 mg at 07/15/24 2132    paliperidone ER (INVEGA) 24 hr tablet 6 mg  6 mg Oral Daily Dennys Waters MD   6 mg at 07/16/24 0859              Allergies:   No Known  "Allergies       Labs:     Recent Results (from the past 48 hour(s))   Urine Drug Screen Panel    Collection Time: 07/15/24  4:49 PM   Result Value Ref Range    Amphetamines Urine Screen Negative Screen Negative    Barbituates Urine Screen Negative Screen Negative    Benzodiazepine Urine Screen Negative Screen Negative    Cannabinoids Urine Screen Positive (A) Screen Negative    Cocaine Urine Screen Negative Screen Negative    Fentanyl Qual Urine Screen Negative Screen Negative    Opiates Urine Screen Negative Screen Negative    PCP Urine Screen Negative Screen Negative          Physical and Psychiatric Examination:     /77   Pulse 84   Temp 97.7  F (36.5  C) (Oral)   Resp 18   SpO2 98%   Weight is 0 lbs 0 oz  There is no height or weight on file to calculate BMI.    Physical Exam:  I have reviewed the physical exam as documented by by the medical team and agree with findings and assessment and have no additional findings to add at this time.         MSE:   Appearance: Patient is alert and calm, lying in bed, covering himself in  hospital sheets  Attitude:  cooperative  Eye Contact:  fair, difficulty sustaining eye contact at times  Mood:  \"Really  good\"  Affect:  Frequently laughing and smiling unprovoked  Speech:  normal prosody  Psychomotor Behavior:  no evidence of tardive dyskinesia, dystonia, or tics  Muscle strength and tone: Appears normal  Thought Process:  Circumstantial, illogical   Associations:  no loose associations  Thought Content:  no evidence of suicidal ideation or homicidal ideation, denied AH, VH  Insight:  limited  Judgement:  limited  Oriented to:  Patient is disoriented in time  Attention Span and Concentration:  poor  Recent and Remote Memory:  fair           DSM-5 Diagnosis:   Bipolar 1 Disorder, with manic symptoms    Cannabis Use Disorder  ADHD          Assessment:     Venancio Edgar is a 28 year old male with past psychiatric history of Bipolar 1 disorder and ADHD and medical " "history of DORV with doubly committed with VSD and vitamin D deficiency who presented to ED via EMS with acute manic behaviors. He was discharged from prolonged admission on Stn 12 7/10 on stay of commitment. Please see DEC note for further details, he had been seen by group home staff exhibiting bizarre and intrusive behaviors and was brought to the hospital. UDS positive for THC, though also told ED staff he took mushrooms.     Since discharge Venancio reports buying THC products and \"Mushroom Road Trip\" (per website contains various tryptamines), did not actually use psilocybin. He denied any of the reports from group home staff and EMS. Exam today consistent with jose, with elevated mood, racing thoughts. He was also slightly confused and disoriented. Reported medication compliance but could only name Adderall as his medications. Venancio's acute manic symptoms are likely due to substance use and/or medication noncompliance. Awaiting result of blood Depakote levels.    Team discussed with  who will revoke stay of commitment. Currently on IP psych waitlist.           Summary of Recommendations:   Legal: stay of  commitment currently, in process of revoking  Safety: Per ED staff  Labs/Studies:  Medications:   - Continue PO paliperidone  - Continue VPA  - Attempted to give paliperidone GAMEZ loading dose #2 but could not be given in ED  - Continue olanzapine PRN  Follow-Up: Transfer to IP psychiatry     Maliha Caceres, PGY1      I was present with the resident who participated in the service and in the documentation of the services provided. I have verified the history and personally performed the physical exam and medical decision making, as documented by the student/resident and edited by me     Yasmani Santa MD    Department of Psychiatry  Please Vocera if questions    Total time spent in chart review, patient interview and coordination of care; 85 minutes - all time was spent on the " date of the encounter that I saw patient

## 2024-07-16 NOTE — ED NOTES
Pt appeared to be responding to internal stimuli during interactions, pt did not endorse hallucinations. When taking bedtime medication, pt drank water and chewed his medication; pt spit out an unknown amount of medication but swallowed the remaining medication, MD was notified. Pt has been sleeping since approximately 2030. When awake, pt was placed on a 2:1, the charge nurse and this writer deemed it safe to continue a 1:1 while pt was sleeping.     One event of note this shift began with the pt posturing and attempting to push past staff members to leave.     Pt is sleeping, 1:1 at bedside.

## 2024-07-16 NOTE — ED NOTES
Pt attempted to leave trying to push past staff members. Code 21 called by security. This writer arrived by pt and was informed pt was starting to become aggressive. Pt was able to be redirected to go back to his room with a nicotine lozenge he had been asking for. Security cancelled Code 21 due to pt being calm. Pt put on a 72 hour hold.     This writer consulted wit 1:1 staff and charge nurse, pt on a 2:1 for safety of staff and pt. 2:1 at bedside, pt in room.

## 2024-07-16 NOTE — PLAN OF CARE
Venancio Irizarrymark  July 15, 2024  Plan of Care Hand-off Note     Patient Care Path: inpatient mental health    Plan for Care:   Patient comes in the hospital due to presenting with manic-like symptoms where he is highly disorganized in conversation and tangential throughout the interview.  Patient required a B52 during his ER stay due to intrusive behaviors and difficulty regulating emotion.  Patient was quite labile during the assessment at 1 point he could be quite pleasant, however then quickly shift to becoming quite irritable and upset where he would leave the interview for several minutes and then upon returning would become pleasant once more.  At times patient was tearful when speaking about his appearance on camera.  Patient had difficulty tracking conversations as well.  We were unable to contact the group home during his stay however after discussion with attending doctor the patient was placed on a 72-hour hold due to disorganized behavior and manic like features.    Identified Goals and Safety Issues: stabilize jose and decrease tangential thoughts/discusssions    Overview:  [phone], , Rebecca Martines            Legal Status:  72 hr hold    Psychiatry Consult:       Updated   regarding plan of care.           Calos Balbuena

## 2024-07-16 NOTE — PROGRESS NOTES
"Patient had episode of vomiting. Patient stated \"I ate some turkey and it made me throw up\". No blood or pill noted in emesis. Informed Dr. Corbin and will give one time dose of ordered Zofran. Patient back to room and in no distress at this time.    0929-patient refused zofran at this time. Drinking coffee in bed and in no distress. Patient offered hygiene products.   "

## 2024-07-16 NOTE — TELEPHONE ENCOUNTER
R: [place]  Access Inpatient Bed Call Log 7/16/24 @ 4:30 PM:     Intake has called facilities that have not updated the bed status within the last 12 hours.                                 [facility] is posting 0 beds.           [facility] is posting 3 beds. [phone];   1:32 PM No Answer  [facility] is posting 0 beds. Negative covid required    [facility] is posting 0 beds. Neg covid. No high school/Elisa-psych. [phone].  per call at 7:07 am to Arnold, they are at cap.   [facility] is posting 0 beds. [phone]    [facility] is posting 0 beds. [phone]     [facility] is posting 1 beds. Ages 18-35. Negative covid. [phone]. Per call at 7:08 am to Rosa, they are at cap for y/a, have 4 adol beds, no leatha beds, and no child beds avail.  Per call with Eliana @6:46 PM They are already reviewing for last bed  [facility] is posting 0 beds.     [facility] is posting 0 beds [phone]         [facility] is posting 3 beds. LOW acuity ONLY. Mixed unit 12+. Negative covid- [phone]; per call at 7:11 am to Clarita, they are at cap currently. Pt is too acute  [facility] has 0 beds posted. No aggression. Negative Covid. Low acuity.    [facility] is posting 0 beds. Negative covid. [phone]; per call at 7:13 am, recording says adult and adol units are at cap.    [facility] ([place]) is posting 1 bed. Low acuity only. Neg covid.  [phone]   Per call with Johana @6:49 PM Pt is too acute with a hx of aggression  [facility] is posting 2 beds. Low acuity. No current aggression.    NO beds available  [facility] ([place]) is posting 0 beds available. Negative covid.  [phone].        [facility] is posting 1 bed. Low acuity. 72 HH hold preferred. Negative covid required. [phone]; per call at 7:16 am to Loni, she does not know their availability but said we can call back later.    [facility]  ([place]) is posting 5 beds. Low acuity only. Neg covid.  [phone];  Per call with Johana @6:49 PM Pt is too acute with a hx of aggression     [facility] [place] is posting 1 bed.  Negative covid required.   Vol only, No history of aggression, violence, or assault. No sexual offenders. No 72 HH holds. [phone]   Pt is not vol       [facility] is posting 1 bed. Negative covid required.  (Must have the cognitive ability to do programming. No aggressive or violent behavior or recent HX in the last 2 yrs. MH must be primary.) Always low acuity. [phone]   Pt is too acute  [facility] has 0 beds posted. Negative covid required.  Low acuity only. Violence and aggression capped.  [phone];   [facility] is posting 2 beds. Low acuity, Negative covid required. [phone]; per call at 7:19 am to Billie, they may possibly have a few beds avail but this is unclear at this time.  No beds available but they can put Pt on the wait list  [facility] posting 4 beds Negative covid required.  [phone]; per Blanca's call to MYRTLE Miner at 6:30 am, they have 5 step-down/low acuity beds avail.  Pt declined earlier due to acuity  [facility] [place] is posting 1 beds. Negative covid. LOW acuity. (No lines, drains, or tubes, oxygen, CPAP, IV, etc.) Must Have a Ride Home. [phone]; per call at 7:27 am to Aleshia, they are at cap.    [facility] is posting 1 bed. [phone]; Negative covid. (No. lines, drains, or tubes, oxygen, CPAP, IV, etc.);    [facility] is posting 8 beds. No covid test required. [phone] ; [place]; per call at 7:30 am to Óscar, they have 2 adult male beds and no child/adol beds avail. She said they may have adult d/c's today but no d/c's for child/adol's.         Pt remains on the work list pending appropriate bed availability.

## 2024-07-17 ENCOUNTER — TELEPHONE (OUTPATIENT)
Dept: BEHAVIORAL HEALTH | Facility: CLINIC | Age: 29
End: 2024-07-17
Payer: COMMERCIAL

## 2024-07-17 PROCEDURE — 99233 SBSQ HOSP IP/OBS HIGH 50: CPT | Mod: GC | Performed by: STUDENT IN AN ORGANIZED HEALTH CARE EDUCATION/TRAINING PROGRAM

## 2024-07-17 PROCEDURE — 250N000013 HC RX MED GY IP 250 OP 250 PS 637: Performed by: FAMILY MEDICINE

## 2024-07-17 PROCEDURE — 250N000013 HC RX MED GY IP 250 OP 250 PS 637: Performed by: STUDENT IN AN ORGANIZED HEALTH CARE EDUCATION/TRAINING PROGRAM

## 2024-07-17 RX ORDER — OLANZAPINE 10 MG/1
10 TABLET, ORALLY DISINTEGRATING ORAL 2 TIMES DAILY PRN
Status: DISCONTINUED | OUTPATIENT
Start: 2024-07-17 | End: 2024-07-18

## 2024-07-17 RX ORDER — HYDROXYZINE HYDROCHLORIDE 25 MG/1
25 TABLET, FILM COATED ORAL ONCE
Status: COMPLETED | OUTPATIENT
Start: 2024-07-17 | End: 2024-07-17

## 2024-07-17 RX ORDER — LORAZEPAM 1 MG/1
1 TABLET ORAL AT BEDTIME
Status: DISCONTINUED | OUTPATIENT
Start: 2024-07-17 | End: 2024-07-25

## 2024-07-17 RX ORDER — HYDROXYZINE HYDROCHLORIDE 50 MG/1
50 TABLET, FILM COATED ORAL ONCE
Status: COMPLETED | OUTPATIENT
Start: 2024-07-17 | End: 2024-07-17

## 2024-07-17 RX ADMIN — NICOTINE POLACRILEX 2 MG: 2 LOZENGE ORAL at 08:41

## 2024-07-17 RX ADMIN — HYDROXYZINE HYDROCHLORIDE 25 MG: 25 TABLET, FILM COATED ORAL at 08:41

## 2024-07-17 RX ADMIN — LORAZEPAM 1 MG: 1 TABLET ORAL at 22:01

## 2024-07-17 RX ADMIN — NICOTINE POLACRILEX 2 MG: 2 LOZENGE ORAL at 13:00

## 2024-07-17 RX ADMIN — PALIPERIDONE 6 MG: 6 TABLET, EXTENDED RELEASE ORAL at 08:40

## 2024-07-17 RX ADMIN — NICOTINE POLACRILEX 2 MG: 2 LOZENGE ORAL at 16:38

## 2024-07-17 RX ADMIN — DIVALPROEX SODIUM 1250 MG: 500 TABLET, DELAYED RELEASE ORAL at 22:00

## 2024-07-17 RX ADMIN — OLANZAPINE 10 MG: 10 TABLET, ORALLY DISINTEGRATING ORAL at 10:28

## 2024-07-17 NOTE — TELEPHONE ENCOUNTER
R: [place]  Access Inpatient Bed Call Log  7/17/2024 12:41 AM  Intake has called facilities that have not updated their bed status within the last 12 hours.??      ADULTS:     *METRO  [place] -- [facility]: @ cap per website.  [place] -- [facility]:  @Posting 3 beds.  - 12:47 AM Per Grecia, they are capped.   [place] -- [facility]: @ Cap per website.  [place] -- [facility]: @ Cap per website. - 12:20 AM Per Gagan, they are able to review.   [place] -- [facility]: @ Cap per website.  [facility] -- [facility]: @ Cap per website.   [place] [facility] @ Posting 1 beds. Ages 18-28, Voluntary only, COVID test req'd, NO aggression, physical or sexual assault, violence hx or drug abuse, or psychosis - 12:48 AM Per Rosa, they have 1 bed.   [place] -- [facility]: @ Cap per website.  [place] -- [facility]: @ cap per website.  [place] -- [facility]:  @ Cap per website.      *[facility]: @ Posting 3 beds.  Mixed unit/Low acuity only.  - Not appropriate due to acuity   [facility]: @ Posting 1 bed Low acuity, No aggression - Not appropriate due to acuity   [facility]: @ cap per website.  [facility]:  @ Cap per website. Low acuity only. No current aggression.  [facility]:  @ Posting 3 bed. COVID negative test req. Lower acuity only. No Aggression. - Declined due to acuity and aggression hx   [facility]: @ Cap per website. Low acuity only. Prefer med adjustments placement. - Declined due to acuity and aggression hx   . No aggression   [facility]:  @ Posting 4 beds. COVID negative test req. Lower acuity only. No Aggression. - Not appropriate due to acuity  - Declined due to acuity and aggression hx   [place] -- [facility]: @Posting 1 bed NO reviews after 10PM. Low acuity only.    [place] -- [facility]: @ Posting 1 bed. No hx of aggression. No sexual offenders. Voluntary patients only - Not appropriate due to acuity and hold  status  [place] -- [facility]:  @ Posting 1 beds. Low acuity only. Must have the cognitive ability to do programming. No aggressive or violent behavior or recent HX in the last 2 yrs. MH must be primary.  - Not appropriate due to acuity   [place] [facility]: @ Cap per website. COVID negative test. Must be low acuity ONLY.   [place] -- [facility]: @ Posting 2 beds. Low acuity. Negative Covid. -12:49 AM Per Raudel, they are capped.- 12:23 AM Per Blanca, they are capped.   [place] -- [place]: @Posting 4 beds. Negative COVID test  - Not appropriate due to acuity  - Declined due to acuity and aggression hx   [place] [facility]: @ Posting 2 beds. No hx of aggression/assault. No lines, drains or tubes. Does not provide detox or CD treatment. - Not appropriate due to acuity   [place] -- [facility]: @ Posting 1 beds. Mixed unit/Low acuity/no medical devices - IV, CPAP etc. Negative Covid.). - 12:44 AM Per Dilcia, they are able to review 1 high acuity bed (case-by-case)     Pt remains on waitlist pending appropriate placement availability.

## 2024-07-17 NOTE — TELEPHONE ENCOUNTER
R:    NOTICE OF INTENT TO REVOKE A PROVISIONAL DISCHARGE RECEIVED IN RIGHTFAX ON 07/17/2024 AT 9:48:17 AM, PLACED IN RIGHTFAX FOLDER LABELED COURT/COMMITMENT/ALEXANDER/HOLD.

## 2024-07-17 NOTE — PROGRESS NOTES
"Triage & Transition Services, Extended Care     Therapy Progress Note    Patient: Venancio goes by \"Venancio,\" uses he/him pronouns  Date of Service: July 17, 2024  Site of Service: [facility] EMERGENCY DEPARTMENT                             ED16A  Patient was seen yes  Mode of Assessment: Virtual: AmWell    Presentation Summary: Pt is seen by extended care for therapeutic check-in and reassessment. Exchanged greeting, introduced self and role. Pt is agreeable to meet for session. Pt is seen on the ipad and is unable to engage in a lucid conversation.  Writer asked Pt if he understands the commitment and the provisional discharge process. Pt indicates he does and denies having any questions. He does seem to understand. Pt continues to exhibit disorganized thinking and speech. He demonstrates loose associations. Pt experiencng internal stimuli and paranoia. He states, \"I slept about 4 hours last night and would rather you talk\". Pt later states, \"4 hours is plenty of sleep.\" Pt makes other statements such as: \"simple you keep it. Adderall check my heart rate. I slept in my own bed. You are screwed I cant talk to him\". Pt eventually indicates he no longer wants to talk and wants to end the call.    Therapeutic Intervention(s) Provided: Engaged in guided discovery, explored patient's perspectives and helped expand them through socratic dialogue.    Current Symptoms: obsessions/compulsions avoidance, impaired decision making   impulsive, inattentive, displaces blame, distractability, auditory hallucinations      Mental Status Exam   Affect: Dramatic, Labile  Appearance: Appropriate  Attention Span/Concentration: Attentive  Eye Contact: Intense, Variable    Fund of Knowledge: Delayed   Language /Speech Content: Fluent  Language /Speech Volume: Normal  Language /Speech Rate/Productions: Hyperverbal  Recent Memory: Variable  Remote Memory: Variable  Mood: Anxious, Irritable  Orientation to Person: Yes   Orientation " to Place: Yes  Orientation to Time of Day: Yes  Orientation to Date: Yes     Situation (Do they understand why they are here?): Yes  Psychomotor Behavior: Normal  Thought Content: Clear  Thought Form: Flight of Ideas    Treatment Objective(s) Addressed: rapport building, assessing safety    Patient Response to Interventions: acceptance expressed, no evidence of understanding    Progress Towards Goals: Patient Reports Symptoms Are: ongoing  Patient Progress Toward Goals: is not making progress  Comment: Pt exhibits impaired ability to insight and judgement.  Next Step to Work Toward Discharge: symptom stabilization  Symptom Stabilization Comment: Pt is awaiting placement on an IP unit for stabilization.  Ability to Engage Comment: Unable to enage in safety planning for a lower level of care. Pt unable to safely care for himself in the community.    Case Management: Case Management Included: collaborating with patient's support system  Details on Collaborating with Patient's Support System: [facility] CM: Chelsea Brennan phone: [phone], Spoke with Rosa Bailon Supervisor ([phone])  Summary of Interaction: Was informed by Attempted to be in contact with pt's CM. LMV asking for a call back. Per Rosa: Will file a revoked provisional discharge as pt has not been doing well.    Plan: inpatient mental health  yes provider, RN Recommend continue with plan for inpatient mental health services.  yes    Clinical Substantiation: Pt continues to present with difficulty tracking conversations, jose, responding to internal stimuli, and labile. Pt is not forthcoming about his mental health and denies drug use.Patient is recommended by this clinician to admit [facility] for safety and stabilization. Pt is unable to engage in safety planning to mitigate risk level in a non-secure setting. Lower levels of care have not been successful in mitigating risk.    Legal Status: Legal Status at Admission: 72 Hour Hold,  Commitment  72 Hour Hold - Date/Time Initiated: 7/15 1931  72 Hour Hold - Date/Time Ends: 7/18 1931    Session Status: Time session started: 1213  Time session ended: 1329  Session Duration (minutes): 16 minutes  Session Number: 2  Anticipated number of sessions or this episode of care: 4    Time Spent: 16 minutes    CPT Code: CPT Codes: 09439 - Psychotherapy (with patient) - 30 (16-37*) min    Diagnosis:   Patient Active Problem List   Diagnosis Code    Bipolar disorder (H) F31.9    ADHD (attention deficit hyperactivity disorder) F90.9    Bipolar I disorder with jose (H) F31.10       Primary Problem This Admission: Active Hospital Problems    *Bipolar disorder (H)    F31.9      Jairo Sheth, [facility]   Licensed Mental Health Professional (LMHP), [facility]  [phone]

## 2024-07-17 NOTE — CONSULTS
"      Psychiatry Consultation; Follow up              Reason for Consult, requesting source:      Requesting source: Dennys Waters    Labs and imaging reviewed,             Interim history:      Patient's has an anxious affect and reports feeling angry and scared. He says he would like to see his outpatient psychiatrist \"Obi\" to get adderall. He says he doesn't like the ED because he feels lonely and begins to cry at this moment. He reiterates he would rather \"see out his 72hh in station 12\". Says he slept poorly last night.     Patient denies any SI, HI, ENRIQUETA, AH or VH. Shortly before the interview ended, Venancio begins crying again and says \"Please don't leave me\" and then requests to speak with his 1:1.        Current Medications:     Current Facility-Administered Medications   Medication Dose Route Frequency Provider Last Rate Last Admin    divalproex sodium delayed-release (DEPAKOTE) DR tablet 1,250 mg  1,250 mg Oral At Bedtime Dennys Waters MD   1,250 mg at 07/16/24 2245    paliperidone ER (INVEGA) 24 hr tablet 6 mg  6 mg Oral Daily Dennys Waters MD   6 mg at 07/17/24 0840              MSE:   Appearance: Patient is alert, sitting on the bed and dressed in hospital scrubs  Attitude:  cooperative  Eye Contact:  fair, looks around the room a couple times  Mood:  Patient is feeling angry and anxious, easily around the room a couple times  Affect:  Labile, fanny mid and end of interview  Speech:  rambling  Psychomotor Behavior:  no evidence of tardive dyskinesia, dystonia, or tics  Muscle strength and tone: Appears normal  Thought Process:  disorganized  Associations:  no loose associations  Thought Content:  no evidence of suicidal ideation or homicidal ideation, Denied AH, VH  Insight:  limited  Judgement:  poor  Oriented to:  time, person, and place  Attention Span and Concentration:  limited  Recent and Remote Memory:  fair    Vital signs:  Temp: 98.4  F (36.9  C) Temp src: Oral BP: 138/75 Pulse: 102   Resp: 16 " "SpO2: 95 % O2 Device: None (Room air)        There is no height or weight on file to calculate BMI.         DSM-5 Diagnosis:   Bipolar 1 Disorder, with manic symptoms     Cannabis Use Disorder                                                      ADHD          Assessment:   Venancio Edgar is a 28 year old male with past psychiatric history of Bipolar 1 disorder and ADHD and medical history of DORV with doubly committed with VSD and vitamin D deficiency who presented to ED via EMS with acute manic behaviors. He was discharged from prolonged admission on Stn 12 7/10 on stay of commitment. Please see DEC note for further details, he had been seen by group home staff exhibiting bizarre and intrusive behaviors and was brought to the hospital. UDS positive for THC, though also told ED staff he took mushrooms (per patient was synthetic blend bought in store, \"mushroom road trip\").     Patient is still presenting with acute manic symptoms. He is feeling angry and anxious and would like to be admitted to inpatient psych. Currently on IP psych waitlist. We will start Ativan tonight for sleep, should be used short term only.           Summary of Recommendations:   Legal: stay of commitment currently, in process of revoking  Safety: Per ED staff  Labs/Studies:  Medications:   -Continue PO paliperidone 6mg  - Continue VPA 1250mg, pending level  - Continue olanzapine PRN  - Scheduled Ativan 1mg at night for sleep   Follow-Up: Transfer to IP psychiatry    Maliha Caceres, PGY1    I was present with the resident who participated in the service and in the documentation of the services provided. I have verified the history and personally performed the physical exam and medical decision making, as documented by the student/resident and edited by me     Yasmani Santa MD    Department of Psychiatry  Please Vocera if questions    Total time spent in chart review, patient interview and coordination of care; 52 minutes " - all time was spent on the date of the encounter that I saw patient

## 2024-07-17 NOTE — TELEPHONE ENCOUNTER
R: [place]  Access Inpatient Bed Call Log 7/17/24 @ 8:15 AM:     Intake has called facilities that have not updated the bed status within the last 12 hours.                                 [facility] is posting 0 beds.           [facility] is posting 3 beds. [phone]; Per call @ 8:58 AM, no beds.    [facility] is posting 0 beds. Negative covid required  [phone]    [facility] is posting 0 beds. Neg covid. No high school/Elisa-psych. [phone].  Per Susan @ 8:16 AM, @ capacity.   [facility] is posting 0 beds. [phone]    [facility] is posting 0 beds. [phone]  Per Tien @ 8:01 AM, on delay.    [facility] [place] is posting 0 beds. Ages 18-35. Negative covid. [phone]. Per Onesimo @ 7:51 AM, YA: 0 beds; child: 1 bed; adol: 4 beds.  [facility] is posting 0 beds.     [facility] is posting 0 beds [phone]      [facility] is posting 3 beds. LOW acuity ONLY. Mixed unit 12+. Negative covid- [phone]; Per Tien @ 8:01 AM, can take reviews.    [facility] has 0 beds posted. No aggression. Negative Covid. Low acuity.    [facility] is posting 0 beds. Negative covid. [phone]; Per Taylor @ 7:59 AM, at capacity  [facility] is posting 3 beds. Low acuity only. Neg covid.  [phone]   Per Alka @ 8:11 AM, [place] has 0 beds, [facility] open for reviews. 7/16 pt declined d/t acuity and hx of aggression.   [facility] is posting 0 beds. Low acuity. No current aggression.      [facility] is posting 0 beds available. Negative covid.  [phone].        [facility] is posting 1 bed. Low acuity. 72 HH hold preferred. Negative covid required. [phone];   [facility] ([facility] is posting 3 beds. Low acuity only. Neg covid.  [phone]; 7/16 pt declined d/t acuity and hx of aggression.     [facility] [place] is posting 1 bed.  Negative  covid required.   Vol only, No history of aggression, violence, or assault. No sexual offenders. No 72 HH holds. [phone]   Per call @ 8:10 AM, at capacity.   [facility] is posting 1 bed. Negative covid required.  (Must have the cognitive ability to do programming. No aggressive or violent behavior or recent HX in the last 2 yrs. MH must be primary.) Always low acuity. [phone]   Pt not appropriate d/t recent hx of aggressive beh.   [facility] has 0 beds posted. Negative covid required.  Low acuity only. Violence and aggression capped.  [phone]; Per call @ 7:50, call back after 9AM   [facility]. Low acuity, Negative covid required. [phone]; Per Renetta @ 8:07 AM, can review.   [facility] posting 4 beds Negative covid required.  [phone]; Per Evelio @ 8:06 AM, 4 step-down beds. 7/16 pt declined, needs ICU bed.   [facility] [place] is posting 2 beds. Negative covid. LOW acuity. (No lines, drains, or tubes, oxygen, CPAP, IV, etc.) Must Have a Ride Home. [phone]; Per Miriam @ 8:04 AM, 2 beds.   [facility] is posting 1 bed. [phone]; Negative covid. (No. lines, drains, or tubes, oxygen, CPAP, IV, etc.);  Per Miriam @ 7:57 AM, 2 beds avail.     Pt remains on the work list pending appropriate bed availability.       12:01 PM After NM and CRN review, they have approved pt to be presented to provider for St. 12.   12:20 PM Paged Junior to review for St. 12 admission.    1:27 PM Paged Junior   1:47 PM PPS informed that St. 12 cannot be accommodated on the unit at this time d/t his need for 2:1 staffing.   2:07 PM Junior called to confirm pt cannot be accommodated on St[facility] 12 today. Can represent tomorrow if a bed is available.

## 2024-07-17 NOTE — ED NOTES
IP MH Referral Acuity Rating Score (RARS)    LMHP complete at referral to IP MH, with DEC; and, daily while awaiting IP MH placement. Call score to PPS.  CRITERIA SCORING   New 72 HH and Involuntary for IP MH (not adolescent) 1/1   Boarding over 24 hours 1/1   Vulnerable adult at least 55+ with multiple co morbidities; or, Patient age 11 or under 0/1   Suicide ideation without relief of precipitating factors 0/1   Current plan for suicide 0/1   Current plan for homicide 0/1   Imminent risk or actual attempt to seriously harm another without relief of factors precipitating the attempt 0/1   Severe dysfunction in daily living (ex: complete neglect for self care, extreme disruption in vegetative function, extreme deterioration in social interactions) 1/1   Recent (last 2 weeks) or current physical aggression in the ED 1/1   Restraints or seclusion episode in ED 0/1   Verbal aggression, agitation, yelling, etc., while in the ED 1/1   Active psychosis with psychomotor agitation or catatonia 1/1   Need for constant or near constant redirection (from leaving, from others, etc).  1/1   Intrusive or disruptive behaviors 1/1   TOTAL Acuity Total Score: 8

## 2024-07-17 NOTE — ED PROVIDER NOTES
[facility] ED Mental Health Handoff Note:       Brief HPI:  This is a 28 year old male signed out to me by Dr. Mckeon.  See initial ED Provider note for full details of the presentation. Interval history is pertinent for no events or changes were reported from the shift prior.  He is on a 72-hour hold and being admitted for jose and psychosis.    Home meds reviewed and ordered/administered: Yes    Medically stable for inpatient mental health admission: Yes.    Evaluated by mental health: Yes. The recommendation is for inpatient mental health treatment. Bed search in process    Safety concerns: At the time I received sign out, the patient had been aggressive/combative/agitated, but has calmed.    Hold Status:  Active Orders   Legal    Emergency Hospitalization Hold (72 Hr Hold)     Frequency: Effective Now     Start Date/Time: 07/15/24 1931      Number of Occurrences: Until Specified            Exam:   Patient Vitals for the past 24 hrs:   BP Temp Temp src Pulse Resp SpO2   07/17/24 0400 125/67 98.2  F (36.8  C) Oral -- 16 97 %   07/16/24 0929 108/68 -- -- 70 -- 98 %   07/16/24 0850 96/55 97.4  F (36.3  C) Oral 67 16 99 %           ED Course:    Medications   nicotine (COMMIT) lozenge 2 mg (2 mg Buccal $Given 7/16/24 2107)   divalproex sodium delayed-release (DEPAKOTE) DR tablet 1,250 mg (1,250 mg Oral $Given 7/16/24 2245)   paliperidone ER (INVEGA) 24 hr tablet 6 mg (6 mg Oral $Given 7/16/24 0859)   OLANZapine (zyPREXA) injection 10 mg (has no administration in time range)   ondansetron (ZOFRAN ODT) ODT tab 4 mg (4 mg Oral $Given 7/16/24 1321)   haloperidol lactate (HALDOL) injection 5 mg (5 mg Intramuscular $Given 7/15/24 1733)   LORazepam (ATIVAN) injection 2 mg (2 mg Intramuscular $Given 7/15/24 1733)   diphenhydrAMINE (BENADRYL) injection 50 mg (50 mg Intramuscular $Given 7/15/24 1733)   OLANZapine zydis (zyPREXA) ODT tab 10 mg (10 mg Oral $Given 7/16/24 1944)   ondansetron (ZOFRAN ODT) ODT tab 4 mg (4  mg Oral Not Given 7/16/24 1006)            There were no significant events during my shift.    Patient was signed out to the oncoming provider      Impression:    ICD-10-CM    1. Severe manic bipolar 1 disorder with psychotic behavior (H)  F31.2       2. Cannabis abuse  F12.10           Plan:    Awaiting inpatient mental health admission/transfer.      RESULTS:   Results for orders placed or performed during the hospital encounter of 07/15/24 (from the past 24 hour(s))   Asymptomatic COVID-19 Virus (Coronavirus) by PCR Nasopharyngeal     Status: Normal    Collection Time: 07/16/24  9:01 AM    Specimen: Nasopharyngeal; Swab   Result Value Ref Range    SARS CoV2 PCR Negative Negative    Narrative    Testing was performed using the [employer] Peripheralsert Xpress SARS-CoV-2 Assay on the Cepheid Gene-Xpert Instrument Systems. Additional information about this Emergency Use Authorization (EUA) assay can be found via the Lab Guide. This test should be ordered for the detection of SARS-CoV-2 in individuals who meet SARS-CoV-2 clinical and/or epidemiological criteria as well as from individuals without symptoms or other reasons to suspect COVID-19. Test performance for asymptomatic patients has only been established in anterior nasal swab specimens. This test is for in vitro diagnostic use under the FDA EUA for laboratories certified under CLIA to perform high complexity testing. This test has not been FDA cleared or approved. A negative result does not rule out the presence of PCR inhibitors in the specimen or target RNA concentration below the limit of detection for the assay. The possibility of a false negative should be considered if the patient's recent exposure or clinical presentation suggests COVID-19. This test was validated by the [facility]. This laboratory is certified under the Clinical Laboratory Improvement Amendments (CLIA) as qualified to perform high complexity laboratory testing.      CBC with platelets differential     Status: None    Collection Time: 07/16/24  9:24 AM    Narrative    The following orders were created for panel order CBC with platelets differential.  Procedure                               Abnormality         Status                     ---------                               -----------         ------                     CBC with platelets and d...[[id number]]                      Final result                 Please view results for these tests on the individual orders.   CBC with platelets and differential     Status: None    Collection Time: 07/16/24  9:24 AM   Result Value Ref Range    WBC Count 9.6 4.0 - 11.0 10e3/uL    RBC Count 5.09 4.40 - 5.90 10e6/uL    Hemoglobin 14.8 13.3 - 17.7 g/dL    Hematocrit 44.5 40.0 - 53.0 %    MCV 87 78 - 100 fL    MCH 29.1 26.5 - 33.0 pg    MCHC 33.3 31.5 - 36.5 g/dL    RDW 13.5 10.0 - 15.0 %    Platelet Count 234 150 - 450 10e3/uL    % Neutrophils 48 %    % Lymphocytes 37 %    % Monocytes 7 %    % Eosinophils 7 %    % Basophils 1 %    % Immature Granulocytes 0 %    NRBCs per 100 WBC 0 <1 /100    Absolute Neutrophils 4.7 1.6 - 8.3 10e3/uL    Absolute Lymphocytes 3.5 0.8 - 5.3 10e3/uL    Absolute Monocytes 0.7 0.0 - 1.3 10e3/uL    Absolute Eosinophils 0.6 0.0 - 0.7 10e3/uL    Absolute Basophils 0.1 0.0 - 0.2 10e3/uL    Absolute Immature Granulocytes 0.0 <=0.4 10e3/uL    Absolute NRBCs 0.0 10e3/uL   Comprehensive metabolic panel     Status: Abnormal    Collection Time: 07/16/24  9:25 AM   Result Value Ref Range    Sodium 139 135 - 145 mmol/L    Potassium 3.8 3.4 - 5.3 mmol/L    Carbon Dioxide (CO2) 22 22 - 29 mmol/L    Anion Gap 16 (H) 7 - 15 mmol/L    Urea Nitrogen 8.5 6.0 - 20.0 mg/dL    Creatinine 0.84 0.67 - 1.17 mg/dL    GFR Estimate >90 >60 mL/min/1.73m2    Calcium 8.9 8.6 - 10.0 mg/dL    Chloride 101 98 - 107 mmol/L    Glucose 167 (H) 70 - 99 mg/dL    Alkaline Phosphatase 55 40 - 150 U/L    AST 15 0 - 45 U/L    ALT 20 0 - 70 U/L     Protein Total 7.2 6.4 - 8.3 g/dL    Albumin 4.3 3.5 - 5.2 g/dL    Bilirubin Total 0.3 <=1.2 mg/dL             MD Evelin Jalloh David, MD  07/17/24 2562

## 2024-07-18 ENCOUNTER — TELEPHONE (OUTPATIENT)
Dept: BEHAVIORAL HEALTH | Facility: CLINIC | Age: 29
End: 2024-07-18
Payer: COMMERCIAL

## 2024-07-18 PROBLEM — F31.2 SEVERE MANIC BIPOLAR 1 DISORDER WITH PSYCHOTIC BEHAVIOR (H): Status: ACTIVE | Noted: 2024-07-18

## 2024-07-18 PROBLEM — F12.10 CANNABIS ABUSE: Status: ACTIVE | Noted: 2024-07-18

## 2024-07-18 PROCEDURE — 36415 COLL VENOUS BLD VENIPUNCTURE: CPT | Performed by: STUDENT IN AN ORGANIZED HEALTH CARE EDUCATION/TRAINING PROGRAM

## 2024-07-18 PROCEDURE — 80165 DIPROPYLACETIC ACID FREE: CPT | Performed by: STUDENT IN AN ORGANIZED HEALTH CARE EDUCATION/TRAINING PROGRAM

## 2024-07-18 PROCEDURE — 80164 ASSAY DIPROPYLACETIC ACD TOT: CPT | Performed by: STUDENT IN AN ORGANIZED HEALTH CARE EDUCATION/TRAINING PROGRAM

## 2024-07-18 PROCEDURE — 96372 THER/PROPH/DIAG INJ SC/IM: CPT | Performed by: EMERGENCY MEDICINE

## 2024-07-18 PROCEDURE — 250N000013 HC RX MED GY IP 250 OP 250 PS 637: Performed by: NURSE PRACTITIONER

## 2024-07-18 PROCEDURE — 250N000013 HC RX MED GY IP 250 OP 250 PS 637: Performed by: EMERGENCY MEDICINE

## 2024-07-18 PROCEDURE — 250N000013 HC RX MED GY IP 250 OP 250 PS 637: Performed by: FAMILY MEDICINE

## 2024-07-18 PROCEDURE — 250N000011 HC RX IP 250 OP 636: Performed by: EMERGENCY MEDICINE

## 2024-07-18 PROCEDURE — 99233 SBSQ HOSP IP/OBS HIGH 50: CPT | Performed by: STUDENT IN AN ORGANIZED HEALTH CARE EDUCATION/TRAINING PROGRAM

## 2024-07-18 PROCEDURE — 124N000002 HC R&B MH UMMC

## 2024-07-18 RX ORDER — HYDROXYZINE HYDROCHLORIDE 25 MG/1
25 TABLET, FILM COATED ORAL 3 TIMES DAILY PRN
Status: DISCONTINUED | OUTPATIENT
Start: 2024-07-18 | End: 2024-07-18

## 2024-07-18 RX ORDER — OLANZAPINE 10 MG/2ML
10 INJECTION, POWDER, FOR SOLUTION INTRAMUSCULAR 3 TIMES DAILY PRN
Status: DISCONTINUED | OUTPATIENT
Start: 2024-07-18 | End: 2024-07-22

## 2024-07-18 RX ORDER — DIPHENHYDRAMINE HYDROCHLORIDE 50 MG/ML
50 INJECTION INTRAMUSCULAR; INTRAVENOUS EVERY 8 HOURS PRN
Status: DISCONTINUED | OUTPATIENT
Start: 2024-07-18 | End: 2024-07-22

## 2024-07-18 RX ORDER — OLANZAPINE 10 MG/1
10 TABLET ORAL 3 TIMES DAILY PRN
Status: DISCONTINUED | OUTPATIENT
Start: 2024-07-18 | End: 2024-07-22

## 2024-07-18 RX ORDER — TRAZODONE HYDROCHLORIDE 50 MG/1
50 TABLET, FILM COATED ORAL
Status: DISCONTINUED | OUTPATIENT
Start: 2024-07-18 | End: 2024-08-02 | Stop reason: HOSPADM

## 2024-07-18 RX ORDER — DIPHENHYDRAMINE HCL 50 MG
50 CAPSULE ORAL EVERY 8 HOURS PRN
Status: DISCONTINUED | OUTPATIENT
Start: 2024-07-18 | End: 2024-07-22

## 2024-07-18 RX ORDER — ACETAMINOPHEN 325 MG/1
650 TABLET ORAL EVERY 4 HOURS PRN
Status: DISCONTINUED | OUTPATIENT
Start: 2024-07-18 | End: 2024-08-02 | Stop reason: HOSPADM

## 2024-07-18 RX ORDER — HALOPERIDOL 5 MG/ML
2 INJECTION INTRAMUSCULAR ONCE
Status: COMPLETED | OUTPATIENT
Start: 2024-07-18 | End: 2024-07-18

## 2024-07-18 RX ORDER — AMOXICILLIN 250 MG
1 CAPSULE ORAL 2 TIMES DAILY PRN
Status: DISCONTINUED | OUTPATIENT
Start: 2024-07-18 | End: 2024-08-02 | Stop reason: HOSPADM

## 2024-07-18 RX ORDER — HYDROXYZINE HYDROCHLORIDE 25 MG/1
25 TABLET, FILM COATED ORAL EVERY 4 HOURS PRN
Status: DISCONTINUED | OUTPATIENT
Start: 2024-07-18 | End: 2024-08-02 | Stop reason: HOSPADM

## 2024-07-18 RX ORDER — HALOPERIDOL 5 MG/1
5 TABLET ORAL EVERY 8 HOURS PRN
Status: DISCONTINUED | OUTPATIENT
Start: 2024-07-18 | End: 2024-07-22

## 2024-07-18 RX ORDER — HALOPERIDOL 5 MG/ML
5 INJECTION INTRAMUSCULAR EVERY 8 HOURS PRN
Status: DISCONTINUED | OUTPATIENT
Start: 2024-07-18 | End: 2024-07-22

## 2024-07-18 RX ORDER — MAGNESIUM HYDROXIDE/ALUMINUM HYDROXICE/SIMETHICONE 120; 1200; 1200 MG/30ML; MG/30ML; MG/30ML
30 SUSPENSION ORAL EVERY 4 HOURS PRN
Status: DISCONTINUED | OUTPATIENT
Start: 2024-07-18 | End: 2024-08-02 | Stop reason: HOSPADM

## 2024-07-18 RX ORDER — LORAZEPAM 1 MG/1
1 TABLET ORAL EVERY 8 HOURS PRN
Status: DISCONTINUED | OUTPATIENT
Start: 2024-07-18 | End: 2024-07-22

## 2024-07-18 RX ORDER — LORAZEPAM 2 MG/ML
1 INJECTION INTRAMUSCULAR EVERY 8 HOURS PRN
Status: DISCONTINUED | OUTPATIENT
Start: 2024-07-18 | End: 2024-07-22

## 2024-07-18 RX ORDER — HALOPERIDOL 5 MG/ML
5 INJECTION INTRAMUSCULAR ONCE
Status: DISCONTINUED | OUTPATIENT
Start: 2024-07-18 | End: 2024-07-18

## 2024-07-18 RX ADMIN — DIVALPROEX SODIUM 1250 MG: 500 TABLET, DELAYED RELEASE ORAL at 19:26

## 2024-07-18 RX ADMIN — NICOTINE POLACRILEX 2 MG: 2 LOZENGE ORAL at 11:58

## 2024-07-18 RX ADMIN — PALIPERIDONE 6 MG: 6 TABLET, EXTENDED RELEASE ORAL at 08:08

## 2024-07-18 RX ADMIN — NICOTINE POLACRILEX 2 MG: 2 LOZENGE ORAL at 13:14

## 2024-07-18 RX ADMIN — NICOTINE POLACRILEX 2 MG: 2 LOZENGE ORAL at 14:06

## 2024-07-18 RX ADMIN — NICOTINE POLACRILEX 4 MG: 4 GUM, CHEWING BUCCAL at 10:15

## 2024-07-18 RX ADMIN — OLANZAPINE 10 MG: 10 TABLET, ORALLY DISINTEGRATING ORAL at 12:33

## 2024-07-18 RX ADMIN — OLANZAPINE 10 MG: 10 TABLET, ORALLY DISINTEGRATING ORAL at 08:45

## 2024-07-18 RX ADMIN — HALOPERIDOL LACTATE 2 MG: 5 INJECTION, SOLUTION INTRAMUSCULAR at 16:43

## 2024-07-18 RX ADMIN — HYDROXYZINE HYDROCHLORIDE 25 MG: 25 TABLET, FILM COATED ORAL at 14:20

## 2024-07-18 RX ADMIN — NICOTINE POLACRILEX 2 MG: 2 LOZENGE ORAL at 18:56

## 2024-07-18 RX ADMIN — TRAZODONE HYDROCHLORIDE 50 MG: 50 TABLET ORAL at 19:26

## 2024-07-18 RX ADMIN — NICOTINE POLACRILEX 2 MG: 2 LOZENGE ORAL at 08:44

## 2024-07-18 RX ADMIN — NICOTINE POLACRILEX 2 MG: 2 LOZENGE ORAL at 16:30

## 2024-07-18 RX ADMIN — LORAZEPAM 1 MG: 1 TABLET ORAL at 19:26

## 2024-07-18 ASSESSMENT — ACTIVITIES OF DAILY LIVING (ADL)
ADLS_ACUITY_SCORE: 35
ADLS_ACUITY_SCORE: 35
ADLS_ACUITY_SCORE: 29
ADLS_ACUITY_SCORE: 35
ADLS_ACUITY_SCORE: 35
ADLS_ACUITY_SCORE: 29
DRESS: SCRUBS (BEHAVIORAL HEALTH);INDEPENDENT
ADLS_ACUITY_SCORE: 35
HYGIENE/GROOMING: SHOWER;INDEPENDENT
ADLS_ACUITY_SCORE: 35
ADLS_ACUITY_SCORE: 29
ADLS_ACUITY_SCORE: 35
ADLS_ACUITY_SCORE: 29
ADLS_ACUITY_SCORE: 35
ADLS_ACUITY_SCORE: 29

## 2024-07-18 NOTE — TELEPHONE ENCOUNTER
R: [place]  Access Inpatient Bed Call Log 7/18/24 @ 7:20 AM:     Intake has called facilities that have not updated the bed status within the last 12 hours.                                   12:34 PM Paged Luis to review for unit 32.     1:12 PM Pt accepted to unit 32 needing a 2:1     1:26 PM Intake called unit 32 - CRN stated they are short staffed for 2:1     1:36 PM Intake called [facility] - They are in a meeting - WCB    2:22 PM Tavares MYRTLE and Dacia are working on getting appropriate staff.     2:25 PM Intake called ED with bed placement information. RN is aware of staffing concern.

## 2024-07-18 NOTE — ED NOTES
"Pt asked if could talk to RN. Writer noticed, pt was increasingly agitated, anxious and fixiated on court hold. Per pt, \"This is illegal,I can go home\". When trying to verbally deescalate, pt began trying to elope. Pt began to refuse to go back to room, started screaming in hallway. Pt placed onto restraining chair. IM haldol given. Pt crying and screaming for about 20 minutes after placed into restraining chair.   "

## 2024-07-18 NOTE — CONSULTS
"      Psychiatry Consultation; Follow up              Reason for Consult, requesting source:      Requesting source: Toro Davis    Labs and imaging reviewed            Interim history:    Venancio reports feeling good but had poor sleep due to \"Zyprexa\" and \"screaming next door patient\".  He says \"adderall\" makes him sleep better but understands he can't have it right now. He reports feeling low because he has energy to expend but can't because \"I'm here\". He denies any SI, HI, ENRIQUETA, AH, VH or paranoia. He reports Ativan helps calm him down.    When told of court hold and need to stay here until court process is complete his affect changes and says \"You're lying\" several times, and corners writer in his room. Encouraged him to contact his [facility]  who will have most up to date info.         Current Medications:     Current Facility-Administered Medications   Medication Dose Route Frequency Provider Last Rate Last Admin    divalproex sodium delayed-release (DEPAKOTE) DR tablet 1,250 mg  1,250 mg Oral At Bedtime Dennys Waters MD   1,250 mg at 07/17/24 2200    LORazepam (ATIVAN) tablet 1 mg  1 mg Oral At Bedtime Yasmani Santa MD   1 mg at 07/17/24 2201    paliperidone ER (INVEGA) 24 hr tablet 6 mg  6 mg Oral Daily Dennys Waters MD   6 mg at 07/18/24 0808              MSE:   Appearance: awake, alert, sitting on the bed and dressed in hospital scrubs  Attitude:  cooperative  Eye Contact:  fair  Mood:  \"Good\"  Affect:  Anxious  Speech:  rambling  Psychomotor Behavior:  no evidence of tardive dyskinesia, dystonia, or tics  Muscle strength and tone: Appears normal  Thought Process:  disorganized  Associations:  no loose associations  Thought Content:  no evidence of suicidal ideation or homicidal ideation. Denied AH, VH or Paranoia  Insight:  limited  Judgement:  poor  Oriented to:  time, person, and place  Attention Span and Concentration:  limited  Recent and Remote Memory:  limited    Vital " "signs:  Temp: 97.6  F (36.4  C) Temp src: Oral BP: 127/84 Pulse: 110   Resp: 18 SpO2: 97 % O2 Device: None (Room air)        There is no height or weight on file to calculate BMI.    EKG:          DSM-5 Diagnosis:   Bipolar 1 Disorder, with manic symptoms  Cannabis Use Disorder  ADHD          Assessment:     Venancio Edgar is a 28 year old male with past psychiatric history of Bipolar 1 disorder and ADHD and medical history of DORV with doubly committed with VSD and vitamin D deficiency who presented to ED via EMS with acute manic behaviors. He was discharged from prolonged admission on Stn 12 7/10 on stay of commitment. Please see DEC note for further details, he had been seen by group home staff exhibiting bizarre and intrusive behaviors and was brought to the hospital. UDS positive for THC, though also told ED staff he took mushrooms (per patient was synthetic blend bought in store, \"mushroom road trip\").     Patient still presents with manic symptoms, less severe and we would anticipate better recovery when his mood stabilizing medications are at appropriate dosage. We may increase PO paliperidone if he is unable to transfer to IP psych tomorrow and cannot receive GAMEZ paliperidone. He was quite distressed at being told he may need to stay here until court, saying he is \"fully voluntary\" for being placed on commitment.           Summary of Recommendations:   Legal: Court hold  Safety: Per ED staff  Labs/Studies:  Medications:   -Continue PO paliperidone 6mg  - Continue VPA 1250mg, pending level  - Continue olanzapine PRN  - Scheduled Ativan 1mg at night for sleep   Follow-Up: Transfer to IP psychiatry    Maliha Caceres, PGY1    I was present with the medical student and resident who participated in the service and in the documentation of the services provided. I have verified the history and personally performed the physical exam and medical decision making, as documented by the student/resident and edited by " me     Yasmani Snata MD    Department of Psychiatry  Please Vocera if questions    Total time spent in chart review, patient interview and coordination of care; 60 minutes - all time was spent on the date of the encounter that I saw patient

## 2024-07-18 NOTE — PROGRESS NOTES
"Triage & Transition Services, Extended Care     Therapy Progress Note    Patient: Venancio goes by \"Venancio,\" uses he/him pronouns  Date of Service: July 18, 2024  Site of Service: [facility] EMERGENCY DEPARTMENT                             ED16A  Patient was seen yes  Mode of Assessment: Virtual: AmWell    Presentation Summary: Pt is seen by extended care for therapeutic check-in and reassessment. Exchanged greeting, introduced self and role. Pt is agreeable to meet for session. Pt states, \"I heard if I don't cause any problems today I can leave at 7:30\". When asked about mental health symptoms he states, \"I don't want to talk about that it makes me anxious.\"  Pt continues to exhibit disorganized thinking and speech. He demonstrates loose associations. Pt experiencng internal stimuli and paranoia. Pt states, \"I don't want to work, I will get a job eventually.\" Pt unable to engage in meaningful conversation. Pt seems anxious and guarded and asks to end the session.    Therapeutic Intervention(s) Provided: Engaged in guided discovery, explored patient's perspectives and helped expand them through socratic dialogue.    Current Symptoms: obsessions/compulsions, anxious avoidance, impaired decision making   impulsive, inattentive, displaces blame, distractability, auditory hallucinations      Mental Status Exam   Affect: Dramatic, Labile  Appearance: Appropriate  Attention Span/Concentration: Inattentive  Eye Contact: Variable    Fund of Knowledge: Delayed   Language /Speech Content: Fluent  Language /Speech Volume: Normal  Language /Speech Rate/Productions: Normal  Recent Memory: Variable  Remote Memory: Variable  Mood: Anxious, Irritable  Orientation to Person: Yes   Orientation to Place: Yes  Orientation to Time of Day: No  Orientation to Date: No     Situation (Do they understand why they are here?): Yes  Psychomotor Behavior: Normal  Thought Content: Hallucinations, Delusions, Paranoia  Thought Form: Loose " Associations    Treatment Objective(s) Addressed: rapport building, assessing safety, processing feelings    Patient Response to Interventions: acceptance expressed, no evidence of understanding    Progress Towards Goals: Patient Reports Symptoms Are: ongoing  Patient Progress Toward Goals: is not making progress  Comment: Pt exhibits impaired ability to insight and judgement. He is unable to safely care for himself in the community.    Next Step to Work Toward Discharge: symptom stabilization  Symptom Stabilization Comment: Pt is awaiting placement on an IPMH unit for stabilization.  Ability to Engage Comment: Unable to enage in safety planning for a lower level of care. Pt unable to safely care for himself in the community.    Case Management: Case Management Included: collaborating with patient's support system  Details on Collaborating with Patient's Support System: [facility] CM: Chelsea Brennan phone: [phone], Spoke with Rosa Bailon Supervisor ([phone])  Summary of Interaction: Was informed by Attempted to be in contact with pt's CM. LMV asking for a call back. Per Rosa: Will file a revoked provisional discharge as pt has not been doing well.    Plan: inpatient mental health  yes provider, RN Notified Toro Ryan and nursing provider [place] of recommendation to continue with plan for inpatient mental health treatment.  yes    Clinical Substantiation: Pt continues to present with difficulty tracking conversations, jose, responding to internal stimuli, and labile. Pt is not forthcoming about his mental health and denies drug use.Patient is recommended by this clinician to admit IP  for safety and stabilization. Pt is unable to engage in safety planning to mitigate risk level in a non-secure setting. Lower levels of care have not been successful in mitigating risk.    Legal Status: Legal Status at Admission: Court Hold, Commitment  72 Hour Hold - Date/Time Initiated: 7/15 1931  72 Hour Hold -  Date/Time Ends: 7/18 1931  Type of Court Hold: revocation of provisional discharge    Session Status: Time session started: 0940  Time session ended: 0956  Session Duration (minutes): 16 minutes  Session Number: 3  Anticipated number of sessions or this episode of care: 4    Time Spent: 16 minutes    CPT Code: CPT Codes: 86520 - Psychotherapy (with patient) - 30 (16-37*) min    Diagnosis:   Patient Active Problem List   Diagnosis Code    Bipolar disorder (H) F31.9    ADHD (attention deficit hyperactivity disorder) F90.9    Bipolar I disorder with jose (H) F31.10       Primary Problem This Admission: Active Hospital Problems    *Bipolar disorder (H)    F31.9      Jairo Sheth [facility]   Licensed Mental Health Professional (LMHP), [facility]  [phone]

## 2024-07-18 NOTE — ED NOTES
[facility] ED Mental Health Handoff Note:       Brief HPI:  This is a 28 year old male signed out to me by Dr. Benítez.  See initial ED Provider note for full details of the presentation. Interval history is pertinent for no acute events prior. He is on a 72-hour hold and being admitted for jose and psychosis. .    Home meds reviewed and ordered/administered: Yes    Medically stable for inpatient mental health admission: Yes.    Evaluated by mental health: Yes. The recommendation is for inpatient mental health treatment. Bed search in process    Safety concerns: At the time I received sign out, there were no safety concerns.    Hold Status:  Active Orders   Legal    Emergency Hospitalization Hold (72 Hr Hold)     Frequency: Effective Now     Start Date/Time: 07/15/24 1931      Number of Occurrences: Until Specified         Exam:   Patient Vitals for the past 24 hrs:   BP Temp Temp src Pulse Resp SpO2   07/17/24 2136 133/74 98.6  F (37  C) Oral 119 14 96 %   07/17/24 1035 138/75 98.4  F (36.9  C) Oral -- 16 95 %   07/17/24 0831 138/75 98.4  F (36.9  C) Oral 102 -- 95 %           ED Course:    Medications   nicotine (COMMIT) lozenge 2 mg (2 mg Buccal $Given 7/17/24 1638)   divalproex sodium delayed-release (DEPAKOTE) DR tablet 1,250 mg (1,250 mg Oral $Given 7/17/24 2200)   paliperidone ER (INVEGA) 24 hr tablet 6 mg (6 mg Oral $Given 7/17/24 0840)   OLANZapine (zyPREXA) injection 10 mg (has no administration in time range)   ondansetron (ZOFRAN ODT) ODT tab 4 mg (4 mg Oral $Given 7/16/24 1321)   OLANZapine zydis (zyPREXA) ODT tab 10 mg (10 mg Oral $Given 7/17/24 1028)   LORazepam (ATIVAN) tablet 1 mg (1 mg Oral $Given 7/17/24 2201)   haloperidol lactate (HALDOL) injection 5 mg (5 mg Intramuscular $Given 7/15/24 1733)   LORazepam (ATIVAN) injection 2 mg (2 mg Intramuscular $Given 7/15/24 1733)   diphenhydrAMINE (BENADRYL) injection 50 mg (50 mg Intramuscular $Given 7/15/24 8321)   OLANZapine zydis (zyPREXA) ODT tab  10 mg (10 mg Oral $Given 7/16/24 1944)   ondansetron (ZOFRAN ODT) ODT tab 4 mg (4 mg Oral Not Given 7/16/24 1006)   hydrOXYzine HCl (ATARAX) tablet 25 mg (25 mg Oral $Given 7/17/24 0841)     Or   hydrOXYzine HCl (ATARAX) tablet 50 mg ( Oral See Alternative 7/17/24 0841)            There no significant events during my shift.    Patient was signed out to the oncoming provider      Impression:    ICD-10-CM    1. Severe manic bipolar 1 disorder with psychotic behavior (H)  F31.2       2. Cannabis abuse  F12.10           Plan:    Awaiting inpatient mental health admission/transfer.      RESULTS:   No results found for this visit on 07/15/24 (from the past 24 hour(s)).          MD Ryan Hurley, Toro Farah MD  07/18/24 2819

## 2024-07-18 NOTE — ED NOTES
"Patient agreeable to take HS medications, asked patient if labs could be drawn prior to taking the medications for the Depakote level, patient replied, \"no, but I would let them do it tomorrow afternoon.\"    "

## 2024-07-18 NOTE — ED NOTES
Pt verbally understands that they cannot elope, and can contract to safety. Restraining chair removed, and restraining orders discontinued.

## 2024-07-18 NOTE — ED NOTES
IP MH Referral Acuity Rating Score (RARS)    LMHP complete at referral to IP MH, with DEC; and, daily while awaiting IP MH placement. Call score to PPS.  CRITERIA SCORING   New 73 HH and Involuntary for IP MH (not adolescent) 1/1   Boarding over 24 hours 1/1   Vulnerable adult at least 55+ with multiple co morbidities; or, Patient age 11 or under 0/1   Suicide ideation without relief of precipitating factors 0/1   Current plan for suicide 0/1   Current plan for homicide 0/1   Imminent risk or actual attempt to seriously harm another without relief of factors precipitating the attempt 0/1   Severe dysfunction in daily living (ex: complete neglect for self care, extreme disruption in vegetative function, extreme deterioration in social interactions) 1/1   Recent (last 2 weeks) or current physical aggression in the ED 1/1   Restraints or seclusion episode in ED 0/1   Verbal aggression, agitation, yelling, etc., while in the ED 1/1   Active psychosis with psychomotor agitation or catatonia 1/1   Need for constant or near constant redirection (from leaving, from others, etc).  0/1   Intrusive or disruptive behaviors 0/1   TOTAL Acuity Total Score: 6

## 2024-07-18 NOTE — TELEPHONE ENCOUNTER
R: [facility] Access Inpatient Bed Call Log  7/18/24 @ 1:00am   Intake has called facilities that have not updated their bed status within the last 12 hours.     *METRO:  [place] -- [facility]: @ capacity.  [facility]: @ cap per website. Reporting no reviews overnight.  [place] -- [facility]: @ cap per website. Low acuity  [place] -- [facility]: @ cap per website. Low acuity only.  [place] -- [facility]: @ cap per website.  [facility]: @ cap per website.  [facility]/ beds: POSTING 6 BED. Ages 18-35, Voluntary only, NO aggression/physical/sexual assault, violence hx or drug abuse, or psychosis. Negative Covid.-  Per Rosa, 4 available for reviews  [place] -- [facility]: @ cap per website.  [place] -- [facility]: @ cap per website.  [place] -- [facility]: @ cap per website. Do not review overnight.     *STATEWIDE (by distance):  [facility]: POSTING 4 BEDS. Mixed unit. Ages 12 and up/Low acuity only.   [facility] - @ cap per website. Low acuity, No aggression  [facility] -- @ cap per website.   [facility] - @ cap per website. Low acuity only. No current aggression.  [facility] - POSTING 5 BEDS. Negative Covid. Lower acuity only.  [facility] - POSTING 1 BED. Low acuity only. Prefer med-adjustment placements.  [facility] - POSTING 3 BEDS.  No aggression. - Only Low Acuity reviews- Declined due to acuity   [place] - [facility]: @ cap per website. No aggressive behaviors. Do not review overnight.  [place] -- [facility]: @ cap per website.  No hx of aggression. No sexual offenders. Voluntary patients only.  [place] -- [facility]: POSTING 2 BEDS. Low acuity only. Must be able to do programming. No aggression/violent behavior in 2 years. No CD treatment.  [place] -- [facility]: @ cap per website. Negative Covid test. Must be low acuity  ONLY.  [place] -- [facility]: POSTING 2 BEDS. Low acuity. Negative Covid.   [place] -- [facility]: POSTING 4 BEDS. Negative Covid Required.- dECLINED DUE TO ACUITY   [facility]: POSTING 2 BEDS. No hx of aggression/assault. No lines, drains or tubes. Does not provide detox or CD treatment. Require a confirmed ride upon discharge.  [place] -- [facility]: POSTING 1 BED. Negative COVID. No medical devices.     Pt remains on waitlist pending appropriate placement availability

## 2024-07-18 NOTE — ED NOTES
Within an hour after restraint an in person face to face assessment was completed at 1710, including an evaluation of the patient's immediate reaction to the intervention, behavioral assessment and review/assessment of history, drugs and medications, recent labs, etc., and behavioral condition.  The patient experienced: No adverse physical outcome from seclusion/restraint initiation.  The intervention of restraint or seclusion needs to terminate.    --  Subsequent Critical Care Addendum (Modifier -25)  This patient had an Emergency Department evaluation and management performed by  the initial ED provider  at an earlier time that the patient did not require critical care. Subsequently, the patient's state changed such that critical care was medically necessary. The critical care provided was separate and distinct from the Emergency Department evaluation and management performed prior.     Based on my evaluation of the patient, Venancio Edgar has severe agitation, which requires immediate intervention, and therefore he is critically ill.     The care team initiated medication therapy with IM Haldol  to provide stabilization care. Due to the critical nature of this patient, I reassessed nursing observations and mental status multiple times prior to his disposition.     Time also spent performing documentation and and record review .     Critical care time (excluding teaching time and procedures): 10 minutes.        Eden Garcia MD  07/18/24 3517

## 2024-07-19 LAB
HOLD SPECIMEN: NORMAL
TSH SERPL DL<=0.005 MIU/L-ACNC: 1.44 UIU/ML (ref 0.3–4.2)
VALPROATE SERPL-MCNC: 65.7 UG/ML

## 2024-07-19 PROCEDURE — 250N000013 HC RX MED GY IP 250 OP 250 PS 637: Performed by: NURSE PRACTITIONER

## 2024-07-19 PROCEDURE — 80164 ASSAY DIPROPYLACETIC ACD TOT: CPT | Performed by: NURSE PRACTITIONER

## 2024-07-19 PROCEDURE — 99223 1ST HOSP IP/OBS HIGH 75: CPT | Performed by: NURSE PRACTITIONER

## 2024-07-19 PROCEDURE — 36415 COLL VENOUS BLD VENIPUNCTURE: CPT | Performed by: NURSE PRACTITIONER

## 2024-07-19 PROCEDURE — 90853 GROUP PSYCHOTHERAPY: CPT

## 2024-07-19 PROCEDURE — 84443 ASSAY THYROID STIM HORMONE: CPT | Performed by: NURSE PRACTITIONER

## 2024-07-19 PROCEDURE — 124N000002 HC R&B MH UMMC

## 2024-07-19 PROCEDURE — 250N000011 HC RX IP 250 OP 636: Performed by: NURSE PRACTITIONER

## 2024-07-19 PROCEDURE — 90832 PSYTX W PT 30 MINUTES: CPT | Performed by: COUNSELOR

## 2024-07-19 RX ADMIN — LORAZEPAM 1 MG: 1 TABLET ORAL at 19:25

## 2024-07-19 RX ADMIN — DIPHENHYDRAMINE HYDROCHLORIDE 50 MG: 50 INJECTION, SOLUTION INTRAMUSCULAR; INTRAVENOUS at 13:13

## 2024-07-19 RX ADMIN — PALIPERIDONE 6 MG: 6 TABLET, EXTENDED RELEASE ORAL at 08:07

## 2024-07-19 RX ADMIN — NICOTINE POLACRILEX 4 MG: 4 GUM, CHEWING BUCCAL at 13:13

## 2024-07-19 RX ADMIN — NICOTINE POLACRILEX 4 MG: 4 GUM, CHEWING BUCCAL at 11:59

## 2024-07-19 RX ADMIN — NICOTINE POLACRILEX 4 MG: 4 GUM, CHEWING BUCCAL at 18:16

## 2024-07-19 RX ADMIN — TRAZODONE HYDROCHLORIDE 50 MG: 50 TABLET ORAL at 19:24

## 2024-07-19 RX ADMIN — DIVALPROEX SODIUM 1250 MG: 500 TABLET, DELAYED RELEASE ORAL at 19:24

## 2024-07-19 RX ADMIN — OLANZAPINE 10 MG: 10 TABLET, FILM COATED ORAL at 12:40

## 2024-07-19 RX ADMIN — NICOTINE POLACRILEX 2 MG: 2 LOZENGE ORAL at 09:18

## 2024-07-19 RX ADMIN — NICOTINE POLACRILEX 2 MG: 2 GUM, CHEWING BUCCAL at 10:21

## 2024-07-19 RX ADMIN — LORAZEPAM 1 MG: 2 INJECTION, SOLUTION INTRAMUSCULAR; INTRAVENOUS at 13:13

## 2024-07-19 RX ADMIN — HALOPERIDOL LACTATE 5 MG: 5 INJECTION, SOLUTION INTRAMUSCULAR at 13:13

## 2024-07-19 RX ADMIN — NICOTINE POLACRILEX 2 MG: 2 LOZENGE ORAL at 08:08

## 2024-07-19 RX ADMIN — NICOTINE POLACRILEX 2 MG: 2 LOZENGE ORAL at 07:05

## 2024-07-19 ASSESSMENT — ACTIVITIES OF DAILY LIVING (ADL)
ADLS_ACUITY_SCORE: 29
LAUNDRY: WITH SUPERVISION
ADLS_ACUITY_SCORE: 29
ORAL_HYGIENE: INDEPENDENT
ADLS_ACUITY_SCORE: 29
DRESS: INDEPENDENT
ADLS_ACUITY_SCORE: 29
HYGIENE/GROOMING: INDEPENDENT
DRESS: SCRUBS (BEHAVIORAL HEALTH)
ADLS_ACUITY_SCORE: 29
ADLS_ACUITY_SCORE: 29
HYGIENE/GROOMING: INDEPENDENT
ADLS_ACUITY_SCORE: 29
ADLS_ACUITY_SCORE: 29
ORAL_HYGIENE: INDEPENDENT
ADLS_ACUITY_SCORE: 29

## 2024-07-19 NOTE — PLAN OF CARE
"Goal Outcome Evaluation:    Initial meeting note:    Therapist introduced self to patient and discussed psychotherapy service available to patient.     Pt response: Pt expressed interest in meeting with therapist    Plan: Made plan to meet with pt again; began identifying goals      Individual Therapy Note      Date of Service: July 19, 2024    Patient: Venancio goes by \"Venancio,\" uses he/him pronouns    Individuals Present: Venancio DOLORES Barrett    Session start: 10:35 am  Session end: 10:55 am  Session duration in minutes: 20      Modality Used: Person Centered, Rapport Building, and Motivational Interviewing    Goals: Meet and rapport building, insight into re - admit to hospital, having recently been here. Safety planning    Patient Description of current symptoms: focused on needing adderal he reports     Mental Status Exam:   Attitude: somewhat cooperative  Eye Contact: fair  Mood: good  Affect: intensity is heightened  Speech: pressured speech  Psychomotor Behavior: no evidence of tardive dyskinesia, dystonia, or tics  Thought Process:  linear, illogical, and tangental  Associations: loosening of associations present  Thought Content: no evidence of suicidal ideation or homicidal ideation  Insight: partial  Judgement: poor  Attention Span and Concentration: fair    Pt progress: Pt just arrived to unit, was interested in talking this therapist    Treatment Objective(s) Addressed:   The focus of this session was on rapport building, orienting the patient to therapy, identifying and practicing coping strategies, safety planning, and assessing safety     Progress Towards Goals and Assessment of Patient:   Patient is making progress towards treatment goals as evidenced by willingness to engage.       Therapeutic Intervention(s):   Provided active listening, unconditional positive regard, and validation.   Engaged in safety planning identifying coping skills, warning signs, health support and resources, Explored " motivation for behavioral change, Identified stress relief practices, Explored strategies for self-soothing, and Reviewed healthy living that supports positive mental health, including looking at sleep hygiene, regular movement, nutrition, and regular socialization      Plan/next step: check in again. He was lacking insight on substance use affecting mental health. He was also perseverating on the use of adderal. Says it makes him angry except when he is playing a video game, it helps him focus    89238 - Psychotherapy (with patient) - 30 (16-37*) min    Patient Active Problem List   Diagnosis    Bipolar disorder (H)    ADHD (attention deficit hyperactivity disorder)    Bipolar I disorder with jose (H)    Cannabis abuse    Severe manic bipolar 1 disorder with psychotic behavior (H)

## 2024-07-19 NOTE — PLAN OF CARE
Problem: Sleep Disturbance  Goal: Adequate Sleep/Rest  Outcome: Progressing   Goal Outcome Evaluation:    Patient slept 6.75 During the night. Patient is on  SIO 2: 1, 5 Ft. Due to assault risk and severe intrusiveness. Safety checks was done every 15 minutes. Patient was awake at 0400 am requested for two cup of orange juice was given to the patient and he went back to sleep. No PRN was given. No concern noted. At 0705 am patient requested for nicotine Lozenge 2 mg  PRN was given and, he went back to his room.

## 2024-07-19 NOTE — PLAN OF CARE
Group Attendance:  attended full group    Time session began: 1015  Time session ended: 1100  Patient's total time in group: 45    Total # Attendees   4   Group Type psychotherapeutic     Group Topic Covered emotional regulation, insight improvement, symptom management, and substance use  Nonjudgmental stance: self/others, Problem solving emotions      Group Session Detail This group focused on reasons to stay compliant with treatment recommendations. Group reviewed the meaning of treatment potency, and why a patient should not increase or decrease their medications without the authorization of their doctor. Group also brainstormed on reasons to stay patient to allow treatments to take their course. Group reviewed stages of treatment - focusing on psychotherapy.     Patient's response to the group topic/interactions:  cooperative with task and supportive of peers     Patient Details: This patient attended the group. Did not stay the entirety of the group. Participated by speaking. Was redirected as needed.       49078 - Group psychotherapy - 1 Session    Patient Active Problem List   Diagnosis    Bipolar disorder (H)    ADHD (attention deficit hyperactivity disorder)    Bipolar I disorder with jose (H)    Cannabis abuse    Severe manic bipolar 1 disorder with psychotic behavior (H)     Sg Vargas, Ph.D., [facility]

## 2024-07-19 NOTE — PLAN OF CARE
Initial meeting note:    Therapist introduced self to patient and discussed psychotherapy service available to patient.     Pt response: Pt expressed interest in meeting with therapist    Plan: Made plan to meet with pt again; began identifying goals . Pt reported that he is interested in working towards addiction. Reported that he will be coming to group psychotherapy, and will use all support that he can get.

## 2024-07-19 NOTE — PLAN OF CARE
"Goal Outcome Evaluation:    Plan of Care Reviewed With: patient       Problem: Psychotic Signs/Symptoms  Goal: Improved Mood Symptoms (Psychotic Signs/Symptoms)  Intervention: Optimize Emotion and Mood  Recent Flowsheet Documentation  Taken 7/18/2024 1900 by Karma Marshall RN  Diversional Activity:   music   television      Patient is a 28 year old male who lives in a group home and was brought to the ED ([facility]) due to  disorganized behavior. Patient is on a court hold. Per chart review, patient has a history of bipolar disorder with jose and psychosis. He arrived in station 32 per wheelchair at around 1812 this evening. Patient was eating his dinner when he came.     SIO 2:1 5 feet was initiated (as ordered) due to assault risk and severe intrusiveness.  Patient agreed for the search. Was cooperative with the admission process. Appeared tense. Patient's speech was rambling and he can hardly focus on one topic. Patient said that he was brought to the hospital due to \"psychosis\". He added \"but I only needed Adderral\". Rated anxiety and depression 10/10. Denied SI and HI.     Patient took a shower. He later complained that he's tired. He went to bed at around 1920 this evening. Hs meds were given. PRN Trazodone was given for sleep per request. Patient went to sleep but got up twice and requested for snacks. Will continue to monitor.             "

## 2024-07-19 NOTE — PLAN OF CARE
Goal Outcome Evaluation:    IP Treatment Plan    Client's Name: Venancio Edgar  YOB: 1995      Treatment Plan Date: July 19, 2024      Anticipated number of sessions for this episode of care: 6    Current Concerns/Problem Areas:    Goal 1 :Identify 1-2 ways that substance use has negatively impacted their lifePt will increase the use of healthy coping skills from 2 times to 5 times/day      Intervention(s)    Engaged in safety planning identifying coping skills, warning signs, health support and resources and Explored barriers to safety and sobriety            Goal 2 :  process loss of mom, he got a bit teary about this, and also talk about appropriate boundaries with peers/ group home peers Within the therapeutic setting, Venancio will identify and replace maladaptive thinking patterns with more adaptive thinking patterns from 10% of the time to 50%  of the time.      Intervention(s)    Engaged in safety planning identifying coping skills, warning signs, health support and resources, Understand and identify reasons for hospitalization, how to use the hospital to create change and prevent future hospitalizations , Explored and identified early warning signs and triggers to hospitalization, soon again after discharge from [facility], Explored motivation for behavioral change, Identified stress relief practices, Reviewed healthy living that supports positive mental health, including looking at sleep hygiene, regular movement, nutrition, and regular socialization, and Explored motivation for behavioral change        Pt centered considerations  Pt considerations low functioning/ low insight

## 2024-07-19 NOTE — PLAN OF CARE
"        Group Attendance:  attended full group     Time session began: 1300  Time session ended: 1345  Patient's total time in group: 30            Group Session Detail This group focused on ways to help regulate emotions, focusing on negative emotions such as anger/frustration. Reviewed primarily, the roles of the amygdala, hippocampus, and the thalamus in human functioning. These were done after the facilitator described these brain parts and their basic functions, while the patients subsequently voiced their understanding of their functions. Following this, the group reviewed the concept of neuroplasticity and hopes for the idea that the brain can heal. Focused on ways to assist the brain in its attempt to return to baseline, including staying away from toxic substances. Patients problem-solved around the question: \"What is a toxic substance\" to which, their general response was \"any medication or substance that is  not prescribed by the doctor, or that is taken against medical advice.\" Writer thanked the group for this understanding. Encouraged group to find one new/healthy activity they could start doing, as a way to help their brain learn something new (healthy and simple). Group participants identified what this activity will be, for themselves.      Patient's response to the group topic/interactions:  cooperative with task and supportive of peers     Patient Details: This patient attended the group. Did not stay the entirety of the group. Participated by speaking. Was redirected as needed.       33124 - Group psychotherapy - 1 Session      Patient Active Problem List   Diagnosis    Bipolar disorder (H)    ADHD (attention deficit hyperactivity disorder)    Bipolar I disorder with joes (H)    Cannabis abuse    Severe manic bipolar 1 disorder with psychotic behavior (H)       "

## 2024-07-19 NOTE — PROVIDER NOTIFICATION
Patient got very escalated not long after Zyprexa was administered. He demanded to call [facility] to set up an appointment with his outpatient provider. Despite writer and other staff explaining that he is being followed on an inpatient basis, he kept demanding. He was given the number to [facility] and he called and escalated to the point he was yelling very loudly and the phone he was on was turned off. At that point patient was very angry and shouting. Other patients were asked to go to the group room or their own rooms. [facility] team was called.   Provider Lauren Smith was paged and this writer informed her that patient had been given Zyprexa 10 PO prn, but had excalated further and was not able to redirect. She said Ok to give Combination PRN Medication Administration    Medications Administered: Benadryl+Haldol+Ativan combination    What patient symptom(s) led to the administration of these medications?  Extreme agitation, extreme yelling and shouting, inability to follow directions or de-escalate.     What interventions were attempted to avoid use of these medication (including other PRN medications)?   Olanzapine 10 mg PO prn, several staff tried to de-escalate patient, talked calmly to patient, active listening.    What were the patient's response(s) to the interventions?   Patient has stopped shouting and has de-escalated, he is following staff directions at this time.    Provider notified: Lauren Smith         Date: 07/19/24        Time: 1300    Roshni Cummings RN

## 2024-07-19 NOTE — PLAN OF CARE
BEH IP Unit Acuity Rating Score (UARS)  Patient is given one point for every criteria they meet.    CRITERIA SCORING   On a 72 hour hold, court hold, committed, stay of commitment, or revocation. 1    Patient LOS on BEH unit exceeds 20 days. 0  LOS: 1   Patient under guardianship, 55+, otherwise medically complex, or under age 11. 0   Suicide ideation without relief of precipitating factors. 0   Current plan for suicide. 0   Current plan for homicide. 0   Imminent risk or actual attempt to seriously harm another without relief of factors precipitating the attempt. 0   Severe dysfunction in daily living (ex: complete neglect for self care, extreme disruption in vegetative function, extreme deterioration in social interactions). 1   Recent (last 7 days) or current physical aggression in the ED or on unit. 1 1 days since 7/18   Restraints or seclusion episode in past 72 hours. 1 1 days since 7/18   Recent (last 7 days) or current verbal aggression, agitation, yelling, etc., while in the ED or unit. 1 1 days since 7/18   Active psychosis. 1   Need for constant or near constant redirection (from leaving, from others, etc).  1   Intrusive or disruptive behaviors. 1   Patient requires 3 or more hours of individualized nursing care per 8-hour shift (i.e. for ADLs, meds, therapeutic interventions). 1   TOTAL 9

## 2024-07-19 NOTE — PLAN OF CARE
"Upcoming Meetings and Dates/Important Information  Days since IP admission 1  Team Note Due Friday  No future appointments.     Next Steps  Connect with [facility] about 1:1 group home  Verify that he cannot go back to the house he was at. He has to wait for the more intensive house is ready    Assessment/Intervention/Current Symptoms and Care Coordination  Chart Review  Met with team to discuss patient care.  Emailed  informing her of pt admission and that I am assigned CTC. Inquired what her thoughts were for discharge planning.  Pt inquired as to why he was here. I provided him with the notice of intent to revoke provisional discharge. He asked me to read it to him as he does not have his glasses. I read the report out loud to him and he said several times it was \"untrue\". Prompted him to not at the bottom that he can oppose the revocation by calling his . I provided him with his 's phone number.  [place] patient say several times that he needed to see his provider [facility] and he needed the number to schedule. RN attempted to redirect him several times that he would not be able to do that due to him being IP. He asked for the number from the RN  Called [facility] [phone] and informed the  that the pt will be calling to try to set up an appointment with [facility] but is currently IP and does seem to accept that he cannot have an appointment with [facility].  Called [facility] who stated that the plan was for pt to transfer to a 1:1 group home and that was being coordinated by [facility]. [facility] can be reached at [phone].  Left voicemail message with [facility] requested return call. Emailed him as well. He emailed back stating he would call after his meeting he was in.    Discharge Plan or Goal  Dispo Plan: TBD  Transportation: TBD  Medication: TBD    Provisional discharge: Will need  Safety plan complete?: Not yet completed  Appointments:   TBD     Barriers to Discharge  patient requires " further psychiatric stabilization due to current symptomology, medication management with possible adjustments    Expected Discharge Date: 07/26/2024        Discharge Comments: PD Revocation        Referral Status  No new referral made    Legal Status  MI Commitment with Hand  [facility]- [facility]  Hand medications Haldol, Risperdal, Invega, Zyprexa, Seroquel, and Abilify   Stay of Commitment revoked and Commitment imposed 6/13/2024  PD from station 12 7/10/2024  PD Revoked 7/17/2024  60/90 Report 9/11/2024  Expires 12/13/2024    Carlos Christian  [employer]  Phone [phone]    Contacts  [facility]:  Norman [email or web address] [phone]  Darline Romo ( for retirement): [phone] - RENNY on file for retirement staff  Bree (retirement ): [phone] RENNY on file for group home staff  Email: [email or web address]     :  Chelsea Brennan ([facility])  Ph: [phone] - Commitment CM no RENNY needed   [email or web address]  [facility]  Ph: [phone]  Christi (member of case management team): [phone]     Mother (Biological Aunt) - Yolanda ([phone])     Psychiatric Medication Management  MARIO Weinberg, CNP with [facility]  [address], [address], [place] [address]  Phone: [phone]  Fax: [phone]

## 2024-07-19 NOTE — PLAN OF CARE
07/18/24 3673   Patient Belongings   Did you bring any home meds/supplements to the hospital?  No   Patient Belongings locker   Patient Belongings Put in Hospital Secure Location (Security or Locker, etc.) cell phone/electronics;clothing;shoes   Belongings Search Yes   Clothing Search Yes   Second Staff Fernandez J - [address]     Locker #9    List of Belongings in locker:   - 1 khaki pant  - 4 books  - 1 pair of socks   - 1 hoodie   - 2 lip balms  - 8 crayons   - 1 square fidget spinner   - 1 pair of black crocs   - 1 pack of bath wipes   - 1 comb   - 1 toothpaste   - 1 box of tissue   - 1 phone with black case (screensaver cracked)    A               Admission:  I am responsible for any personal items that are not sent to the safe or pharmacy.  [facility] is not responsible for loss, theft or damage of any property in my possession.    Signature:  _________________________________ Date: _______  Time: _____                                              Staff Signature:  ____________________________ Date: ________  Time: _____      2nd Staff person, if patient is unable/unwilling to sign:    Signature: ________________________________ Date: ________  Time: _____     Discharge:  [facility] has returned all of my personal belongings:    Signature: _________________________________ Date: ________  Time: _____                                          Staff Signature:  ____________________________ Date: ________  Time: _____

## 2024-07-19 NOTE — H&P
History and Physical    Venancio Edgar MRN# [id number]   Age: 28 year old YOB: 1995     Date of Admission:  7/15/2024          Contacts:     [facility]  - Bree Conrad ([phone])    [facility]  - Yajaira ([phone])    [facility]  - Chelsea ([phone])    Psychiatry  Provider: MARIO Weinberg CNP   Location: [place] [address]  Phone: [phone]  Fax: [phone]     Mother (Biological Aunt) - Yolanda ([phone])         Diagnoses:     Bipolar disorder type 1, manic severe, with psychosis  Historical diagnosis of ADHD  Cannabis use disorder  Polysubstance abuse  Nicotine use disorder         Recommendations:     Admit to Unit: 32N    Attending Physician: Dr. Galindo, under the direct care of Lauren Smith NP    Patient is committed MI with Hand (Haldol, Risperdal, Invega, Zyprexa, Seroquel & Abilify) in [facility].  Provisional discharge has been revoked.      Routine lab studies have been requested.  VPA level is pending.      Monitor for target symptoms.     Provide a safe environment and therapeutic milieu.     Status individual observation due to disorganization, agitation and elopement risk.      Medications:  He received the first loading dose of Invega Sustenna 234 mg on 7/9 and did not receive the second loading dose of 156 mg on 7/15, so will administer today.  Per notes from his previous hospitalization, Invega Sustenna 117 mg was scheduled for 8/15; consider increasing this dose.  Continue Invega 6 mg daily for now.  Continue Ativan 1 mg at HS for now; plan to discontinue.  Continue Depakote DR 1250 mg at HS pending outcome of VPA level; consider change to ER formulation.  PRNs of Zyprexa, Haldol-Ativan-Benadryl, Trazodone and Hydroxyzine are available.      He was residing in a group home prior to admission.  His outpatient team hopes to find a group home with increased  "supervision.  He has outpatient case management, a CADI  and psychiatry.      Attestation:  Patient has been seen and evaluated by me, MARIO Phan CNP  The patient was counseled on nature of illness and treatment plan/options  Care was coordinated with treatment team  Total time > 75 minutes         Chief Complaint:     History is obtained from the patient and electronic health record.  ED notes, DEC assessment, outpatient records and records from previous hospitalizations were reviewed.    \"Sounds like I was being a bit disruptive.\"           History of Present Illness:        Venancio Edgar is a 28-year-old male admitted to [facility] Station 32N on 7/15/2024, arriving on the unit 7/18/2024.  He was admitted through the ED due to agitation and psychosis.  He was hospitalized on Station 12N 6/7/2024 - 7/10/2024 at which time his stay of commitment was vacated and he was Jarvised.  Invega Sustenna was initiated, and he was discharged to his group home.  Group home staff report that he bought gummies and cannabis \"tar.\"  He became more disorganized and agitated.  He was eating grass.  He went outside and looked into the windows of cars.  He slept poorly.  He was brought in via EMS.  While in the ED, his  filed an intent to revoke his provisional discharge, and his provisional discharge was revoked.  While in the ED, he was intrusive, disorganized and impulsive.  He repeatedly requested macaroni and cheese.  He tried to charge past staff in an attempt to elope.  He later was placed in restraints after screaming and attempting to elope again.  He was placed on SIO 2:1.  He reported that he had taken Adderall and made several requests for Adderall.  He later said he had taken a synthetic composition of tryptamines called \"mushroom road trip.\"  UTOX was positive for cannabinoids.  He reports taking medications as prescribed.  During conversation with provider, he mentioned " "using \"paliperidone, bong water and road trip.\"  He was cooperative but disorganized.  He stated that he will not attempt to elope from the unit.  Discussed reducing SIO staffing from 2:1 to 1:1.  He became emotional, nearly tearful, and stated he has separation anxiety from his mother dying when he was a baby and would feel much better having 1:1 staff available versus no SIO staff.           Psychiatric Review of Systems:     He characterized his mood as \"pretty stable today.\"  He denies suicidal and homicidal ideation.  He reports some struggles with irritability and anxiety.  He said his concentration is \"really bad\" and that he is easily distracted.  He did not ask for Adderall as he had done in the ED, but rather inquired about Strattera.  States his sleep is \"very good.\"  His appetite is \"good\" and he mentioned gaining weight.  He denies hallucinations.  He was disorganized but did not make any overtly paranoid/delusional statements.  He denies thought broadcasting.  He stated he has PTSD and separation anxiety related to his mother's death when he was a baby.            Medical Review of Systems:     A 10-point review of systems was completed and is negative with the exception of HPI.           Psychiatric History:     He has a history of bipolar 1 disorder and ADHD.  He has a history of 4 previous hospitalizations.  He was most recently hospitalized on Station 12N 6/7/2024 - 7/10/2024.  He denies any history of suicide attempts or self-injury.  He has a history of physical aggression, including trying to push past staff in an attempt to elope from the ED just prior to admission.  Past medications include Vyvanse, Risperdal, Cogentin, Adderall, Haldol, Zyprexa, Lexapro, Ritalin, Concerta, Trazodone and Strattera.  He has no history of ECT.  He has been to [facility].  In addition to his current commitment, he has a history of prior commitment in 2021.           Substance Use History:     Per records, he " has a a history of abusing cannabis, psychedelics and stimulants (Dexedrine).He vapes nicotine and smokes tobacco from a pipe.          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  Right great toe fracture    No history of seizures or head injuries         Past Surgical History:     None reported         Allergies:     No known allergies           Medications:      divalproex sodium delayed-release (DEPAKOTE) 250 MG DR tablet Take 5 tablets (1,250 mg) by mouth at bedtime    paliperidone ER (INVEGA) 6 MG 24 hr tablet Take 1 tablet (6 mg) by mouth daily    [START ON 8/15/2024] paliperidone (INVEGA SUSTENNA) 117 MG/0.75ML HUBERT Inject 0.75 mLs (117 mg) into the muscle once for 1 dose    paliperidone (INVEGA SUSTENNA) 156 MG/ML HUBERT injection Inject 1 mL (156 mg) into the muscle once for 1 dose (due 7/15/2024)          Social History:     He grew up in [place].  His father lives in [place].  His mother  when he was a baby, and he was adopted by his aunt Yolanda.  He has 5 siblings.  He does not have children.  Highest level of education is some college.  He is unemployed.  He reports he has lived at his current group home for 8 months and likes it, though his  would like him to move to a facility with a higher level of supervision.  He denies any history of legal issues.  He has no  history.          Family History:     Records indicate and the patient corroborates that his brother and father have schizophrenia.         Labs:      Latest Reference Range & Units 07/15/24 16:49 24 09:01 24 09:24 24 09:25 24 09:02   Sodium 135 - 145 mmol/L    139    Potassium 3.4 - 5.3 mmol/L    3.8    Chloride 98 - 107 mmol/L    101    Carbon Dioxide (CO2) 22 - 29 mmol/L    22    Urea Nitrogen 6.0 - 20.0 mg/dL    8.5    Creatinine 0.67 - 1.17 mg/dL    0.84    GFR Estimate >60 mL/min/1.73m2    >90    Calcium  "8.6 - 10.0 mg/dL    8.9    Anion Gap 7 - 15 mmol/L    16 (H)    Albumin 3.5 - 5.2 g/dL    4.3    Protein Total 6.4 - 8.3 g/dL    7.2    Alkaline Phosphatase 40 - 150 U/L    55    ALT 0 - 70 U/L    20    AST 0 - 45 U/L    15    Bilirubin Total <=1.2 mg/dL    0.3    Glucose 70 - 99 mg/dL    167 (H)    TSH 0.30 - 4.20 uIU/mL     1.44   WBC 4.0 - 11.0 10e3/uL   9.6     Hemoglobin 13.3 - 17.7 g/dL   14.8     Hematocrit 40.0 - 53.0 %   44.5     Platelet Count 150 - 450 10e3/uL   234     RBC Count 4.40 - 5.90 10e6/uL   5.09     MCV 78 - 100 fL   87     MCH 26.5 - 33.0 pg   29.1     MCHC 31.5 - 36.5 g/dL   33.3     RDW 10.0 - 15.0 %   13.5     % Neutrophils %   48     % Lymphocytes %   37     % Monocytes %   7     % Eosinophils %   7     % Basophils %   1     Absolute Basophils 0.0 - 0.2 10e3/uL   0.1     Absolute Eosinophils 0.0 - 0.7 10e3/uL   0.6     Absolute Immature Granulocytes <=0.4 10e3/uL   0.0     Absolute Lymphocytes 0.8 - 5.3 10e3/uL   3.5     Absolute Monocytes 0.0 - 1.3 10e3/uL   0.7     % Immature Granulocytes %   0     Absolute Neutrophils 1.6 - 8.3 10e3/uL   4.7     Absolute NRBCs 10e3/uL   0.0     NRBCs per 100 WBC <1 /100   0     SARS CoV2 PCR Negative   Negative      Amphetamine Qual Urine Screen Negative  Screen Negative       Fentanyl Qual Urine Screen Negative  Screen Negative       Cocaine Urine Screen Negative  Screen Negative       Benzodiazepine Urine Screen Negative  Screen Negative       Opiates Qualitative Urine Screen Negative  Screen Negative       PCP Urine Screen Negative  Screen Negative       Cannabinoids Qual Urine Screen Negative  Screen Positive !       Barbiturates Qual Urine Screen Negative  Screen Negative              Psychiatric Examination:     Appearance:  awake, alert and dressed in hospital scrubs, disheveled  Attitude:  cooperative  Eye Contact:  fair  Mood:   \"pretty stable\" but also endorses feeling anxious and irritable  Affect:   anxious, sad  Speech:   normal volume, " clear, coherent  Psychomotor Behavior:  no evidence of tardive dyskinesia, dystonia, or tics, appears restless  Thought Process:  disorganized  Associations:  no loose associations  Thought Content:  no evidence of suicidal ideation or homicidal ideation, did not make any overtly paranoid/delusional statements but cannot rule out  Insight:  limited  Judgment:  limited  Oriented to:  month/year, time of day, person, and place  Attention Span and Concentration:  poor  Recent and Remote Memory:  limited  Language:  intact, fluent English  Fund of Knowledge:  appropriate  Muscle Strength and Tone:  normal  Gait and Station:  normal    /79   Pulse 119   Temp 98.3  F (36.8  C)   Resp 16   Ht 1.829 m (6')   Wt 98.7 kg (217 lb 11.2 oz)   SpO2 95%   BMI 29.53 kg/m           Physical Exam:     Please refer to the physical exam completed by Dr. Garcia in the ED on 7/14/2024:    Constitutional:       General: He is not in acute distress.     Appearance: He is not diaphoretic.      Comments: Adult male, awake, not really able to contribute to history meaningfully.  Tangential, answers are nonsensical.   HENT:      Head: Atraumatic.      Mouth/Throat:      Mouth: Mucous membranes are moist.      Pharynx: Oropharynx is clear. No oropharyngeal exudate.   Eyes:      General: No scleral icterus.     Pupils: Pupils are equal, round, and reactive to light.   Cardiovascular:      Rate and Rhythm: Normal rate.      Pulses: Normal pulses.      Heart sounds: No murmur heard.  Pulmonary:      Effort: No respiratory distress.      Breath sounds: Normal breath sounds.   Abdominal:      General: Bowel sounds are normal.      Palpations: Abdomen is soft.      Tenderness: There is no abdominal tenderness.   Musculoskeletal:         General: No tenderness.   Skin:     General: Skin is warm.      Findings: No rash.   Neurological:      General: No focal deficit present.   Psychiatric:      Comments: Adult male, tangential,  disorganized, able to briefly be redirected.  Not agitated or aggressive, but not able to follow a conversation.  Denies SI/HI.  Answers to questions are totally nonsensical.  Seems hyperfocused on macaroni and cheese.

## 2024-07-19 NOTE — PLAN OF CARE
" INITIAL PSYCHOSOCIAL ASSESSMENT AND NOTE    Information for assessment was obtained from:       []Patient     []Parent     []Community provider    [x]Hospital records   []Other     []Guardian       Presenting Problem:  Patient is a 28 year old male who uses he/him. Patient was admitted to [facility] on 7/15/2024 Station 32N on a revoked provisional discharge.    Presenting issues and presentation for admit: Pt reports he came to the ED because he borrowed a game from his house mate and paid him $20 and the house mate became angry with him. Pt reports he became angry also and the police brought him to the hospital.       Per Dec-Patient is a 28-year-old male with a history of bipolar and ADHD who comes in the hospital brought in by ambulance due to disorganized behavior and jose while at his group home.  Patient states that he is \"having an adult emergency\" and reports that no one is able to tell who he is anymore and feels that he is unrecognizable by others.  Patient states that he came to the hospital also because he felt that others want him to discuss whether or not he should be on Adderall.  Patient currently lives in a group home where he has been for the last year and feels that things there have been generally well.  He reports that a staff member called 911 because \"staff are always involved bro.\"       He denies any excessive worry or racing thoughts however states that he needs to use a fidget otherwise \"I am going to die\".  Patient began speaking about his virtual reality headset that he uses at home and began to yell saying that \"I'm not using my sanity right now!\"  When inquiring about the patient's sleep and appetite, he reports that we are to look in his chart and noticed that he sees \"Dr. Eat Well, that's what I do, I eat well. Look it up!\"  Patient was intermittently leaving the interview at times due to ongoing frustration and agitation.    The " following areas have been assessed:    History of Mental Health and Chemical Dependency:  Mental Health History:  Patient has a historical diagnosis of Bipolar Disorder, ADHD, schizophrenia  .   The patient denies a history of suicide attempts .   Patient denies a history of engaging in non-suicidal self-injury.     Previous psychiatric hospitalizations and treatments (including outpatient, residential, and inpatient care:   6/2024, [facility], [facility], [facility]     Substance Use History  Mushroom gummies      Patient's current relationship status is   single.   Patient reported having zero child(jer).       Family Description (Constellation, significant information and events, Family Psychiatric History):  Family history of Schizophrenia, schizoaffective bipolar type     Significant Medical issues, Life events or Trauma history:   history of adult trauma-per chart review      Living Situation:  Patient's current living/housing situation is  [facility]group home . They live with other residents and they report that housing is stable and they are able to return upon discharge.       Educational Background:    Patient's highest education level was high school graduate. Patient reports they are  able to understand written materials.     Occupational and Financial Status:     Patient is currently disabled.  Patient reports  income is obtained through disability.  Patient does not identify finances as a current stressor. They are insured under [employer]. Restrictions (No/Yes): Varies    Occupational History: disabled    Legal Concerns (current or past history):       Current Concerns: Pt denies    Past History: Pt denies      Legal Status:  72HH-pending revocation   [facility]: [facility]  File Number: [id number]   Start and expiration date of commitment: 11/29/23-11/29/24     Commitment History: Stay of commitment through [facility]        Service History: Pt denies    Ethnic/Cultural/Spiritual  considerations:   The patient describes their cultural background as White/, heterosexual, male.  Contextual influences on patient's health include severity of symptoms, level of functioning, and medication adherence concerns.   Patient identified their preferred language to be English. Patient reported they do not need the assistance of an .  Spiritual considerations include: None identified    Social Functioning (organizations, interests, support system):   In their free time, patient reports they like to patient reports they like to play video games and rap.      Patient identified mother as part of their support system.  Patient identified the quality of these relationships as stable and meaningful.       Current Treatment Providers are:  Primary Care Provider:  Name/Clinic:   Number:     Medication Management/Psychiatry:  Name/Clinic: [facility]   Number: [phone]        Therapist:   Name/Clinic:   Number:     /ACT Team:  [facility]   Number: O: [phone]  C: [phone]      Group Home:  Name/Clinic: [facility] Home Manager Bree Martines: [email or web address]  Number: [phone]      Other contact information (family, friends, SO) and RENNY status:       GOALS FOR HOSPITALIZATION:  What do patient want to accomplish during this hospitalization to make things better for the patient.?   Patient priorities:  The patient reported that what is most important to them is discharging as soon as he is able to. They identified stabilization as a goal of this hospitalization.    Social Service Assessment/Plan:  Patient view:   Patient reports it is important for the care team to know he  did not come in kicking and being aggressive like last time.  Upon discharge, they anticipate needing a psychiatry appointment set up for them. Pt appears manic and exhibits a lack of awareness into his return  to the hospital. Pt would benefit from day treatment and therapy to develop some coping skills.      Strengths and Assets:  The patient uses these coping skills to help with stress and hard times: playing video games.          Patient will have psychiatric assessment and medication management by the psychiatrist. Medications will be reviewed and adjusted per DO/MD/APRN CNP as indicated. The treatment team will continue to assess and stabilize the patient's mental health symptoms with the use of medications and therapeutic programming. Hospital staff will provide a safe environment and a therapeutic milieu. Staff will continue to assess patient as needed. Patient will participate in unit groups and activities. Patient will receive individual and group support on the unit.      CTC will do individual inpatient treatment planning and after care planning. CTC will discuss options for increasing community supports with the patient. CTC will coordinate with outpatient providers and will place referrals to ensure appropriate follow up care is in place.

## 2024-07-19 NOTE — PLAN OF CARE
"  Problem: Psychotic Signs/Symptoms  Goal: Improved Behavioral Control (Psychotic Signs/Symptoms)  Outcome: Progressing   Goal Outcome Evaluation:     Plan of Care Reviewed With: patient   Patient was frequently seeking out staff. He needed to be redirected from standing at the nurses' station often. He laughed inappropriately at times. He was restless and often pacing. He used a loud voice when talking and had intense eye contact. SIO was changed from 2:1 to a 1:1 by provider this shift. He continues to require an SIO due to severe intrusiveness. He is impulsive at times. Patient denied all mental health symptoms this morning. Writer asked him how he was feeling around 1200 and he said \"really bad.\" He said he felt that way because the TV was too loud for him to play the [employer]. He has requested nicotine gum frequently. No other prn  medications have been given. Compliant with scheduled meds. Will given Invega Sustenna when it arrives from pharmacy.    This afternoon, patient said \"I'm getting mad because I don't know why I'm here.\" He asked to speak to \"the doctor\" and that he hadn't seen one yet. Explained that he saw the provider psych NP. He was starting to get a bit agitated and said \"Oh she's going to keep me here until I agree not to take Adderall.\" [facility] gave him a copy of court paperwork. Patient continued to say, \"I don't know how I'm going to calm down.\" Zyprexa 10 mg prn for agitation.            "

## 2024-07-19 NOTE — PLAN OF CARE
07/19/24 1359   Individualization/Patient Specific Goals   Patient Personal Strengths expressive of emotions;resilient   Patient Vulnerabilities traumatic event;lacks insight into illness;history of unsuccessful treatment   Patient/Family-Specific Goals (Include Timeframe) Does not want to be here   Interprofessional Rounds   Summary Revoked commitment after bizarre and aggressive behavior. [facility]   Participants advanced practice nurse;nursing;CTC;psychiatrist   Behavioral Team Discussion   Participants Dai Smith, APRN, CNP; Megan Garcia [facility]; Roshni Cummings RN   Progress New admit   Anticipated length of stay 7+ days   Continued Stay Criteria/Rationale Pt will need to wait until his 1:1 house is ready   Medical/Physical Not impacting treatment   Plan DC to Group home   Safety Plan Completed   Anticipated Discharge Disposition group home     PRECAUTIONS AND SAFETY    Behavioral Orders   Procedures    Assault precautions    Cheeking Precautions (behavioral units)    Code 1 - Restrict to Unit    Elopement precautions    Routine Programming     As clinically indicated    Status 15     Every 15 minutes.    Status Individual Observation     Patient SIO status reviewed with team/RN.  Please also refer to RN/team documentation for add'l detail.    -SIO staff to monitor following which have contributed to patient being on SIO:  Disorganization and manic symptoms, requires significant redirection.  While in ED, he pushed past staff in an attempt to elope.  -Possible interventions SIO staff could use to support patient's treatment progress:  Redirection, PRNs.  -When following observed, team will review discontinuation of SIO:  Patient consistently demonstrates safe, cooperative behaviors and responds appropriately to redirection.     Order Specific Question:   CONTINUOUS 24 hours / day     Answer:   5 feet     Order Specific Question:   Indications for SIO     Answer:   Assault risk     Order  Specific Question:   Indications for SIO     Answer:   Severe intrusiveness       Safety  Safety WDL: .WDL except  Patient Location: lounge  Observed Behavior: sitting  Safety Measures: clinical history reviewed, safety rounds completed, suicide assessment completed, suicide check-in completed, 1:1 observation maintained  Diversional Activity: music, television  De-Escalation Techniques: 1:1 observation initiated, diversional activity encouraged, stimulation decreased  Suicidality: Status 15  Assault: status 15, status continuous sight, private room  Elopement Interventions: status 15, behavioral scrubs (pajamas), signs posted on unit entrance / exit doors

## 2024-07-20 LAB
VALPROATE FREE MFR SERPL: 15 %
VALPROATE FREE SERPL-MCNC: 12 UG/ML
VALPROATE SERPL-MCNC: 80 UG/ML

## 2024-07-20 PROCEDURE — 250N000013 HC RX MED GY IP 250 OP 250 PS 637: Performed by: NURSE PRACTITIONER

## 2024-07-20 PROCEDURE — 124N000002 HC R&B MH UMMC

## 2024-07-20 PROCEDURE — 250N000013 HC RX MED GY IP 250 OP 250 PS 637: Performed by: CLINICAL NURSE SPECIALIST

## 2024-07-20 RX ORDER — POLYETHYLENE GLYCOL 3350 17 G
4 POWDER IN PACKET (EA) ORAL
Status: DISCONTINUED | OUTPATIENT
Start: 2024-07-20 | End: 2024-08-02 | Stop reason: HOSPADM

## 2024-07-20 RX ADMIN — DIVALPROEX SODIUM 1250 MG: 500 TABLET, DELAYED RELEASE ORAL at 19:34

## 2024-07-20 RX ADMIN — NICOTINE POLACRILEX 4 MG: 4 GUM, CHEWING BUCCAL at 06:34

## 2024-07-20 RX ADMIN — OLANZAPINE 10 MG: 10 TABLET, FILM COATED ORAL at 16:49

## 2024-07-20 RX ADMIN — NICOTINE POLACRILEX 4 MG: 4 GUM, CHEWING BUCCAL at 16:23

## 2024-07-20 RX ADMIN — LORAZEPAM 1 MG: 1 TABLET ORAL at 12:35

## 2024-07-20 RX ADMIN — NICOTINE POLACRILEX 4 MG: 2 LOZENGE ORAL at 17:27

## 2024-07-20 RX ADMIN — NICOTINE POLACRILEX 4 MG: 2 LOZENGE ORAL at 09:05

## 2024-07-20 RX ADMIN — NICOTINE POLACRILEX 4 MG: 2 LOZENGE ORAL at 11:22

## 2024-07-20 RX ADMIN — HYDROXYZINE HYDROCHLORIDE 25 MG: 25 TABLET ORAL at 16:23

## 2024-07-20 RX ADMIN — HALOPERIDOL 5 MG: 5 TABLET ORAL at 12:35

## 2024-07-20 RX ADMIN — LORAZEPAM 1 MG: 1 TABLET ORAL at 19:34

## 2024-07-20 RX ADMIN — HYDROXYZINE HYDROCHLORIDE 25 MG: 25 TABLET ORAL at 06:48

## 2024-07-20 RX ADMIN — PALIPERIDONE 6 MG: 6 TABLET, EXTENDED RELEASE ORAL at 07:51

## 2024-07-20 RX ADMIN — NICOTINE POLACRILEX 4 MG: 2 LOZENGE ORAL at 12:51

## 2024-07-20 RX ADMIN — NICOTINE POLACRILEX 4 MG: 2 LOZENGE ORAL at 14:03

## 2024-07-20 RX ADMIN — NICOTINE POLACRILEX 4 MG: 4 GUM, CHEWING BUCCAL at 08:01

## 2024-07-20 RX ADMIN — DIPHENHYDRAMINE HYDROCHLORIDE 50 MG: 50 CAPSULE ORAL at 12:35

## 2024-07-20 RX ADMIN — TRAZODONE HYDROCHLORIDE 50 MG: 50 TABLET ORAL at 19:34

## 2024-07-20 RX ADMIN — OLANZAPINE 10 MG: 10 TABLET, FILM COATED ORAL at 09:06

## 2024-07-20 RX ADMIN — HYDROXYZINE HYDROCHLORIDE 25 MG: 25 TABLET ORAL at 10:47

## 2024-07-20 ASSESSMENT — ACTIVITIES OF DAILY LIVING (ADL)
ADLS_ACUITY_SCORE: 39
ADLS_ACUITY_SCORE: 39
ADLS_ACUITY_SCORE: 29
ADLS_ACUITY_SCORE: 39
ADLS_ACUITY_SCORE: 29
DRESS: SCRUBS (BEHAVIORAL HEALTH)
ADLS_ACUITY_SCORE: 39
ADLS_ACUITY_SCORE: 29
ADLS_ACUITY_SCORE: 39
ADLS_ACUITY_SCORE: 39
HYGIENE/GROOMING: PROMPTS;INDEPENDENT
ADLS_ACUITY_SCORE: 29
ADLS_ACUITY_SCORE: 39
ADLS_ACUITY_SCORE: 39
ORAL_HYGIENE: PROMPTS
ADLS_ACUITY_SCORE: 29
ADLS_ACUITY_SCORE: 39
ADLS_ACUITY_SCORE: 29
ADLS_ACUITY_SCORE: 39
ADLS_ACUITY_SCORE: 29
ADLS_ACUITY_SCORE: 29
ADLS_ACUITY_SCORE: 39
ADLS_ACUITY_SCORE: 29
ADLS_ACUITY_SCORE: 39
ADLS_ACUITY_SCORE: 39
ADLS_ACUITY_SCORE: 29

## 2024-07-20 NOTE — PLAN OF CARE
"Goal Outcome Evaluation:    Plan of Care Reviewed With: patient          Venancio remains on a SIO for Assault Risk and Severe Intrusiveness, however, it was change from a 2:1 to a 1:1 today.      Patient slept until 1805.  Ate 100% of dinner and reporting \"I am still starving, I need more food\".      Patient was due for Invega Injection and initially requested \"can it be given tomorrow because I got shots in my arms today?\"  Writer bargained with patient who reluctantly agreed and the injection was administered in patient's Left deltoid.    Patient has been anxious with loud, pressured speech.  Unsure if patient just normally talks loud.  Seeks out this RN frequently for different requests such as asking for double portions to be ordered, needing to talk about a stool softner even though patient reports having a BM daily including today, requesting a lozenge for his throat - denies sore throat or symptoms- unit is out of jolly rangers and patient wants something, wants to talk to  about receiving Melatonin and Trazodone.  Was able to redirect this shift.      Patient denies SI/SIB/HI and AVH.  Endorses some anxiety \"but I am good this shift\".         "

## 2024-07-20 NOTE — PLAN OF CARE
Problem: Adult Inpatient Plan of Care  Goal: Optimal Comfort and Wellbeing  Outcome: Progressing     Problem: Psychotic Signs/Symptoms  Goal: Improved Sleep (Psychotic Signs/Symptoms)  Outcome: Progressing  Flowsheets (Taken 7/20/2024 0028)  Mutually Determined Action Steps (Improved Sleep): sleeps 4-6 hours at night   Goal Outcome Evaluation:     Evening staff reported to the oncoming RN( Writer) that the patient snoring loudly and that his respirations were 27 when laying flat. During a respiratory assessment the pt mentioned that he has sleep Apnea . Sticky note left for provider. Pt awake at 6 AM,took coffee and nicotine gum. Dancing on the hallway.       Per outgoing RN report the patient's most recent vital signs are:     Vital signs:  BP: 118/69  Temp: 97.3  HR: 108  RR: 27  SpO2: 97 % (pt seated)     Respiarory assessment      Patient snores loudly and breaths heavily when sleeping flat.Nail beds are are not cyanotic.Respiratory rate is tachypneic at 27/per minute when laying flat and 22/per minute when sitting up/minute . Lung expansion is symmetrical. Pursed-lip breathing noted with sleeping. Anterior and posterior chest walls have no tenderness, masses, or crepitus upon palpation. On auscultation lungs are clear  over lung bases. No expiratory wheezes audible or heard with stethoscope.Pulse oximetry 97% on room air.    Interventions:    1:Pillow provided for elevation. Pt stabilizes with these measures.  Recommendations:   2: Sleep evaluation referral .  3: Medical bed for elevation .

## 2024-07-20 NOTE — PLAN OF CARE
"  Problem: Psychotic Signs/Symptoms  Goal: Improved Behavioral Control (Psychotic Signs/Symptoms)  Outcome: Progressing   Goal Outcome Evaluation:     Plan of Care Reviewed With: patient       Patient has asked this writer for Hydroxyzine right away this morning. He, however, had been given it prn about 40 minutes prior. He said that intends to take Haldol and Benadryl today. Writer explained that those medications were as needed for specific reasons. He said, \"So I have to yell to get it?\" Writer steered that conversation away and offered morning meds and prn nicotine gum. Patient was given scheduled AM Paliperidone. He kept asking for nicotine gum as well as lozenge, so got an order from on call provider to make both available prn for him. At 0906 he was c/o feeling agitated and he was given Olanzapine 10 PO prn. He approached this RN many times for requests. He said several times, \"I know what medication will fix this. But I won't say what it is.\" A short time later he approached this RN and said the same thing and then said \"I just feel so depressed.\" He said Adderall would fix it. He asked for Hydroxyzine again, but again it was too early to give. He then said he was going to call his best friend Odilon, which he did and was heard talking and laughing on the phone. He continues to speak quite loudly and have intense eye contact. He laughs inappropriately at times. He is labile.   Patient has been intermittently speaking in a baby voice. He complained loudly at lunch that he didn't get chicken noodle soup- dietary said it was not circled on his menu. Patient was escalating over the course of the shift, in particular for the past hour.  Combination PRN Medication Administration    Medications Administered: Benadryl+Haldol+Ativan combination    What patient symptom(s) led to the administration of these medications?  Increased agitation, escalating over the course of the shift, in spite of other prn meds given. " "    What interventions were attempted to avoid use of these medication (including other PRN medications)?   Lots of redirection, distraction, listening, prn Zyprexa and Hydroxyzine given, see MAR    What were the patient's response(s) to the interventions?   Patient almost immediately after being given the meds said he felt better. But- he was much less agitated eventually and quieter. He was less intrusive and seeking out writer for medications.    Provider notified: Mariano Pacheco       Date: 07/20/24        Time: 6591    Roshni Cummings RN        Patient was overheard saying to another patient- \"I got the Haldol, Benadryl... but I had to get loud to get it!\"    "

## 2024-07-21 PROCEDURE — 250N000013 HC RX MED GY IP 250 OP 250 PS 637: Performed by: NURSE PRACTITIONER

## 2024-07-21 PROCEDURE — 250N000013 HC RX MED GY IP 250 OP 250 PS 637: Performed by: CLINICAL NURSE SPECIALIST

## 2024-07-21 PROCEDURE — 124N000002 HC R&B MH UMMC

## 2024-07-21 PROCEDURE — 250N000011 HC RX IP 250 OP 636: Performed by: NURSE PRACTITIONER

## 2024-07-21 RX ADMIN — NICOTINE POLACRILEX 4 MG: 4 GUM, CHEWING BUCCAL at 10:42

## 2024-07-21 RX ADMIN — HYDROXYZINE HYDROCHLORIDE 25 MG: 25 TABLET ORAL at 18:20

## 2024-07-21 RX ADMIN — NICOTINE POLACRILEX 4 MG: 4 GUM, CHEWING BUCCAL at 17:46

## 2024-07-21 RX ADMIN — NICOTINE POLACRILEX 4 MG: 4 GUM, CHEWING BUCCAL at 12:49

## 2024-07-21 RX ADMIN — DIPHENHYDRAMINE HYDROCHLORIDE 50 MG: 50 CAPSULE ORAL at 13:04

## 2024-07-21 RX ADMIN — PALIPERIDONE 6 MG: 6 TABLET, EXTENDED RELEASE ORAL at 08:00

## 2024-07-21 RX ADMIN — OLANZAPINE 10 MG: 10 TABLET, FILM COATED ORAL at 08:57

## 2024-07-21 RX ADMIN — ONDANSETRON 4 MG: 4 TABLET, ORALLY DISINTEGRATING ORAL at 11:07

## 2024-07-21 RX ADMIN — NICOTINE POLACRILEX 4 MG: 2 LOZENGE ORAL at 16:31

## 2024-07-21 RX ADMIN — HYDROXYZINE HYDROCHLORIDE 25 MG: 25 TABLET ORAL at 10:18

## 2024-07-21 RX ADMIN — NICOTINE POLACRILEX 4 MG: 2 LOZENGE ORAL at 19:03

## 2024-07-21 RX ADMIN — NICOTINE POLACRILEX 4 MG: 2 LOZENGE ORAL at 09:34

## 2024-07-21 RX ADMIN — LORAZEPAM 1 MG: 1 TABLET ORAL at 13:04

## 2024-07-21 RX ADMIN — DIVALPROEX SODIUM 1250 MG: 500 TABLET, DELAYED RELEASE ORAL at 19:17

## 2024-07-21 RX ADMIN — OLANZAPINE 10 MG: 10 TABLET, FILM COATED ORAL at 11:04

## 2024-07-21 RX ADMIN — TRAZODONE HYDROCHLORIDE 50 MG: 50 TABLET ORAL at 19:17

## 2024-07-21 RX ADMIN — HALOPERIDOL 5 MG: 5 TABLET ORAL at 13:04

## 2024-07-21 RX ADMIN — NICOTINE POLACRILEX 4 MG: 2 LOZENGE ORAL at 11:47

## 2024-07-21 RX ADMIN — NICOTINE POLACRILEX 4 MG: 4 GUM, CHEWING BUCCAL at 08:04

## 2024-07-21 RX ADMIN — LORAZEPAM 1 MG: 1 TABLET ORAL at 19:17

## 2024-07-21 ASSESSMENT — ACTIVITIES OF DAILY LIVING (ADL)
ADLS_ACUITY_SCORE: 39
ADLS_ACUITY_SCORE: 39
ADLS_ACUITY_SCORE: 29
ADLS_ACUITY_SCORE: 39
ADLS_ACUITY_SCORE: 29
ADLS_ACUITY_SCORE: 39
ADLS_ACUITY_SCORE: 39
ADLS_ACUITY_SCORE: 29
ADLS_ACUITY_SCORE: 39
ADLS_ACUITY_SCORE: 29
ADLS_ACUITY_SCORE: 39
ADLS_ACUITY_SCORE: 29
ADLS_ACUITY_SCORE: 29
ADLS_ACUITY_SCORE: 39
ADLS_ACUITY_SCORE: 39
ADLS_ACUITY_SCORE: 29
ORAL_HYGIENE: INDEPENDENT
HYGIENE/GROOMING: INDEPENDENT

## 2024-07-21 NOTE — PLAN OF CARE
"Goal Outcome Evaluation:         Patient remains on an SIO - 10 feet for Risk of Assault and Severe Intrusiveness.    Patient frequently seeks out staff, however, has not been a behavioral problem this shift.      Remains restless, voice is loud and patient is expressing anxiety to discharge, \"as long as I get out of here by my moms birthday on 7/27, I will be fine\".      Paces the unit, played the X-box, showered, watched TV, social with select peers and staff.    Does request PRNs appropriately.  Requested and received PRNs:        Hydroxyzine 25 mg at 1623- \"anxiety\".    Zyprexa 10 mg at 1649 \"getting irritable as hell\".    Trazodone 50 mg at 1934 \"never can sleep\".    Nicotine lozenge/gum 2 or 3 times.  Compliant with scheduled medications.    Patient denies SI/SIB/HI and endorses anxiety.                "

## 2024-07-21 NOTE — PLAN OF CARE
"  Problem: Psychotic Signs/Symptoms  Goal: Improved Sleep (Psychotic Signs/Symptoms)  Outcome: Progressing   Goal Outcome Evaluation:     Plan of Care Reviewed With: patient       Patient has been impulsive and restless. He slept better overnight, 6.25 hours. He asked already this morning whether the \"haldol, benadryl, ativan is onboard.\" Writer encouraged patient not to focus on getting certain medications, but to see how his morning and day goes. Patient asked for Zyprexa prn 10 mg at around 0900- he was moving side to side and told writer \"I'm not doing this on purpose, I can't help it.\" Zyprexa prn was given. He asked for Hydroxyzine at the same time, but writer told him to see how well the Zyprexa helps with his agitation. He has been playing Xbox this morning. He has been playing with another peer and they seem to get along well.   He came to the nursing station to ask for Hydroxyzine about 1014. \"I just really was playing the xbox hard, I feel anxious.\" Given 25 mg Hydroxyzine prn as ordered. Then patient not long after heard talking very loudly on the phone: \"She won't give me the medicine right away. \"It's Ativan, Haldol, and Benadryl. I have to be angry to ask for it...\" Patient came to ask for this writer as soon as he finished his phone call. He asked for those medications (Haldol, Benadryl, Ativan). Tried to distract patient, along with other staff that offered to box breathe with him, distraction. He came back to ask for medication again very soon after. Since it had been 2 hours since he took Zyprexa, it was given again as ordered, prn.   Since the second dose of Zyprexa, patient has appeared less agitated. He approached writer and said that if he doesn't leave by Monday \"I'm going to christiano.\" He does not have insight into commitment, porter.  About 1300 patient came to writer again asked for \"B52\" and said \"I'm mad!\" Attempted to offer other things that might help. Patient was adamant nothing else would " "help. He started whimpering, \"Please help me! Help me!\"  All other prn meds were unable to be given.  All attempts at comfort measures, coping skills, and distraction were met with great resistance by patient.   Combination PRN Medication Administration    Medications Administered: Benadryl+Haldol+Ativan combination    What patient symptom(s) led to the administration of these medications?  Agitation, multiple statements \"I'm mad\", mood lability,      What interventions were attempted to avoid use of these medication (including other PRN medications)?   Use of Olanzapine prn x 2, Hydroxyzine prn, Nicotine replacement prn, distraction- Xbox, reading, offered box breathing, shower, snacks.     What were the patient's response(s) to the interventions?   They worked for a brief time, but he remained very focused on getting these medications and becoming more agitated.2    Provider notified: Dr. Heard       Date: 07/21/24        Time: 0522    Roshni Cummings RN            "

## 2024-07-21 NOTE — PLAN OF CARE
Problem: Sleep Disturbance  Goal: Adequate Sleep/Rest  Outcome: Progressing  Intervention: Promote Sleep/Rest  Patient continues on SIO 1:1, in room and in bed sleeping when received this shift, safety checks and precautions in place, No PRN given or requested. Patient slept approximately 6.25 hours.Will continue to monitor and offer therapeutic support as needed for the rest of the shift.

## 2024-07-22 PROCEDURE — 250N000013 HC RX MED GY IP 250 OP 250 PS 637: Performed by: NURSE PRACTITIONER

## 2024-07-22 PROCEDURE — 250N000011 HC RX IP 250 OP 636: Performed by: NURSE PRACTITIONER

## 2024-07-22 PROCEDURE — 250N000013 HC RX MED GY IP 250 OP 250 PS 637: Performed by: PSYCHIATRY & NEUROLOGY

## 2024-07-22 PROCEDURE — 250N000013 HC RX MED GY IP 250 OP 250 PS 637: Performed by: CLINICAL NURSE SPECIALIST

## 2024-07-22 PROCEDURE — 99232 SBSQ HOSP IP/OBS MODERATE 35: CPT | Performed by: NURSE PRACTITIONER

## 2024-07-22 PROCEDURE — 124N000002 HC R&B MH UMMC

## 2024-07-22 RX ORDER — OLANZAPINE 10 MG/2ML
10 INJECTION, POWDER, FOR SOLUTION INTRAMUSCULAR 3 TIMES DAILY PRN
Status: DISCONTINUED | OUTPATIENT
Start: 2024-07-22 | End: 2024-08-02 | Stop reason: HOSPADM

## 2024-07-22 RX ORDER — DIPHENHYDRAMINE HCL 50 MG
50 CAPSULE ORAL EVERY 6 HOURS PRN
Status: DISCONTINUED | OUTPATIENT
Start: 2024-07-22 | End: 2024-08-02 | Stop reason: HOSPADM

## 2024-07-22 RX ORDER — DIPHENHYDRAMINE HYDROCHLORIDE 50 MG/ML
50 INJECTION INTRAMUSCULAR; INTRAVENOUS EVERY 6 HOURS PRN
Status: DISCONTINUED | OUTPATIENT
Start: 2024-07-22 | End: 2024-08-02 | Stop reason: HOSPADM

## 2024-07-22 RX ORDER — DIPHENHYDRAMINE HCL 25 MG
25 CAPSULE ORAL EVERY 4 HOURS PRN
Status: DISCONTINUED | OUTPATIENT
Start: 2024-07-22 | End: 2024-08-02 | Stop reason: HOSPADM

## 2024-07-22 RX ORDER — HALOPERIDOL 5 MG/ML
5 INJECTION INTRAMUSCULAR EVERY 6 HOURS PRN
Status: DISCONTINUED | OUTPATIENT
Start: 2024-07-22 | End: 2024-08-02 | Stop reason: HOSPADM

## 2024-07-22 RX ORDER — OLANZAPINE 10 MG/1
10 TABLET ORAL 3 TIMES DAILY PRN
Status: DISCONTINUED | OUTPATIENT
Start: 2024-07-22 | End: 2024-08-02 | Stop reason: HOSPADM

## 2024-07-22 RX ORDER — HALOPERIDOL 5 MG/1
5 TABLET ORAL EVERY 6 HOURS PRN
Status: DISCONTINUED | OUTPATIENT
Start: 2024-07-22 | End: 2024-08-02 | Stop reason: HOSPADM

## 2024-07-22 RX ADMIN — OLANZAPINE 10 MG: 10 TABLET, FILM COATED ORAL at 08:07

## 2024-07-22 RX ADMIN — HYDROXYZINE HYDROCHLORIDE 25 MG: 25 TABLET ORAL at 08:51

## 2024-07-22 RX ADMIN — NICOTINE POLACRILEX 4 MG: 4 GUM, CHEWING BUCCAL at 13:08

## 2024-07-22 RX ADMIN — ONDANSETRON 4 MG: 4 TABLET, ORALLY DISINTEGRATING ORAL at 16:58

## 2024-07-22 RX ADMIN — NICOTINE POLACRILEX 4 MG: 2 LOZENGE ORAL at 15:56

## 2024-07-22 RX ADMIN — DIPHENHYDRAMINE HYDROCHLORIDE 50 MG: 50 CAPSULE ORAL at 10:39

## 2024-07-22 RX ADMIN — PALIPERIDONE 6 MG: 6 TABLET, EXTENDED RELEASE ORAL at 08:51

## 2024-07-22 RX ADMIN — ACETAMINOPHEN 650 MG: 325 TABLET, FILM COATED ORAL at 15:57

## 2024-07-22 RX ADMIN — NICOTINE POLACRILEX 4 MG: 4 GUM, CHEWING BUCCAL at 07:57

## 2024-07-22 RX ADMIN — NICOTINE POLACRILEX 4 MG: 4 GUM, CHEWING BUCCAL at 16:58

## 2024-07-22 RX ADMIN — NICOTINE POLACRILEX 4 MG: 2 LOZENGE ORAL at 11:36

## 2024-07-22 RX ADMIN — NICOTINE POLACRILEX 4 MG: 2 LOZENGE ORAL at 14:11

## 2024-07-22 RX ADMIN — LORAZEPAM 1 MG: 1 TABLET ORAL at 19:05

## 2024-07-22 RX ADMIN — OLANZAPINE 10 MG: 10 TABLET, FILM COATED ORAL at 12:35

## 2024-07-22 RX ADMIN — OLANZAPINE 10 MG: 10 TABLET, FILM COATED ORAL at 16:58

## 2024-07-22 RX ADMIN — DIVALPROEX SODIUM 1250 MG: 500 TABLET, FILM COATED, EXTENDED RELEASE ORAL at 19:05

## 2024-07-22 RX ADMIN — NICOTINE POLACRILEX 4 MG: 2 LOZENGE ORAL at 08:51

## 2024-07-22 RX ADMIN — HYDROXYZINE HYDROCHLORIDE 25 MG: 25 TABLET ORAL at 13:08

## 2024-07-22 RX ADMIN — HALOPERIDOL 5 MG: 5 TABLET ORAL at 10:39

## 2024-07-22 ASSESSMENT — ACTIVITIES OF DAILY LIVING (ADL)
DRESS: SCRUBS (BEHAVIORAL HEALTH)
ADLS_ACUITY_SCORE: 29
ORAL_HYGIENE: INDEPENDENT
HYGIENE/GROOMING: INDEPENDENT
ADLS_ACUITY_SCORE: 29

## 2024-07-22 NOTE — PLAN OF CARE
"Goal Outcome Evaluation:    Pt came into group at 10:15 am, and was swearing and angry about being hospitalized he was saying \" I dont want to be here and saying something about not knowing who his doctor is.     He was disregulated, writer and his SIO staff told him he would have to leave if he couldn't calm down, he opted to leave group on his own. He was then observed and heard sobbing loudly outside the group room. The peers in group were preoccupied and worried about him they reported.                        "

## 2024-07-22 NOTE — PLAN OF CARE
Upcoming Meetings and Dates/Important Information  Days since IP admission 4  Team Note Due Friday  No future appointments.     Next Steps  Will need PD    Assessment/Intervention/Current Symptoms and Care Coordination  Chart Review  Met with team to discuss patient care.  Received email from  stating pt was referred to [facility] and his CADI CM is Yajaira Sandhu.  Spoke with José Miguel at [facility] who said he has fulltime staff in orientation next week and he has a meeting tomorrow to find out if he can transfer some existing staff from another program. If that works out, he is looking at early next week for move in.  Updated provider,  and pt.  Printed pt a list of coping skills he can use on the unit.    Discharge Plan or Goal  Dispo Plan: New house at [place]  Transportation: TBD  Medication: TBD    Provisional discharge: Will need  Safety plan complete?: Not yet completed  Appointments:   TBD     Barriers to Discharge  patient requires further psychiatric stabilization due to current symptomology, medication management with possible adjustments    Expected Discharge Date: 07/26/2024        Discharge Comments: PD Revocation        Referral Status  No new referral made    Legal Status  MI Commitment with [facility]  [facility]-GW- [facility]  [facility] medications Haldol, Risperdal, Invega, Zyprexa, Seroquel, and Abilify   Stay of Commitment revoked and Commitment imposed 6/13/2024  PD from station 12 7/10/2024  PD Revoked 7/17/2024  60/90 Report 9/11/2024  Expires 12/13/2024    Carlos Gonzales  [employer]  Phone [phone]    Contacts  [facility]:  Norman [email or web address] [phone]  Darline Romo ( for prison): [phone] - RENNY on file for prison staff  Bree (prison ): [phone] RENNY on file for group home staff  Email: [email or web address]     :  Chelsea Brennan ([facility])  Ph:  [phone] - [facility] no RENNY needed   [email or web address]  [facility]  Ph: [phone]  Christi (member of case management team): [phone]     Mother (Biological Aunt) - Yolanda ([phone])     Psychiatric Medication Management  MARIO Weinberg, CNP with [facility] [employer]  [address], [address], [place] [address]  Phone: [phone]  Fax: [phone]

## 2024-07-22 NOTE — CARE PLAN
Rehab Group    Start time: 1115  End time: 1200  Patient time total: 25 minutes    attended partial group    #5 attended   Group Type: occupational therapy   Group Topic Covered: balanced lifestyle, coping skills, healthy leisure time, self-care, and social skills       Group Session Detail:   Hands on meaningful activity with choice of Sticker or Scratch Art project for creative expression, concentration, sequencing, follow through, frustration tolerance, coping, reality-based activity, mood stabilization, encouraging a meaningful daily routine, an opportunity to experience success and socialization.        Patient Response/Contribution:  worked intermittently       Patient Detail:  Pt joined group late and was in and out of group a few times, often leaving items behind.  Pt's SIO staff was present and attentive.  Pt began work on a project, though he was able to focus only for short bursts of time.  Pt initially worked in a neat manner, and as group progressed he worked more messily and veered from the printed pattern.  Pt was social with a male peer and needed reminders a few times for appropriate topics of conversation.  Pt was restless, unkempt and messy in his appearance.  He kept getting blue dye all over the place, including on his clothing and on the tables.  Pt was either unaware, uncaring, or a combination of the two, but paid no attention to this.  Pt demonstrated poor social skills and made a few paranoid/suspicious statements.            No Charge    Patient Active Problem List   Diagnosis    Bipolar disorder (H)    ADHD (attention deficit hyperactivity disorder)    Bipolar I disorder with jose (H)    Cannabis abuse    Severe manic bipolar 1 disorder with psychotic behavior (H)

## 2024-07-22 NOTE — PLAN OF CARE
"Goal Outcome Evaluation:    Plan of Care Reviewed With: patient          Patient remains on a 10 Foot SIO for Risk of Assault and Severe Intrusiveness.         Patient continues to be restless, easily distracted, elevated and labile.  His voice is loud.  Self reports his mood as \"all over the place - irritated, sad, anxious, depressed\".    Patient frequently seeks out staff, even with SIO in place, for various requests.    Requested and received the following PRNs:    1631 Nicotine lozenge  1746 Nicotine gum  1820 Hydroxyzine 25 mg  1903 Nicotine gum  1920 Trazodone 50 mg    Patient compliant with scheduled medications.    Patient was upset about his dinner tray believing he did not receive enough food to fill him up, however, he ate and was full.    Patient continues to ask writer why he is here and when he is leaving.    1720 patient received a phone call from [employer].      Patient took a shower this evening, however, he puts on the clothes he was wearing.    Patient denies SI/SIB/HI or AVH.         "

## 2024-07-22 NOTE — PROGRESS NOTES
"[facility],  Psychiatric Progress Note      Impression:     Venancio Edgar is a 28-year-old male admitted to [facility] on 7/15/2024, arriving on the unit 7/18/2024.  He was admitted through the ED due to agitation and psychosis.  He was hospitalized on [facility] 6/7/2024 - 7/10/2024 at which time his stay of commitment was vacated and he was Jarvised.  Invega Sustenna was initiated, and he was discharged to his group home.  Group home staff report that he bought gummies and cannabis \"tar.\"  He became more disorganized and agitated.  He was eating grass.  He went outside and looked into the windows of cars.  He slept poorly.  He was brought in via EMS.  While in the ED, his  filed an intent to revoke his provisional discharge, and his provisional discharge was revoked.  While in the ED, he was intrusive, disorganized and impulsive.  He repeatedly requested macaroni and cheese.  He tried to charge past staff in an attempt to elope.  He later was placed in restraints after screaming and attempting to elope again.  He was placed on SIO 2:1.  He reported that he had taken Adderall and made several requests for Adderall.  He later said he had taken a synthetic composition of tryptamines called \"mushroom road trip.\"  UTOX was positive for cannabinoids.  He reports taking medications as prescribed.  During conversation with provider, he mentioned using \"paliperidone, bong water and road trip.\"  He was cooperative but disorganized.  He stated that he will not attempt to elope from the unit.  Discussed reducing SIO staffing from 2:1 to 1:1.  He became emotional, nearly tearful, and stated he has separation anxiety from his mother dying when he was a baby and would feel much better having 1:1 staff available versus no SIO staff.   Since admission, his second loading dose of Invega Sustenna was administered 7/19.  PO Invega was continued.  Depakote " DR was continued.  Ativan was initiated.  PRNs of Zyprexa, Haldol, Benadryl, Trazodone and Hydroxyzine were initiated.  He has been on SIO.  He has been emotionally labile and disorganized, with paranoid thought content..           Diagnoses:     Bipolar disorder type 1, manic severe, with psychosis  Historical diagnosis of ADHD  Cannabis use disorder  Polysubstance abuse  Nicotine use disorder         Plan:     Medications:  He received the first loading dose of Invega Sustenna 234 mg on 7/9 and he did not receive the second loading dose of 156 mg on 7/15, so it was administered 7/19.  Per notes from his previous hospitalization, Invega Sustenna 117 mg was scheduled for 8/15; consider increasing this dose.  Continue Invega 6 mg daily for now.  Continue Ativan 1 mg at HS for now; plan to discontinue.  Change Depakote to ER formulation, 1250 mg in the evening.  Discontinue PRN Ativan.  PRNs of Haldol & Benadryl should be used for first line treatment of agitation.  Continue PRNs of Zyprexa, Trazodone and Hydroxyzine.    Patient is committed MI with Hand (Haldol, Risperdal, Invega, Zyprexa, Seroquel & Abilify) in [facility].  Provisional discharge has been revoked.       Status individual observation due to disorganization, agitation and elopement risk.       He was residing in a group home prior to admission.  His outpatient team hopes to find a group home with increased supervision.  He has outpatient case management, a CADI  and psychiatry.  Plan for sleep medicine referral.          Attestation:  Patient has been seen and evaluated by me, Dai Smith, APRN CNP  The patient was counseled on nature of illness and treatment plan/options  Care was coordinated with treatment team  Total time > 35 minutes          Interim History:     The patient's care was discussed with the treatment team and chart notes were reviewed.  Pt was documented as sleeping 6.25 and 6.75 hours during the two most  "recent overnight shifts.  He received IM Haldol-Ativan-Benadryl Friday afternoon and received it PO both Saturday and Sunday afternoons.  He has also been receiving PRNs of Hydroxyzine, Zyprexa, Zofran and Trazodone.  He asked for Adderall.  He was observed snoring at night.  He attended some groups on Friday.  He talked to staff in a baby voice.  He specifically asked for Haldol-Ativan-Benadryl and told another patient that it was necessary to be loud in order for staff to administer it.  Today, pt appeared impulsive and emotionally labile during conversation with provider.  He spoke loudly.  VPA level was 65.7.  Discussed changing Depakote from DR to ER formulation and he agreed.  Pt states that PRN Haldol-Ativan-Benadryl works better than PRN Zyprexa.  States he has had Haldol and Benadryl without Ativan, and that this is also more calming than Zyprexa.  Discontinued PRN Ativan and changed Benadryl and Haldol to be first line treatment for agitation.  Pt said his mood is \"fantastic!\"  However, minutes later he was holding back tears discussing his mother's death when he was a baby.  He also reports intermittent agitation.  States he had a \"tantrum\" last Friday because he could not reach his outpatient psychiatrist.  He denies hallucinations.  He said, \"I feel like I'm being experimented on.  I don't feel safe.\"  States that having SIO staff helps increase his sense of safety.  He denies suicidal and homicidal ideation.  He reports his sleep is \"fine.\"  He requested double portion entrees and veggies and hummus as a snack.  He requested to wear his clothing and shoes.  Informed him that while he is on elopement precautions, he cannot wear these items.  Discussed criteria for discharge.  Ordered a wedge to elevate head of bed since he has been snoring.  Plan for sleep medicine referral.          Medications:     Current Facility-Administered Medications   Medication Dose Route Frequency Provider Last Rate Last " Admin    acetaminophen (TYLENOL) tablet 650 mg  650 mg Oral Q4H PRN Dai Smith APRN CNP        alum & mag hydroxide-simethicone (MAALOX) suspension 30 mL  30 mL Oral Q4H PRN Dai Smith APRN CNP        haloperidol (HALDOL) tablet 5 mg  5 mg Oral Q6H PRN Dai Smith APRN CNP        And    diphenhydrAMINE (BENADRYL) capsule 50 mg  50 mg Oral Q6H PRN Dai Smith APRN CNP        haloperidol lactate (HALDOL) injection 5 mg  5 mg Intramuscular Q6H PRN Dai Smith APRN CNP        Or    diphenhydrAMINE (BENADRYL) injection 50 mg  50 mg Intramuscular Q6H PRN Dai Smith APRN CNP        divalproex sodium extended-release (DEPAKOTE ER) 24 hr tablet 1,250 mg  1,250 mg Oral QPM Dai Smith APRN CNP        hydrOXYzine HCl (ATARAX) tablet 25 mg  25 mg Oral Q4H PRN Dai Smith APRN CNP   25 mg at 07/21/24 1820    LORazepam (ATIVAN) tablet 1 mg  1 mg Oral At Bedtime Dai Smith APRN CNP   1 mg at 07/21/24 1917    nicotine (COMMIT) lozenge 4 mg  4 mg Buccal Q1H PRN Mariano Pacheco APRN CNP   4 mg at 07/21/24 1903    nicotine polacrilex (NICORETTE) gum 4 mg  4 mg Buccal Q1H PRN Dai Smith APRN CNP   4 mg at 07/22/24 0757    OLANZapine (zyPREXA) tablet 10 mg  10 mg Oral TID PRN Dai Smith APRN CNP   10 mg at 07/22/24 0807    Or    OLANZapine (zyPREXA) injection 10 mg  10 mg Intramuscular TID PRN Dai Smith APRN CNP        ondansetron (ZOFRAN ODT) ODT tab 4 mg  4 mg Oral Q6H PRN Dai Smith APRN CNP   4 mg at 07/21/24 1107    paliperidone ER (INVEGA) 24 hr tablet 6 mg  6 mg Oral Daily Dai Smith APRN CNP   6 mg at 07/21/24 0800    senna-docusate (SENOKOT-S/PERICOLACE) 8.6-50 MG per tablet 1 tablet  1 tablet Oral BID PRN Dai Smith APRN CNP        traZODone (DESYREL) tablet 50 mg  50 mg Oral At Bedtime PRN Dai Smith APRN CNP   50 mg at  "07/21/24 1917             Allergies:     No Known Allergies         Psychiatric Examination:     /84 (BP Location: Left arm, Patient Position: Sitting, Cuff Size: Adult Regular)   Pulse 94   Temp 98.2  F (36.8  C) (Temporal)   Resp 20   Ht 1.829 m (6')   Wt 101.2 kg (223 lb)   SpO2 94%   BMI 30.24 kg/m      Appearance:  awake, alert and dressed in hospital scrubs, disheveled  Attitude:  cooperative  Eye Contact:  fair  Mood:   \"fantastic\" with intermittent anxiety, irritability and sadness  Affect:   anxious, sad. ;abo;e  Speech:   loud, clear, coherent  Psychomotor Behavior:  no evidence of tardive dyskinesia, dystonia, or tics, appears restless  Thought Process:  disorganized  Associations:  no loose associations  Thought Content:  no evidence of suicidal ideation or homicidal ideation, paranoia is present, denies hallucinations  Insight:  limited  Judgment:  limited  Oriented to:  month/year, time of day, person, and place  Attention Span and Concentration:  limited  Recent and Remote Memory:  limited  Language:  intact, fluent English  Fund of Knowledge:  appropriate  Muscle Strength and Tone:  normal  Gait and Station:  normal         Labs:     No results found for this or any previous visit (from the past 24 hour(s)).  "

## 2024-07-22 NOTE — PLAN OF CARE
"     Goal Outcome Evaluation:    Pt is intermittently tense, irritable and irrational. He frequently seeks out staff, said he is bored. He is uninterested in suggestions and requests medications frequently.  He is upset about being in the hospital. When told that staff are here to help he is argumentative. He said, \"no they are not, that's a lie.\" He said staff read his chart and come up with ideas. He does not like being on SIO. He is informed that provider will determine when he is safe to have less monitoring.   Pt reports bilateral feet pain rated at \"9.5\"/10 from walking around a lot. Given prn Tylenol. At pain reassess said it helped \"a little\". Said he has very high anxiety and is feeling very depressed. He denied SI/HA. When administering medications he laughs inappropriately and asks \"why does that look like a hammer, on the sink\". Nothing was set in the sink.   Pt later approaches and asked again for Olanzapine. He said the t.v. is talking to him. Writer suggests he spend time in his room listening to music. He dismisses this. He said he is feeling unsafe, threatens to call 911 and will continue to escalate. Pt given prn Zyprexa.   Pt yells at staff. He is upset when redirected for being intrusive with another pt and sharing too much personal information. He is paranoid, said he is getting ripped off. He is upset about the phones, thinks they are blocked. Argues about the phones before accepting the suggestion to try again. He then uses the phone without further issue.   Later in the shift pt reports feeling, \"like a snake is crawling through me\". Reports feeling restless. Is interested in trying Benadryl and does not want Cogentin. On call provider notified, benadryl ordered. Pt sleeping when Benadryl became available.   Attempted to obtain wedge for bed. [facility] said they do not have one available at this time.                        "

## 2024-07-22 NOTE — PLAN OF CARE
BEH IP Unit Acuity Rating Score (UARS)  Patient is given one point for every criteria they meet.    CRITERIA SCORING   On a 72 hour hold, court hold, committed, stay of commitment, or revocation. 1    Patient LOS on BEH unit exceeds 20 days. 0  LOS: 4   Patient under guardianship, 55+, otherwise medically complex, or under age 11. 0   Suicide ideation without relief of precipitating factors. 0   Current plan for suicide. 0   Current plan for homicide. 0   Imminent risk or actual attempt to seriously harm another without relief of factors precipitating the attempt. 0   Severe dysfunction in daily living (ex: complete neglect for self care, extreme disruption in vegetative function, extreme deterioration in social interactions). 1   Recent (last 7 days) or current physical aggression in the ED or on unit. 1 4 days since 7/18   Restraints or seclusion episode in past 72 hours. 0   Recent (last 7 days) or current verbal aggression, agitation, yelling, etc., while in the ED or unit. 1 4 days since 7/18   Active psychosis. 1   Need for constant or near constant redirection (from leaving, from others, etc).  1   Intrusive or disruptive behaviors. 1   Patient requires 3 or more hours of individualized nursing care per 8-hour shift (i.e. for ADLs, meds, therapeutic interventions). 1   TOTAL 8
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
## 2024-07-23 PROCEDURE — 250N000011 HC RX IP 250 OP 636: Performed by: NURSE PRACTITIONER

## 2024-07-23 PROCEDURE — 99232 SBSQ HOSP IP/OBS MODERATE 35: CPT | Performed by: NURSE PRACTITIONER

## 2024-07-23 PROCEDURE — 124N000002 HC R&B MH UMMC

## 2024-07-23 PROCEDURE — 250N000013 HC RX MED GY IP 250 OP 250 PS 637: Performed by: CLINICAL NURSE SPECIALIST

## 2024-07-23 PROCEDURE — 250N000013 HC RX MED GY IP 250 OP 250 PS 637: Performed by: NURSE PRACTITIONER

## 2024-07-23 RX ADMIN — NICOTINE POLACRILEX 4 MG: 4 GUM, CHEWING BUCCAL at 16:38

## 2024-07-23 RX ADMIN — DIPHENHYDRAMINE HYDROCHLORIDE 50 MG: 50 CAPSULE ORAL at 17:19

## 2024-07-23 RX ADMIN — DIVALPROEX SODIUM 1250 MG: 500 TABLET, FILM COATED, EXTENDED RELEASE ORAL at 20:26

## 2024-07-23 RX ADMIN — HALOPERIDOL 5 MG: 5 TABLET ORAL at 17:19

## 2024-07-23 RX ADMIN — NICOTINE POLACRILEX 4 MG: 2 LOZENGE ORAL at 09:05

## 2024-07-23 RX ADMIN — OLANZAPINE 10 MG: 10 TABLET, FILM COATED ORAL at 13:19

## 2024-07-23 RX ADMIN — NICOTINE POLACRILEX 4 MG: 4 GUM, CHEWING BUCCAL at 13:36

## 2024-07-23 RX ADMIN — NICOTINE POLACRILEX 4 MG: 2 LOZENGE ORAL at 11:26

## 2024-07-23 RX ADMIN — OLANZAPINE 10 MG: 10 TABLET, FILM COATED ORAL at 09:14

## 2024-07-23 RX ADMIN — DIPHENHYDRAMINE HYDROCHLORIDE 50 MG: 50 CAPSULE ORAL at 10:21

## 2024-07-23 RX ADMIN — HYDROXYZINE HYDROCHLORIDE 25 MG: 25 TABLET ORAL at 11:28

## 2024-07-23 RX ADMIN — LORAZEPAM 1 MG: 1 TABLET ORAL at 20:27

## 2024-07-23 RX ADMIN — PALIPERIDONE 6 MG: 6 TABLET, EXTENDED RELEASE ORAL at 09:04

## 2024-07-23 RX ADMIN — ONDANSETRON 4 MG: 4 TABLET, ORALLY DISINTEGRATING ORAL at 08:33

## 2024-07-23 RX ADMIN — NICOTINE POLACRILEX 4 MG: 2 LOZENGE ORAL at 12:25

## 2024-07-23 RX ADMIN — NICOTINE POLACRILEX 4 MG: 2 LOZENGE ORAL at 14:55

## 2024-07-23 RX ADMIN — HYDROXYZINE HYDROCHLORIDE 25 MG: 25 TABLET ORAL at 16:38

## 2024-07-23 RX ADMIN — NICOTINE POLACRILEX 4 MG: 4 GUM, CHEWING BUCCAL at 10:24

## 2024-07-23 RX ADMIN — HALOPERIDOL 5 MG: 5 TABLET ORAL at 10:24

## 2024-07-23 ASSESSMENT — ACTIVITIES OF DAILY LIVING (ADL)
ADLS_ACUITY_SCORE: 29
LAUNDRY: WITH SUPERVISION
ADLS_ACUITY_SCORE: 29
HYGIENE/GROOMING: INDEPENDENT
ADLS_ACUITY_SCORE: 29
ORAL_HYGIENE: INDEPENDENT
ADLS_ACUITY_SCORE: 29

## 2024-07-23 NOTE — PLAN OF CARE
Goal Outcome Evaluation:      Group Attendance:   only in group first 5 minutes    Time session began: 2:15 pm  Time session ended: 3:00 pm  Patient's total time in group: 45    Total # Attendees   5   Group Type psychotherapeutic     Group Topic Covered emotional regulation and insight improvement     Group Session Detail Visual directive and process group, wellness and where emotions are felt in the body     Patient's response to the group topic/interactions:  required encouragement to attend group, distracted , disorganized, needed prompts to redirect, unable to sequence the task, verbalizations were off topic, interrupted others/ frequently, appeared frustrated, and disruptive to the group     Patient Details: Mood anger, distressed, unfocused. Writer challenged that he was just playing video games and he then said, yeah that was fun. He left group and tried to come back, writer told him it was too disruptive to enter in and out and if he wanted to participate in painting and conversation, he could come back in but he would not be able to leave again and he then declined further participation.           [id number] - Group psychotherapy - 1 Session    Patient Active Problem List   Diagnosis    Bipolar disorder (H)    ADHD (attention deficit hyperactivity disorder)    Bipolar I disorder with jose (H)    Cannabis abuse    Severe manic bipolar 1 disorder with psychotic behavior (H)
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
## 2024-07-23 NOTE — PLAN OF CARE
"  Problem: Psychotic Signs/Symptoms  Goal: Enhanced Social, Occupational or Functional Skills (Psychotic Signs/Symptoms)  Outcome: Not Progressing     Problem: Psychotic Signs/Symptoms  Goal: Improved Behavioral Control (Psychotic Signs/Symptoms)  Outcome: Not Progressing    Pt asks for frequent prns again this shift. See MAR report. Pt continues to approach nurses station frequently stating that he feels anxious, angry. Pt continues to reject staff suggestions of alternatives to prn medications. Pt has one particular episode of raising his voice, arguing with staff where he abruptly goes to make a phone call and immediately smiles as he greets who he's called. He states: \"Hey, how are you? I was just yelling at the staff.\" Pt then laughs and continues to laugh and smile throughout the conversation.    In mid-morning pt states that he feels anxious and and angry. He states: \"What are you going to do to stop my seething?\"    Pt returns to nurses station 2-3 times during noon meal to complain about his meal. Staff redirect him. He states: \"I guess I'm going to have to get angry. You guys want to do all this reasoning when I prefer to make demands.\"     Pt continued to have intermittent outbursts of loud talking with complaints and demands. Pt announces that he is depressed only a few minutes after being overheard laughing on the phone. This presentation seems very much to be regressive and attention-seeking rather than representing any true lability. During a yelling episode another staff member  reminded him that he had earlier apologized for yelling and being disrespectful and encouraged him to exercise the same insight. Pt stared at her intensely and stated: \"You're causing me to feel very anxious.\"    Reflection time was called on the unit and pt stood at nurses station yelling: \"I will never stop my requests, I don't care if it is reflection time.\" Pt does eventually walk away and sit in milieu.     Eun " Liliana, CRYS

## 2024-07-23 NOTE — PLAN OF CARE
"  Pt plays video games and spends time in room. Listens to headphones. Pt intermittently irritable and demanding Olanzapine or \"B52\". Encouraged distraction, offered music or shower and he said, \"I don't take well to suggestions\". He asks for a foot soak. Provider orders this. Pt does foot soak in room. He asks to shower which he does. He said he does not want soap, towels or clean scrubs. He demands Olanzapine. He said he feels like he has a knot in his stomach. Accepts Hydroxyzine. He yells at staff when he does not get his requests met immediately, he curses at the Nurses station. He goes to his room and Rinku thornton talks with him. Makes paranoid statements about his HIPAA rights being violated because a Doctor told him that information was found in a chart. Pt reports Hydroxyzine not helpful and requests prn. Haldol and Benadryl given. The medication has good results, is calmer with cessation of intermittent outbursts. He reports foot soaks helped with pain relief. Accepts scheduled medications. Pt tachycardic at 117 recheck was 113. Note left for provider.     "

## 2024-07-23 NOTE — PROGRESS NOTES
"[facility],  Psychiatric Progress Note      Impression:     Venancio Edgar is a 28-year-old male admitted to [facility] on 7/15/2024, arriving on the unit 7/18/2024.  He was admitted through the ED due to agitation and psychosis.  He was hospitalized on Station 12N 6/7/2024 - 7/10/2024 at which time his stay of commitment was vacated and he was Jarvised.  Invega Sustenna was initiated, and he was discharged to his group home.  Group home staff report that he bought gummies and cannabis \"tar.\"  He became more disorganized and agitated.  He was eating grass.  He went outside and looked into the windows of cars.  He slept poorly.  He was brought in via EMS.  While in the ED, his  filed an intent to revoke his provisional discharge, and his provisional discharge was revoked.  While in the ED, he was intrusive, disorganized and impulsive.  He repeatedly requested macaroni and cheese.  He tried to charge past staff in an attempt to elope.  He later was placed in restraints after screaming and attempting to elope again.  He was placed on SIO 2:1.  He reported that he had taken Adderall and made several requests for Adderall.  He later said he had taken a synthetic composition of tryptamines called \"mushroom road trip.\"  UTOX was positive for cannabinoids.  He reports taking medications as prescribed.  During conversation with provider, he mentioned using \"paliperidone, bong water and road trip.\"  He was cooperative but disorganized.  He stated that he will not attempt to elope from the unit.  Discussed reducing SIO staffing from 2:1 to 1:1.  He became emotional, nearly tearful, and stated he has separation anxiety from his mother dying when he was a baby and would feel much better having 1:1 staff available versus no SIO staff.   Since admission, his second loading dose of Invega Sustenna was administered 7/19.  PO Invega was continued.  Depakote " DR was changed to ER formulation.  Ativan was initiated.  PRNs of Zyprexa, Haldol, Benadryl, Trazodone and Hydroxyzine were initiated.  He has been on SIO.  He has been emotionally labile and disorganized, with paranoid thought content..           Diagnoses:     Bipolar disorder type 1, manic severe, with psychosis  Historical diagnosis of ADHD  Cannabis use disorder  Polysubstance abuse  Nicotine use disorder         Plan:     Medications:  He received the first loading dose of Invega Sustenna 234 mg on 7/9 and he did not receive the second loading dose of 156 mg on 7/15, so it was administered 7/19.  Per notes from his previous hospitalization, Invega Sustenna 117 mg was scheduled for 8/15; consider increasing this dose.  Continue Invega 6 mg daily for now.  Continue Ativan 1 mg at HS for now; plan to discontinue.  Continue Depakote ER 1250 mg at HS.  PRNs of Haldol & Benadryl should be used for first line treatment of agitation.  Continue PRNs of Zyprexa, Trazodone and Hydroxyzine.    Patient is committed MI with Hand (Haldol, Risperdal, Invega, Zyprexa, Seroquel & Abilify) in [facility].  Provisional discharge has been revoked.       Status individual observation due to disorganization, agitation and elopement risk.       He was residing in a group home prior to admission.  His outpatient team prefers that he be referred to a different group home with a higher level of supervision, with bed availability expected next week.  [facility] is also an option.   He has outpatient case management, a CADI  and psychiatry.  Plan for sleep medicine referral.          Attestation:  Patient has been seen and evaluated by me, Dai Smith, MARIO CNP  The patient was counseled on nature of illness and treatment plan/options  Care was coordinated with treatment team  Total time > 35 minutes          Interim History:     The patient's care was discussed with the treatment team and chart notes were reviewed.  Pt  "was documented as sleeping 6.25 hours during the overnight shift.  Yesterday he received PRN Tylenol x 1, PRN Benadryl x 1, PRN Haldol x 1, PRN Hydroxyzine x 2, PRN Zyprexa x 3 and PRN Zofran x 1.  He remains on SIO.  Early in the day he attended a group and appeared, angry, cursing about the fact that he is hospitalized.   He left group and was sobbing.  Later in the day he attended another group and was in and out, distracted, restless, and made paranoid statements.  Staff report he appeared tense and argumentative.  He said he felt unsafe and wanted to call 911.  He yelled and was emotionally labile.  He complained of restlessness, and the on-call provider prescribed PRN Benadryl.  Staff tried to locate a wedge to elevate the head of his bed due to snoring and suspected apnea, but none could be found.   Today, pt was irritable, guarded and confrontational during conversation with provider.  When asked how he is doing, he responded, \"I did everything you wanted me to!  What do you want me to say?\"  He denies suicidal and homicidal ideation.  He denies hallucinations.  He said his mood is \"pissed\" because he doesn't have Vyvanse or Adderall.  He claims both medications were prescribed upon his discharge from 12N earlier this month.  Provider informed him that his discharge summary does not support his assertion that they were recommended, or that he received them.  He responded that the discharge summary provider was viewing was incorrect, and that he has the right one.   Pt said that he is unable to think because he is \"too distracted.\"  \"I feel like you're against me, for what I really need.\"  Discussed with him that stimulant medications are contraindicated in bipolar disorder, but that after his mood is stable, nonstimulant options can be discussed.  Pt then remarked with speculations of what provider would write about him in progress notes, concerned this would reflect poorly upon him and prolong his " "hospitalization.  Pt stated that he would refuse to take any \"bipolar meds.\"  Provider strongly advised him to continue taking Depakote, as refusal of this medication would result in need for changes to his Hand medications and prolong his hospitalization.           Medications:     Current Facility-Administered Medications   Medication Dose Route Frequency Provider Last Rate Last Admin    acetaminophen (TYLENOL) tablet 650 mg  650 mg Oral Q4H PRN Dai Smith APRN CNP   650 mg at 07/22/24 1557    alum & mag hydroxide-simethicone (MAALOX) suspension 30 mL  30 mL Oral Q4H PRN Dai Smith APRN CNP        diphenhydrAMINE (BENADRYL) capsule 25 mg  25 mg Oral Q4H PRN Riley Albarran MD        haloperidol (HALDOL) tablet 5 mg  5 mg Oral Q6H PRN Dai Smith APRN CNP   5 mg at 07/22/24 1039    And    diphenhydrAMINE (BENADRYL) capsule 50 mg  50 mg Oral Q6H PRN Dai Smith APRN CNP   50 mg at 07/22/24 1039    haloperidol lactate (HALDOL) injection 5 mg  5 mg Intramuscular Q6H PRN Dai Smith APRN CNP        Or    diphenhydrAMINE (BENADRYL) injection 50 mg  50 mg Intramuscular Q6H PRN Dai Smith APRN CNP        divalproex sodium extended-release (DEPAKOTE ER) 24 hr tablet 1,250 mg  1,250 mg Oral QPM Dai Smith APRN CNP   1,250 mg at 07/22/24 1905    hydrOXYzine HCl (ATARAX) tablet 25 mg  25 mg Oral Q4H PRN Dai Smith APRN CNP   25 mg at 07/22/24 1308    LORazepam (ATIVAN) tablet 1 mg  1 mg Oral At Bedtime Dai Smith APRN CNP   1 mg at 07/22/24 1905    nicotine (COMMIT) lozenge 4 mg  4 mg Buccal Q1H PRN Mariano Pacheco APRN CNP   4 mg at 07/22/24 1556    nicotine polacrilex (NICORETTE) gum 4 mg  4 mg Buccal Q1H PRN Dai Smith APRN CNP   4 mg at 07/22/24 1658    OLANZapine (zyPREXA) tablet 10 mg  10 mg Oral TID PRN Dai Smith APRN CNP   10 mg at 07/22/24 1658    Or    " "OLANZapine (zyPREXA) injection 10 mg  10 mg Intramuscular TID PRN Dai Smith APRN CNP        ondansetron (ZOFRAN ODT) ODT tab 4 mg  4 mg Oral Q6H PRN Dai Smith APRN CNP   4 mg at 07/22/24 1658    paliperidone ER (INVEGA) 24 hr tablet 6 mg  6 mg Oral Daily Dai Smith APRN CNP   6 mg at 07/22/24 0851    senna-docusate (SENOKOT-S/PERICOLACE) 8.6-50 MG per tablet 1 tablet  1 tablet Oral BID PRN Dai Smith APRN CNP        traZODone (DESYREL) tablet 50 mg  50 mg Oral At Bedtime PRN Dai Smith APRN CNP   50 mg at 07/21/24 1917             Allergies:     No Known Allergies         Psychiatric Examination:     /79 (BP Location: Left arm, Patient Position: Sitting, Cuff Size: Adult Regular)   Pulse 111   Temp 98  F (36.7  C) (Oral)   Resp 18   Ht 1.829 m (6')   Wt 101.2 kg (223 lb)   SpO2 96%   BMI 30.24 kg/m      Appearance:  awake, alert and dressed in hospital scrubs, disheveled  Attitude:  guarded, mostly hostile  Eye Contact:  fair  Mood:   \"pissed\"  Affect:   mood congruent, irritable  Speech:   loud, clear, coherent  Psychomotor Behavior:  no evidence of tardive dyskinesia, dystonia, or tics, appears restless  Thought Process:  disorganized  Associations:  no loose associations  Thought Content:  no evidence of suicidal ideation or homicidal ideation, paranoia is present, denies hallucinations  Insight:  limited  Judgment:  limited  Oriented to:  month/year, time of day, person, and place  Attention Span and Concentration:  limited  Recent and Remote Memory:  limited  Language:  intact, fluent English  Fund of Knowledge:  appropriate  Muscle Strength and Tone:  normal  Gait and Station:  normal         Labs:     No results found for this or any previous visit (from the past 24 hour(s)).  "

## 2024-07-23 NOTE — PLAN OF CARE
BEH IP Unit Acuity Rating Score (UARS)  Patient is given one point for every criteria they meet.    CRITERIA SCORING   On a 72 hour hold, court hold, committed, stay of commitment, or revocation. 1    Patient LOS on BEH unit exceeds 20 days. 0  LOS: 5   Patient under guardianship, 55+, otherwise medically complex, or under age 11. 0   Suicide ideation without relief of precipitating factors. 0   Current plan for suicide. 0   Current plan for homicide. 0   Imminent risk or actual attempt to seriously harm another without relief of factors precipitating the attempt. 0   Severe dysfunction in daily living (ex: complete neglect for self care, extreme disruption in vegetative function, extreme deterioration in social interactions). 1   Recent (last 7 days) or current physical aggression in the ED or on unit. 1 5 days since 7/18   Restraints or seclusion episode in past 72 hours. 0   Recent (last 7 days) or current verbal aggression, agitation, yelling, etc., while in the ED or unit. 1 5 days since 7/18   Active psychosis. 1   Need for constant or near constant redirection (from leaving, from others, etc).  1   Intrusive or disruptive behaviors. 1   Patient requires 3 or more hours of individualized nursing care per 8-hour shift (i.e. for ADLs, meds, therapeutic interventions). 1   TOTAL 8

## 2024-07-23 NOTE — CARE PLAN
"  Rehab Group    Start time: 1015  End time: 1200  Patient time total: 20 minutes    came in and out of group session    #7 attended   Group Type: occupational therapy   Group Topic Covered: balanced lifestyle, coping skills, healthy leisure time, problem solving, self-esteem, and social skills       Group Session Detail:  Creative expression group with hands on Sun Catcher Painting task for concentration, attention to detail, frustration tolerance, follow through, organization/planning, coping, reality-based activity, mood stabilization, encouraging a healthy daily routine, spatial awareness, workspace care, improving self-esteem and socialization.       Patient Response/Contribution:  required repetition of directions, distracted , inattentive, needed prompts to redirect, refused to comply with staff directions, and disruptive to the group       Patient Detail:  Pt was in and out of group several times with his [facility] staff present.  Pt is resistive to listening to any directions around doing the project, just demanding items therapist did not have.  Pt finally settled on a project and then he took the paint bottle, turned it upside down and squeezed a large clump of paint in the middle of his sun catcher.  Pt was redirected not to do so, as it is meant to be painted with attention to the design.  Pt was resistive to redirection and spread the paint over the whole middle of the sun catcher ignoring the pattern.  He asserted, \"Don;t tell me how to use the supplies that I paid thousands of dollars for by being here.\"  Pt then dumped another gob of paint in another color to do part of the boarder, again using the same technique with no care for detail, nor his workspace as he got paint on the table (despite a large piece of brown paper being placed under his sun catcher for spills) as well as his hands.  Pt stated he was done and went to leave the room.  Pt was asked to clean up his space prior to leaving and he looked " shocked that the therapist would ask such a thing.  Pt begrudgingly cleaned his brush, and wiped the table.  Pt was in and out of group a few times after this.  He stated he wanted to finish his painting project, though he never did.  On one occasion therapist asked him if he would like a one step project to work on.  Pt misinterpreted this as a command and stated it wouldn't be good for his health to comply.  Pt left group, later returned and asked if he could have the same one step project a peer had, this was after trying to take hers and needing to be redirected not to take other people's supplies.  There was not an identical one available, though he did choose something else, worked on it briefly and again left group.  Again, pt paid limited to no attention to detail.  Pt is flat in affect, often is unaware or ignores social cues, has a marked lack of manners, and lacks insight to his illness and behavior.            No Charge    Patient Active Problem List   Diagnosis    Bipolar disorder (H)    ADHD (attention deficit hyperactivity disorder)    Bipolar I disorder with jose (H)    Cannabis abuse    Severe manic bipolar 1 disorder with psychotic behavior (H)

## 2024-07-23 NOTE — PLAN OF CARE
Upcoming Meetings and Dates/Important Information  Days since IP admission 5  Team Note Due Friday  No future appointments.     Next Steps  Will need PD    Assessment/Intervention/Current Symptoms and Care Coordination  Chart Review    Met with team to discuss patient care.Per team, He remains symptomatic and may not be ready for discharge next week if and when his group home is staffed. See providers notes for further medication details.     Venancio requested the phone number for his . I provided him with this phone number and the number to the main office. I confirmed the discharge plan that his primary CTC has coordinated for him.    Evelia with [employer] called requesting updates - [phone] [email or web address]     Discharge Plan or Goal  Dispo Plan: New house at [place]  Transportation: TBD  Medication: TBD    Provisional discharge: Will need  Safety plan complete?: Not yet completed  Appointments:   TBD     Barriers to Discharge  patient requires further psychiatric stabilization due to current symptomology, medication management with possible adjustments   Expected Discharge Date: 07/26/2024        Discharge Comments: PD Revocation        Referral Status  No new referral made    Legal Status  MI Commitment with [employer]  35-AB- [facility]  Hand medications Haldol, Risperdal, Invega, Zyprexa, Seroquel, and Abilify   Stay of Commitment revoked and Commitment imposed 6/13/2024  PD from station 12 7/10/2024  PD Revoked 7/17/2024  60/90 Report 9/11/2024  Expires 12/13/2024    Carlos Gonzales  [place] [employer]  Phone [phone]    Contacts  [facility]:  Norman [email or web address] [phone]  Darline Romo ( for assisted): [phone] - RENNY on file for assisted staff  Bree (assisted ): [phone] RENNY on file for group home staff  Email: [email or web address]     :  Chelsea PeaceBrown  [facility])  Ph: [phone] - Commitment CM no RENNY needed   [email or web address]  [facility]  Ph: [phone]  Christi (member of case management team): [phone]     Mother (Biological Aunt) - Yolanda ([phone])     Psychiatric Medication Management  MARIO Weinberg, CNP with [facility]  [address], [address], [place] [address]  Phone: [phone]  Fax: [phone]

## 2024-07-23 NOTE — CARE PLAN
Rehab Group    Start time: 1315  End time: 1400  Patient time total: 8 minutes    attended partial group    #4 attended   Group Type: occupational therapy   Group Topic Covered: balanced lifestyle, coping skills, healthy leisure time, Physical activity, sensory intervention, and social skills       Group Session Detail:  Healthy leisure exploration with hands on Ping Pong Game for concentration, large motor skills and movement, light cardio exercise, hand eye coordination, coping, mood stabilization, reality-based activity, proprioception, an opportunity to experience success and socialization.         Patient Response/Contribution:  actively engaged, required repetition of directions, needed prompts to redirect, and refused to comply with staff directions       Patient Detail:  Pt joined the group and seemed to be somewhat motivated by the prospect of playing ping pong.  Pt often randomly hit the ball, ignoring the sequencing of the game, the score, whose turn it was, and when the ball was dead.  Upon redirection, pt bristled a bit and pushed back on the idea of keeping score.  Eventually, pt was a bit more compliant, though he only remained in group for a short time, not even finishing one, fifteen point game prior to leaving.  Pt was blunted in affect, has a short attention span and is often a bit oppositional, seemingly for the sake of being so.            No Charge    Patient Active Problem List   Diagnosis    Bipolar disorder (H)    ADHD (attention deficit hyperactivity disorder)    Bipolar I disorder with jose (H)    Cannabis abuse    Severe manic bipolar 1 disorder with psychotic behavior (H)

## 2024-07-24 PROCEDURE — 250N000013 HC RX MED GY IP 250 OP 250 PS 637: Performed by: NURSE PRACTITIONER

## 2024-07-24 PROCEDURE — 124N000002 HC R&B MH UMMC

## 2024-07-24 PROCEDURE — 99232 SBSQ HOSP IP/OBS MODERATE 35: CPT | Performed by: NURSE PRACTITIONER

## 2024-07-24 PROCEDURE — 250N000011 HC RX IP 250 OP 636: Performed by: NURSE PRACTITIONER

## 2024-07-24 PROCEDURE — 250N000013 HC RX MED GY IP 250 OP 250 PS 637: Performed by: CLINICAL NURSE SPECIALIST

## 2024-07-24 RX ORDER — OLANZAPINE 10 MG/1
10 TABLET ORAL EVERY MORNING
Status: DISCONTINUED | OUTPATIENT
Start: 2024-07-25 | End: 2024-07-30

## 2024-07-24 RX ORDER — OLANZAPINE 10 MG/2ML
10 INJECTION, POWDER, FOR SOLUTION INTRAMUSCULAR EVERY MORNING
Status: DISCONTINUED | OUTPATIENT
Start: 2024-07-25 | End: 2024-07-30

## 2024-07-24 RX ADMIN — HYDROXYZINE HYDROCHLORIDE 25 MG: 25 TABLET ORAL at 12:33

## 2024-07-24 RX ADMIN — ACETAMINOPHEN 650 MG: 325 TABLET, FILM COATED ORAL at 13:09

## 2024-07-24 RX ADMIN — DIPHENHYDRAMINE HYDROCHLORIDE 50 MG: 50 CAPSULE ORAL at 16:44

## 2024-07-24 RX ADMIN — OLANZAPINE 10 MG: 10 TABLET, FILM COATED ORAL at 08:16

## 2024-07-24 RX ADMIN — NICOTINE POLACRILEX 4 MG: 2 LOZENGE ORAL at 12:59

## 2024-07-24 RX ADMIN — DIPHENHYDRAMINE HYDROCHLORIDE 50 MG: 50 CAPSULE ORAL at 10:34

## 2024-07-24 RX ADMIN — DIVALPROEX SODIUM 1250 MG: 500 TABLET, FILM COATED, EXTENDED RELEASE ORAL at 21:03

## 2024-07-24 RX ADMIN — HALOPERIDOL 5 MG: 5 TABLET ORAL at 10:34

## 2024-07-24 RX ADMIN — OLANZAPINE 10 MG: 10 TABLET, FILM COATED ORAL at 13:48

## 2024-07-24 RX ADMIN — NICOTINE POLACRILEX 4 MG: 4 GUM, CHEWING BUCCAL at 08:16

## 2024-07-24 RX ADMIN — NICOTINE POLACRILEX 4 MG: 2 LOZENGE ORAL at 08:48

## 2024-07-24 RX ADMIN — HALOPERIDOL 5 MG: 5 TABLET ORAL at 16:44

## 2024-07-24 RX ADMIN — PALIPERIDONE 6 MG: 6 TABLET, EXTENDED RELEASE ORAL at 08:16

## 2024-07-24 RX ADMIN — LORAZEPAM 1 MG: 1 TABLET ORAL at 21:03

## 2024-07-24 RX ADMIN — ONDANSETRON 4 MG: 4 TABLET, ORALLY DISINTEGRATING ORAL at 15:27

## 2024-07-24 RX ADMIN — NICOTINE POLACRILEX 4 MG: 2 LOZENGE ORAL at 14:43

## 2024-07-24 ASSESSMENT — ACTIVITIES OF DAILY LIVING (ADL)
ADLS_ACUITY_SCORE: 29
ORAL_HYGIENE: INDEPENDENT
ADLS_ACUITY_SCORE: 29
HYGIENE/GROOMING: INDEPENDENT
ADLS_ACUITY_SCORE: 29
LAUNDRY: WITH SUPERVISION
ADLS_ACUITY_SCORE: 29
DRESS: INDEPENDENT
ADLS_ACUITY_SCORE: 29

## 2024-07-24 NOTE — PLAN OF CARE
Upcoming Meetings and Dates/Important Information  Days since IP admission 6  Team Note Due Friday  No future appointments.     Next Steps  Will need PD    Assessment/Intervention/Current Symptoms and Care Coordination  Chart Review  Met with team to discuss patient care.  Left voicemail message with José Miguel at [facility] requested return call for update.  José Miguel emailed stating the staff that are interested in a transfer requested more information. He will follow up with them tomorrow and call me back tomorrow.    Discharge Plan or Goal  Dispo Plan: New house at [facility]  Transportation: TBD  Medication: TBD    Provisional discharge: Will need  Safety plan complete?: Not yet completed  Appointments:   TBD     Barriers to Discharge  patient requires further psychiatric stabilization due to current symptomology, medication management with possible adjustments    Expected Discharge Date: 07/31/2024        Discharge Comments: PD Revocation        Referral Status  No new referral made    Legal Status  MI Commitment with Hand  [facility]  Hand medications Haldol, Risperdal, Invega, Zyprexa, Seroquel, and Abilify   Stay of Commitment revoked and Commitment imposed 6/13/2024  PD from station 12 7/10/2024  PD Revoked 7/17/2024  60/90 Report 9/11/2024  Expires 12/13/2024    Carlos Dixon  [place] [employer]  Phone [phone]    Contacts  [facility]:  Norman [email or web address] [phone]  Darline Romo ( for retirement): [phone] - RENNY on file for retirement staff  Bree (retirement ): [phone] RENNY on file for group home staff  Email: [email or web address]     :  Chelsea Brennan ([facility])  Ph: [phone] - Commitment CM no RENNY needed   [email or web address]  [facility]  Ph: [phone]  Christi (member of case management team): [phone]     Mother (Biological Aunt) - Yolanda ([phone])      Psychiatric Medication Management  MARIO Weinberg, CNP with [facility]  [address], [address], [place] [address]  Phone: [phone]  Fax: [phone]    Evelia with [employer] called requesting updates - [phone] [email or web address]

## 2024-07-24 NOTE — PLAN OF CARE
Goal Outcome Evaluation:    Problem: Sleep Disturbance  Goal: Adequate Sleep/Rest  Outcome: Progressing     Pt slept most of the night, 6.75 hours. Continues on SIO 10 feet for assault risk and severe intrusiveness. No safety concerns noted.

## 2024-07-24 NOTE — PLAN OF CARE
Goal Outcome Evaluation:    Plan of Care Reviewed With: patient      Pt was visible in milieu, attended groups, watched tv and listened to headphones. He was demanding and loud early in the shift but after having a conversation with him about his behavior, he agreed to not yell or be disrespectful to staff.  He has always been polite and appropriate with patients/peers but verbally abusive to staff.  He agreed that he will not engage in this behavior and will use his coping skills.  He enjoys playing x-box and talking with peers.  He is focussed on getting off SIO qand was told that we will discuss his behavior over that past 24 hours in the morning and he could be taken off SIO if he has had no intrusive/abusive behavior.  Took PRN Hydroxyzine, benadryl, zyprexa and haldol for agitation this shift and he reports that they are all somewhat helpful. No SI/SIB noted or observed. Will continue to monitor.  /77 (BP Location: Left arm, Patient Position: Sitting)   Pulse 96   Temp 97.8  F (36.6  C) (Temporal)   Resp 17   Ht 1.829 m (6')   Wt 100.7 kg (222 lb)   SpO2 97%   BMI 30.11 kg/m

## 2024-07-24 NOTE — PLAN OF CARE
BEH IP Unit Acuity Rating Score (UARS)  Patient is given one point for every criteria they meet.    CRITERIA SCORING   On a 72 hour hold, court hold, committed, stay of commitment, or revocation. 1    Patient LOS on BEH unit exceeds 20 days. 0  LOS: 6   Patient under guardianship, 55+, otherwise medically complex, or under age 11. 0   Suicide ideation without relief of precipitating factors. 0   Current plan for suicide. 0   Current plan for homicide. 0   Imminent risk or actual attempt to seriously harm another without relief of factors precipitating the attempt. 0   Severe dysfunction in daily living (ex: complete neglect for self care, extreme disruption in vegetative function, extreme deterioration in social interactions). 1   Recent (last 7 days) or current physical aggression in the ED or on unit. 1 6 days since 7/18   Restraints or seclusion episode in past 72 hours. 0   Recent (last 7 days) or current verbal aggression, agitation, yelling, etc., while in the ED or unit. 1 6 days since 7/18   Active psychosis. 1   Need for constant or near constant redirection (from leaving, from others, etc).  1   Intrusive or disruptive behaviors. 1   Patient requires 3 or more hours of individualized nursing care per 8-hour shift (i.e. for ADLs, meds, therapeutic interventions). 1   TOTAL 8

## 2024-07-24 NOTE — PROGRESS NOTES
"[facility],  Psychiatric Progress Note      Impression:     Venancio Edgar is a 28-year-old male admitted to [facility] on 7/15/2024, arriving on the unit 7/18/2024.  He was admitted through the ED due to agitation and psychosis.  He was hospitalized on [facility] 6/7/2024 - 7/10/2024 at which time his stay of commitment was vacated and he was Jarvised.  Invega Sustenna was initiated, and he was discharged to his group home.  Group home staff report that he bought gummies and cannabis \"tar.\"  He became more disorganized and agitated.  He was eating grass.  He went outside and looked into the windows of cars.  He slept poorly.  He was brought in via EMS.  While in the ED, his  filed an intent to revoke his provisional discharge, and his provisional discharge was revoked.  While in the ED, he was intrusive, disorganized and impulsive.  He repeatedly requested macaroni and cheese.  He tried to charge past staff in an attempt to elope.  He later was placed in restraints after screaming and attempting to elope again.  He was placed on SIO 2:1.  He reported that he had taken Adderall and made several requests for Adderall.  He later said he had taken a synthetic composition of tryptamines called \"mushroom road trip.\"  UTOX was positive for cannabinoids.  He reports taking medications as prescribed.  During conversation with provider, he mentioned using \"paliperidone, bong water and road trip.\"  He was cooperative but disorganized.  He stated that he will not attempt to elope from the unit.  Discussed reducing SIO staffing from 2:1 to 1:1.  He became emotional, nearly tearful, and stated he has separation anxiety from his mother dying when he was a baby and would feel much better having 1:1 staff available versus no SIO staff.   Since admission, his second loading dose of Invega Sustenna was administered 7/19.  PO Invega was replaced with Zyprexa. "  Depakote DR was changed to ER formulation.  Ativan was initiated.  PRNs of Zyprexa, Haldol, Benadryl, Trazodone and Hydroxyzine were initiated.  He has been on SIO.  He has been emotionally labile and disorganized, with paranoid thought content.         Diagnoses:     Bipolar disorder type 1, manic severe, with psychosis  Historical diagnosis of ADHD  Cannabis use disorder  Polysubstance abuse  Nicotine use disorder         Plan:     Medications:  He received the first loading dose of Invega Sustenna 234 mg on 7/9 and he did not receive the second loading dose of 156 mg on 7/15, so it was administered 7/19.  Per notes from his previous hospitalization, Invega Sustenna 117 mg was scheduled for 8/15; consider increasing this dose.  Discontinue PO Invega.  Begin Zyprexa 10 mg in the AM with a back up of IM per Hand.  Continue Ativan 1 mg at HS for now; plan to discontinue.  Continue Depakote ER 1250 mg at HS.  PRNs of Haldol & Benadryl should be used for first line treatment of agitation.  Continue PRNs of Zyprexa, Trazodone and Hydroxyzine.    Patient is committed MI with Hand (Haldol, Risperdal, Invega, Zyprexa, Seroquel & Abilify) in [facility].  Provisional discharge has been revoked.      VPA level ordered 7/26 evening.       Status individual observation due to disorganization, agitation and elopement risk.       He was residing in a group home prior to admission.  His outpatient team prefers that he be referred to a different group home with a higher level of supervision, with bed availability expected at this facility next week.  [facility] is also an option.   He has outpatient case management, a CADI  and psychiatry.  Plan for sleep medicine referral.          Attestation:  Patient has been seen and evaluated by me, MARIO Phan CNP  The patient was counseled on nature of illness and treatment plan/options  Care was coordinated with treatment team  Total time > 35 minutes           "Interim History:     The patient's care was discussed with the treatment team and chart notes were reviewed.  Pt was documented as sleeping 6.75 hours during the overnight shift.  Yesterday he took PRN Haldol x 2, PRN Benadryl x 2, PRN Hydroxyzine x 2, PRN Zyprexa x 2 and PRN Zofran x 1.  He remains on SIO.  He attended some groups and was in and out with limited focused, messy work, and was resistant to redirection.  He approached staff frequently.  He was emotionally labile, at times angry and yelling and at other times crying.  He was tachycardic last evening.  Today, pt stated he is \"numb I can't feel anything.\"  He later stated he feels numb sexually due to side effects from medications but that he feels angry emotionally.  He said he does not believe he has bipolar disorder, nor current psychotic symptoms.  He denies hallucinations.  He does not believe that he has paranoid thought content.  Shortly thereafter he reported \"weird, intrusive thoughts of being watched.\"  He made a few disorganized statements out of context to the questions posed.  At one point he stated, \"I'm not working here.  I'm not getting a job.\"  Discussed plan to discontinue Invega since he received the second loading dose.  Discussed plan to begin scheduled Zyprexa.  Pt attempted to persuade provider to believe that Zyprexa is \"way stronger than olanzapine.\"  It was difficult to convince him that they are both the same medication, but eventually he accepted this.  He said that Zyprexa causes him to sleepwalk if he takes it at night and asked that it be scheduled during the daytime.  Pt requested Strattera for attention/focus.  Discussed with him that he is not stable enough to add on this medication, and that this can be revisited next week.  Discussed criteria for discharge.           Medications:     Current Facility-Administered Medications   Medication Dose Route Frequency Provider Last Rate Last Admin    acetaminophen (TYLENOL) " tablet 650 mg  650 mg Oral Q4H PRN Dai Smith APRN CNP   650 mg at 07/22/24 1557    alum & mag hydroxide-simethicone (MAALOX) suspension 30 mL  30 mL Oral Q4H PRN Dai Smith APRN CNP        diphenhydrAMINE (BENADRYL) capsule 25 mg  25 mg Oral Q4H PRN Riley Albarran MD        haloperidol (HALDOL) tablet 5 mg  5 mg Oral Q6H PRN Dai Smith APRN CNP   5 mg at 07/23/24 1719    And    diphenhydrAMINE (BENADRYL) capsule 50 mg  50 mg Oral Q6H PRN Dai Smith APRN CNP   50 mg at 07/23/24 1719    haloperidol lactate (HALDOL) injection 5 mg  5 mg Intramuscular Q6H PRN Dai Smith APRN CNP        Or    diphenhydrAMINE (BENADRYL) injection 50 mg  50 mg Intramuscular Q6H PRN Dai Smith APRN CNP        divalproex sodium extended-release (DEPAKOTE ER) 24 hr tablet 1,250 mg  1,250 mg Oral QPM Dai Smith APRN CNP   1,250 mg at 07/23/24 2026    hydrOXYzine HCl (ATARAX) tablet 25 mg  25 mg Oral Q4H PRN Dai Smith APRN CNP   25 mg at 07/23/24 1638    LORazepam (ATIVAN) tablet 1 mg  1 mg Oral At Bedtime Dai Smith APRN CNP   1 mg at 07/23/24 2027    nicotine (COMMIT) lozenge 4 mg  4 mg Buccal Q1H PRN Mariano Pacheco APRN CNP   4 mg at 07/23/24 1455    nicotine polacrilex (NICORETTE) gum 4 mg  4 mg Buccal Q1H PRN Dai Smith APRN CNP   4 mg at 07/23/24 1638    OLANZapine (zyPREXA) tablet 10 mg  10 mg Oral TID PRN Dai Smith APRN CNP   10 mg at 07/23/24 1319    Or    OLANZapine (zyPREXA) injection 10 mg  10 mg Intramuscular TID PRN Dai Smith APRN CNP        ondansetron (ZOFRAN ODT) ODT tab 4 mg  4 mg Oral Q6H PRN Dai Smith APRN CNP   4 mg at 07/23/24 0833    paliperidone ER (INVEGA) 24 hr tablet 6 mg  6 mg Oral Daily Dai Smith APRN CNP   6 mg at 07/23/24 0904    senna-docusate (SENOKOT-S/PERICOLACE) 8.6-50 MG per tablet 1 tablet  1 tablet Oral  BID Dai Law APRN CNP        traZODone (DESYREL) tablet 50 mg  50 mg Oral At Bedtime Dai Law APRN CNP   50 mg at 07/21/24 1917             Allergies:     No Known Allergies         Psychiatric Examination:     /67 (BP Location: Right arm, Patient Position: Sitting)   Pulse 113   Temp 97.9  F (36.6  C) (Oral)   Resp 16   Ht 1.829 m (6')   Wt 100.7 kg (222 lb)   SpO2 97%   BMI 30.11 kg/m      Appearance:  awake, alert and dressed in hospital scrubs, disheveled  Attitude:  guarded  Eye Contact:  fair  Mood:   angry  Affect:   increased intensity but less irritable than yesteray  Speech:   loud, clear, coherent  Psychomotor Behavior:  no evidence of tardive dyskinesia, dystonia, or tics  Thought Process:  disorganized  Associations:  no loose associations  Thought Content:  no evidence of suicidal ideation or homicidal ideation, paranoia is present, denies hallucinations  Insight:  limited  Judgment:  limited  Oriented to:  month/year, time of day, person, and place  Attention Span and Concentration:  limited  Recent and Remote Memory:  limited  Language:  intact, fluent English  Fund of Knowledge:  appropriate  Muscle Strength and Tone:  normal  Gait and Station:  normal         Labs:     No results found for this or any previous visit (from the past 24 hour(s)).

## 2024-07-24 NOTE — PLAN OF CARE
Rehab Group    Start time: 10:15 am  End time: 11:45 am  Patient time total: 40 minutes    attended partial group    #7 attended   Group Type: OT Clinic   Group Topic Covered: activity therapy, balanced lifestyle, coping skills, and healthy leisure time     Group Session Detail:  Open Clinic for creative expression, promoting autonomy, building sense of self-worth, coping with stress/symptoms and opportunity to exercise cognitive skills (i.e. initiation, planning, organization, sequencing, sustained attention, follow-through, termination of task and overall safety awareness).        Patient Response/Contribution:  supportive of peers, left group on several occasions, disorganized, and inattentive     Patient Detail:    Patient joined group for 40 minutes total but was restless throughout, leaving group and returning multiple times.  When in group pt worked on a very simple coloring project; pt successfully finished this project.  Pt was motivated to engage with his peers and his interactions were generally appropriate but lacked focus and organization.  Pt engaged in spontaneous freestyle rapping throughout group.  Pt is expressing motivation to not have an SIO staff with him.  Overall affect is bright with some underlying irritability, specifically towards staff members.             No charge     Patient Active Problem List   Diagnosis    Bipolar disorder (H)    ADHD (attention deficit hyperactivity disorder)    Bipolar I disorder with jose (H)    Cannabis abuse    Severe manic bipolar 1 disorder with psychotic behavior (H)

## 2024-07-24 NOTE — PLAN OF CARE
Goal Outcome Evaluation:    Plan of Care Reviewed With: patient      Pt was observed in the milieu talking loudly on the phone and getting a little bit agitated. PRN Haldol and Benadryl given with some relief. Took a long nap after dinner. Ate 100% of dinner with adequate amount of fluids. C/O getting restless legs when taking Zyprexa. Frequent requests. Disorganized, labile, hyperverbal, and tangential. Medication compliant. No safety/behavior concerns at this time.  ./70 (BP Location: Left arm, Patient Position: Sitting, Cuff Size: Adult Large)   Pulse 75   Temp 97.6  F (36.4  C) (Oral)   Resp 16   Ht 1.829 m (6')   Wt 100.7 kg (222 lb)   SpO2 95%   BMI 30.11 kg/m

## 2024-07-25 PROCEDURE — G0177 OPPS/PHP; TRAIN & EDUC SERV: HCPCS

## 2024-07-25 PROCEDURE — 99232 SBSQ HOSP IP/OBS MODERATE 35: CPT | Performed by: NURSE PRACTITIONER

## 2024-07-25 PROCEDURE — 250N000013 HC RX MED GY IP 250 OP 250 PS 637: Performed by: CLINICAL NURSE SPECIALIST

## 2024-07-25 PROCEDURE — 250N000011 HC RX IP 250 OP 636: Performed by: NURSE PRACTITIONER

## 2024-07-25 PROCEDURE — 250N000013 HC RX MED GY IP 250 OP 250 PS 637: Performed by: NURSE PRACTITIONER

## 2024-07-25 PROCEDURE — 124N000002 HC R&B MH UMMC

## 2024-07-25 RX ORDER — LORAZEPAM 0.5 MG/1
0.5 TABLET ORAL AT BEDTIME
Status: DISCONTINUED | OUTPATIENT
Start: 2024-07-25 | End: 2024-07-29

## 2024-07-25 RX ADMIN — DIPHENHYDRAMINE HYDROCHLORIDE 50 MG: 50 CAPSULE ORAL at 08:45

## 2024-07-25 RX ADMIN — DIVALPROEX SODIUM 1250 MG: 500 TABLET, FILM COATED, EXTENDED RELEASE ORAL at 19:47

## 2024-07-25 RX ADMIN — TRAZODONE HYDROCHLORIDE 50 MG: 50 TABLET ORAL at 19:47

## 2024-07-25 RX ADMIN — LORAZEPAM 0.5 MG: 0.5 TABLET ORAL at 19:47

## 2024-07-25 RX ADMIN — OLANZAPINE 10 MG: 10 TABLET, FILM COATED ORAL at 11:11

## 2024-07-25 RX ADMIN — NICOTINE POLACRILEX 4 MG: 2 LOZENGE ORAL at 09:19

## 2024-07-25 RX ADMIN — OLANZAPINE 10 MG: 10 TABLET, FILM COATED ORAL at 08:08

## 2024-07-25 RX ADMIN — HALOPERIDOL 5 MG: 5 TABLET ORAL at 17:52

## 2024-07-25 RX ADMIN — NICOTINE POLACRILEX 4 MG: 2 LOZENGE ORAL at 17:30

## 2024-07-25 RX ADMIN — TRAZODONE HYDROCHLORIDE 50 MG: 50 TABLET ORAL at 22:07

## 2024-07-25 RX ADMIN — NICOTINE POLACRILEX 4 MG: 4 GUM, CHEWING BUCCAL at 18:34

## 2024-07-25 RX ADMIN — ONDANSETRON 4 MG: 4 TABLET, ORALLY DISINTEGRATING ORAL at 12:38

## 2024-07-25 RX ADMIN — NICOTINE POLACRILEX 4 MG: 2 LOZENGE ORAL at 06:56

## 2024-07-25 RX ADMIN — DIPHENHYDRAMINE HYDROCHLORIDE 50 MG: 50 CAPSULE ORAL at 17:52

## 2024-07-25 RX ADMIN — NICOTINE POLACRILEX 4 MG: 4 GUM, CHEWING BUCCAL at 07:46

## 2024-07-25 RX ADMIN — NICOTINE POLACRILEX 4 MG: 2 LOZENGE ORAL at 16:30

## 2024-07-25 RX ADMIN — HALOPERIDOL 5 MG: 5 TABLET ORAL at 08:45

## 2024-07-25 RX ADMIN — HYDROXYZINE HYDROCHLORIDE 25 MG: 25 TABLET ORAL at 18:44

## 2024-07-25 ASSESSMENT — ACTIVITIES OF DAILY LIVING (ADL)
ADLS_ACUITY_SCORE: 29
DRESS: INDEPENDENT
ADLS_ACUITY_SCORE: 29
ORAL_HYGIENE: INDEPENDENT
ADLS_ACUITY_SCORE: 29
LAUNDRY: WITH SUPERVISION
ADLS_ACUITY_SCORE: 29
HYGIENE/GROOMING: INDEPENDENT
ADLS_ACUITY_SCORE: 29

## 2024-07-25 NOTE — PLAN OF CARE
BEH IP Unit Acuity Rating Score (UARS)  Patient is given one point for every criteria they meet.    CRITERIA SCORING   On a 72 hour hold, court hold, committed, stay of commitment, or revocation. 1    Patient LOS on BEH unit exceeds 20 days. 0  LOS: 7   Patient under guardianship, 55+, otherwise medically complex, or under age 11. 0   Suicide ideation without relief of precipitating factors. 0   Current plan for suicide. 0   Current plan for homicide. 0   Imminent risk or actual attempt to seriously harm another without relief of factors precipitating the attempt. 0   Severe dysfunction in daily living (ex: complete neglect for self care, extreme disruption in vegetative function, extreme deterioration in social interactions). 1   Recent (last 7 days) or current physical aggression in the ED or on unit. 0   Restraints or seclusion episode in past 72 hours. 0   Recent (last 7 days) or current verbal aggression, agitation, yelling, etc., while in the ED or unit. 0   Active psychosis. 1   Need for constant or near constant redirection (from leaving, from others, etc).  1   Intrusive or disruptive behaviors. 1   Patient requires 3 or more hours of individualized nursing care per 8-hour shift (i.e. for ADLs, meds, therapeutic interventions). 1   TOTAL 6

## 2024-07-25 NOTE — PLAN OF CARE
"Goal Outcome Evaluation:    Plan of Care Reviewed With: patient      Patient slept 6.5 hours over night shift.  He was visible in milieu for the majority of the shift, attended groups, watched tv, played x-box and listened to headphones. He was somewhat demanding at times and wa briefly loud about wanting armando use the phone and play x-box during groups.  He then asked for a sunbutter sandwich and was told that no snacks are given out during groups.  He became upset and loud saying; \"you're trying to torture me!\"  He was assured that nobody is trying to torture him and he would be able to have a snack and play games as soon as group was done.  He went into group and said; \"you're group is screwing up everyone else's plans.\"  He actually stayed for the remained of the group and was mostly appropriate.  As soon as group was over he asked for a sandwich again.  When brought the sunbutter sandwich he said he wouldn't eat it because he wanted turkey.  He enjoys playing x-box and talking with peers.  He is focussed on getting off SIO this morning and it was discontinued around 10 am.  He was told that he needs to continue to behave appropriately and not be disruptive to the unit. Took PRN benadryl, zyprexa and haldol for agitation this shift and he reports that they are all somewhat helpful. No SI/SIB noted or observed. Will continue to monitor.  BP (!) 146/91 (BP Location: Left arm, Patient Position: Sitting, Cuff Size: Adult Regular)   Pulse 116   Temp 98  F (36.7  C) (Temporal)   Resp 16   Ht 1.829 m (6')   Wt 100.6 kg (221 lb 11.2 oz)   SpO2 97%   BMI 30.07 kg/m      "

## 2024-07-25 NOTE — PROGRESS NOTES
"[facility],  Psychiatric Progress Note      Impression:     Venancio Edgar is a 28-year-old male admitted to [facility] on 7/15/2024, arriving on the unit 7/18/2024.  He was admitted through the ED due to agitation and psychosis.  He was hospitalized on [facility] 6/7/2024 - 7/10/2024 at which time his stay of commitment was vacated and he was Jarvised.  Invega Sustenna was initiated, and he was discharged to his group home.  Group home staff report that he bought gummies and cannabis \"tar.\"  He became more disorganized and agitated.  He was eating grass.  He went outside and looked into the windows of cars.  He slept poorly.  He was brought in via EMS.  While in the ED, his  filed an intent to revoke his provisional discharge, and his provisional discharge was revoked.  While in the ED, he was intrusive, disorganized and impulsive.  He repeatedly requested macaroni and cheese.  He tried to charge past staff in an attempt to elope.  He later was placed in restraints after screaming and attempting to elope again.  He was placed on SIO 2:1.  He reported that he had taken Adderall and made several requests for Adderall.  He later said he had taken a synthetic composition of tryptamines called \"mushroom road trip.\"  UTOX was positive for cannabinoids.  He reports taking medications as prescribed.  During conversation with provider, he mentioned using \"paliperidone, bong water and road trip.\"  He was cooperative but disorganized.  He stated that he will not attempt to elope from the unit.  Discussed reducing SIO staffing from 2:1 to 1:1.  He became emotional, nearly tearful, and stated he has separation anxiety from his mother dying when he was a baby and would feel much better having 1:1 staff available versus no SIO staff.   Since admission, his second loading dose of Invega Sustenna was administered 7/19.  PO Invega was replaced with Zyprexa. "  Depakote DR was changed to ER formulation.  Ativan was initiated and is now being tapered.  PRNs of Zyprexa, Haldol, Benadryl, Trazodone and Hydroxyzine were initiated.  SIO was discontinued on 7/25.  He has been less emotionally labile and less disorganized.         Diagnoses:     Bipolar disorder type 1, manic severe, with psychosis  Historical diagnosis of ADHD  Cannabis use disorder  Polysubstance abuse  Nicotine use disorder         Plan:     Medications:  He received the first loading dose of Invega Sustenna 234 mg on 7/9 and he did not receive the second loading dose of 156 mg on 7/15, so it was administered 7/19.  Per notes from his previous hospitalization, Invega Sustenna 117 mg was scheduled for 8/15; consider increasing this dose.  Continue Zyprexa 10 mg in the AM with a back up of IM per Hand.  Reduce Ativan to 0.5 mg at HS, plan to discontinue.  Continue Depakote ER 1250 mg at HS.  PRNs of Haldol & Benadryl should be used for first line treatment of agitation.  Continue PRNs of Zyprexa, Trazodone and Hydroxyzine.    Patient is committed MI with Hand (Haldol, Risperdal, Invega, Zyprexa, Seroquel & Abilify) in [facility].  Provisional discharge has been revoked.      VPA level ordered 7/26 evening.       Discontinue status individual observation.      He was residing in a group home prior to admission.  His outpatient team prefers that he be referred to a different group home with a higher level of supervision, with bed availability expected at this facility next week.  [facility] is also an option.   He has outpatient case management, a CADI  and psychiatry.  Plan for sleep medicine referral if he is agreeable.          Attestation:  Patient has been seen and evaluated by me, Dai Smith, MARIO CNP  The patient was counseled on nature of illness and treatment plan/options  Care was coordinated with treatment team  Total time > 35 minutes          Interim History:     The patient's  "care was discussed with the treatment team and chart notes were reviewed.  Pt was documented as sleeping 6.5 hours during the overnight shift.  Yesterday he took PRN Tylenol x 1, PRN Haldol x 2, PRN Benadryl x 2, PRN Hydroxyzine x 1 and PRN Zyprexa x 2.  He remains on SIO.  He attended 1 group, at which time he lacked focus and organization but was generally appropriate during interactions.  He spent time watching TV, listening to music on headphones and playing games on the [employer].  He was disorganized and labile.  Today, pt informed provider that he has \"big troubles\" with anxiety, particularly around noon each day.  States PRNs of Haldol and Benadryl reduce his anxiety for about 4 hours.  He reports intermittent irritability.  \"It depends on what's going on.\"  He cited examples as meals being late and \"when staff is noncompliant with anything I have for them.\"  He reports sleeping well in spite of staff reports of snoring and possible apneic episodes.  States he does not want to complete a sleep study or use a CPAP, nor does he want a wedge to elevate the head of his bed.  He denies hallucinations.  States that he took Strattera for a year and found it ineffective, so he is no longer requesting this medication.  He did not ask for stimulants today.  He was somewhat disorganized during the conversation but maintained a calm demeanor and apologized to provider for being \"mean\" on previous days.  He appeared more future-oriented and focused on taking steps towards achieving stability for discharge.  States he would feel safe on the unit without SIO staff, and his behaviors have improved.  Discussed at team meeting and discontinued SIO.         Medications:     Current Facility-Administered Medications   Medication Dose Route Frequency Provider Last Rate Last Admin    acetaminophen (TYLENOL) tablet 650 mg  650 mg Oral Q4H VICTOR HUGON Dai Smith APRN CNP   650 mg at 07/24/24 1309    alum & mag hydroxide-simethicone " (MAALOX) suspension 30 mL  30 mL Oral Q4H PRN Dai Smith APRN CNP        diphenhydrAMINE (BENADRYL) capsule 25 mg  25 mg Oral Q4H PRN Riley Albarran MD        haloperidol (HALDOL) tablet 5 mg  5 mg Oral Q6H PRN Dai Smith APRN CNP   5 mg at 07/24/24 1644    And    diphenhydrAMINE (BENADRYL) capsule 50 mg  50 mg Oral Q6H PRN Dai Smith APRN CNP   50 mg at 07/24/24 1644    haloperidol lactate (HALDOL) injection 5 mg  5 mg Intramuscular Q6H PRN Dai Smith APRN CNP        Or    diphenhydrAMINE (BENADRYL) injection 50 mg  50 mg Intramuscular Q6H PRN Dai Smith APRN CNP        divalproex sodium extended-release (DEPAKOTE ER) 24 hr tablet 1,250 mg  1,250 mg Oral QPM Dai Smith APRN CNP   1,250 mg at 07/24/24 2103    hydrOXYzine HCl (ATARAX) tablet 25 mg  25 mg Oral Q4H PRN Dai Smith APRN CNP   25 mg at 07/24/24 1233    LORazepam (ATIVAN) tablet 1 mg  1 mg Oral At Bedtime Dai Smith APRN CNP   1 mg at 07/24/24 2103    magnesium sulfate (EPSOM SALT) granules 1 Tablespoonful  1 Tablespoonful Topical Daily PRN Dai Smith APRN CNP        nicotine (COMMIT) lozenge 4 mg  4 mg Buccal Q1H PRN Mariano Pacheco APRN CNP   4 mg at 07/25/24 0656    nicotine polacrilex (NICORETTE) gum 4 mg  4 mg Buccal Q1H PRN Dai Smith APRN CNP   4 mg at 07/24/24 0816    OLANZapine (zyPREXA) tablet 10 mg  10 mg Oral QAM Dai Smith APRN CNP        Or    OLANZapine (zyPREXA) injection 10 mg  10 mg Intramuscular QAM Dai Smith APRN CNP        OLANZapine (zyPREXA) tablet 10 mg  10 mg Oral TID PRN Dai Smith APRN CNP   10 mg at 07/24/24 1348    Or    OLANZapine (zyPREXA) injection 10 mg  10 mg Intramuscular TID PRN Dai Smith APRN CNP        ondansetron (ZOFRAN ODT) ODT tab 4 mg  4 mg Oral Q6H PRN Dai Smith APRN CNP   4 mg at 07/23/24 0819     senna-docusate (SENOKOT-S/PERICOLACE) 8.6-50 MG per tablet 1 tablet  1 tablet Oral BID Dai Law APRN CNP        traZODone (DESYREL) tablet 50 mg  50 mg Oral At Bedtime PRDai Villalobos APRN CNP   50 mg at 07/21/24 1917             Allergies:     No Known Allergies         Psychiatric Examination:     /70 (BP Location: Left arm, Patient Position: Sitting, Cuff Size: Adult Large)   Pulse 75   Temp 97.6  F (36.4  C) (Oral)   Resp 16   Ht 1.829 m (6')   Wt 100.7 kg (222 lb)   SpO2 95%   BMI 30.11 kg/m      Appearance:  awake, alert and dressed in hospital scrubs, fair grooming  Attitude:  less guarded  Eye Contact:  fair  Mood:   anxious, intermittently irritable  Affect:   intensity is slightly increased  Speech:   somewhat loud, clear, coherent  Psychomotor Behavior:  no evidence of tardive dyskinesia, dystonia, or tics  Thought Process:  somewhat disorganized  Associations:  no loose associations  Thought Content:  no evidence of suicidal ideation or homicidal ideation, paranoia is present but reduced, denies hallucinations  Insight:  limited  Judgment:  limited  Oriented to:  month/year, time of day, person, and place  Attention Span and Concentration:  limited  Recent and Remote Memory:  fair  Language:  intact, fluent English  Fund of Knowledge:  appropriate  Muscle Strength and Tone:  normal  Gait and Station:  normal         Labs:     No results found for this or any previous visit (from the past 24 hour(s)).

## 2024-07-25 NOTE — PLAN OF CARE
Upcoming Meetings and Dates/Important Information  Days since IP admission 7  Team Note Due Friday  No future appointments.     Next Steps  Will need PD    Assessment/Intervention/Current Symptoms and Care Coordination  Chart Review  Met with team to discuss patient care.  No update from José Miguel. Informed pt.    Discharge Plan or Goal  Dispo Plan: New house at [place]  Transportation: TBD  Medication: TBD    Provisional discharge: Will need  Safety plan complete?: Not yet completed  Appointments:   TBD     Barriers to Discharge  patient requires further psychiatric stabilization due to current symptomology, medication management with possible adjustments    Expected Discharge Date: 08/01/2024        Discharge Comments: PD Revocation        Referral Status  No new referral made    Legal Status  MI Commitment with Hand  70-GU- [facility]  Hand medications Haldol, Risperdal, Invega, Zyprexa, Seroquel, and Abilify   Stay of Commitment revoked and Commitment imposed 6/13/2024  PD from station 12 7/10/2024  PD Revoked 7/17/2024  60/90 Report 9/11/2024  Expires 12/13/2024    Carlos Gonzales  [employer]  Phone [phone]    Contacts  [facility]:  Norman [email or web address] [phone]  Darline Romo ( for retirement): [phone] - RENNY on file for retirement staff  Bree (retirement ): [phone] RENNY on file for group home staff  Email: [email or web address]     :  Chelsea Brennan ([facility])  Ph: [phone] - Commitment CM no RENNY needed   [email or web address].  [facility]  Ph: [phone]  Christi (member of case management team): [phone]     Mother (Biological Aunt) - Yolanda ([phone])     Psychiatric Medication Management  MARIO Weinberg, CNP with [facility]  [address], [address], [place] [address]  Phone: [phone]  Fax: [phone]    Evelia with  [employer] called requesting updates - [phone] [email or web address]

## 2024-07-25 NOTE — PLAN OF CARE
Goal Outcome Evaluation:       Patient was in bed at the beginning of the shift, breathing quietly and unlabored. Pt slept 6.5 hrs  No concerns reported or noted this shift.  Will continue to Monitor.  PRNs:None

## 2024-07-25 NOTE — PLAN OF CARE
"Goal Outcome Evaluation:    Plan of Care Reviewed With: patient      Pt was in room resting at start of shift. Writer woke Pt up for snack. Pt came out of his room and was labile, hyperverbal, talking loud, singing and walking the hallway. Nicotine Lozenge X  2. Nicotine gum.     Pt got worked up after talking with mother stating \"My mom gave me an ultimatum about taking Adderall and Vyvanse for my ADHD. My mom says I don't have ADHD but I know I do\" PRN Haldol and Benadryl given. An hour later, Pt requested and received Hydroxyzine 25 mg stating \"I feel very anxious around my stomach and I need hydroxyzine.\"    Pt had multiple phone calls this shift. Pt was observed just having a lot of different emotions while on the phone. He was crying at one point, then laughing, somber, then back to crying. His emotions where just allover the place. Writer offered Pt his HS meds at this time with some relief. Pt's last call came in around 2115 and was overheard him laughing and appears to be having a great conversation. PRN Trazodone X 2 given for sleep.    Ate all his dinner. No safety concerns at this time.  /76 (BP Location: Left arm, Patient Position: Sitting, Cuff Size: Adult Large)   Pulse 104   Temp 98.1  F (36.7  C) (Temporal)   Resp 16   Ht 1.829 m (6')   Wt 100.6 kg (221 lb 11.2 oz)   SpO2 94%   BMI 30.07 kg/m        "

## 2024-07-25 NOTE — PLAN OF CARE
"  Rehab Group    Start time: 10:15 am  End time: 11:45 am  Patient time total: 50 minutes    attended partial group    #5 attended   Group Type: OT Clinic   Group Topic Covered: activity therapy, balanced lifestyle, and healthy leisure time     Group Session Detail:  Open Clinic for creative expression, promoting autonomy, building sense of self-worth, coping with stress/symptoms and opportunity to exercise cognitive skills (i.e. initiation, planning, organization, sequencing, sustained attention, follow-through, termination of task and overall safety awareness).        Patient Response/Contribution:  worked intermittently, left group on several occasions, disorganized, unable to sequence the task, verbalizations were off topic, interrupted others/ frequently, refused to comply with staff directions, appeared frustrated, became angry or agitated, disruptive to the group, was asked to leave , and impulsive behaviors     Patient Detail:    Patient joined group for 50 minutes total.  At the beginning of group, pt presented as focused and was putting effort towards maintaining self-control. Pt shared that his mood was \"definitely not neutral. I feel bad.\", and then said that he planned to use the group activity as a way to cope with this and change his mood.  Pt said that his goal for the day was to remain off of an SIO and he said that it depends on how he chooses to behave.  Writer reinforced pt's movement towards a more internal locus of control.  Writer supported pt with set up of supplies for task.  Pt attempted the task briefly but lacked attention to detail and became quickly annoyed and chose to abandon the project.  Pt opted to color instead.  Pt worked in a goal directed manner for ~15 more minutes and then left the group, stating, \"I need a break from group.\"  Shortly after, pt became agitated in the lounge as he had requested that staff get him a sandwich and staff told him that it would need to " wait until after group.  Pt came to the group door and yelled at writer about not being allowed to have a sandwich; unit staff redirected pt away from the room.  Pt later opted to join group again but remained agitated, argumentative and was generally disrespectful towards staff.  Pt was not accepting of redirection or limits that were being set.  Writer told pt that he would not be allowed in group if he was going to continue to be disruptive to his peers; pt opted to leave shortly after.  Writer kept group door shut after this but pt did make multiple attempts to re-enter the group room while still highly agitated.         Train & Education Service Per Session 45 + Minutes () OT Group code    Patient Active Problem List   Diagnosis    Bipolar disorder (H)    ADHD (attention deficit hyperactivity disorder)    Bipolar I disorder with jose (H)    Cannabis abuse    Severe manic bipolar 1 disorder with psychotic behavior (H)

## 2024-07-26 PROCEDURE — 250N000013 HC RX MED GY IP 250 OP 250 PS 637: Performed by: NURSE PRACTITIONER

## 2024-07-26 PROCEDURE — 80164 ASSAY DIPROPYLACETIC ACD TOT: CPT | Performed by: NURSE PRACTITIONER

## 2024-07-26 PROCEDURE — G0177 OPPS/PHP; TRAIN & EDUC SERV: HCPCS

## 2024-07-26 PROCEDURE — 36415 COLL VENOUS BLD VENIPUNCTURE: CPT | Performed by: NURSE PRACTITIONER

## 2024-07-26 PROCEDURE — 250N000013 HC RX MED GY IP 250 OP 250 PS 637: Performed by: CLINICAL NURSE SPECIALIST

## 2024-07-26 PROCEDURE — 99232 SBSQ HOSP IP/OBS MODERATE 35: CPT | Performed by: NURSE PRACTITIONER

## 2024-07-26 PROCEDURE — 124N000002 HC R&B MH UMMC

## 2024-07-26 RX ADMIN — OLANZAPINE 10 MG: 10 TABLET, FILM COATED ORAL at 08:32

## 2024-07-26 RX ADMIN — NICOTINE POLACRILEX 4 MG: 4 GUM, CHEWING BUCCAL at 12:35

## 2024-07-26 RX ADMIN — HALOPERIDOL 5 MG: 5 TABLET ORAL at 18:42

## 2024-07-26 RX ADMIN — NICOTINE POLACRILEX 4 MG: 2 LOZENGE ORAL at 16:45

## 2024-07-26 RX ADMIN — HYDROXYZINE HYDROCHLORIDE 25 MG: 25 TABLET ORAL at 16:44

## 2024-07-26 RX ADMIN — NICOTINE POLACRILEX 4 MG: 2 LOZENGE ORAL at 18:19

## 2024-07-26 RX ADMIN — NICOTINE POLACRILEX 4 MG: 2 LOZENGE ORAL at 09:00

## 2024-07-26 RX ADMIN — NICOTINE POLACRILEX 4 MG: 4 GUM, CHEWING BUCCAL at 09:31

## 2024-07-26 RX ADMIN — HALOPERIDOL 5 MG: 5 TABLET ORAL at 10:53

## 2024-07-26 RX ADMIN — DIVALPROEX SODIUM 1250 MG: 500 TABLET, FILM COATED, EXTENDED RELEASE ORAL at 20:23

## 2024-07-26 RX ADMIN — LORAZEPAM 0.5 MG: 0.5 TABLET ORAL at 20:24

## 2024-07-26 RX ADMIN — NICOTINE POLACRILEX 4 MG: 2 LOZENGE ORAL at 13:35

## 2024-07-26 RX ADMIN — DIPHENHYDRAMINE HYDROCHLORIDE 50 MG: 50 CAPSULE ORAL at 18:42

## 2024-07-26 RX ADMIN — DIPHENHYDRAMINE HYDROCHLORIDE 50 MG: 50 CAPSULE ORAL at 10:54

## 2024-07-26 RX ADMIN — HYDROXYZINE HYDROCHLORIDE 25 MG: 25 TABLET ORAL at 09:23

## 2024-07-26 RX ADMIN — NICOTINE POLACRILEX 4 MG: 2 LOZENGE ORAL at 10:20

## 2024-07-26 ASSESSMENT — ACTIVITIES OF DAILY LIVING (ADL)
ADLS_ACUITY_SCORE: 29
HYGIENE/GROOMING: INDEPENDENT
ADLS_ACUITY_SCORE: 29
ADLS_ACUITY_SCORE: 29
HYGIENE/GROOMING: INDEPENDENT
ADLS_ACUITY_SCORE: 29
DRESS: INDEPENDENT
ADLS_ACUITY_SCORE: 29
ORAL_HYGIENE: INDEPENDENT
ADLS_ACUITY_SCORE: 29
ORAL_HYGIENE: INDEPENDENT
ADLS_ACUITY_SCORE: 29
LAUNDRY: WITH SUPERVISION
LAUNDRY: WITH SUPERVISION
ADLS_ACUITY_SCORE: 29
DRESS: INDEPENDENT

## 2024-07-26 NOTE — PLAN OF CARE
"Upcoming Meetings and Dates/Important Information  Days since IP admission 8  Team Note Due Friday  No future appointments.     Next Steps  Will need PD    Assessment/Intervention/Current Symptoms and Care Coordination  Chart Review  Met with team to discuss patient care.  José Miguel emailed stating \"I am still awaiting information from internal staff members to share their availability. I would like to target the 31st as a tentative discharge date.\" Updated pt and provider.  Left voicemail message providing update to Evelia with [employer] at [phone].     Discharge Plan or Goal  Dispo Plan: New house at [place]  Transportation: TBD  Medication: TBD    Provisional discharge: Will need  Safety plan complete?: Not yet completed  Appointments:   TBD     Barriers to Discharge  patient requires further psychiatric stabilization due to current symptomology, medication management with possible adjustments    Expected Discharge Date: 08/02/2024        Discharge Comments: PD Revocation        Referral Status  No new referral made    Legal Status  MI Commitment with Hand  28-IV- [facility]  Hand medications Haldol, Risperdal, Invega, Zyprexa, Seroquel, and Abilify   Stay of Commitment revoked and Commitment imposed 6/13/2024  PD from station 12 7/10/2024  PD Revoked 7/17/2024  60/90 Report 9/11/2024  Expires 12/13/2024    Carlos Gonzales  [employer]  Phone [phone]    Contacts  [facility]:  Norman [email or web address] [phone]  Darline Romo ( for [facility]): [phone] - RENNY on file for [facility] staff  Bree ([facility] ): [phone] RENNY on file for group home staff  Email: [email or web address]     :  Chelsea Brennan ([facility])  Ph: [phone] - Commitment CM no RENNY needed   [email or web address].  [facility]  Ph: [phone]  Christi (member of case management team): [phone]   "   Mother (Biological Aunt) - Yolanda ([phone])     Psychiatric Medication Management  MARIO Weinberg, CNP with [facility]  [address], [address], [place] [address]  Phone: [phone]  Fax: [phone]    Evelia with [employer] called requesting updates - [phone] [email or web address]

## 2024-07-26 NOTE — PROGRESS NOTES
"[facility],  Psychiatric Progress Note      Impression:     Venancio Edgar is a 28-year-old male admitted to [facility] on 7/15/2024, arriving on the unit 7/18/2024.  He was admitted through the ED due to agitation and psychosis.  He was hospitalized on [facility] 6/7/2024 - 7/10/2024 at which time his stay of commitment was vacated and he was Jarvised.  Invega Sustenna was initiated, and he was discharged to his group home.  Group home staff report that he bought gummies and cannabis \"tar.\"  He became more disorganized and agitated.  He was eating grass.  He went outside and looked into the windows of cars.  He slept poorly.  He was brought in via EMS.  While in the ED, his  filed an intent to revoke his provisional discharge, and his provisional discharge was revoked.  While in the ED, he was intrusive, disorganized and impulsive.  He repeatedly requested macaroni and cheese.  He tried to charge past staff in an attempt to elope.  He later was placed in restraints after screaming and attempting to elope again.  He was placed on SIO 2:1.  He reported that he had taken Adderall and made several requests for Adderall.  He later said he had taken a synthetic composition of tryptamines called \"mushroom road trip.\"  UTOX was positive for cannabinoids.  He reports taking medications as prescribed.  During conversation with provider, he mentioned using \"paliperidone, bong water and road trip.\"  He was cooperative but disorganized.  He stated that he will not attempt to elope from the unit.  Discussed reducing SIO staffing from 2:1 to 1:1.  He became emotional, nearly tearful, and stated he has separation anxiety from his mother dying when he was a baby and would feel much better having 1:1 staff available versus no SIO staff.   Since admission, his second loading dose of Invega Sustenna was administered 7/19.  PO Invega was replaced with Zyprexa. "  Depakote DR was changed to ER formulation.  Ativan was initiated and is now being tapered.  PRNs of Zyprexa, Haldol, Benadryl, Trazodone and Hydroxyzine were initiated.  SIO was discontinued on 7/25.  He has been less emotionally labile and less disorganized.  States he has been unable to reach his uncle and believes he is ignoring calls and messages from an unrecognized number.  Approved brief use of phone under staff supervision to          Diagnoses:     Bipolar disorder type 1, manic severe, with psychosis  Historical diagnosis of ADHD  Cannabis use disorder  Polysubstance abuse  Nicotine use disorder         Plan:     Medications:  He received the first loading dose of Invega Sustenna 234 mg on 7/9 and he did not receive the second loading dose of 156 mg on 7/15, so it was administered 7/19.  Per notes from his previous hospitalization, Invega Sustenna 117 mg was scheduled for 8/15; consider increasing this dose.  Continue Zyprexa 10 mg in the AM with a back up of IM per Yazan.  Continue Ativan 0.5 mg at HS, plan to discontinue.  Continue Depakote ER 1250 mg at HS.  PRNs of Haldol & Benadryl should be used for first line treatment of agitation.  Continue PRNs of Zyprexa, Trazodone and Hydroxyzine.    Patient is committed MI with Hand (Haldol, Risperdal, Invega, Zyprexa, Seroquel & Abilify) in [facility].  Provisional discharge has been revoked.      VPA level ordered 7/26 evening.       He was residing in a group home prior to admission.  His outpatient team prefers that he be referred to a different group home with a higher level of supervision, with bed availability expected at this facility 7/31, though with trajectory of progress, he may not be stable for discharge by then.  [facility] is also an option.   He has outpatient case management, a CADI  and psychiatry.  Plan for sleep medicine referral if he is agreeable (currently declines).          Attestation:  Patient has been seen and evaluated  "by me, MARIO Phan CNP  The patient was counseled on nature of illness and treatment plan/options  Care was coordinated with treatment team  Total time > 35 minutes          Interim History:     The patient's care was discussed with the treatment team and chart notes were reviewed.  Pt was documented as sleeping 7 hours during the overnight shift.  Yesterday he took PRN Haldol x 2, PRN Benadryl x 2, PRN Hydroxyzine x 1, PRN Zyprexa x 1, PRN Zofran x 1 and PRN Trazodone x 2.  He attended 1 group.  He was initially calm.  Over time he became frustrated and abandoned his project.  He asked staff for a sandwich and became agitated when his requests were denied.  He returned to group but was argumentative and disruptive.  He left the group and the  shut the door.  He made multiple attempts to reenter.  Staff report his mood was labile.  He spent time talking on the phone and playing games on the [employer].  Today, pt states his mood is \"pretty well.\"  He complained that his AM dose of Zyprexa \"takes the euphoria of being alive away.\"  Pt acknowledges mood instability.  Discussed with him that the goal is for mood stabilization so he feels neither euphoric nor irritable or despondent.  Pt agreed to a blood draw for VPA level this evening.  He denies suicidal and homicidal ideation.  Discussed his behaviors in group yesterday.  He explained, \"I was hangry.\"  Discussed behavioral expectations and goals for discharge.  Pt continues to request Vyvanse and Adderall, stating these are the best 2 medications to stabilize his mood, and speculated confidently that his outpatient provider will prescribe them.  States he sometimes has restless legs when he takes Zyprexa late in the day.  Reviewed with him that PRN Benadryl is available.  He states he has been pacing less and his feet are not as sore.  He asked to wear his own clothing.  Discussed that the treatment team would like to see more improvements in " behaviors first.  Pt stated that he has been unable to reach his uncle and believes his uncle has been disregarding calls and messages from an unrecognized number.  Authorized use of cell phone under staff supervision so he can text his uncle his current contact info on the unit.  Pt approached provider a few more times in the milieu with various questions and to complain of feeling bored.           Medications:     Current Facility-Administered Medications   Medication Dose Route Frequency Provider Last Rate Last Admin    acetaminophen (TYLENOL) tablet 650 mg  650 mg Oral Q4H PRN Dai Smith APRN CNP   650 mg at 07/24/24 1309    alum & mag hydroxide-simethicone (MAALOX) suspension 30 mL  30 mL Oral Q4H PRN Dai Smith APRN CNP        diphenhydrAMINE (BENADRYL) capsule 25 mg  25 mg Oral Q4H PRN Riley Albarran MD        haloperidol (HALDOL) tablet 5 mg  5 mg Oral Q6H PRN Dai Smith APRN CNP   5 mg at 07/25/24 1752    And    diphenhydrAMINE (BENADRYL) capsule 50 mg  50 mg Oral Q6H PRN Dai Smith APRN CNP   50 mg at 07/25/24 1752    haloperidol lactate (HALDOL) injection 5 mg  5 mg Intramuscular Q6H PRN Dai Smith APRN CNP        Or    diphenhydrAMINE (BENADRYL) injection 50 mg  50 mg Intramuscular Q6H PRN Dai Smith APRN CNP        divalproex sodium extended-release (DEPAKOTE ER) 24 hr tablet 1,250 mg  1,250 mg Oral QPM Dai Smith APRN CNP   1,250 mg at 07/25/24 1947    hydrOXYzine HCl (ATARAX) tablet 25 mg  25 mg Oral Q4H PRN Dai Smith APRN CNP   25 mg at 07/25/24 1844    LORazepam (ATIVAN) tablet 0.5 mg  0.5 mg Oral At Bedtime Dai Smith APRN CNP   0.5 mg at 07/25/24 1947    magnesium sulfate (EPSOM SALT) granules 1 Tablespoonful  1 Tablespoonful Topical Daily PRN Dai Smith APRN CNP        nicotine (COMMIT) lozenge 4 mg  4 mg Buccal Q1H PRN Mariano Pacheco APRN CNP   4 mg  "at 07/25/24 1730    nicotine polacrilex (NICORETTE) gum 4 mg  4 mg Buccal Q1H PRN Dai Smith APRN CNP   4 mg at 07/25/24 1834    OLANZapine (zyPREXA) tablet 10 mg  10 mg Oral Dai Grider APRN CNP   10 mg at 07/25/24 0808    Or    OLANZapine (zyPREXA) injection 10 mg  10 mg Intramuscular Dai Grider APRN CNP        OLANZapine (zyPREXA) tablet 10 mg  10 mg Oral TID PRN Dai Smith APRN CNP   10 mg at 07/25/24 1111    Or    OLANZapine (zyPREXA) injection 10 mg  10 mg Intramuscular TID PRN Dai Smith APRN CNP        ondansetron (ZOFRAN ODT) ODT tab 4 mg  4 mg Oral Q6H PRN Dai Smith APRN CNP   4 mg at 07/25/24 1238    senna-docusate (SENOKOT-S/PERICOLACE) 8.6-50 MG per tablet 1 tablet  1 tablet Oral BID PRN Dai Smith APRN CNP        traZODone (DESYREL) tablet 50 mg  50 mg Oral At Bedtime PRN Dai Smith APRN CNP   50 mg at 07/25/24 2207             Allergies:     No Known Allergies         Psychiatric Examination:     /76 (BP Location: Left arm, Patient Position: Sitting, Cuff Size: Adult Large)   Pulse 104   Temp 98.1  F (36.7  C) (Temporal)   Resp 16   Ht 1.829 m (6')   Wt 100.6 kg (221 lb 11.2 oz)   SpO2 94%   BMI 30.07 kg/m      Appearance:  awake, alert and dressed in hospital scrubs, fair grooming  Attitude:  mostly cooperative  Eye Contact:  fair  Mood:   \"pretty well\"  Affect:   intensity is slightly increased  Speech:   somewhat loud, clear, coherent  Psychomotor Behavior:  no evidence of tardive dyskinesia, dystonia, or tics  Thought Process:  somewhat disorganized  Associations:  no loose associations  Thought Content:  no evidence of suicidal ideation or homicidal ideation, paranoia is present but reduced, denies hallucinations  Insight:  limited  Judgment:  limited  Oriented to:  month/year, time of day, person, and place  Attention Span and Concentration:  limited  Recent and Remote " Memory:  fair  Language:  intact, fluent English  Fund of Knowledge:  appropriate  Muscle Strength and Tone:  normal  Gait and Station:  normal         Labs:     No results found for this or any previous visit (from the past 24 hour(s)).

## 2024-07-26 NOTE — PLAN OF CARE
Goal Outcome Evaluation:         Pt appeared to sleep 7hours during the night, no problems reported, no PRN's given, on Status 15 minute safety checks.

## 2024-07-26 NOTE — PLAN OF CARE
"  Problem: Adult Behavioral Health Plan of Care  Goal: Optimized Coping Skills in Response to Life Stressors  Outcome: Progressing   Goal Outcome Evaluation:    Pt watches t.v. and listens to headphones. Plays video games with a peer He talks about a house being ready for him on Wednesday. Pt shakes his hands when asked about mood, said, \"not really okay\" and \"I feel that knot in my stomach again.\" Was given prn Hydroxyzine. Also uses stress ball. Pt eats adequately at meal. Requests Haldol and Benadryl. Writer encourages distraction/coping skills. He used his phone to text his uncle per order. He tries to call friends/family and is unable to contact them. Reports feeling agitated and starts repeating himself with increased intensity, requests prn and was given Haldol and Benadryl. Pt reports foot discomfort rated at '5'/10. Requests foot soak. After soak said that it helped. Complies with VPA lab draw. Pt approaches and said \"I have a cyst I want it drained\". Has a small raised bump on right anterior forearm. Note left for provider.                        "

## 2024-07-26 NOTE — PROGRESS NOTES
"  Rehab Group    Start time: 1315  End time: 1410  Patient time total: 25 minutes    attended partial group     #3 attended   Group Type: occupational therapy   Group Topic Covered: healthy leisure time and social skills     Group Session Detail:  Self-reflection group game     Patient Response/Contribution:  cooperative with task     Patient Detail:    Upon initial arrival to group, pt appeared irritable, stating \"another group is starting? Didn't we just finish group like an hour ago?\" He was informed that coming to group was his decision, and ultimately decided to join group. Pt actively participated in a structured occupational therapy group involving a self-reflecting, group leisure task. Pt appeared comfortable sharing positive past experiences and personal information with peers, and was respectful in listening and responding to peers. Pt was able to follow 3 step directions for the novel task and tracked the task consistently. Several responses to prompts revolved around playing video games. When prompted to identify who he would hypothetically donate money to, he explained \"people who have schizoaffective disorder; I used to have schizoaffective and I can't hold a job because of it, so I'd like to help people who are schizoaffective\" (paraphrased). Excused himself from group early and did not return (no charge).        No Charge    Patient Active Problem List   Diagnosis    Bipolar disorder (H)    ADHD (attention deficit hyperactivity disorder)    Bipolar I disorder with jose (H)    Cannabis abuse    Severe manic bipolar 1 disorder with psychotic behavior (H)       "

## 2024-07-26 NOTE — PLAN OF CARE
07/26/24 1436   Interprofessional Rounds   Summary SIO was discontinued yesterday as he has been less intrusive and disruptive in the milieu. The higher acuity home is being staffed. Remains anxious and labile   Participants advanced practice nurse;nursing;CTC;psychiatrist   Behavioral Team Discussion   Participants Dai Smith, APRN, CNP; Megan BOWENS [facility]; Calos Winston RN   Progress Some improvements   Anticipated length of stay 7+ days   Continued Stay Criteria/Rationale Needs to stabilize on medications   Medical/Physical Not impacting treatment   Plan DC to Group home   Anticipated Discharge Disposition group home     PRECAUTIONS AND SAFETY    Behavioral Orders   Procedures    Assault precautions    Cheeking Precautions (behavioral units)    Code 1 - Restrict to Unit    Elopement precautions    Routine Programming     As clinically indicated    Status 15     Every 15 minutes.       Safety  Safety WDL: WDL  Patient Location: patient room, own, group room, lounge, hallway  Observed Behavior: sitting, walking  Observed Behavior (Comment): Talking on phone, watching TV in lounge, meals in dining area, playing x-box  Safety Measures: clinical history reviewed  Diversional Activity: music, television, play  De-Escalation Techniques: appropriate behavior reinforced, diversional activity encouraged  Suicidality: Status 15  Assault: status continuous sight  Elopement Interventions: status 15, status continuous sight

## 2024-07-26 NOTE — PLAN OF CARE
Goal Outcome Evaluation:    Plan of Care Reviewed With: patient      Patient slept 7 hours over night shift.  He was visible in milieu for the majority of the shift, He watched tv, played x-box and listened to headphones. He was polite and appropriate this shift - not demanding, and was understanding of unit rules/policies.  He is a bit excitable but not loud and easily redirectable.  He talked about yesterday when he was upset and lound about wanting to use the phone and play x-box during groups, then demanding a sunbutter sandwich.  He verbalized understanding that this behavior was inappropriate and said he would not be acting that way anymore.  He went to groups and was appropriate. Took PRN benadryl and haldol for agitation this morning and he reports that it was helpful. No SI/SIB noted or observed. Will continue to monitor.  /76 (BP Location: Left arm, Patient Position: Sitting, Cuff Size: Adult Large)   Pulse 104   Temp 98.1  F (36.7  C) (Temporal)   Resp 16   Ht 1.829 m (6')   Wt 100.6 kg (221 lb 11.2 oz)   SpO2 94%   BMI 30.07 kg/m

## 2024-07-26 NOTE — PLAN OF CARE
BEH IP Unit Acuity Rating Score (UARS)  Patient is given one point for every criteria they meet.    CRITERIA SCORING   On a 72 hour hold, court hold, committed, stay of commitment, or revocation. 1    Patient LOS on BEH unit exceeds 20 days. 0  LOS: 8   Patient under guardianship, 55+, otherwise medically complex, or under age 11. 0   Suicide ideation without relief of precipitating factors. 0   Current plan for suicide. 0   Current plan for homicide. 0   Imminent risk or actual attempt to seriously harm another without relief of factors precipitating the attempt. 0   Severe dysfunction in daily living (ex: complete neglect for self care, extreme disruption in vegetative function, extreme deterioration in social interactions). 1   Recent (last 7 days) or current physical aggression in the ED or on unit. 0   Restraints or seclusion episode in past 72 hours. 0   Recent (last 7 days) or current verbal aggression, agitation, yelling, etc., while in the ED or unit. 0   Active psychosis. 0   Need for constant or near constant redirection (from leaving, from others, etc).  0   Intrusive or disruptive behaviors. 1   Patient requires 3 or more hours of individualized nursing care per 8-hour shift (i.e. for ADLs, meds, therapeutic interventions). 1   TOTAL 6

## 2024-07-26 NOTE — PROGRESS NOTES
Rehab Group    Start time: 1020  End time: 1115  Patient time total: 50 minutes    attended full group    #7 attended   Group Type: occupational therapy   Group Topic Covered: coping skills     Group Session Detail:  OT clinic     Patient Response/Contribution:  cooperative with task     Patient Detail:    Pt actively participated in occupational therapy clinic to facilitate coping skill exploration, creative expression within personally meaningful activities, and clinical observation of social, cognitive, and kinesthetic performance skills. Pt response: Independent to initiate, gather materials, sequence, and adjust to workspace demands as needed. Attentive to self-directed, creative expression task for approximately x10 minute intervals. Socialized with peers and staff. Briefly excused himself from group to get medication due to reported anxiety. Calm and cooperative.        Train & Education Service Per Session 45 + Minutes () OT Group code    Patient Active Problem List   Diagnosis    Bipolar disorder (H)    ADHD (attention deficit hyperactivity disorder)    Bipolar I disorder with jose (H)    Cannabis abuse    Severe manic bipolar 1 disorder with psychotic behavior (H)

## 2024-07-27 LAB — VALPROATE SERPL-MCNC: 45 UG/ML

## 2024-07-27 PROCEDURE — 250N000013 HC RX MED GY IP 250 OP 250 PS 637: Performed by: NURSE PRACTITIONER

## 2024-07-27 PROCEDURE — 124N000002 HC R&B MH UMMC

## 2024-07-27 PROCEDURE — 250N000013 HC RX MED GY IP 250 OP 250 PS 637

## 2024-07-27 PROCEDURE — 250N000013 HC RX MED GY IP 250 OP 250 PS 637: Performed by: CLINICAL NURSE SPECIALIST

## 2024-07-27 RX ORDER — DIVALPROEX SODIUM 500 MG/1
500 TABLET, EXTENDED RELEASE ORAL ONCE
Status: COMPLETED | OUTPATIENT
Start: 2024-07-27 | End: 2024-07-27

## 2024-07-27 RX ADMIN — DIVALPROEX SODIUM 1250 MG: 500 TABLET, FILM COATED, EXTENDED RELEASE ORAL at 19:08

## 2024-07-27 RX ADMIN — LORAZEPAM 0.5 MG: 0.5 TABLET ORAL at 19:08

## 2024-07-27 RX ADMIN — NICOTINE POLACRILEX 4 MG: 2 LOZENGE ORAL at 11:31

## 2024-07-27 RX ADMIN — DIPHENHYDRAMINE HYDROCHLORIDE 50 MG: 50 CAPSULE ORAL at 08:07

## 2024-07-27 RX ADMIN — HALOPERIDOL 5 MG: 5 TABLET ORAL at 08:07

## 2024-07-27 RX ADMIN — NICOTINE POLACRILEX 4 MG: 2 LOZENGE ORAL at 09:22

## 2024-07-27 RX ADMIN — NICOTINE POLACRILEX 4 MG: 4 GUM, CHEWING BUCCAL at 10:21

## 2024-07-27 RX ADMIN — DIVALPROEX SODIUM 500 MG: 500 TABLET, FILM COATED, EXTENDED RELEASE ORAL at 20:21

## 2024-07-27 RX ADMIN — NICOTINE POLACRILEX 4 MG: 2 LOZENGE ORAL at 16:53

## 2024-07-27 RX ADMIN — NICOTINE POLACRILEX 4 MG: 2 LOZENGE ORAL at 18:38

## 2024-07-27 RX ADMIN — NICOTINE POLACRILEX 4 MG: 2 LOZENGE ORAL at 08:10

## 2024-07-27 RX ADMIN — OLANZAPINE 10 MG: 10 TABLET, FILM COATED ORAL at 08:07

## 2024-07-27 ASSESSMENT — ACTIVITIES OF DAILY LIVING (ADL)
ADLS_ACUITY_SCORE: 29
DRESS: INDEPENDENT
ADLS_ACUITY_SCORE: 29
LAUNDRY: WITH SUPERVISION
ADLS_ACUITY_SCORE: 29
ORAL_HYGIENE: INDEPENDENT
ADLS_ACUITY_SCORE: 29
HYGIENE/GROOMING: INDEPENDENT
ADLS_ACUITY_SCORE: 29
HYGIENE/GROOMING: INDEPENDENT
ADLS_ACUITY_SCORE: 29
LAUNDRY: WITH SUPERVISION
DRESS: INDEPENDENT
ADLS_ACUITY_SCORE: 29
ORAL_HYGIENE: INDEPENDENT
ADLS_ACUITY_SCORE: 29
ADLS_ACUITY_SCORE: 29

## 2024-07-27 NOTE — PLAN OF CARE
"Goal Outcome Evaluation:    Plan of Care Reviewed With: patient      Patient slept 6.5 hours over night shift.  He was up at the beginning of the shift asking about getting his clothes back but then saying Lauren told him he had to wait until Monday - but still wanted an exception to be made.  He said he wanted to refuse the Zyprexa because it just makes him lay in bed and sleep all day but then told another staff that Zyprexa makes him unable to sleep.  He was told that the Zyprexa was a Hand medication and he was to take either the pill or an injection.  He acted very surprised by this and was provided with a copy of the court order.  He cried and called his mom telling her \"there's no point making me take Zyprexa here when there's no way I'll take it when I leave.\"  He repeated this to staff multiple times and said he was planning to get his outpatient provider to order the medications he wants (Vyvanse and Adderall) and discontinuing the medications he does not like - Zyprexa.  He ate 100% of breakfast, watched tv, played x-box and then slept in his room for the remainder of the morning.  He woke up around 1:00 pm - came out of his room and ate 100% of lunch.  He continued to say he was \"in a terrible mood\".  He then returned to his room and slept for the remainder of the shift.  Took PRN benadryl and haldol for agitation this morning and he reports that it was somewhat helpful. No SI/SIB noted or observed. Will continue to monitor.  /80 (BP Location: Left arm)   Pulse 96   Temp 98  F (36.7  C) (Temporal)   Resp 16   Ht 1.829 m (6')   Wt 100.6 kg (221 lb 11.2 oz)   SpO2 97%   BMI 30.07 kg/m    "

## 2024-07-27 NOTE — PLAN OF CARE
"  Problem: Adult Inpatient Plan of Care  Goal: Optimal Comfort and Wellbeing  Outcome: Progressing   Goal Outcome Evaluation:    Plan of Care Reviewed With: patient      Pt napping at start of shift. He gets up soon after and said he is feeling very depressed. Talks about his mom not talking to him and that she grew up when \"the pick-ins were good\" and work came easier and \"I have to work harder for money\". He listens to headphones, chats with peers and plays video games. Ate adequately at meal. He asks to talk and is upset about being jarvised for Zyprexa. Said the medication now effects him differently and it taste like plastic. He wants to discharge, maybe on Tuesday or move to [place] with his cousin. He asked if he has bipolar d/o. He was asked what the provider told him. He said, \"she told me I have bipolar d/o\". Pt would like to take hs medications early and go to bed. After taking medication he goes to room lays down briefly and then goes to the lounge. Provider orders Depakote increase. He argues about the dose. He then accepts with encouragement. He again request wedge for bed. Offered additional pillows and he declines. Email with request for wedge sent.                     "

## 2024-07-27 NOTE — PLAN OF CARE
Goal Outcome Evaluation:         Pt appeared to sleep  6.5 hours during the night, no problems reported, no PRN's given on Status 15 minute safety checks.

## 2024-07-28 PROCEDURE — 250N000013 HC RX MED GY IP 250 OP 250 PS 637

## 2024-07-28 PROCEDURE — 250N000013 HC RX MED GY IP 250 OP 250 PS 637: Performed by: NURSE PRACTITIONER

## 2024-07-28 PROCEDURE — 124N000002 HC R&B MH UMMC

## 2024-07-28 PROCEDURE — 250N000013 HC RX MED GY IP 250 OP 250 PS 637: Performed by: CLINICAL NURSE SPECIALIST

## 2024-07-28 RX ADMIN — DIVALPROEX SODIUM 1750 MG: 500 TABLET, FILM COATED, EXTENDED RELEASE ORAL at 19:18

## 2024-07-28 RX ADMIN — HALOPERIDOL 5 MG: 5 TABLET ORAL at 18:13

## 2024-07-28 RX ADMIN — NICOTINE POLACRILEX 4 MG: 4 GUM, CHEWING BUCCAL at 11:08

## 2024-07-28 RX ADMIN — DIPHENHYDRAMINE HYDROCHLORIDE 50 MG: 50 CAPSULE ORAL at 18:12

## 2024-07-28 RX ADMIN — LORAZEPAM 0.5 MG: 0.5 TABLET ORAL at 19:19

## 2024-07-28 RX ADMIN — NICOTINE POLACRILEX 4 MG: 2 LOZENGE ORAL at 16:33

## 2024-07-28 RX ADMIN — TRAZODONE HYDROCHLORIDE 50 MG: 50 TABLET ORAL at 20:22

## 2024-07-28 RX ADMIN — NICOTINE POLACRILEX 4 MG: 2 LOZENGE ORAL at 10:23

## 2024-07-28 RX ADMIN — NICOTINE POLACRILEX 4 MG: 2 LOZENGE ORAL at 11:27

## 2024-07-28 RX ADMIN — DIPHENHYDRAMINE HYDROCHLORIDE 50 MG: 50 CAPSULE ORAL at 10:38

## 2024-07-28 RX ADMIN — OLANZAPINE 10 MG: 10 TABLET, FILM COATED ORAL at 09:03

## 2024-07-28 RX ADMIN — ACETAMINOPHEN 650 MG: 325 TABLET, FILM COATED ORAL at 16:04

## 2024-07-28 RX ADMIN — NICOTINE POLACRILEX 4 MG: 2 LOZENGE ORAL at 18:13

## 2024-07-28 RX ADMIN — HYDROXYZINE HYDROCHLORIDE 25 MG: 25 TABLET ORAL at 16:04

## 2024-07-28 RX ADMIN — NICOTINE POLACRILEX 4 MG: 2 LOZENGE ORAL at 09:23

## 2024-07-28 RX ADMIN — NICOTINE POLACRILEX 4 MG: 4 GUM, CHEWING BUCCAL at 15:33

## 2024-07-28 RX ADMIN — NICOTINE POLACRILEX 4 MG: 2 LOZENGE ORAL at 14:27

## 2024-07-28 RX ADMIN — NICOTINE POLACRILEX 4 MG: 4 GUM, CHEWING BUCCAL at 12:19

## 2024-07-28 RX ADMIN — HALOPERIDOL 5 MG: 5 TABLET ORAL at 10:38

## 2024-07-28 ASSESSMENT — ACTIVITIES OF DAILY LIVING (ADL)
ADLS_ACUITY_SCORE: 29
HYGIENE/GROOMING: INDEPENDENT
ADLS_ACUITY_SCORE: 29
HYGIENE/GROOMING: INDEPENDENT
ADLS_ACUITY_SCORE: 29
LAUNDRY: WITH SUPERVISION
ADLS_ACUITY_SCORE: 29
ORAL_HYGIENE: INDEPENDENT
DRESS: INDEPENDENT
ADLS_ACUITY_SCORE: 29
LAUNDRY: WITH SUPERVISION
DRESS: INDEPENDENT
ADLS_ACUITY_SCORE: 29
ORAL_HYGIENE: INDEPENDENT
ADLS_ACUITY_SCORE: 29

## 2024-07-28 NOTE — PLAN OF CARE
"  Problem: Adult Inpatient Plan of Care  Goal: Optimal Comfort and Wellbeing  Outcome: Progressing   Goal Outcome Evaluation:    Plan of Care Reviewed With: patient      Pt paces unit and listens to headphones. Pt uses the phone frequently, social with peers. Raps lyrics with another pt. He is restless. Pt reports visit went well with his mom, \"we found common ground\". Reports bilateral feet pain '5'/10 - prn Tylenol given. Pt later reports pain relief. Pt was asked about mood, said he is \"happy 8 out of 10\". Reports feeling anxious and depressed, denied SI/HA. Asked for medication, prn Hydroxyzine given. After administration pt said, \" make sure to tell Lauren the 25 of Hydroxyzine does not work\". He was reminded it takes time for medication to take effect. He is dismissive of this. He later approaches to say he wants the Hydroxyzine increased to 50 mg. Ate well at meal, 100%. Takes a shower. He approaches, states \"I am agitated\" he requests and given Haldol and Benadryl. Pt later reports Haldol/Benadryl had good effect. He asks for hs medications early said he wants to go to bed. He gets up for snack and requests prn Trazodone.                  "

## 2024-07-28 NOTE — PLAN OF CARE
"Goal Outcome Evaluation:    Plan of Care Reviewed With: patient      Patient slept 7 hours over night shift.  He was up at breakfast time and ate 100% of meals this shift.  He said he wanted to refuse the Zyprexa because he was feeling good and did not want the Zyprexa to bring down his mood.  He continued to perseverate on Zyprexa throughout the shift, frequently making contradictory statements - he would say Zyprexa makes him lay in bed and sleep all day but then say that Zyprexa makes him unable to sleep.  When asked directly why he is contradicting himself and to identify his actual complaint about zyorexa he walks away.  He was very perseverative on Adderall and Vyvanse - asked RN about many medication combinations and if they were safe.  When asked what his concern was he said taking Adderall in the morning, Zyprexa in the afternoon and Ativan at night will kill a person.  He was told this is not true but he should ask his provider about specific medication regimens.  He talked to his mom and then came to the desk saying his mom was visiting and it would be the last time he ever sees her.  He started whimpering and eventually wailing but there were no tears.  When asked why he believed he would never see his mom again he said; \"I decided it because she will not support my decision to stop taking Zyprexa and go back on Vyvanse and Adderall.\"  When told he could think more about this and possibly change his mind he was very resistant, saying; \"No, my mind is made up!\"  Throughout the day he was obsessively looking for a specific video game and asking every staff member where it was.  He expressed many disorganized theories about what happened to the game, where it might be and why it was missing.  For hours he talked about the game and why he needed to play it.  He refused all attempts to offer other activities and different video games.  He stood at the desk and cried loudly about the video game but no tears " "were seen.  He said; \"I just can't stop thinking about it!\"  He was asked by all staff to stay away from the desk area if he was going to continue to refuse everything offered to him and watch staff work while loudly cyring.  He said \"I don't care, this is where I want to be and if you want me somewhere else you'll have to force me.\"  He was reminded that this behavior will be noted in his chart and again asked if there was anything we could do to help him cope with his feelings but he repeatedly said; \"No, there's nothing you can do - I don't care!\"  He again told staff multiple times that he was planning to get his outpatient provider to order the medications he wants - Vyvanse and Adderall, and discontinue the medications he does not like - Zyprexa.  He reported he was \"in a terrible mood and I need you to tell my mom and Lauren (provider) that my ADHD is out of control and I need to get back on Adderall and Vyvanse\".  Took PRN benadryl and haldol for agitation this morning and he reports that it was somewhat helpful. No SI/SIB noted or observed. Will continue to monitor.  /77 (BP Location: Left arm)   Pulse 110   Temp 97.6  F (36.4  C) (Temporal)   Resp 14   Ht 1.829 m (6')   Wt 100.6 kg (221 lb 11.2 oz)   SpO2 96%   BMI 30.07 kg/m      "

## 2024-07-28 NOTE — PLAN OF CARE
Goal Outcome Evaluation:     Pt appeared to sleep 7 hours during the night, no problems reported, no PRN's given on Status 15 minute safety checks.

## 2024-07-29 PROCEDURE — 99232 SBSQ HOSP IP/OBS MODERATE 35: CPT | Performed by: NURSE PRACTITIONER

## 2024-07-29 PROCEDURE — 250N000013 HC RX MED GY IP 250 OP 250 PS 637: Performed by: NURSE PRACTITIONER

## 2024-07-29 PROCEDURE — 250N000013 HC RX MED GY IP 250 OP 250 PS 637: Performed by: CLINICAL NURSE SPECIALIST

## 2024-07-29 PROCEDURE — 90853 GROUP PSYCHOTHERAPY: CPT

## 2024-07-29 PROCEDURE — 124N000002 HC R&B MH UMMC

## 2024-07-29 RX ORDER — DIVALPROEX SODIUM 500 MG/1
2000 TABLET, EXTENDED RELEASE ORAL EVERY EVENING
Status: DISCONTINUED | OUTPATIENT
Start: 2024-07-29 | End: 2024-07-29

## 2024-07-29 RX ORDER — GUANFACINE 1 MG/1
1 TABLET ORAL AT BEDTIME
Status: DISCONTINUED | OUTPATIENT
Start: 2024-07-29 | End: 2024-08-02 | Stop reason: HOSPADM

## 2024-07-29 RX ORDER — LORAZEPAM 0.5 MG/1
0.5 TABLET ORAL EVERY OTHER DAY
Status: DISCONTINUED | OUTPATIENT
Start: 2024-07-29 | End: 2024-08-02 | Stop reason: HOSPADM

## 2024-07-29 RX ADMIN — TRAZODONE HYDROCHLORIDE 50 MG: 50 TABLET ORAL at 19:39

## 2024-07-29 RX ADMIN — HALOPERIDOL 5 MG: 5 TABLET ORAL at 11:26

## 2024-07-29 RX ADMIN — DIPHENHYDRAMINE HYDROCHLORIDE 50 MG: 50 CAPSULE ORAL at 11:26

## 2024-07-29 RX ADMIN — LORAZEPAM 0.5 MG: 0.5 TABLET ORAL at 19:39

## 2024-07-29 RX ADMIN — DIVALPROEX SODIUM 1750 MG: 500 TABLET, FILM COATED, EXTENDED RELEASE ORAL at 19:39

## 2024-07-29 RX ADMIN — OLANZAPINE 10 MG: 10 TABLET, FILM COATED ORAL at 08:59

## 2024-07-29 RX ADMIN — NICOTINE POLACRILEX 4 MG: 2 LOZENGE ORAL at 10:11

## 2024-07-29 RX ADMIN — NICOTINE POLACRILEX 4 MG: 2 LOZENGE ORAL at 08:59

## 2024-07-29 RX ADMIN — GUANFACINE 1 MG: 1 TABLET ORAL at 19:40

## 2024-07-29 ASSESSMENT — ACTIVITIES OF DAILY LIVING (ADL)
DRESS: INDEPENDENT
ADLS_ACUITY_SCORE: 29
ADLS_ACUITY_SCORE: 29
HYGIENE/GROOMING: INDEPENDENT
ADLS_ACUITY_SCORE: 29
ORAL_HYGIENE: INDEPENDENT
ADLS_ACUITY_SCORE: 29

## 2024-07-29 NOTE — PROGRESS NOTES
"[facility], [place]   Psychiatric Progress Note        Interim History:   Team meeting report: The patient's care was discussed with the treatment team during the daily team meeting and/or staff's chart notes were reviewed.  Staff report patient has been disorganized, tangential, difficult to follow.  He wanted to refuse Zyprexa yesterday because he felt that it is making him tired, making him laying in bed and sleep all day long.  Later, he reported that Zyprexa is activating him.  He was insisting on getting back on Adderall and Vyvanse.  Later on, the patient was still looking for a particular game and when he was not able to find it, he spends a lot of time in the  crying about it.  Reported being in a terrible mood.  He took as needed the Haldol and Benadryl and reported good results with.  In the evening, the patient was doses.  He was pacing on the unit with headphones on.  He was on the phone frequently.  He was social with peers and staff.  He has been making bizarre statements.  Reported feeling happy but later stated that he is depressed and anxious.  He is reporting that hydroxyzine is not very helpful.  He requested Benadryl and Haldol again in the evening.  It looks like he took this combination just before lunch today.  The patient slept on and off.    Met with patient.  The patient is hyperverbal, tangential, difficult to follow.  A lot of answers are out of context.  He is loud.  He is very \"happy\" to see me today.  He wanted to talk about his Vyvanse and the Adderall.  He takes it for ADHD.  Discussed nonstimulant options.  The patient has been on Strattera before and states this has not been helpful.  He is willing to try guanfacine or clonidine since he has not been on any of these medications before.  The patient does not know why he is in the hospital.  He was not able to explain how he ended up here.  The patient is reporting a problem staying alert.  " Reports that he is depressed because his ADHD is not treated.  He is sleeping okay.  He is feeling better today because he is reading the Bible.  He talked about the legal activities at his job.  It looks like he was working for [employer].  This made him quit his job.  He is denying any auditory visual hallucinations, paranoia, and delusions.  Discussed disposition.  The patient thinks he is going to a group home [facility] in [place].    Denies suicidal or homicidal ideation.    The patient continues to be manic.  Discharging him prematurely will put him at high risk for harming himself or others.  The patient will need to remain in the hospital until the jose subsides.         Medications:     Current Facility-Administered Medications   Medication Dose Route Frequency Provider Last Rate Last Admin    divalproex sodium extended-release (DEPAKOTE ER) 24 hr tablet 1,750 mg  1,750 mg Oral QPM Nichelle Santamaria APRN CNP   1,750 mg at 07/28/24 1918    LORazepam (ATIVAN) tablet 0.5 mg  0.5 mg Oral At Bedtime Dai Smith APRN CNP   0.5 mg at 07/28/24 1919    OLANZapine (zyPREXA) tablet 10 mg  10 mg Oral Dai Grider APRN CNP   10 mg at 07/29/24 0859    Or    OLANZapine (zyPREXA) injection 10 mg  10 mg Intramuscular Dai Grider APRN CNP                Allergies:   No Known Allergies         Labs:     Recent Results (from the past 672 hour(s))   Urine Drug Screen Panel    Collection Time: 07/15/24  4:49 PM   Result Value Ref Range    Amphetamines Urine Screen Negative Screen Negative    Barbituates Urine Screen Negative Screen Negative    Benzodiazepine Urine Screen Negative Screen Negative    Cannabinoids Urine Screen Positive (A) Screen Negative    Cocaine Urine Screen Negative Screen Negative    Fentanyl Qual Urine Screen Negative Screen Negative    Opiates Urine Screen Negative Screen Negative    PCP Urine Screen Negative Screen Negative   Asymptomatic COVID-19  Virus (Coronavirus) by PCR Nasopharyngeal    Collection Time: 07/16/24  9:01 AM    Specimen: Nasopharyngeal; Swab   Result Value Ref Range    SARS CoV2 PCR Negative Negative   CBC with platelets and differential    Collection Time: 07/16/24  9:24 AM   Result Value Ref Range    WBC Count 9.6 4.0 - 11.0 10e3/uL    RBC Count 5.09 4.40 - 5.90 10e6/uL    Hemoglobin 14.8 13.3 - 17.7 g/dL    Hematocrit 44.5 40.0 - 53.0 %    MCV 87 78 - 100 fL    MCH 29.1 26.5 - 33.0 pg    MCHC 33.3 31.5 - 36.5 g/dL    RDW 13.5 10.0 - 15.0 %    Platelet Count 234 150 - 450 10e3/uL    % Neutrophils 48 %    % Lymphocytes 37 %    % Monocytes 7 %    % Eosinophils 7 %    % Basophils 1 %    % Immature Granulocytes 0 %    NRBCs per 100 WBC 0 <1 /100    Absolute Neutrophils 4.7 1.6 - 8.3 10e3/uL    Absolute Lymphocytes 3.5 0.8 - 5.3 10e3/uL    Absolute Monocytes 0.7 0.0 - 1.3 10e3/uL    Absolute Eosinophils 0.6 0.0 - 0.7 10e3/uL    Absolute Basophils 0.1 0.0 - 0.2 10e3/uL    Absolute Immature Granulocytes 0.0 <=0.4 10e3/uL    Absolute NRBCs 0.0 10e3/uL   Comprehensive metabolic panel    Collection Time: 07/16/24  9:25 AM   Result Value Ref Range    Sodium 139 135 - 145 mmol/L    Potassium 3.8 3.4 - 5.3 mmol/L    Carbon Dioxide (CO2) 22 22 - 29 mmol/L    Anion Gap 16 (H) 7 - 15 mmol/L    Urea Nitrogen 8.5 6.0 - 20.0 mg/dL    Creatinine 0.84 0.67 - 1.17 mg/dL    GFR Estimate >90 >60 mL/min/1.73m2    Calcium 8.9 8.6 - 10.0 mg/dL    Chloride 101 98 - 107 mmol/L    Glucose 167 (H) 70 - 99 mg/dL    Alkaline Phosphatase 55 40 - 150 U/L    AST 15 0 - 45 U/L    ALT 20 0 - 70 U/L    Protein Total 7.2 6.4 - 8.3 g/dL    Albumin 4.3 3.5 - 5.2 g/dL    Bilirubin Total 0.3 <=1.2 mg/dL   Valproic Acid Free & Total    Collection Time: 07/18/24  9:43 AM   Result Value Ref Range    Valproic Acid Free 12 7 - 23 ug/mL    Valproic Acid Total 80 50 - 125 ug/mL    Valproic Acid, Percent Free 15 5 - 18 %   TSH with free T4 reflex and/or T3 as indicated    Collection Time:  07/19/24  9:02 AM   Result Value Ref Range    TSH 1.44 0.30 - 4.20 uIU/mL   Valproic acid    Collection Time: 07/19/24  9:02 AM   Result Value Ref Range    Valproic acid 65.7   ug/mL   Extra Purple Top Tube    Collection Time: 07/19/24  9:02 AM   Result Value Ref Range    Hold Specimen JIC    Valproic acid    Collection Time: 07/26/24  7:08 PM   Result Value Ref Range    Valproic acid 45.0 (L)   ug/mL            Precautions:     Behavioral Orders   Procedures    Assault precautions    Cheeking Precautions (behavioral units)    Code 1 - Restrict to Unit    Elopement precautions    Routine Programming     As clinically indicated    Status 15     Every 15 minutes.            Psychiatric Examination:   Temp: 97  F (36.1  C) Temp src: Temporal BP: 129/73 Pulse: 118   Resp: 18 SpO2: 100 % O2 Device: None (Room air)    Weight is 221 lbs 11.2 oz  Body mass index is 30.07 kg/m .    Appearance: awake, alert and adequately groomed  Attitude:  cooperative and evasive  Eye Contact:  good  Mood:  anxious, depressed, and better  Affect:  intensity is heightened  Speech:  pressured speech  Psychomotor Behavior:  no evidence of tardive dyskinesia, dystonia, or tics  Throught Process:  linear, disorganized, illogical, tangental, and circumstantial  Associations:  no loose associations  Thought Content:  no evidence of suicidal ideation or homicidal ideation, no auditory hallucinations present, and no visual hallucinations present  Insight:  limited  Judgement:  limited  Oriented to:  time, person, and place  Attention Span and Concentration:  limited  Recent and Remote Memory:  limited         Precautions:     Behavioral Orders   Procedures    Assault precautions    Cheeking Precautions (behavioral units)    Code 1 - Restrict to Unit    Elopement precautions    Routine Programming     As clinically indicated    Status 15     Every 15 minutes.          DIagnoses:   Bipolar disorder type 1, manic severe, with psychosis  Historical  diagnosis of ADHD  Cannabis use disorder  Polysubstance abuse  Nicotine use disorder         Plan:   Medications:    --Received the first loading dose of Invega Sustenna 234 mg on 7/9 and he did not receive the second loading dose of 156 mg on 7/15, so it was administered 7/19.  Per notes from his previous hospitalization, Invega Sustenna 117 mg was scheduled for 8/15; consider increasing this dose.   --Continue Zyprexa 10 mg in the AM with a back up of IM per Yazan.    --Reduce Ativan to 0.5 mg at HS, plan to discontinue.       --Change to every other night x 3 doses, than discontinue  --Depakote ER 1750 mg at HS. Valproic acid, platelets, LFT on 7/31   --Start Tenex 1 mg, qhs for ADHD  --PRNs of Haldol & Benadryl should be used for first line treatment of agitation.    --Continue PRNs of Zyprexa, Trazodone and Hydroxyzine.     Medical:  --Internal medicine to follow up for medical problems       Consults:   --Care was coordinated with the treatment team.   --The patient was consulted on nature of illness and treatment options.      Disposition Plan   Reason for ongoing admission: is unable to care for self due to severe psychosis or jose  Discharge location: group home  Discharge Medications: not ordered  Follow-up Appointments: not scheduled  Discharge will be granted once symptoms improved.    Legal Status  MI Commitment with Hand  87-FM- [facility] medications Haldol, Risperdal, Invega, Zyprexa, Seroquel, and Abilify   Stay of Commitment revoked and Commitment imposed 6/13/2024  PD from station 12 7/10/2024  PD Revoked 7/17/2024  60/90 Report 9/11/2024  Expires 12/13/2024     Carlos Gonzales  [employer]  Phone [phone]     Contacts  [facility]:  Norman [email or web address] [phone]  Darline Romo ( for jail): [phone] - RENNY on file for jail staff  Bree (jail ): [phone] RENNY on file for group  home staff  Email: [email or web address]     :  Chelsea Mika ([facility])  Ph: [phone] - Commitment  no RENNY needed   [email or web address]  [facility]  Ph: [phone]  Christi (member of case management team): [phone]     Mother (Biological Aunt) - Yolanda ([phone])      Psychiatric Medication Management  MARIO Weinberg, CNP with [facility]  [address], [facility] [address], [place] [address]  Phone: [phone]  Fax: [phone]     Evelia with [employer] called requesting updates - [phone] [email or web address]     Sherif MARTIN CNP    This note was created with the help of Dragon dictation system. All grammatical/typing errors or context distortion are unintentional and inherent to software.

## 2024-07-29 NOTE — PLAN OF CARE
"Goal Outcome Evaluation:    Plan of Care Reviewed With: patient      Pt was mostly in room resting on an off. Out of his room only for dinner and snacks. Ate all his dinner and had adequate amount of fluids.    Stated mood is \"tired.\" Affect is labile. Behavior was isolative, withdrawn but cooperative. Denies pain, anxiety, depression, SI/HI/AVH. Medication compliant. No safety/behavior concerns at this time.  Refused Vitals.           "

## 2024-07-29 NOTE — PLAN OF CARE
"     Group Attendance:  attended full group  Time session began: 1115  Time session ended: 1200  Patient's total time in group: 45     Total # Attendees    4-5   Group Type psychotherapeutic      Group Topic Covered Insight fostering   Group Session Detail In this session, the patients shared their understanding of the term \"insight\", and its application to life, in general. Following this, the group then reviewed insight as relates to mental health symptoms and help-seeking. Three different insight scenarios were presented where the patients identified which of the three was more appropriate compared to the others. Patients also discussed the limits/challenges of insight. The group ended with a review of the benefits/advantages of insight, drawing in history or previous events (crises) as a motivating factor for which others may be asked to help provide a perspective on symptoms. Reviewed helpful resources to approach for insight, and distinguished them from unhealthy ones. Patients shared their perspectives on ways they plan to practice being open to feedback.      Patient's response to the group topic/interactions:  cooperative with task, expressed readiness to alter behaviors, supportive of peers, socially appropriate, and requested more information on topic      Patient Details: Pt participated in the group. Asked questions. Responded to prompts. Reported that he does not trust that other people will be correct in giving him feedback. Facilitator used a common example of \"not being able to see his own beards at the moment\" to clarify to the patient, that there are some things about an individual that the individual cannot see and relies on either a mirror or other people to show to the individual. The patient voiced that he understood this. Was encouraged to continue taking his medications as prescribed and to continue to stay treatment compliant.         66027 - Group psychotherapy - 1 Session     Patient " Active Problem List   Diagnosis    Bipolar disorder (H)    ADHD (attention deficit hyperactivity disorder)    Bipolar I disorder with jose (H)    Cannabis abuse    Severe manic bipolar 1 disorder with psychotic behavior (H)     Sg Vargas, Ph.D., [facility]

## 2024-07-29 NOTE — PLAN OF CARE
Goal Outcome Evaluation:         Pt appeared to sleep 7 hours during the night, no problems reported, no PRN's given, on Status 15 minute safety checks.

## 2024-07-29 NOTE — PLAN OF CARE
"Group Attendance:  attended full group  Time session began: 1015  Time session ended: 1100  Patient's total time in group: 45     Total # Attendees    4   Group Type psychotherapeutic      Group Topic Covered Hope activation: How to stay hopeful in times of crisis   Group Session Detail In this session, the participants reported how their night went (their sleep) and their current feelings about their symptoms, in the context of treatment response. Patients shared their understanding of the term \"hope\", generally, and in respect to dealing with mental health crises. Different scenarios where hope had been activated in addressing a mental health situation were reviewed, from which the patients identified a strategy that may be applicable or helpful for them to implement in the future. Patients shared their stories of success and challenges as relates to staying motivated towards treatment. Group feedback focused on motivating each patient, targeting the particular area of need that was expressed.        Patient's response to the group topic/interactions:  cooperative with task, expressed readiness to alter behaviors, supportive of peers, socially appropriate, and requested more information on topic      Patient Details: Pt participated in the group. Asked questions. Responded to prompts. The patient reported that he does not believe his medications are helping him, and that he believes he is just here because staff want to be in control. Group motivated him towards considering keeping an open mind towards his treatment.      38099 - Group psychotherapy - 1 Session     Patient Active Problem List   Diagnosis    Bipolar disorder (H)    ADHD (attention deficit hyperactivity disorder)    Bipolar I disorder with jose (H)    Cannabis abuse    Severe manic bipolar 1 disorder with psychotic behavior (H)     Sg Vargas, Ph.D., [facility]  "

## 2024-07-29 NOTE — PLAN OF CARE
"Problem: Psychotic Signs/Symptoms  Goal: Improved Mood Symptoms (Psychotic Signs/Symptoms)  Outcome: Not Progressing  Intervention: Optimize Emotion and Mood  Recent Flowsheet Documentation  Taken 7/29/2024 1100 by Roshni Cummings RN  Diversional Activity:   television   music   Goal Outcome Evaluation:    Plan of Care Reviewed With: patient   Patient was compliant with medications this morning. He was in a happy mood until about mid morning and he came to the desk and said he was \"sad\" and then \"the Zyprexa just dulls me so much.\" Went over coping skills for patient, a staff member gave him supplies to journal. Patient was given Haldol and Benadryl prn. After prns given, he said loudly, \"I'm done with Zyprexa! I'll go to my doctor and get on Risperdal when I leave.\" He has been in bed since lunchtime. No other prns given this shift.                "

## 2024-07-29 NOTE — PLAN OF CARE
Upcoming Meetings and Dates/Important Information  Days since IP admission 11  Team Note Due Friday  No future appointments.     Next Steps  Will need PD    Assessment/Intervention/Current Symptoms and Care Coordination  Chart Review  Met with team to discuss patient care.  No updates.    Discharge Plan or Goal  Dispo Plan: New house at [place]  Transportation: TBD  Medication: TBD    Provisional discharge: Will need  Safety plan complete?: Not yet completed  Appointments:   TBD     Barriers to Discharge  patient requires further psychiatric stabilization due to current symptomology, medication management with possible adjustments    Expected Discharge Date: 08/02/2024        Discharge Comments: PD Revocation        Referral Status  No new referral made    Legal Status  MI Commitment with Hand  45-ZB- [facility]  Hand medications Haldol, Risperdal, Invega, Zyprexa, Seroquel, and Abilify   Stay of Commitment revoked and Commitment imposed 6/13/2024  PD from station 12 7/10/2024  PD Revoked 7/17/2024  60/90 Report 9/11/2024  Expires 12/13/2024    Carlos Gonzales  [employer]  Phone [phone]    Contacts  [facility]:  Norman [email or web address] [phone]  Darline Romo ( for residential): [phone] - RENNY on file for residential staff  Bree (residential ): [phone] RENNY on file for group home staff  Email: [email or web address]     :  Chelsea Brennan ([facility])  Ph: [phone] - Commitment CM no RENNY needed   [email or web address].  [facility]  Ph: [phone]  Christi (member of case management team): [phone]     Mother (Biological Aunt) - Yolanda ([phone])     Psychiatric Medication Management  MARIO Weinberg, CNP with [facility]  [address], [address], [place] [address]  Phone: [phone]  Fax: [phone]    Evelia with [employer] called  requesting updates - [phone] [email or web address]

## 2024-07-29 NOTE — PLAN OF CARE
BEH IP Unit Acuity Rating Score (UARS)  Patient is given one point for every criteria they meet.    CRITERIA SCORING   On a 72 hour hold, court hold, committed, stay of commitment, or revocation. 1    Patient LOS on BEH unit exceeds 20 days. 0  LOS: 11   Patient under guardianship, 55+, otherwise medically complex, or under age 11. 0   Suicide ideation without relief of precipitating factors. 0   Current plan for suicide. 0   Current plan for homicide. 0   Imminent risk or actual attempt to seriously harm another without relief of factors precipitating the attempt. 0   Severe dysfunction in daily living (ex: complete neglect for self care, extreme disruption in vegetative function, extreme deterioration in social interactions). 1   Recent (last 7 days) or current physical aggression in the ED or on unit. 0   Restraints or seclusion episode in past 72 hours. 0   Recent (last 7 days) or current verbal aggression, agitation, yelling, etc., while in the ED or unit. 0   Active psychosis. 0   Need for constant or near constant redirection (from leaving, from others, etc).  0   Intrusive or disruptive behaviors. 1   Patient requires 3 or more hours of individualized nursing care per 8-hour shift (i.e. for ADLs, meds, therapeutic interventions). 1   TOTAL 6

## 2024-07-30 PROCEDURE — 250N000013 HC RX MED GY IP 250 OP 250 PS 637: Performed by: CLINICAL NURSE SPECIALIST

## 2024-07-30 PROCEDURE — 99232 SBSQ HOSP IP/OBS MODERATE 35: CPT | Performed by: NURSE PRACTITIONER

## 2024-07-30 PROCEDURE — 124N000002 HC R&B MH UMMC

## 2024-07-30 PROCEDURE — 250N000013 HC RX MED GY IP 250 OP 250 PS 637: Performed by: NURSE PRACTITIONER

## 2024-07-30 RX ORDER — HALOPERIDOL 5 MG/1
5 TABLET ORAL 2 TIMES DAILY
Status: DISCONTINUED | OUTPATIENT
Start: 2024-07-30 | End: 2024-08-02 | Stop reason: HOSPADM

## 2024-07-30 RX ORDER — DIPHENHYDRAMINE HCL 50 MG
50 CAPSULE ORAL 2 TIMES DAILY
Status: DISCONTINUED | OUTPATIENT
Start: 2024-07-30 | End: 2024-08-02 | Stop reason: HOSPADM

## 2024-07-30 RX ORDER — HALOPERIDOL 5 MG/ML
5 INJECTION INTRAMUSCULAR 2 TIMES DAILY
Status: DISCONTINUED | OUTPATIENT
Start: 2024-07-30 | End: 2024-08-02 | Stop reason: HOSPADM

## 2024-07-30 RX ADMIN — DIPHENHYDRAMINE HYDROCHLORIDE 50 MG: 50 CAPSULE ORAL at 10:27

## 2024-07-30 RX ADMIN — HYDROXYZINE HYDROCHLORIDE 25 MG: 25 TABLET ORAL at 17:13

## 2024-07-30 RX ADMIN — NICOTINE POLACRILEX 4 MG: 2 LOZENGE ORAL at 18:18

## 2024-07-30 RX ADMIN — OLANZAPINE 10 MG: 10 TABLET, FILM COATED ORAL at 17:52

## 2024-07-30 RX ADMIN — NICOTINE POLACRILEX 4 MG: 4 GUM, CHEWING BUCCAL at 10:04

## 2024-07-30 RX ADMIN — GUANFACINE 1 MG: 1 TABLET ORAL at 19:14

## 2024-07-30 RX ADMIN — OLANZAPINE 10 MG: 10 TABLET, FILM COATED ORAL at 08:55

## 2024-07-30 RX ADMIN — DIVALPROEX SODIUM 1750 MG: 500 TABLET, FILM COATED, EXTENDED RELEASE ORAL at 19:14

## 2024-07-30 RX ADMIN — DIPHENHYDRAMINE HYDROCHLORIDE 50 MG: 50 CAPSULE ORAL at 19:14

## 2024-07-30 RX ADMIN — NICOTINE POLACRILEX 4 MG: 2 LOZENGE ORAL at 08:55

## 2024-07-30 RX ADMIN — HALOPERIDOL 5 MG: 5 TABLET ORAL at 10:27

## 2024-07-30 RX ADMIN — NICOTINE POLACRILEX 4 MG: 2 LOZENGE ORAL at 11:17

## 2024-07-30 RX ADMIN — NICOTINE POLACRILEX 4 MG: 2 LOZENGE ORAL at 16:53

## 2024-07-30 RX ADMIN — HALOPERIDOL 5 MG: 5 TABLET ORAL at 19:14

## 2024-07-30 ASSESSMENT — ACTIVITIES OF DAILY LIVING (ADL)
ADLS_ACUITY_SCORE: 39
ORAL_HYGIENE: INDEPENDENT
ADLS_ACUITY_SCORE: 39
ADLS_ACUITY_SCORE: 29
ADLS_ACUITY_SCORE: 39
ADLS_ACUITY_SCORE: 29
ADLS_ACUITY_SCORE: 29
HYGIENE/GROOMING: INDEPENDENT;PROMPTS
ADLS_ACUITY_SCORE: 29
ADLS_ACUITY_SCORE: 29
ADLS_ACUITY_SCORE: 39
ADLS_ACUITY_SCORE: 29
HYGIENE/GROOMING: INDEPENDENT;PROMPTS
ADLS_ACUITY_SCORE: 29
ADLS_ACUITY_SCORE: 39
ADLS_ACUITY_SCORE: 39
ORAL_HYGIENE: INDEPENDENT
ADLS_ACUITY_SCORE: 39
ADLS_ACUITY_SCORE: 29
ADLS_ACUITY_SCORE: 39
ADLS_ACUITY_SCORE: 29
HYGIENE/GROOMING: INDEPENDENT;PROMPTS;SHOWER
DRESS: SCRUBS (BEHAVIORAL HEALTH)
DRESS: SCRUBS (BEHAVIORAL HEALTH)

## 2024-07-30 NOTE — PLAN OF CARE
Upcoming Meetings and Dates/Important Information  Days since IP admission 12  Team Note Due Friday  No future appointments.     Next Steps  Will need PD    Assessment/Intervention/Current Symptoms and Care Coordination  Chart Review  Met with team to discuss patient care.  Venancio was agitated at the desk this morning while his nurse was administering and documenting medications for another patient. I stepped in and he asked why he wasn't leaving today, the group home is ready for him. I informed him that I have not received that information from the group home and even so, he may not be ready for discharge. He asked why, I informed him that he is still pretty manic and that is when he escalated and was upset.  Yesterday, I completed a clinician administer danisha questionnaire (The Bech-Rafaelsen Danisha Scale) which he scored a 20 putting in  moderate danisha  range. I had him do the Brice Self-Rating Scale and entered in Epic, which he scored a 3, where a 1 or higher indicates a high probability of a manic or hypomanic condition and a need for treatment and/or further diagnostic workup.  I reviewed both scales with him, explained what behaviors I had been seeing that caused that score. He disagreed with several ratings, specifically the ones he thinks are caused by his untreated ADHD and even when I reduced those items, he still is in moderate danisha.  He endorsed understanding, but disagreed.  Inquired from provider about predicting pt readiness for discharge.    Discharge Plan or Goal  Dispo Plan: [place]  Transportation: TBD  Medication: TBD    Provisional discharge: Will need  Safety plan complete?: Not yet completed  Appointments:   TBD     Barriers to Discharge  patient requires further psychiatric stabilization due to current symptomology, medication management with possible adjustments    Expected Discharge Date: 08/02/2024        Discharge Comments: PD Revocation        Referral Status  No new  referral made    Legal Status  MI Commitment with Hand  95-WF- [facility]  Hand medications Haldol, Risperdal, Invega, Zyprexa, Seroquel, and Abilify   Stay of Commitment revoked and Commitment imposed 6/13/2024  PD from station 12 7/10/2024  PD Revoked 7/17/2024  60/90 Report 9/11/2024  Expires 12/13/2024    Carlos Gonzales  [employer]  Phone [phone]    Contacts  [facility]:  Norman [email or web address] [phone]  Darline Romo ( for penitentiary): [phone] - RENNY on file for penitentiary staff  Bree (penitentiary ): [phone] RENNY on file for group home staff  Email: [email or web address]     :  Chelsea Brennan ([facility])  Ph: [phone] - Commitment CM no RENNY needed   [email or web address]..  [facility]  Ph: [phone]  Christi (member of case management team): [phone]     Mother (Biological Aunt) - Yolanda ([phone])     Psychiatric Medication Management  MARIO Weinberg, CNP with [facility]  [address], [address], [place] [address]  Phone: [phone]  Fax: [phone]    Evelia with [employer] called requesting updates - [phone] [email or web address]

## 2024-07-30 NOTE — CARE PLAN
"  Rehab Group    Start time: 1015  End time: 1200  Patient time total: 35 minutes    came in and out of group session    #6 attended   Group Type: occupational therapy   Group Topic Covered: balanced lifestyle, coping skills, healthy leisure time, self-esteem, and social skills       Group Session Detail:  Hands on healthy leisure exploration with [employer] activity for concentration, fine motor skills, creative expression, organization/planning, follow through, calming through repetition, coping, reality-based activity, mood stabilization, building self-esteem, and an opportunity for socialization.       Patient Response/Contribution:  worked intermittently       Patient Detail:  Pt was in and out of group regularly.  He began to work on a small project, though he did not complete it.  A peer offered to finish his project, as he had requested it be ironed in it's incomplete version.  Pt thanked the peer for her offer and allowed her to complete it.  Pt demonstrated that he lacks insight to his illness stating he was admitted to the hospital because he was, \"laughing too much and they said I was manic.\"  Pt went on to say he wasn't doing anything illegal, but he shouldn't talk about it because then he would be \"labeled as paranoid.\"  Pt relayed that he has ADHD and struggles to concentrate as he isn't getting medication for this while here.  Pt was restless, blunted in affect, though social with peers.  Poor boundaries with others also noted.            No Charge    Patient Active Problem List   Diagnosis    Bipolar disorder (H)    ADHD (attention deficit hyperactivity disorder)    Bipolar I disorder with jose (H)    Cannabis abuse    Severe manic bipolar 1 disorder with psychotic behavior (H)       "

## 2024-07-30 NOTE — PROGRESS NOTES
"[facility], [place]   Psychiatric Progress Note        Interim History:   Team meeting report: The patient's care was discussed with the treatment team during the daily team meeting and/or staff's chart notes were reviewed.  Nursing staff reports the patient has been doing fairly well.  He feels his blood work in the morning.  Spent a lot of time in bed.  He was seen walking on the unit.  In the evening, the patient reported feeling sad and frustrated for being in the hospital.  He believes that he will be discharged the same day.  He was crying.  Denied all mental health symptoms.  In the evening, he felt agitated and requested as needed Zyprexa stating that it is helpful.  No anger outbursts. The patient continues to lack insight into his illness.  Believes he was admitted for laughing out loud.  Slept through the night.    Met with patient.  He is more organized.  He is smiling.  He is feeling good.  He.  He slept very well.  Appetite is good.  Denies feeling depressed or anxious.  Denies hallucinations, paranoia, delusions.  Reports being hospitalized for \"disobeying the rules in the group home\".  States states he has been \"high on weed\".  States he did not he did not know that he is using cannabis, \"somebody use my pipe without my permission and that is how I got it\".  The patient did not mention Adderall or Vyvanse.  He likes the guanfacine. Discussed disposition.  The patient feels that he is ready to go home.  Will discuss Haldol Decanoate tomorrow.    Had a conversation with the insurance company.  They suggest considering chemical dependency treatment as well as Haldol Decanoate.          Medications:     Current Facility-Administered Medications   Medication Dose Route Frequency Provider Last Rate Last Admin    divalproex sodium extended-release (DEPAKOTE ER) 24 hr tablet 1,750 mg  1,750 mg Oral QPM Sherif Clay APRN CNP   1,750 mg at 07/29/24 1939    " guanFACINE (TENEX) tablet 1 mg  1 mg Oral At Bedtime Sherif Clay APRN CNP   1 mg at 07/29/24 1940    LORazepam (ATIVAN) tablet 0.5 mg  0.5 mg Oral Every Other Day Sherif Clay APRN CNP   0.5 mg at 07/29/24 1939    OLANZapine (zyPREXA) tablet 10 mg  10 mg Oral Dai Grider APRN CNP   10 mg at 07/30/24 0855    Or    OLANZapine (zyPREXA) injection 10 mg  10 mg Intramuscular Dai Grider APRN CNP                Allergies:   No Known Allergies         Labs:     Recent Results (from the past 672 hour(s))   Urine Drug Screen Panel    Collection Time: 07/15/24  4:49 PM   Result Value Ref Range    Amphetamines Urine Screen Negative Screen Negative    Barbituates Urine Screen Negative Screen Negative    Benzodiazepine Urine Screen Negative Screen Negative    Cannabinoids Urine Screen Positive (A) Screen Negative    Cocaine Urine Screen Negative Screen Negative    Fentanyl Qual Urine Screen Negative Screen Negative    Opiates Urine Screen Negative Screen Negative    PCP Urine Screen Negative Screen Negative   Asymptomatic COVID-19 Virus (Coronavirus) by PCR Nasopharyngeal    Collection Time: 07/16/24  9:01 AM    Specimen: Nasopharyngeal; Swab   Result Value Ref Range    SARS CoV2 PCR Negative Negative   CBC with platelets and differential    Collection Time: 07/16/24  9:24 AM   Result Value Ref Range    WBC Count 9.6 4.0 - 11.0 10e3/uL    RBC Count 5.09 4.40 - 5.90 10e6/uL    Hemoglobin 14.8 13.3 - 17.7 g/dL    Hematocrit 44.5 40.0 - 53.0 %    MCV 87 78 - 100 fL    MCH 29.1 26.5 - 33.0 pg    MCHC 33.3 31.5 - 36.5 g/dL    RDW 13.5 10.0 - 15.0 %    Platelet Count 234 150 - 450 10e3/uL    % Neutrophils 48 %    % Lymphocytes 37 %    % Monocytes 7 %    % Eosinophils 7 %    % Basophils 1 %    % Immature Granulocytes 0 %    NRBCs per 100 WBC 0 <1 /100    Absolute Neutrophils 4.7 1.6 - 8.3 10e3/uL    Absolute Lymphocytes 3.5 0.8 - 5.3 10e3/uL    Absolute Monocytes 0.7  0.0 - 1.3 10e3/uL    Absolute Eosinophils 0.6 0.0 - 0.7 10e3/uL    Absolute Basophils 0.1 0.0 - 0.2 10e3/uL    Absolute Immature Granulocytes 0.0 <=0.4 10e3/uL    Absolute NRBCs 0.0 10e3/uL   Comprehensive metabolic panel    Collection Time: 07/16/24  9:25 AM   Result Value Ref Range    Sodium 139 135 - 145 mmol/L    Potassium 3.8 3.4 - 5.3 mmol/L    Carbon Dioxide (CO2) 22 22 - 29 mmol/L    Anion Gap 16 (H) 7 - 15 mmol/L    Urea Nitrogen 8.5 6.0 - 20.0 mg/dL    Creatinine 0.84 0.67 - 1.17 mg/dL    GFR Estimate >90 >60 mL/min/1.73m2    Calcium 8.9 8.6 - 10.0 mg/dL    Chloride 101 98 - 107 mmol/L    Glucose 167 (H) 70 - 99 mg/dL    Alkaline Phosphatase 55 40 - 150 U/L    AST 15 0 - 45 U/L    ALT 20 0 - 70 U/L    Protein Total 7.2 6.4 - 8.3 g/dL    Albumin 4.3 3.5 - 5.2 g/dL    Bilirubin Total 0.3 <=1.2 mg/dL   Valproic Acid Free & Total    Collection Time: 07/18/24  9:43 AM   Result Value Ref Range    Valproic Acid Free 12 7 - 23 ug/mL    Valproic Acid Total 80 50 - 125 ug/mL    Valproic Acid, Percent Free 15 5 - 18 %   TSH with free T4 reflex and/or T3 as indicated    Collection Time: 07/19/24  9:02 AM   Result Value Ref Range    TSH 1.44 0.30 - 4.20 uIU/mL   Valproic acid    Collection Time: 07/19/24  9:02 AM   Result Value Ref Range    Valproic acid 65.7   ug/mL   Extra Purple Top Tube    Collection Time: 07/19/24  9:02 AM   Result Value Ref Range    Hold Specimen JIC    Valproic acid    Collection Time: 07/26/24  7:08 PM   Result Value Ref Range    Valproic acid 45.0 (L)   ug/mL            Precautions:     Behavioral Orders   Procedures    Assault precautions    Cheeking Precautions (behavioral units)    Code 1 - Restrict to Unit    Elopement precautions    Routine Programming     As clinically indicated    Status 15     Every 15 minutes.            Psychiatric Examination:   Temp: 97.9  F (36.6  C) Temp src: Oral BP: 126/83 Pulse: 90   Resp: 16 SpO2: 98 % O2 Device: None (Room air)    Weight is 219 lbs 12.8 oz   Body mass index is 29.81 kg/m .    Appearance: awake, alert and adequately groomed  Attitude:  cooperative and evasive  Eye Contact:  good  Mood:  anxious, depressed, and better  Affect:  intensity is heightened  Speech:  pressured speech  Psychomotor Behavior:  no evidence of tardive dyskinesia, dystonia, or tics  Throught Process:  linear, disorganized, illogical, tangental, and circumstantial  Associations:  no loose associations  Thought Content:  no evidence of suicidal ideation or homicidal ideation, no auditory hallucinations present, and no visual hallucinations present  Insight:  limited  Judgement:  limited  Oriented to:  time, person, and place  Attention Span and Concentration:  limited  Recent and Remote Memory:  limited         Precautions:     Behavioral Orders   Procedures    Assault precautions    Cheeking Precautions (behavioral units)    Code 1 - Restrict to Unit    Elopement precautions    Routine Programming     As clinically indicated    Status 15     Every 15 minutes.          DIagnoses:   Bipolar disorder type 1, manic severe, with psychosis  Historical diagnosis of ADHD  Cannabis use disorder  Polysubstance abuse  Nicotine use disorder         Plan:   Medications:    --Received the first loading dose of Invega Sustenna 234 mg on 7/9 and he did not receive the second loading dose of 156 mg on 7/15, so it was administered 7/19.  Per notes from his previous hospitalization, Invega Sustenna 117 mg was scheduled for 8/15; consider increasing this dose.   --Discontinue Zyprexa, doesn't appear to be helpful.  --Start Haldol 5 mg, bid. Backed up by IM. Patient is Jarviced  --Start Benadryl 50 mg, bid.     --Reduce Ativan to 0.5 mg at HS, plan to discontinue.       --Change to every other night x 3 doses, than discontinue  --Depakote ER 1750 mg at HS. Valproic acid, platelets, LFT on 7/31   --Start Tenex 1 mg, qhs for ADHD  --PRNs of Haldol & Benadryl should be used for first line treatment of  agitation.    --Continue PRNs of Zyprexa, Trazodone and Hydroxyzine.     Medical:  --Internal medicine to follow up for medical problems       Consults:   --Care was coordinated with the treatment team.   --The patient was consulted on nature of illness and treatment options.      Disposition Plan   Reason for ongoing admission: is unable to care for self due to severe psychosis or jose  Discharge location: group home  Discharge Medications: not ordered  Follow-up Appointments: not scheduled  Discharge will be granted once symptoms improved.    Legal Status  MI Commitment with Hand  24-UY- [facility]  Hand medications Haldol, Risperdal, Invega, Zyprexa, Seroquel, and Abilify   Stay of Commitment revoked and Commitment imposed 6/13/2024  PD from station 12 7/10/2024  PD Revoked 7/17/2024  60/90 Report 9/11/2024  Expires 12/13/2024     Carlos Gonzales  [employer]  Phone [phone]     Contacts  [facility]:  Norman [email or web address] [phone]  Darline Romo ( for senior care): [phone] - RENNY on file for senior care staff  Bree (senior care ): [phone] RENNY on file for group home staff  Email: [email or web address]     :  Chelsea Brennan ([facility])  Ph: [phone] - Commitment CM no RENNY needed   [email or web address]  [facility]  Ph: [phone]  Christi (member of case management team): [phone]     Mother (Biological Aunt) - Yolanda ([phone])      Psychiatric Medication Management  MARIO Weinberg, CNP with [facility]  [address] , [facility] [address], [place] [address]  Phone: [phone]  Fax: [phone]     Evelia with [employer] called requesting updates - [phone] [email or web address]     Aneliya Miltcheva APRN, CNP    This note was created with the help of Dragon dictation system. All grammatical/typing errors or context distortion are  unintentional and inherent to software.

## 2024-07-30 NOTE — PLAN OF CARE
BEH IP Unit Acuity Rating Score (UARS)  Patient is given one point for every criteria they meet.    CRITERIA SCORING   On a 72 hour hold, court hold, committed, stay of commitment, or revocation. 1    Patient LOS on BEH unit exceeds 20 days. 0  LOS: 12   Patient under guardianship, 55+, otherwise medically complex, or under age 11. 0   Suicide ideation without relief of precipitating factors. 0   Current plan for suicide. 0   Current plan for homicide. 0   Imminent risk or actual attempt to seriously harm another without relief of factors precipitating the attempt. 0   Severe dysfunction in daily living (ex: complete neglect for self care, extreme disruption in vegetative function, extreme deterioration in social interactions). 1   Recent (last 7 days) or current physical aggression in the ED or on unit. 0   Restraints or seclusion episode in past 72 hours. 0   Recent (last 7 days) or current verbal aggression, agitation, yelling, etc., while in the ED or unit. 0   Active psychosis. 0   Need for constant or near constant redirection (from leaving, from others, etc).  0   Intrusive or disruptive behaviors. 1   Patient requires 3 or more hours of individualized nursing care per 8-hour shift (i.e. for ADLs, meds, therapeutic interventions). 1   TOTAL 6

## 2024-07-30 NOTE — PLAN OF CARE
"Problem: Psychotic Signs/Symptoms  Goal: Improved Mood Symptoms (Psychotic Signs/Symptoms)  Outcome: Progressing  Intervention: Optimize Emotion and Mood  Recent Flowsheet Documentation  Taken 7/30/2024 1153 by Roshni Cummings RN  Diversional Activity:   television   music   Goal Outcome Evaluation:     Plan of Care Reviewed With: patient       Patient was labile this morning. He said that he thought he would be leaving today or tomorrow and had a difficult time receiving feedback from staff that he still has manic symptoms that may prevent him from leaving that quickly. He cried loudly and raised his voice at times. He said, \"What am I doing wrong? Why are you guys preventing me from leaving?\" He did not have much insight into his symptoms.CTC met later in the morning with patient to go over symptoms. Patient received Haldol and Benadryl at around 1030 for symptoms of agitation. He has since not had any behavioral events. He went to group this morning. He has been eating well at meals. He was compliant with medications.           "

## 2024-07-30 NOTE — PLAN OF CARE
Pt appeared to sleep 6.5 hours. No concerns reported. Safety checks done at least every 15 minutes.

## 2024-07-31 PROCEDURE — 124N000002 HC R&B MH UMMC

## 2024-07-31 PROCEDURE — 90832 PSYTX W PT 30 MINUTES: CPT | Performed by: COUNSELOR

## 2024-07-31 PROCEDURE — 250N000013 HC RX MED GY IP 250 OP 250 PS 637: Performed by: NURSE PRACTITIONER

## 2024-07-31 PROCEDURE — 250N000013 HC RX MED GY IP 250 OP 250 PS 637: Performed by: PSYCHIATRY & NEUROLOGY

## 2024-07-31 PROCEDURE — 250N000013 HC RX MED GY IP 250 OP 250 PS 637: Performed by: CLINICAL NURSE SPECIALIST

## 2024-07-31 PROCEDURE — 97150 GROUP THERAPEUTIC PROCEDURES: CPT | Mod: GO

## 2024-07-31 RX ORDER — DIVALPROEX SODIUM 250 MG/1
1750 TABLET, EXTENDED RELEASE ORAL EVERY EVENING
Qty: 210 TABLET | Refills: 0 | Status: SHIPPED | OUTPATIENT
Start: 2024-07-31

## 2024-07-31 RX ORDER — DIPHENHYDRAMINE HCL 50 MG
50 CAPSULE ORAL 2 TIMES DAILY
Qty: 60 CAPSULE | Refills: 0 | Status: SHIPPED | OUTPATIENT
Start: 2024-07-31

## 2024-07-31 RX ORDER — OLANZAPINE 10 MG/1
10 TABLET ORAL 3 TIMES DAILY PRN
Qty: 90 TABLET | Refills: 0 | Status: SHIPPED | OUTPATIENT
Start: 2024-07-31

## 2024-07-31 RX ORDER — HYDROXYZINE HYDROCHLORIDE 25 MG/1
25 TABLET, FILM COATED ORAL 3 TIMES DAILY PRN
Qty: 90 TABLET | Refills: 0 | Status: SHIPPED | OUTPATIENT
Start: 2024-07-31

## 2024-07-31 RX ORDER — POLYETHYLENE GLYCOL 3350 17 G
4 POWDER IN PACKET (EA) ORAL
Qty: 108 LOZENGE | Refills: 0 | Status: SHIPPED | OUTPATIENT
Start: 2024-07-31

## 2024-07-31 RX ORDER — TRAZODONE HYDROCHLORIDE 50 MG/1
50 TABLET, FILM COATED ORAL
Qty: 30 TABLET | Refills: 0 | Status: SHIPPED | OUTPATIENT
Start: 2024-07-31

## 2024-07-31 RX ORDER — GUANFACINE 1 MG/1
1 TABLET ORAL AT BEDTIME
Qty: 30 TABLET | Refills: 0 | Status: SHIPPED | OUTPATIENT
Start: 2024-07-31

## 2024-07-31 RX ORDER — HALOPERIDOL 5 MG/1
5 TABLET ORAL 2 TIMES DAILY
Qty: 60 TABLET | Refills: 0 | Status: SHIPPED | OUTPATIENT
Start: 2024-07-31

## 2024-07-31 RX ORDER — DIPHENHYDRAMINE HCL 25 MG
25 CAPSULE ORAL 3 TIMES DAILY PRN
Qty: 90 CAPSULE | Refills: 0 | Status: SHIPPED | OUTPATIENT
Start: 2024-07-31

## 2024-07-31 RX ADMIN — NICOTINE POLACRILEX 4 MG: 4 GUM, CHEWING BUCCAL at 09:22

## 2024-07-31 RX ADMIN — DIPHENHYDRAMINE HYDROCHLORIDE 25 MG: 25 CAPSULE ORAL at 16:20

## 2024-07-31 RX ADMIN — HALOPERIDOL 5 MG: 5 TABLET ORAL at 09:11

## 2024-07-31 RX ADMIN — NICOTINE POLACRILEX 4 MG: 2 LOZENGE ORAL at 10:21

## 2024-07-31 RX ADMIN — DIVALPROEX SODIUM 1750 MG: 500 TABLET, FILM COATED, EXTENDED RELEASE ORAL at 20:14

## 2024-07-31 RX ADMIN — NICOTINE POLACRILEX 4 MG: 2 LOZENGE ORAL at 16:19

## 2024-07-31 RX ADMIN — DIPHENHYDRAMINE HYDROCHLORIDE 50 MG: 50 CAPSULE ORAL at 20:15

## 2024-07-31 RX ADMIN — LORAZEPAM 0.5 MG: 0.5 TABLET ORAL at 20:15

## 2024-07-31 RX ADMIN — HALOPERIDOL 5 MG: 5 TABLET ORAL at 16:21

## 2024-07-31 RX ADMIN — GUANFACINE 1 MG: 1 TABLET ORAL at 20:15

## 2024-07-31 RX ADMIN — DIPHENHYDRAMINE HYDROCHLORIDE 50 MG: 50 CAPSULE ORAL at 09:11

## 2024-07-31 RX ADMIN — HYDROXYZINE HYDROCHLORIDE 25 MG: 25 TABLET ORAL at 18:07

## 2024-07-31 RX ADMIN — NICOTINE POLACRILEX 4 MG: 2 LOZENGE ORAL at 18:07

## 2024-07-31 RX ADMIN — HALOPERIDOL 5 MG: 5 TABLET ORAL at 20:15

## 2024-07-31 RX ADMIN — HYDROXYZINE HYDROCHLORIDE 25 MG: 25 TABLET ORAL at 11:10

## 2024-07-31 ASSESSMENT — ACTIVITIES OF DAILY LIVING (ADL)
ADLS_ACUITY_SCORE: 39
DRESS: SCRUBS (BEHAVIORAL HEALTH)
ORAL_HYGIENE: INDEPENDENT
ADLS_ACUITY_SCORE: 39
HYGIENE/GROOMING: INDEPENDENT;PROMPTS
ADLS_ACUITY_SCORE: 39
HYGIENE/GROOMING: INDEPENDENT;PROMPTS
ADLS_ACUITY_SCORE: 39
LAUNDRY: WITH SUPERVISION
DRESS: SCRUBS (BEHAVIORAL HEALTH)
ADLS_ACUITY_SCORE: 39
ORAL_HYGIENE: INDEPENDENT
ADLS_ACUITY_SCORE: 39

## 2024-07-31 NOTE — PLAN OF CARE
"Pt refused blood draw for lab this morning. Pt stated \"No, not now, No, not going to happen this morning.   "

## 2024-07-31 NOTE — PLAN OF CARE
Pt appeared to sleep 6.75 hours. No concerns reported. Safety checks done at least every 15 minutes.

## 2024-07-31 NOTE — PLAN OF CARE
"Goal Outcome Evaluation:  Individual Therapy Note      Date of Service: July 31, 2024    Patient: Venancio goes by \"Venancio,\" uses he/him pronouns    Individuals Present: Venancio DOLORES Barrett    Session start: 11:30 am  Session end: 11:50 am  Session duration in minutes: 20      Modality Used: Person Centered, Brief Therapy, and mindfulness    Goals: check in, pt said they are worrying a lot, mostly fixated on not having his ADHD medication and worried about if he will be able to have in the future. Writer worked with him with mindfulness, to avoid excessive worry and to get him toward discharge later this week.    Patient Description of current symptoms: worry, in my head     Mental Status Exam:   Attitude: guarded and somewhat cooperative  Eye Contact: fair  Mood: anxious  Affect: intensity is heightened  Speech: paucity of speech  Psychomotor Behavior: no evidence of tardive dyskinesia, dystonia, or tics  Thought Process:  linear and illogical  Associations: no loose associations  Thought Content: no evidence of suicidal ideation or homicidal ideation  Insight: limited  Judgement: limited  Attention Span and Concentration: limited    Pt progress: Pt is moving towards a discharge later this week to a new group home.    Treatment Objective(s) Addressed:   The focus of this session was on orienting the patient to therapy, identifying and practicing coping strategies, and building distress tolerance     Progress Towards Goals and Assessment of Patient:   Patient is making progress towards treatment goals as evidenced by planned discharge pt says it is hard to focus in groups.       Therapeutic Intervention(s):   Provided active listening, unconditional positive regard, and validation.   Explored barriers to safety in the community, no insight as to why he was hospitalized he reports \" no idea\"      Plan/next step: discharge to a new group home    39868 - Psychotherapy (with patient) - 30 (16-37*) min    Patient " Active Problem List   Diagnosis    Bipolar disorder (H)    ADHD (attention deficit hyperactivity disorder)    Bipolar I disorder with jose (H)    Cannabis abuse    Severe manic bipolar 1 disorder with psychotic behavior (H)

## 2024-07-31 NOTE — PLAN OF CARE
Goal Outcome Evaluation:    Plan of Care Reviewed With: patient Plan of Care Reviewed With: patient    Overall Patient Progress: improvingOverall Patient Progress: improving      Problem: Psychotic Signs/Symptoms  Goal: Improved Behavioral Control (Psychotic Signs/Symptoms)  Outcome: Progressing       Patient was in the room at the beginning of the shift, on check in pt reported feeling sad and frustrated since he thought he was going to discharge today. Patient was able to warm up as the shift progressed, denied SI/SIB/HI, denied AVH, but endorsed some anxiety and depression r/t hospitalization. Patient requested and was given prn nicotine lozengeX2 for nicotine dependance, prn hydroxyzine 25mg for anxiety and prn zyprexa for agitation. Patient reported that the medications were effective. Patient requested and had a shower, changed to clean scrubs. Patient eats and drinks okay, no anger or behavioral outbursts during shift.  Blood pressure 129/80, pulse 110, temperature 98.7  F (37.1  C), temperature source Oral, resp. rate 18, height 1.829 m (6'), weight 99.7 kg (219 lb 12.8 oz), SpO2 98%.

## 2024-07-31 NOTE — PLAN OF CARE
"Problem: Psychotic Signs/Symptoms  Goal: Improved Behavioral Control (Psychotic Signs/Symptoms)  Outcome: Progressing  Intervention: Manage Behavior  Recent Flowsheet Documentation  Taken 7/31/2024 0995 by Roshni Cummings RN  De-Escalation Techniques:   diversional activity encouraged   appropriate behavior reinforced   Goal Outcome Evaluation:     Plan of Care Reviewed With: patient   Patient has not been labile this shift. He has been cooperative. He was compliant with medications. He was happy that scheduled Zyprexa was discontinued and Haldol and Benadryl was started. Patient has been quieter on the unit today. He said his mood was \"good.\" He is happy because he got word that he will discharge Friday. Patient went briefly to group this morning. He napped after lunch.   Blood pressure 116/84, pulse (!) 125, temperature 98  F (36.7  C), temperature source Temporal, resp. rate 17, height 1.829 m (6'), weight 99.7 kg (219 lb 12.8 oz), SpO2 96%. Provider notified about pulse. Patient had been moving around a lot prior to vital signs being checked.           "

## 2024-07-31 NOTE — PLAN OF CARE
"  Rehab Group    Start time: 1015  End time: 1200  Patient time total: 35 minutes    came in and out of group session    #5 attended   Group Type: occupational therapy   Group Topic Covered: cognitive activities, coping skills, healthy leisure time, and problem solving   Group Session Detail: OT: Education on healthy leisure engagement with creative hands-on endeavor (beadie animal) to increase following directions, sequencing, concentration, focus, attention to task/detail, task follow through, problem solving, improving feelings of self-worth, planning, healthy leisure engagement, healthy distraction engagement, and coping with stress    Patient Response/Contribution:  left group on several occasions, inattentive, and semi-disruptive; redirectable, restless   Patient Detail: Pt sat among peers for check-in and education regarding OT role on the unit. Pt engaged in semi-disruptive reciprocal social interactions with peers; pt redirectable. Pt reported during check-in that \"maybe discharging this week\" is something that is going well for him recently. After receiving education about presented task, pt stated he didn't think he could complete task due to his inability to concentrate. Pt elected to draw on a blank piece of paper. Pt katerina 2-3- pictures while engaging in social interactions with peers. Pt appeared restless as left group and returned multiple times.       [id number] OT Group (2 or more in attendance)    Patient Active Problem List   Diagnosis    Bipolar disorder (H)    ADHD (attention deficit hyperactivity disorder)    Bipolar I disorder with jose (H)    Cannabis abuse    Severe manic bipolar 1 disorder with psychotic behavior (H)         "

## 2024-07-31 NOTE — PLAN OF CARE
BEH IP Unit Acuity Rating Score (UARS)  Patient is given one point for every criteria they meet.    CRITERIA SCORING   On a 72 hour hold, court hold, committed, stay of commitment, or revocation. 1    Patient LOS on BEH unit exceeds 20 days. 0  LOS: 13   Patient under guardianship, 55+, otherwise medically complex, or under age 11. 0   Suicide ideation without relief of precipitating factors. 0   Current plan for suicide. 0   Current plan for homicide. 0   Imminent risk or actual attempt to seriously harm another without relief of factors precipitating the attempt. 0   Severe dysfunction in daily living (ex: complete neglect for self care, extreme disruption in vegetative function, extreme deterioration in social interactions). 1   Recent (last 7 days) or current physical aggression in the ED or on unit. 0   Restraints or seclusion episode in past 72 hours. 0   Recent (last 7 days) or current verbal aggression, agitation, yelling, etc., while in the ED or unit. 0   Active psychosis. 0   Need for constant or near constant redirection (from leaving, from others, etc).  0   Intrusive or disruptive behaviors. 1   Patient requires 3 or more hours of individualized nursing care per 8-hour shift (i.e. for ADLs, meds, therapeutic interventions). 1   TOTAL 4

## 2024-07-31 NOTE — DISCHARGE INSTRUCTIONS
Behavioral Discharge Planning and Instructions    Summary: You were admitted on 7/15/2024 due to agitation and psychosis.  You were treated by MARIO Miller CNP and MARIO Navarro CNP for ongoing psychiatric assessment and medication management.  You had opportunities to participate in therapeutic groups on the unit. You were discharged on 2024 from [facility] to [facility] located at [address] [place], [place] [id number].    Commitment:  On 2024 you were committed as a person who poses a risk of harm due to a mental illness to the Commissioner of Human Services. You are being discharged on a Provisional Discharge Agreement which shall remain in effect until the end of the commitment period which will  on 2024 unless further ordered from the court.   You were also ordered to take the medications your provider prescribed for you.     You have been assigned a  who will supervise your commitment and report your progress to the courts  Chelsea Brennan  [facility]  [address] [place] [address]  Phone: [phone]  Cell: [phone]      Main Diagnosis:   Bipolar disorder type 1, manic severe, with psychosis  Historical diagnosis of ADHD  Cannabis use disorder  Polysubstance abuse  Nicotine use disorder    Health Care Follow-up:   Psychiatric Medication Management  at 8:45 AM in person  MARIO Weinberg, PMHNP-BC at [address], [address] [place] [address]  Phone [phone] Fax [phone]    Medication Management Plan regarding your long acting injectable  You received your first loading dose of Paliperidone (INVEGA SUSTENNA) 234 MG/0.75ML HUBERT on . You were due for your second loading dose of Paliperidone (INVEGA SUSTENNA) 156 MG/0.75ML HUBERT on  which you did not receive so it was  administered upon your admission on July 19th. Your next dose of Paliperidone (INVEGA SUSTENNA) 117 MG/0.75ML HUBERT is due August 15th and every 28 days thereafter. The RN with [facility] will be able to administer this. [place] [facility] [place] [address], [address]     Attend all scheduled appointments with your outpatient providers. Call at least 24 hours in advance if you need to reschedule an appointment to ensure continued access to your outpatient providers.     Major Treatments, Procedures and Findings:  You were provided with: a psychiatric assessment, assessed for medical stability, medication evaluation and/or management, group therapy, individual therapy, and milieu management    Symptoms to Report: feeling more aggressive, increased confusion, losing more sleep, mood getting worse, or thoughts of suicide    Early warning signs can include: increased depression or anxiety sleep disturbances increased thoughts or behaviors of suicide or self-harm  increased unusual thinking, such as paranoia or hearing voices    Safety and Wellness:  Take all medicines as directed.  Make no changes unless your doctor suggests them.      Follow treatment recommendations.  Refrain from alcohol and non-prescribed drugs.  If there is a concern for safety, call 911.    Resources:   Mental Health Crisis Resources  Throughout [place]: call **CRISIS (**790394)  Crisis Text Line: is available for free, 24/7 by texting [place] to 406157  Suicide Awareness Voices of Education (SAVE) (www.save.org): 528-707-AZTO (7981)  The National Suicide Prevention Lifeline is now: 988 Suicide and Crisis Lifeline. Call 988 anytime.  National Trafford on Mental Illness (www.[place].torsten.org): 981-469-7414 or 026-907-7404.  Xcet0stmm: text the word LIFE to 14532 for immediate support and crisis intervention  Mental Health Consumer/Survivor  Network of [place] (www.mhcsn.net): 320-482-4473 or 147-032-7455  Mental Health Association of [place] (www.mentalhealth.org): 733.215.7610 or 744-960-0370  Peer Support Connection [place] Warmline (PSC) 1-359.559.2437 Available from 5pm - 9am (7 days a week/365 days a year)  [facility] 2-493-105-1667 Community Outreach for Psych Emergencies  Detox Facilities  Withdrawal Management ([address] [phone]  [facility] (in [place]) [phone]  [facility] 540-628-2216  12-Step Support Groups  Alcoholics Anonymous  www.[place].org 148-512-5497 ([facility])  www.aastpaul.org 580-112-4485 ([facility])  Narcotics Anonymous www.[employer]is[employer] 965-995-8603  Marijuana Anonymous www.marijuana-anonymous.o  043-900-7323  Crystal Meth Anonymous www.crystalmeth.Seisquare  Cocaine Anonymous www.IDRI (Infectious Disease Research Institute)n.[employer]  Non-12 Step Support Groups  Addiction Busters at [facility] http://www.[facility].org/program1.html  Smart Recovery www.smartrecovery.org  Recovery Robert recoverydharma.org    General Medication Instructions:   See your medication sheet(s) for instructions.   Take all medicines as directed.  Make no changes unless your doctor suggests them.   Go to all your doctor visits.  Be sure to have all your required lab tests. This way, your medicines can be refilled on time.  Do not use any drugs not prescribed by your doctor.  Avoid alcohol.    Advance Directives:   Scanned document on file with [employer]? No scanned doc  Is document scanned? Pt states no documents  Honoring Choices Your Rights Handout: Informed and given  Was more information offered? Pt declined    The Treatment team has appreciated the opportunity to work with you. If you have any questions or concerns about your recent admission, you can contact the unit which can receive your call 24 hours a day, 7 days a week. They will be able to get in touch with a Provider if needed. The unit number is [phone].

## 2024-07-31 NOTE — PLAN OF CARE
Upcoming Meetings and Dates/Important Information  Days since IP admission 13  Team Note Due Friday  No future appointments.     Next Steps  Will need PD    Assessment/Intervention/Current Symptoms and Care Coordination  Chart Review  Met with team to discuss patient care.  Called and emailed José Miguel at [facility] and inquired if they are prepared to take pt today otherwise we can plan for tomorrow. Asked for address, where to send meds, and any plans for transportation.  Called [employer] at [phone] and scheduled pt with his medication management.  Updated AVS  Emailed [employer] Tran with update that pt discontinue could be today or tomorrow.  Completed PD Draft, need discharge date and address.  Spoke with José Miguel who can make sure the house is staffed Friday morning or Thursday and of business day. He will email me the address.    Discharge Plan or Goal  Dispo Plan: New house at [facility]  Transportation: TBD  Medication: TBD    Provisional discharge: Will need  Safety plan complete?: Completed 7/19/2024  Appointments:   Scheduled med management    Barriers to Discharge  patient requires further psychiatric stabilization due to current symptomology, medication management with possible adjustments    Expected Discharge Date: 08/02/2024        Discharge Comments: PD Revocation        Referral Status  No new referral made    Legal Status  MI Commitment with [employer]  [facility]- [facility]  [employer] medications Haldol, Risperdal, Invega, Zyprexa, Seroquel, and Abilify   Stay of Commitment revoked and Commitment imposed 6/13/2024  PD from station 12 7/10/2024  PD Revoked 7/17/2024  60/90 Report 9/11/2024  Expires 12/13/2024    Carlos Gonzales  [employer]  Phone [phone]    Contacts  [facility]:  Norman [email or web address] [phone]  Darline Romo ( for detention): [phone] - RENNY on file for detention staff  [place] (detention ): [phone]  RENNY on file for group home staff  Email: [email or web address]     :  Chelsea Brennan ([facility])  Ph: [phone] - Commitment  no RENNY needed   [email or web address]  [facility]  Ph: [phone]  Christi (member of case management team): [phone]     Mother (Biological Aunt) - Yolanda ([phone])     Psychiatric Medication Management  MARIO Weinberg, CNP with [facility]  [address], [place] [address]  Phone: [phone]  Fax: [phone]    Evelia with [employer] called requesting updates - [phone] [email or web address]

## 2024-08-01 LAB
ALBUMIN SERPL BCG-MCNC: 4.7 G/DL (ref 3.5–5.2)
ALP SERPL-CCNC: 55 U/L (ref 40–150)
ALT SERPL W P-5'-P-CCNC: 31 U/L (ref 0–70)
AST SERPL W P-5'-P-CCNC: 20 U/L (ref 0–45)
BILIRUB DIRECT SERPL-MCNC: <0.2 MG/DL (ref 0–0.3)
BILIRUB SERPL-MCNC: 0.2 MG/DL
PLATELET # BLD AUTO: 259 10E3/UL (ref 150–450)
PROT SERPL-MCNC: 7.3 G/DL (ref 6.4–8.3)
VALPROATE SERPL-MCNC: 65.6 UG/ML

## 2024-08-01 PROCEDURE — 99232 SBSQ HOSP IP/OBS MODERATE 35: CPT | Performed by: NURSE PRACTITIONER

## 2024-08-01 PROCEDURE — 250N000013 HC RX MED GY IP 250 OP 250 PS 637: Performed by: PSYCHIATRY & NEUROLOGY

## 2024-08-01 PROCEDURE — 250N000013 HC RX MED GY IP 250 OP 250 PS 637: Performed by: NURSE PRACTITIONER

## 2024-08-01 PROCEDURE — 250N000013 HC RX MED GY IP 250 OP 250 PS 637: Performed by: CLINICAL NURSE SPECIALIST

## 2024-08-01 PROCEDURE — 80164 ASSAY DIPROPYLACETIC ACD TOT: CPT | Performed by: PSYCHIATRY & NEUROLOGY

## 2024-08-01 PROCEDURE — 36415 COLL VENOUS BLD VENIPUNCTURE: CPT | Performed by: PSYCHIATRY & NEUROLOGY

## 2024-08-01 PROCEDURE — 85049 AUTOMATED PLATELET COUNT: CPT | Performed by: PSYCHIATRY & NEUROLOGY

## 2024-08-01 PROCEDURE — 124N000002 HC R&B MH UMMC

## 2024-08-01 PROCEDURE — 80076 HEPATIC FUNCTION PANEL: CPT | Performed by: PSYCHIATRY & NEUROLOGY

## 2024-08-01 RX ADMIN — HALOPERIDOL 5 MG: 5 TABLET ORAL at 18:19

## 2024-08-01 RX ADMIN — HALOPERIDOL 5 MG: 5 TABLET ORAL at 21:12

## 2024-08-01 RX ADMIN — NICOTINE POLACRILEX 4 MG: 2 LOZENGE ORAL at 10:35

## 2024-08-01 RX ADMIN — HALOPERIDOL 5 MG: 5 TABLET ORAL at 08:49

## 2024-08-01 RX ADMIN — DIPHENHYDRAMINE HYDROCHLORIDE 50 MG: 50 CAPSULE ORAL at 08:49

## 2024-08-01 RX ADMIN — DIPHENHYDRAMINE HYDROCHLORIDE 25 MG: 25 CAPSULE ORAL at 18:17

## 2024-08-01 RX ADMIN — DIVALPROEX SODIUM 1750 MG: 500 TABLET, FILM COATED, EXTENDED RELEASE ORAL at 19:21

## 2024-08-01 RX ADMIN — NICOTINE POLACRILEX 4 MG: 2 LOZENGE ORAL at 17:24

## 2024-08-01 RX ADMIN — NICOTINE POLACRILEX 4 MG: 4 GUM, CHEWING BUCCAL at 08:49

## 2024-08-01 RX ADMIN — HYDROXYZINE HYDROCHLORIDE 25 MG: 25 TABLET ORAL at 19:22

## 2024-08-01 RX ADMIN — GUANFACINE 1 MG: 1 TABLET ORAL at 19:22

## 2024-08-01 RX ADMIN — DIPHENHYDRAMINE HYDROCHLORIDE 50 MG: 50 CAPSULE ORAL at 21:12

## 2024-08-01 ASSESSMENT — ACTIVITIES OF DAILY LIVING (ADL)
ADLS_ACUITY_SCORE: 39
ADLS_ACUITY_SCORE: 29
ADLS_ACUITY_SCORE: 39
DRESS: STREET CLOTHES
ADLS_ACUITY_SCORE: 29
LAUNDRY: WITH SUPERVISION
ADLS_ACUITY_SCORE: 39
HYGIENE/GROOMING: INDEPENDENT
ADLS_ACUITY_SCORE: 39
ORAL_HYGIENE: INDEPENDENT
ADLS_ACUITY_SCORE: 39

## 2024-08-01 NOTE — PROGRESS NOTES
[facility], [place]   Psychiatric Progress Note        Interim History:   Team meeting report: The patient's care was discussed with the treatment team during the daily team meeting and/or staff's chart notes were reviewed.  Nursing staff reports the patient has been doing well.  He is calm, pleasant, cooperative.  He refused his blood work in the morning.  He is happy with Zyprexa being discontinued.  Reported good mood.  He is happy with possible discharge on Friday. Attended some groups.  In the evening, the patient remained calm and cooperative.  No behavioral issues.  Took extra medications.  Looking forward to discharge on Friday.    Met with patient.  Affect is brighter.  He is doing well.  Thoughts are more organized.  He is no longer tangential.  States he is sleeping well.  Appetite is intact.  Does not feel depressed or anxious.  Denies hallucinations, paranoia, and delusions.  Discussed disposition.  He is happy about going on Friday.  Discussed Haldol decannulated.  The patient is not agreeable.  Education about the use of 3 substances was provided.  No other questions or concerns.    Had a conversation with the insurance company.  They suggest considering chemical dependency treatment as well as Haldol Decanoate.          Medications:     Current Facility-Administered Medications   Medication Dose Route Frequency Provider Last Rate Last Admin    diphenhydrAMINE (BENADRYL) capsule 50 mg  50 mg Oral BID Sherif Clay APRN CNP   50 mg at 08/01/24 0849    divalproex sodium extended-release (DEPAKOTE ER) 24 hr tablet 1,750 mg  1,750 mg Oral QPM Sherif Clay APRN CNP   1,750 mg at 07/31/24 2014    guanFACINE (TENEX) tablet 1 mg  1 mg Oral At Bedtime Sherif Clay APRN CNP   1 mg at 07/31/24 2015    haloperidol (HALDOL) tablet 5 mg  5 mg Oral BID Sherif Clay APRN CNP   5 mg at 08/01/24 0849    Or    haloperidol  lactate (HALDOL) injection 5 mg  5 mg Intramuscular BID Sherif Clay APRN CNP        LORazepam (ATIVAN) tablet 0.5 mg  0.5 mg Oral Every Other Day HelderSherif templeton APRN CNP   0.5 mg at 07/31/24 2015            Allergies:   No Known Allergies         Labs:     Recent Results (from the past 672 hour(s))   Urine Drug Screen Panel    Collection Time: 07/15/24  4:49 PM   Result Value Ref Range    Amphetamines Urine Screen Negative Screen Negative    Barbituates Urine Screen Negative Screen Negative    Benzodiazepine Urine Screen Negative Screen Negative    Cannabinoids Urine Screen Positive (A) Screen Negative    Cocaine Urine Screen Negative Screen Negative    Fentanyl Qual Urine Screen Negative Screen Negative    Opiates Urine Screen Negative Screen Negative    PCP Urine Screen Negative Screen Negative   Asymptomatic COVID-19 Virus (Coronavirus) by PCR Nasopharyngeal    Collection Time: 07/16/24  9:01 AM    Specimen: Nasopharyngeal; Swab   Result Value Ref Range    SARS CoV2 PCR Negative Negative   CBC with platelets and differential    Collection Time: 07/16/24  9:24 AM   Result Value Ref Range    WBC Count 9.6 4.0 - 11.0 10e3/uL    RBC Count 5.09 4.40 - 5.90 10e6/uL    Hemoglobin 14.8 13.3 - 17.7 g/dL    Hematocrit 44.5 40.0 - 53.0 %    MCV 87 78 - 100 fL    MCH 29.1 26.5 - 33.0 pg    MCHC 33.3 31.5 - 36.5 g/dL    RDW 13.5 10.0 - 15.0 %    Platelet Count 234 150 - 450 10e3/uL    % Neutrophils 48 %    % Lymphocytes 37 %    % Monocytes 7 %    % Eosinophils 7 %    % Basophils 1 %    % Immature Granulocytes 0 %    NRBCs per 100 WBC 0 <1 /100    Absolute Neutrophils 4.7 1.6 - 8.3 10e3/uL    Absolute Lymphocytes 3.5 0.8 - 5.3 10e3/uL    Absolute Monocytes 0.7 0.0 - 1.3 10e3/uL    Absolute Eosinophils 0.6 0.0 - 0.7 10e3/uL    Absolute Basophils 0.1 0.0 - 0.2 10e3/uL    Absolute Immature Granulocytes 0.0 <=0.4 10e3/uL    Absolute NRBCs 0.0 10e3/uL   Comprehensive metabolic panel    Collection  Time: 07/16/24  9:25 AM   Result Value Ref Range    Sodium 139 135 - 145 mmol/L    Potassium 3.8 3.4 - 5.3 mmol/L    Carbon Dioxide (CO2) 22 22 - 29 mmol/L    Anion Gap 16 (H) 7 - 15 mmol/L    Urea Nitrogen 8.5 6.0 - 20.0 mg/dL    Creatinine 0.84 0.67 - 1.17 mg/dL    GFR Estimate >90 >60 mL/min/1.73m2    Calcium 8.9 8.6 - 10.0 mg/dL    Chloride 101 98 - 107 mmol/L    Glucose 167 (H) 70 - 99 mg/dL    Alkaline Phosphatase 55 40 - 150 U/L    AST 15 0 - 45 U/L    ALT 20 0 - 70 U/L    Protein Total 7.2 6.4 - 8.3 g/dL    Albumin 4.3 3.5 - 5.2 g/dL    Bilirubin Total 0.3 <=1.2 mg/dL   Valproic Acid Free & Total    Collection Time: 07/18/24  9:43 AM   Result Value Ref Range    Valproic Acid Free 12 7 - 23 ug/mL    Valproic Acid Total 80 50 - 125 ug/mL    Valproic Acid, Percent Free 15 5 - 18 %   TSH with free T4 reflex and/or T3 as indicated    Collection Time: 07/19/24  9:02 AM   Result Value Ref Range    TSH 1.44 0.30 - 4.20 uIU/mL   Valproic acid    Collection Time: 07/19/24  9:02 AM   Result Value Ref Range    Valproic acid 65.7   ug/mL   Extra Purple Top Tube    Collection Time: 07/19/24  9:02 AM   Result Value Ref Range    Hold Specimen JIC    Valproic acid    Collection Time: 07/26/24  7:08 PM   Result Value Ref Range    Valproic acid 45.0 (L)   ug/mL            Precautions:     Behavioral Orders   Procedures    Assault precautions    Cheeking Precautions (behavioral units)    Code 1 - Restrict to Unit    Elopement precautions    Routine Programming     As clinically indicated    Status 15     Every 15 minutes.            Psychiatric Examination:   Temp: 98.1  F (36.7  C) Temp src: Oral BP: 132/82 Pulse: 93   Resp: 16 SpO2: 96 % O2 Device: None (Room air)    Weight is 219 lbs 12.8 oz  Body mass index is 29.81 kg/m .    Appearance: awake, alert and adequately groomed  Attitude:  cooperative  Eye Contact:  good  Mood:  good  Affect:  appropriate and in normal range and intensity is heightened  Speech:  pressured  speech  Psychomotor Behavior:  no evidence of tardive dyskinesia, dystonia, or tics  Throught Process:  logical and goal oriented  Associations:  no loose associations  Thought Content:  no evidence of suicidal ideation or homicidal ideation, no auditory hallucinations present, and no visual hallucinations present  Insight:  fair  Judgement:  fair  Oriented to:  time, person, and place  Attention Span and Concentration:  limited  Recent and Remote Memory:  limited         Precautions:     Behavioral Orders   Procedures    Assault precautions    Cheeking Precautions (behavioral units)    Code 1 - Restrict to Unit    Elopement precautions    Routine Programming     As clinically indicated    Status 15     Every 15 minutes.          DIagnoses:   Bipolar disorder type 1, manic severe, with psychosis  Historical diagnosis of ADHD  Cannabis use disorder  Polysubstance abuse  Nicotine use disorder         Plan:   Medications:    --Received the first loading dose of Invega Sustenna 234 mg on 7/9 and he did not receive the second loading dose of 156 mg on 7/15, so it was administered 7/19.  Per notes from his previous hospitalization, Invega Sustenna 117 mg was scheduled for 8/15; consider increasing this dose.   --Discontinue Zyprexa, doesn't appear to be helpful.  --Start Haldol 5 mg, bid. Backed up by IM. Patient is Jarviced  --Start Benadryl 50 mg, bid.     --Reduce Ativan to 0.5 mg at HS, plan to discontinue.       --Change to every other night x 3 doses, than discontinue  --Depakote ER 1750 mg at HS. Valproic acid, platelets, LFT on 7/31   --Start Tenex 1 mg, qhs for ADHD  --PRNs of Haldol & Benadryl should be used for first line treatment of agitation.    --Continue PRNs of Zyprexa, Trazodone and Hydroxyzine.     Medical:  --Internal medicine to follow up for medical problems       Consults:   --Care was coordinated with the treatment team.   --The patient was consulted on nature of illness and treatment options.       Disposition Plan   Reason for ongoing admission: is unable to care for self due to severe psychosis or jose  Discharge location: group home  Discharge Medications: not ordered  Follow-up Appointments: not scheduled  Discharge will be granted once symptoms improved.    Legal Status  MI Commitment with Hand  83-CZ- [facility]  Hand medications Haldol, Risperdal, Invega, Zyprexa, Seroquel, and Abilify   Stay of Commitment revoked and Commitment imposed 6/13/2024  PD from station 12 7/10/2024  PD Revoked 7/17/2024  60/90 Report 9/11/2024  Expires 12/13/2024     Carlos Christian  [employer]  Phone [phone]     Contacts  [facility]:  Norman [email or web address] [phone]  Darline Romo ( for retirement): [phone] - RENNY on file for retirement staff  Bree (retirement ): [phone] RENNY on file for group home staff  Email: [email or web address]     :  Chelsea Brennan ([facility])  Ph: [phone] - Commitment CM no RENNY needed   [email or web address]  [facility]  Ph: [phone]  Christi (member of case management team): [phone]     Mother (Biological Aunt) - Yolanda ([phone])      Psychiatric Medication Management  MARIO Weinberg, CNP with [facility]  [address], [address], [place] [address]  Phone: [phone]  Fax: [phone]     Evelia with [employer] called requesting updates - [phone] [email or web address]     Sherif MARTIN CNP    This note was created with the help of Dragon dictation system. All grammatical/typing errors or context distortion are unintentional and inherent to software.

## 2024-08-01 NOTE — PLAN OF CARE
Upcoming Meetings and Dates/Important Information  Days since IP admission 14  Team Note Due Friday  No future appointments.     Next Steps  PD/COS at discharge.    Assessment/Intervention/Current Symptoms and Care Coordination  Chart Review  Met with team to discuss patient care.  Received address from Norman  Completed PD and had pt sign. Sent copy to CM to sign.  Completed discharge instructions, sent discharge instructions and AVS med list to his OP care team.  Updated his address in emr.  Faxed Order for Commitment and Hand to [employer].  Emailed team inquiring time for .  Updated pt.    Discharge Plan or Goal  Dispo Plan: New house at [facility]  Transportation: John from [place] will . Time TBD  Medication: Sent to [facility]   Provisional discharge: Will need  Safety plan complete?: Completed 7/19/2024  Appointments:   Scheduled med management    Barriers to Discharge  patient requires further psychiatric stabilization due to current symptomology, medication management with possible adjustments    Expected Discharge Date: 08/02/2024,  9:00 AM      Discharge Comments: PD Revocation        Referral Status  No new referral made    Legal Status  MI Commitment with Hand  09-QQ- [facility]  Hand medications Haldol, Risperdal, Invega, Zyprexa, Seroquel, and Abilify   Stay of Commitment revoked and Commitment imposed 6/13/2024  PD from station 12 7/10/2024  PD Revoked 7/17/2024  60/90 Report 9/11/2024  Expires 12/13/2024    Carlos Gonzales  [employer]  Phone [phone]    Contacts  [facility]:  Norman [email or web address] [phone]  Darline Romo ( for shelter): [phone] - RENNY on file for shelter staff  Bree (shelter ): [phone] RENNY on file for group home staff  Email: [email or web address]     :  Chelsea Brennan ([facility])  Ph: [phone] - Commitment CM no RENNY needed    [email or web address]  [facility]  Ph: [phone]  Christi (member of case management team): [phone]     Mother (Biological Aunt) - Yolanda ([phone])     Psychiatric Medication Management  MARIO Weinberg, CNP with [facility]  [address], [address], [place] [address]  Phone: [phone]  Fax: [phone]    Evelia with [employer] called requesting updates - [phone] [email or web address]

## 2024-08-01 NOTE — PLAN OF CARE
"  Problem: Psychotic Signs/Symptoms  Goal: Improved Mood Symptoms (Psychotic Signs/Symptoms)  Outcome: Progressing  Intervention: Optimize Emotion and Mood  Recent Flowsheet Documentation  Taken 8/1/2024 9290 by Roshni Cummings RN  Diversional Activity:   television   music   Goal Outcome Evaluation:     Plan of Care Reviewed With: patient       Patient had another good day shift. He was not labile. He was appropriate in the milieu. He was compliant with medications. PRN nicotine lozenge/gum was the only prn medication given this shift. He had a bright affect and described his mood as \"stable.\" Patient was not in group activities today. He continues to look forward to his discharge tomorrow. He denied suicidal thoughts, depression, anxiety, and hallucinations.            "

## 2024-08-01 NOTE — PLAN OF CARE
BEH IP Unit Acuity Rating Score (UARS)  Patient is given one point for every criteria they meet.    CRITERIA SCORING   On a 72 hour hold, court hold, committed, stay of commitment, or revocation. 1    Patient LOS on BEH unit exceeds 20 days. 0  LOS: 14   Patient under guardianship, 55+, otherwise medically complex, or under age 11. 0   Suicide ideation without relief of precipitating factors. 0   Current plan for suicide. 0   Current plan for homicide. 0   Imminent risk or actual attempt to seriously harm another without relief of factors precipitating the attempt. 0   Severe dysfunction in daily living (ex: complete neglect for self care, extreme disruption in vegetative function, extreme deterioration in social interactions). 1   Recent (last 7 days) or current physical aggression in the ED or on unit. 0   Restraints or seclusion episode in past 72 hours. 0   Recent (last 7 days) or current verbal aggression, agitation, yelling, etc., while in the ED or unit. 0   Active psychosis. 0   Need for constant or near constant redirection (from leaving, from others, etc).  0   Intrusive or disruptive behaviors. 1   Patient requires 3 or more hours of individualized nursing care per 8-hour shift (i.e. for ADLs, meds, therapeutic interventions). 1   TOTAL 4

## 2024-08-01 NOTE — PLAN OF CARE
Pt appeared to sleep for a total of 6.25 hours overnight. No PRN medications were administered, and no concerns were noted.     Problem: Sleep Disturbance  Goal: Adequate Sleep/Rest  Outcome: Progressing

## 2024-08-02 VITALS
BODY MASS INDEX: 29.77 KG/M2 | RESPIRATION RATE: 16 BRPM | WEIGHT: 219.8 LBS | OXYGEN SATURATION: 96 % | TEMPERATURE: 97.3 F | SYSTOLIC BLOOD PRESSURE: 120 MMHG | DIASTOLIC BLOOD PRESSURE: 77 MMHG | HEART RATE: 105 BPM | HEIGHT: 72 IN

## 2024-08-02 PROCEDURE — 250N000013 HC RX MED GY IP 250 OP 250 PS 637: Performed by: CLINICAL NURSE SPECIALIST

## 2024-08-02 PROCEDURE — 99238 HOSP IP/OBS DSCHRG MGMT 30/<: CPT | Performed by: NURSE PRACTITIONER

## 2024-08-02 PROCEDURE — 250N000013 HC RX MED GY IP 250 OP 250 PS 637: Performed by: NURSE PRACTITIONER

## 2024-08-02 PROCEDURE — H2032 ACTIVITY THERAPY, PER 15 MIN: HCPCS

## 2024-08-02 RX ADMIN — NICOTINE POLACRILEX 4 MG: 4 GUM, CHEWING BUCCAL at 09:39

## 2024-08-02 RX ADMIN — NICOTINE POLACRILEX 4 MG: 4 GUM, CHEWING BUCCAL at 14:36

## 2024-08-02 RX ADMIN — NICOTINE POLACRILEX 4 MG: 2 LOZENGE ORAL at 10:48

## 2024-08-02 RX ADMIN — DIPHENHYDRAMINE HYDROCHLORIDE 50 MG: 50 CAPSULE ORAL at 08:32

## 2024-08-02 RX ADMIN — HYDROXYZINE HYDROCHLORIDE 25 MG: 25 TABLET ORAL at 10:48

## 2024-08-02 RX ADMIN — NICOTINE POLACRILEX 4 MG: 2 LOZENGE ORAL at 08:32

## 2024-08-02 RX ADMIN — HALOPERIDOL 5 MG: 5 TABLET ORAL at 08:32

## 2024-08-02 ASSESSMENT — ACTIVITIES OF DAILY LIVING (ADL)
LAUNDRY: WITH SUPERVISION
ORAL_HYGIENE: INDEPENDENT
ADLS_ACUITY_SCORE: 29
DRESS: INDEPENDENT;STREET CLOTHES
ADLS_ACUITY_SCORE: 29
HYGIENE/GROOMING: INDEPENDENT
ADLS_ACUITY_SCORE: 29

## 2024-08-02 NOTE — PLAN OF CARE
Pt VSS. Pt is cooperative and pleasant upon approach. Pt reports feeling happy about discharging today and wished that he was leaving earlier. Pt visible on the unit playing video games, eating meals in mileu, and interacting appropriately with staff and peers. Pt utilized nicotine replacement regularly and PRN hydroxyzine at approximately 1030.   Writer reviewed pt AVS, medications, outpatient resources, and follow up with pt. Medications sent to [facility]. Pt expressed understanding and denied any questions. Pt denies SI, HI, SIB, AVH, and depression.     Problem: Adult Inpatient Plan of Care  Goal: Plan of Care Review  Description: The Plan of Care Review/Shift note should be completed every shift.  The Outcome Evaluation is a brief statement about your assessment that the patient is improving, declining, or no change.  This information will be displayed automatically on your shift  note.  Outcome: Progressing

## 2024-08-02 NOTE — PLAN OF CARE
BEH IP Unit Acuity Rating Score (UARS)  Patient is given one point for every criteria they meet.    CRITERIA SCORING   On a 72 hour hold, court hold, committed, stay of commitment, or revocation. 1    Patient LOS on BEH unit exceeds 20 days. 0  LOS: 15   Patient under guardianship, 55+, otherwise medically complex, or under age 11. 0   Suicide ideation without relief of precipitating factors. 0   Current plan for suicide. 0   Current plan for homicide. 0   Imminent risk or actual attempt to seriously harm another without relief of factors precipitating the attempt. 0   Severe dysfunction in daily living (ex: complete neglect for self care, extreme disruption in vegetative function, extreme deterioration in social interactions). 1   Recent (last 7 days) or current physical aggression in the ED or on unit. 0   Restraints or seclusion episode in past 72 hours. 0   Recent (last 7 days) or current verbal aggression, agitation, yelling, etc., while in the ED or unit. 0   Active psychosis. 0   Need for constant or near constant redirection (from leaving, from others, etc).  0   Intrusive or disruptive behaviors. 1   Patient requires 3 or more hours of individualized nursing care per 8-hour shift (i.e. for ADLs, meds, therapeutic interventions). 1   TOTAL 4

## 2024-08-02 NOTE — DISCHARGE SUMMARY
"Psychiatric Discharge Summary    Venancio Edgar MRN# [id number]   Age: 28 year old YOB: 1995     Date of Admission:  7/15/2024  Date of Discharge:  8/2/2024  Admitting Physician:  Katharina Galindo MD  Discharge Physician:  MARIO Alaniz CNP          Event Leading to Hospitalization:   Venancio Edgar is a 28-year-old male admitted to [facility] on 7/15/2024, arriving on the unit 7/18/2024.  He was admitted through the ED due to agitation and psychosis.  He was hospitalized on [facility] 6/7/2024 - 7/10/2024 at which time his stay of commitment was vacated and he was Jarvised.  Invega Sustenna was initiated, and he was discharged to his group home.  Group home staff report that he bought gummies and cannabis \"tar.\"  He became more disorganized and agitated.  He was eating grass.  He went outside and looked into the windows of cars.  He slept poorly.  He was brought in via EMS.  While in the ED, his  filed an intent to revoke his provisional discharge, and his provisional discharge was revoked.  While in the ED, he was intrusive, disorganized and impulsive.  He repeatedly requested macaroni and cheese.  He tried to charge past staff in an attempt to elope.  He later was placed in restraints after screaming and attempting to elope again.  He was placed on SIO 2:1.  He reported that he had taken Adderall and made several requests for Adderall.  He later said he had taken a synthetic composition of tryptamines called \"mushroom road trip.\"  UTOX was positive for cannabinoids.  He reports taking medications as prescribed.  During conversation with provider, he mentioned using \"paliperidone, bong water and road trip.\"  He was cooperative but disorganized.  He stated that he will not attempt to elope from the unit.  Discussed reducing SIO staffing from 2:1 to 1:1.  He became emotional, nearly tearful, and stated he has separation anxiety from his mother dying when " he was a baby and would feel much better having 1:1 staff available versus no SIO staff.         See Admission note by Dai Smith APRN CNP  for additional details.          Diagnoses:     Bipolar disorder type 1, manic severe, with psychosis  Historical diagnosis of ADHD  Cannabis use disorder  Polysubstance abuse  Nicotine use disorder    Clinically Significant Risk Factors        # Overweight: Estimated body mass index is 29.81 kg/m  as calculated from the following:    Height as of this encounter: 1.829 m (6').    Weight as of this encounter: 99.7 kg (219 lb 12.8 oz).      # Financial/Environmental Concerns: none                Labs:        Lab Results   Component Value Date     07/16/2024    Lab Results   Component Value Date    CHLORIDE 101 07/16/2024    Lab Results   Component Value Date    BUN 8.5 07/16/2024      Lab Results   Component Value Date    POTASSIUM 3.8 07/16/2024    Lab Results   Component Value Date    CO2 22 07/16/2024    Lab Results   Component Value Date    CR 0.84 07/16/2024          Lab Results   Component Value Date    WBC 9.6 07/16/2024    HGB 14.8 07/16/2024    HCT 44.5 07/16/2024    MCV 87 07/16/2024     08/01/2024     Lab Results   Component Value Date    AST 20 08/01/2024    ALT 31 08/01/2024    ALKPHOS 55 08/01/2024    BILITOTAL 0.2 08/01/2024    ANTHONY 47 06/19/2024     Lab Results   Component Value Date    TSH 1.44 07/19/2024            Consults:   No consultations were requested during this admission         Hospital Course:   Venancio Edgar was admitted to [facility] with attending Sherif MARTIN CNP, under an ongoing civil commitment. The patient was placed under status 15 (15 minute checks) to ensure patient safety.     The patient tolerated medications well. Reported mood symptoms improved. The patient was active on the unit. The patient was social, engaged and attended groups. No overt jose, confusion or psychosis noted.  The patient was  appropriate with peers and staff.  The patient maintained denial of SI, HI and ENRIQUETA. The patient denied depression, anxiety, racing thoughts and irritability.initially was pushing to get Adderall and Vyvanse but by the end of the hospitalization he came to terms with the fact that these medications will not be prescribed.  He realized that nobody will prescribe these medications even if he is doing very well because of his mental health issues.  The patient was happy with taking Tenex for the ADHD.  Future oriented, feeling hopeful for the future. The patient slept well. Appetite was intact. The patient was compliant with medications and care.     Venancio Edgar was released to group home. At the time of this encounter, Venancio Edgar was determined to not be a danger to himself or others and symptoms did not meet criteria for involuntary hospitalization.      Safety plan, post discharge recommendations and relapse prevention were discussed with the patient. The patient agreed to call 911 or present to ED if symptoms worsen or developed thoughts of suicide, self harm or homicide.  The patient agreed to continue medications and outpatient care.         Discharge Medications:     Current Discharge Medication List        START taking these medications    Details   !! diphenhydrAMINE (BENADRYL) 25 MG capsule Take 1 capsule (25 mg) by mouth 3 times daily as needed for sleep (anxiety)  Qty: 90 capsule, Refills: 0    Associated Diagnoses: Bipolar I disorder with jose (H)      !! diphenhydrAMINE (BENADRYL) 50 MG capsule Take 1 capsule (50 mg) by mouth 2 times daily  Qty: 60 capsule, Refills: 0    Associated Diagnoses: Bipolar I disorder with jose (H)      divalproex sodium extended-release (DEPAKOTE ER) 250 MG 24 hr tablet Take 7 tablets (1,750 mg) by mouth every evening  Qty: 210 tablet, Refills: 0    Associated Diagnoses: Bipolar I disorder with jose (H)      guanFACINE (TENEX) 1 MG tablet Take 1 tablet (1 mg) by mouth at  bedtime  Qty: 30 tablet, Refills: 0    Associated Diagnoses: Bipolar I disorder with jose (H)      haloperidol (HALDOL) 5 MG tablet Take 1 tablet (5 mg) by mouth 2 times daily  Qty: 60 tablet, Refills: 0    Associated Diagnoses: Bipolar I disorder with jose (H)      hydrOXYzine HCl (ATARAX) 25 MG tablet Take 1 tablet (25 mg) by mouth 3 times daily as needed for anxiety  Qty: 90 tablet, Refills: 0    Associated Diagnoses: Bipolar I disorder with jose (H)      nicotine (COMMIT) 2 MG lozenge Place 2 lozenges (4 mg) inside cheek every hour as needed for nicotine withdrawal symptoms  Qty: 108 lozenge, Refills: 0    Associated Diagnoses: Cigar smoker      nicotine polacrilex (NICORETTE) 4 MG gum Place 1 each (4 mg) inside cheek every hour as needed for nicotine withdrawal symptoms  Qty: 110 each, Refills: 0    Associated Diagnoses: Cigar smoker      OLANZapine (ZYPREXA) 10 MG tablet Take 1 tablet (10 mg) by mouth 3 times daily as needed (Second line treatment for agitation.)  Qty: 90 tablet, Refills: 0    Associated Diagnoses: Bipolar I disorder with jose (H)      traZODone (DESYREL) 50 MG tablet Take 1 tablet (50 mg) by mouth nightly as needed for sleep (may repeat after 60 minutes)  Qty: 30 tablet, Refills: 0    Associated Diagnoses: Bipolar I disorder with jose (H)       !! - Potential duplicate medications found. Please discuss with provider.        CONTINUE these medications which have CHANGED    Details   paliperidone (INVEGA SUSTENNA) 117 MG/0.75ML HUBERT Inject 0.75 mLs (117 mg) into the muscle once for 1 dose  Qty: 0.75 mL, Refills: 0    Associated Diagnoses: Bipolar I disorder with jose (H)           STOP taking these medications       divalproex sodium delayed-release (DEPAKOTE) 250 MG DR tablet Comments:   Reason for Stopping:         paliperidone ER (INVEGA) 6 MG 24 hr tablet Comments:   Reason for Stopping:                    Psychiatric Examination:   Appearance:  adequately groomed and well  groomed  Attitude:  cooperative  Eye Contact:  good  Mood:  better  Affect:  appropriate and in normal range  Speech:  clear, coherent  Psychomotor Behavior:  no evidence of tardive dyskinesia, dystonia, or tics  Thought Process:  logical and goal oriented  Associations:  no loose associations  Thought Content:  no evidence of suicidal ideation or homicidal ideation  Insight:  good  Judgment:  intact  Oriented to:  time, person, and place  Attention Span and Concentration:  intact  Recent and Remote Memory:  intact  Language: Able to name objects  Fund of Knowledge: appropriate  Muscle Strength and Tone: normal  Gait and Station: Normal         Discharge Plan:   The following medication changes took place:     --Received the first loading dose of Invega Sustenna 234 mg on 7/9 and he did not receive the second loading dose of 156 mg on 7/15, so it was administered 7/19.  Per notes from his previous hospitalization, Invega Sustenna 117 mg was scheduled for 8/15; consider increasing this dose.   --Discontinue Zyprexa, doesn't appear to be helpful.  --Start Haldol 5 mg, bid. Backed up by IM. Patient is Jarviced  --Start Benadryl 50 mg, bid.     --Reduce Ativan to 0.5 mg at HS, plan to discontinue.       --Change to every other night x 3 doses, than discontinue  --Depakote ER 1750 mg at HS. Valproic acid, platelets, LFT on 7/31   --Start Tenex 1 mg, qhs for ADHD     Legal Status  MI Commitment with Hand  [facility]-GN- [facility] medications Haldol, Risperdal, Invega, Zyprexa, Seroquel, and Abilify   Stay of Commitment revoked and Commitment imposed 6/13/2024  PD from station 12 7/10/2024  PD Revoked 7/17/2024  60/90 Report 9/11/2024  Expires 12/13/2024     Carlos Gonzales  [employer]  Phone [phone]     Contacts  [facility]:  Norman [email or web address] [phone]  Darline Romo ( for [facility]): [phone] - RENNY on file for [facility] staff  [facility]  (group home ): [phone] RENNY on file for group home staff  Email: [email or web address]     :  Chelsea Brennan ([facility])  Ph: [phone] - Commitment  no RENNY needed   [email or web address]  [facility]  Ph: [phone]  Christi (member of case management team): [phone]     Mother (Biological Aunt) - Yolanda ([phone])      Psychiatric Medication Management  MARIO Weinberg, CNP with [facility]  [address], [address], [place] [address]  Phone: [phone]  Fax: [phone]      Attestation:  The patient has been seen and evaluated by me,  Sherif MARTIN, CNP

## 2024-08-02 NOTE — PLAN OF CARE
Discharge Note    Pt discharged at 1550, and was driven by a staff member, Conrad Giron, of facility pt was discharged to. Medications sent to [facility]. All belongings returned to patient. Discharge instructions discussed with pt by nurse on previous shift. Pt says that they have no questions regarding discharge instructions. Pt denies SI and HI at time of discharge.

## 2024-08-02 NOTE — PLAN OF CARE
Problem: Sleep Disturbance  Goal: Adequate Sleep/Rest  Outcome: Progressing   Goal Outcome Evaluation:    NOC Shift Report    Pt was in bed at the beginning of the shift. Breathing was regular, spontaneous, and unlabored. Pt did not present with any medical concerns this shift. There were no  PRNs given. The plan of care is ongoing.       Possible discharge today.    Pt appears to sleep for 7 hours

## 2024-08-02 NOTE — PLAN OF CARE
Problem: Adult Inpatient Plan of Care  Goal: Readiness for Transition of Care  Outcome: Progressing     Goal Outcome Evaluation:    Plan of Care Reviewed With: patient      Pt was wearing street clothes this evening, affect is bright. He came out of room intermittently for dinner and as needed medication. Pt is eager to discharge, feels ready to go and reported anxiety/agitation related to this feeling; took haldol and benadryl PRN after dinner. Returned to bed. Pt then came to desk and requested medication for bedtime at 1930. Pt agreed to wait to take haldol and benadryl since he had recently taken PRNs. Pt requested hydroxyzine for anxiety at this time as well as scheduled depakote and guanfacine. At 2130, pt took haldol and bendryl. Pt was pleasant and cooperative all evening, isolative in room.    /81   Pulse 103   Temp 98.1  F (36.7  C) (Oral)   Resp 16   Ht 1.829 m (6')   Wt 99.7 kg (219 lb 12.8 oz)   SpO2 96%   BMI 29.81 kg/m

## 2024-08-02 NOTE — PLAN OF CARE
Upcoming Meetings and Dates/Important Information  Days since IP admission 15  Team Note Due Friday  No future appointments.     Next Steps  PD/COS at discharge.    Assessment/Intervention/Current Symptoms and Care Coordination  Chart Review  Met with team to discuss patient care.  [facility] will be able to  pt at 4pm today.    Discharge Plan or Goal  Dispo Plan: New house at [facility]  Transportation: John from [facility] will . Time TBD  Medication: Sent to [facility]   Provisional discharge: Will need  Safety plan complete?: Completed 7/19/2024  Appointments:   Scheduled med management    Barriers to Discharge  patient requires further psychiatric stabilization due to current symptomology, medication management with possible adjustments    Expected Discharge Date: 08/02/2024,  4:00 PM      Discharge Comments: PD Revocation        Referral Status  No new referral made    Legal Status  MI Commitment with Hand  33-IE- [facility]  Hand medications Haldol, Risperdal, Invega, Zyprexa, Seroquel, and Abilify   Stay of Commitment revoked and Commitment imposed 6/13/2024  PD from station 12 7/10/2024  PD Revoked 7/17/2024  60/90 Report 9/11/2024  Expires 12/13/2024    Carlos Gonzales  [employer]  Phone [phone]    Contacts  [facility]:  Norman [email or web address] [phone]  Darline Romo ( for nursing home): [phone] - RENNY on file for nursing home staff  [facility] (nursing home ): [phone] RENNY on file for group home staff  Email: [email or web address]     :  Chelsea Brennan ([facility])  Ph: [phone] - Commitment CM no RENNY needed   [email or web address]  [facility]  Ph: [phone]  Christi (member of case management team): [phone]     Mother (Biological Aunt) - Yolanda ([phone])     Psychiatric Medication Management  MARIO Weinberg, CNP with [facility] [address], [address], [place] [address]  Phone: [phone]  Fax: [phone]    Evelia with [employer] called requesting updates - [phone] [email or web address]

## 2024-08-05 ENCOUNTER — TELEPHONE (OUTPATIENT)
Dept: BEHAVIORAL HEALTH | Facility: CLINIC | Age: 29
End: 2024-08-05
Payer: COMMERCIAL

## 2024-08-05 NOTE — TELEPHONE ENCOUNTER
Writer received call from [facility] staff requesting to speak with Javier. Staff did not have other identifiers for Javier. Staff reported they will call direct # which Javier left in the .

## 2024-08-10 ENCOUNTER — HOSPITAL ENCOUNTER (OUTPATIENT)
Facility: CLINIC | Age: 29
Setting detail: OBSERVATION
Discharge: HOME | End: 2024-08-10
Attending: EMERGENCY MEDICINE | Admitting: EMERGENCY MEDICINE
Payer: COMMERCIAL

## 2024-08-10 VITALS
OXYGEN SATURATION: 98 % | DIASTOLIC BLOOD PRESSURE: 77 MMHG | RESPIRATION RATE: 16 BRPM | HEART RATE: 104 BPM | SYSTOLIC BLOOD PRESSURE: 128 MMHG | TEMPERATURE: 99.4 F

## 2024-08-10 DIAGNOSIS — J02.9 PHARYNGITIS, UNSPECIFIED ETIOLOGY: ICD-10-CM

## 2024-08-10 DIAGNOSIS — F17.290 CIGAR SMOKER: ICD-10-CM

## 2024-08-10 DIAGNOSIS — F90.9 ATTENTION DEFICIT HYPERACTIVITY DISORDER (ADHD), UNSPECIFIED ADHD TYPE: Primary | ICD-10-CM

## 2024-08-10 DIAGNOSIS — F19.90 MISUSE OF DRUGS: ICD-10-CM

## 2024-08-10 DIAGNOSIS — F31.9 BIPOLAR 1 DISORDER (H): ICD-10-CM

## 2024-08-10 LAB
FLUAV RNA SPEC QL NAA+PROBE: NEGATIVE
FLUBV RNA RESP QL NAA+PROBE: NEGATIVE
GROUP A STREP BY PCR: NOT DETECTED
RSV RNA SPEC NAA+PROBE: NEGATIVE
SARS-COV-2 RNA RESP QL NAA+PROBE: NEGATIVE

## 2024-08-10 PROCEDURE — 99285 EMERGENCY DEPT VISIT HI MDM: CPT | Mod: 25 | Performed by: EMERGENCY MEDICINE

## 2024-08-10 PROCEDURE — 250N000013 HC RX MED GY IP 250 OP 250 PS 637: Performed by: EMERGENCY MEDICINE

## 2024-08-10 PROCEDURE — 87637 SARSCOV2&INF A&B&RSV AMP PRB: CPT | Performed by: EMERGENCY MEDICINE

## 2024-08-10 PROCEDURE — G0378 HOSPITAL OBSERVATION PER HR: HCPCS

## 2024-08-10 PROCEDURE — 99223 1ST HOSP IP/OBS HIGH 75: CPT | Performed by: EMERGENCY MEDICINE

## 2024-08-10 PROCEDURE — 87651 STREP A DNA AMP PROBE: CPT | Performed by: EMERGENCY MEDICINE

## 2024-08-10 RX ORDER — DIPHENHYDRAMINE HCL 25 MG
25 CAPSULE ORAL 3 TIMES DAILY PRN
Status: DISCONTINUED | OUTPATIENT
Start: 2024-08-10 | End: 2024-08-10 | Stop reason: HOSPADM

## 2024-08-10 RX ORDER — ACETAMINOPHEN 325 MG/1
650 TABLET ORAL EVERY 6 HOURS PRN
Status: DISCONTINUED | OUTPATIENT
Start: 2024-08-10 | End: 2024-08-10 | Stop reason: HOSPADM

## 2024-08-10 RX ORDER — DIPHENHYDRAMINE HCL 50 MG
50 CAPSULE ORAL 2 TIMES DAILY
Status: DISCONTINUED | OUTPATIENT
Start: 2024-08-10 | End: 2024-08-10 | Stop reason: HOSPADM

## 2024-08-10 RX ORDER — GUANFACINE 1 MG/1
1 TABLET ORAL AT BEDTIME
Status: DISCONTINUED | OUTPATIENT
Start: 2024-08-10 | End: 2024-08-10 | Stop reason: HOSPADM

## 2024-08-10 RX ORDER — HYDROXYZINE HYDROCHLORIDE 25 MG/1
25 TABLET, FILM COATED ORAL 3 TIMES DAILY PRN
Status: DISCONTINUED | OUTPATIENT
Start: 2024-08-10 | End: 2024-08-10 | Stop reason: HOSPADM

## 2024-08-10 RX ORDER — TRAZODONE HYDROCHLORIDE 50 MG/1
50 TABLET, FILM COATED ORAL
Status: DISCONTINUED | OUTPATIENT
Start: 2024-08-10 | End: 2024-08-10 | Stop reason: HOSPADM

## 2024-08-10 RX ORDER — HALOPERIDOL 5 MG/1
5 TABLET ORAL 2 TIMES DAILY
Status: DISCONTINUED | OUTPATIENT
Start: 2024-08-10 | End: 2024-08-10 | Stop reason: HOSPADM

## 2024-08-10 RX ORDER — POLYETHYLENE GLYCOL 3350 17 G
4 POWDER IN PACKET (EA) ORAL
Status: DISCONTINUED | OUTPATIENT
Start: 2024-08-10 | End: 2024-08-10 | Stop reason: HOSPADM

## 2024-08-10 RX ADMIN — AMOXICILLIN AND CLAVULANATE POTASSIUM 1 TABLET: 875; 125 TABLET, FILM COATED ORAL at 16:22

## 2024-08-10 RX ADMIN — HALOPERIDOL 5 MG: 5 TABLET ORAL at 20:16

## 2024-08-10 RX ADMIN — DIPHENHYDRAMINE HYDROCHLORIDE 50 MG: 50 CAPSULE ORAL at 20:17

## 2024-08-10 RX ADMIN — DIVALPROEX SODIUM 1750 MG: 500 TABLET, FILM COATED, EXTENDED RELEASE ORAL at 20:22

## 2024-08-10 RX ADMIN — NICOTINE POLACRILEX 4 MG: 2 LOZENGE ORAL at 18:56

## 2024-08-10 RX ADMIN — ACETAMINOPHEN 650 MG: 325 TABLET ORAL at 16:22

## 2024-08-10 RX ADMIN — AMOXICILLIN AND CLAVULANATE POTASSIUM 1 TABLET: 875; 125 TABLET, FILM COATED ORAL at 20:16

## 2024-08-10 ASSESSMENT — COLUMBIA-SUICIDE SEVERITY RATING SCALE - C-SSRS
1. IN THE PAST MONTH, HAVE YOU WISHED YOU WERE DEAD OR WISHED YOU COULD GO TO SLEEP AND NOT WAKE UP?: NO
2. HAVE YOU ACTUALLY HAD ANY THOUGHTS OF KILLING YOURSELF IN THE PAST MONTH?: NO
6. HAVE YOU EVER DONE ANYTHING, STARTED TO DO ANYTHING, OR PREPARED TO DO ANYTHING TO END YOUR LIFE?: NO

## 2024-08-10 ASSESSMENT — ACTIVITIES OF DAILY LIVING (ADL)
ADLS_ACUITY_SCORE: 35
ADLS_ACUITY_SCORE: 33

## 2024-08-10 NOTE — CONSULTS
Diagnostic Evaluation Consultation  Crisis Assessment    Patient Name: Venancio Edgar  Age:  28 year old  Legal Sex: male  Gender Identity: male  Pronouns:   Race: Choose not to Answer  Ethnicity: Not  or   Language: English      Patient was assessed: In person   Crisis Assessment Start Date: 08/10/24  Crisis Assessment Start Time: 1537  Crisis Assessment Stop Time: 1610  Patient location: [facility] EMERGENCY DEPARTMENT                             [facility]-    Referral Data and Chief Complaint  Venancio Edgar presents to the ED per community partner(s). Patient is presenting to the ED for the following concerns:  .   Factors that make the mental health crisis life threatening or complex are:  Patient [phone]. He reports that his medications were changed during his most recent inpatient mental health hospitalization. He now takes Benadryl and Haldol in the morning and evening. Since the change, he reportedly feels  sicker , reporting him struggling with flue (current screen was negative for influenza) increased sleep, unable to get up in the morning, feeling more tired and having no energy to do anything. He does not experience any pleasure in doing the things he loves to do, such as video games. He feels stressed by a roommate at the group home who has been disruptive. He denies feeling unsafe nor threatened by the roommate. Patient does not like the current group home. Prior to the recent hospitalization, he had been living in the  old house , the [facility], and upon discharge, he was placed in the  newer  [facility] home. He has been taking medications regularly but is now concerned about the side-effects of new medications and especially since he is also under a civil commitment and Hand. Patient denies suicidal ideation, homicidal ideation, hallucinations and delusions. Patient smokes nicotine with a  vape and reports occasional marijuana use. Most recent marijuana use was one month ago..      Informed Consent and Assessment Methods  Explained the crisis assessment process, including applicable information disclosures and limits to confidentiality, assessed understanding of the process, and obtained consent to proceed with the assessment.  Assessment methods included conducting a formal interview with patient, review of medical records, collaboration with medical staff, and obtaining relevant collateral information from family and community providers when available.  : done     Patient response to interventions: eager to participate  Coping skills were attempted to reduce the crisis:  Patient requested that staff bring him to the ED     History of the Crisis   Medical records indicate patient has a hx of Bipolar d/o 1 with psychosis, ADHD and substance use disorder. Hx of 3 recent visit to the ED due to hallucinations, disorganized behavior and bi-polar- manic symptoms. Patient has an established psychiatrist, managing medications. Patient had regular contact with his mother. Hx of inpatient mental health. Patient is currently under a civil commitment and Hand order.  [facility] [place].    Brief Psychosocial History  Family:  Single, Children no  Support System:  Facility resident(s)/Staff, Parent(s)  Employment Status:  disabled  Source of Income:  disability  Financial Environmental Concerns:  none  Current Hobbies:     Barriers in Personal Life:  mental health concerns    Significant Clinical History  Current Anxiety Symptoms:  anxious, obsessions/compulsions  Current Depression/Trauma:  negativistic, helplessness, hopelessness  Current Somatic Symptoms:  anxious  Current Psychosis/Thought Disturbance:  agitation  Current Eating Symptoms:     Chemical Use History:  Alcohol: None  Benzodiazepines: None  Opiates: None  Cocaine: None  Marijuana:  Occasional  Other Use: None   Past diagnosis:  ADHD, Bipolar Disorder  Family history:  Bipolar Disorder, Schizophrenia  Past treatment:  Case management  [facility] and supervision,  and [facility] case management, psychiatry.  Other relevant history:  Pt is on a stay of commitment through [facility].       Collateral Information  Is there collateral information: Yes     Collateral information name, relationship, phone number:  Staff person, MAO from [facility]. [phone],   Bree Martines: [phone]    What happened today: The patient wanted to come to the ED. He kept saying that something is wrong with his medications. He has been  taking medications regularly, but  started complaining about side effects the past 2 days. He looks more depressed. He has not been going out on field activities with other  residents and stays inside his room most days.     What is different about patient's functioning: He has been staying in his room most of the time, reports that he is tired and wants to sleep.     Concern about alcohol/drug use:      What do you think the patient needs:      Has patient made comments about wanting to kill themselves/others: no    If d/c is recommended, can they take part in safety/aftercare planning:  yes    Additional collateral information:        Risk Assessment  [place] Suicide Severity Rating Scale Full Clinical Version: 8/10/2024   Suicidal Ideation  Q6 Suicide Behavior (Lifetime): no     [place] Suicide Severity Rating Scale Recent:   Suicidal Ideation (Recent)  Q1 Wished to be Dead (Past Month): no  Q2 Suicidal Thoughts (Past Month): no  Level of Risk per Screen: no risks indicated  Intensity of Ideation (Recent)  Description of Most Severe Ideation (Past 1 Month): N.A patient denies  Reasons for Ideation (Past 1 Month): Does not apply  Suicidal Behavior (Recent)  Actual Attempt (Past 3 Months):  No  Total Number of Actual Attempts (Past 3 Months): 0  Actual Attempt Description (Past 3 Months): N.A patient denies  Has subject engaged in non-suicidal self-injurious behavior? (Past 3 Months): No  Interrupted Attempts (Past 3 Months): No  Total Number of Interrupted Attempts (Past 3 Months): 0  Interrupted Attempt Description (Past 3 Months): N.A patient denies  Aborted or Self-Interrupted Attempt (Past 3 Months): No  Total Number of Aborted or Self-Interrupted Attempts (Past 3 Months): 0  Aborted or Self-Interrupted Attempt Description (Past 3 Months): N.A patient denies  Preparatory Acts or Behavior (Past 3 Months): No  Total Number of Preparatory Acts (Past 3 Months): 0  Preparatory Acts or Behavior Description (Past 3 Months): N.A patient denies    Environmental or Psychosocial Events: legal issues such as DWI, DUI, lawsuit, CPS involvement, etc., geographic isolation from supports, helplessness/hopelessness, other life stressors, neither working nor attending school, social isolation  Protective Factors: Protective Factors: able to access care without barriers, lives in a responsibly safe and stable environment, supportive ongoing medical and mental health care relationships, help seeking    Does the patient have thoughts of harming others? Feels Like Hurting Others: no  Previous Attempt to Hurt Others: no  Is the patient engaging in sexually inappropriate behavior?: no    Is the patient engaging in sexually inappropriate behavior?  no        Mental Status Exam   Affect: Appropriate  Appearance: Appropriate  Attention Span/Concentration: Attentive  Eye Contact: Engaged    Fund of Knowledge: Appropriate   Language /Speech Content: Fluent  Language /Speech Volume: Normal  Language /Speech Rate/Productions: Normal  Recent Memory: Intact  Remote Memory: Intact  Mood: Anxious  Orientation to Person: Yes   Orientation to Place: Yes  Orientation to Time of Day: Yes  Orientation to Date: Yes     Situation (Do they  understand why they are here?): Yes  Psychomotor Behavior: Normal  Thought Content: Clear  Thought Form: Intact     Mini-Cog Assessment  Number of Words Recalled:    Clock-Drawing Test:     Three Item Recall:    Mini-Cog Total Score:       Medication  Psychotropic medications:   Medication Orders - Psychiatric (From admission, onward)      None             Current Care Team  Patient Care Team:  No Ref-Primary, Physician as PCP - General  [facility] Valadez as  ( - Clinical)  Maru Jacinto as Psychiatrist (Psychiatry)    Diagnosis  Patient Active Problem List   Diagnosis Code    Bipolar disorder (H) F31.9    ADHD (attention deficit hyperactivity disorder) F90.9    Bipolar I disorder with jose (H) F31.10    Cannabis abuse F12.10    Severe manic bipolar 1 disorder with psychotic behavior (H) F31.2    Bipolar 1 disorder (H) F31.9       Primary Problem This Admission  Active Hospital Problems    *Bipolar 1 disorder (H)        Clinical Summary and Substantiation of Recommendations   Patient presents to the ED with dental pain and concerns about medications. Since recent medication changes, patient experienced increased mood symptoms including isolation, depression, increased sleep and being unable to function. Patient denies suicidal ideation, homicidal ideation, hallucinations and delusions. Collateral source confirmed patient not being himself, isolative and not participating in regularly scheduled group activities. The patient is able to participate in safety and discharge planning. The patient agreed to participate in outpatient therapy. Patient s next medication management appointment is in 30 days. Dr Joann Lawson recommended that the patient remains overnight in the ED and ordered a psych consult to review medications and symptoms.    Patient coping skills attempted to reduce the crisis:  Patient requested that staff bring him to the ED    Disposition  Recommended disposition: Individual Therapy,  Medication Management, Group Home        Reviewed case and recommendations with attending provider. Attending Name: Dr Joann Lawson       Attending concurs with disposition: yes       Patient and/or validated legal guardian concurs with disposition:   yes       Final disposition:  observation    Legal status on admission:  N.A.     Assessment Details   Total duration spent with the patient: 32 min     CPT code(s) utilized: 61275 - Psychotherapy for Crisis - 60 (30-74*) min    MITRA Bean, [facility], Psychotherapist  DEC - Triage & Transition Services  Callback: [phone]

## 2024-08-10 NOTE — ED NOTES
Pt informed nurse that he was told by  that his medications will be re-evaluated before he is discharged back to his residence.

## 2024-08-10 NOTE — DISCHARGE INSTRUCTIONS
[facility] SCHEDULING:  Today you were seen by a licensed mental health professional through [facility] and Transition sevices, Behavioral [facility] Providers ([facility])  for a crisis assessment in the Emergency Department at [facility].  It is recommended that you follow up with your estabished providers (psychiatrist, mental health therapist, and/or primary care doctor - as relevant) as soon as possible. Coordinators from [facility] will be calling you in the next 24-48 hours to ensure that you have the resources you need.  You can also contact [facility] coordinators directly at [phone].    You have been scheduled the following appointments:     Type: Therapy - Initial (In-Person)  Date: Monday, 8/19/2024  Time: 2:00 pm - 3:00 pm  Provider: Mandie MANRIQUEZ  LICSW  Location: [facility] [address], [address], [place] [address]  Phone: [phone]  Patient Instructions  If you are unable to attend, please call the office to cancel the appointment. Please bring your insurance card to the appointment.      [facility] maintains an extensive network of licensed behavioral health providers to connect patients with the services they need.  We do not charge providers a fee to participate in our referral network.  We match patients with providers based on a patient s specific needs, insurance coverage, and location.  Our first effort will be to refer you to a provider within your care system, and will utilize providers outside your care system as needed.       Please take the antibiotics as prescribed.

## 2024-08-10 NOTE — ED PROVIDER NOTES
[facility] EMERGENCY DEPARTMENT ([facility])    8/10/24      ED PROVIDER NOTE   Zi NESBITT   History     Chief Complaint   Patient presents with    Dental Pain    Medication Reaction     The history is provided by the patient and medical records.     Venancio Edgar is a 28 year old male [facility] group home resident with history of severe bipolar 1 disorder with psychosis, ADHD on Tenex, substance use disorder who presents to the emergency department with 2 separate concerns.  He is here with group [place] staff member. Firstly, he is concerned that taking haldol and Benadryl has been affecting him negatively.  He has been experiencing insomnia, anxiety, lightheadedness and nausea.  He states that his medications were adjusted 6 weeks ago and is sick psychiatrist will not change these until he is had trialed x 3 months.  He states he was taking off stimulants, feels very tired and unable to focus on Benadryl and haldol. He is taking medications as prescribed.   He reports some pain to his left submandibular neck.  He has had a fever for the past day.  No shortness of breath or cough.  No abdominal pain, no diarrhea.     DEC  who spoke with patient and group home staff. Patient has been more agitated, believes the haldol and Benadryl has been making him very tired. He is fearful as he wants to stop the haldol and Benadryl, but is legally mandated to be taking them on Hand. He feels he has no energy and is in bed all day. Staff states he is isolating in his room, has been declining social outings. He is concerned because he is already tired in mornings. He otherwise manages his own ADLs. He and staff denies any suicidal ideation. He feels agitated and trapped because he is forced to take the medications due to the Hand order. He vapes nicotine. [facility] is not concerned for any violent behavior or suicidal ideation, but they are worried as he has been preoccupied about the medications.     Epic  "records reviewed.  Patient had recent psychiatric hospitalization from 7/15/2024 to 8/2/2024 (boarded in the ED for 3 days, arrived on the unit [address] on 7/18/2024). \"He was admitted through the ED due to agitation and psychosis.  He was hospitalized on [address] 6/7/2024 - 7/10/2024 at which time his stay of commitment was vacated and he was Jarvised.  Invega Sustenna was initiated, and he was discharged to his group home.  Group home staff report that he bought gummies and cannabis \"tar.\"  He became more disorganized and agitated.  He was eating grass.  He went outside and looked into the windows of cars.  He slept poorly.  He was brought in via EMS.  While in the ED, his  filed an intent to revoke his provisional discharge, and his provisional discharge was revoked.\"      \"initially was pushing to get Adderall and Vyvanse but by the end of the hospitalization he came to terms with the fact that these medications will not be prescribed. He realized that nobody will prescribe these medications even if he is doing very well because of his mental health issues. The patient was happy with taking Tenex for the ADHD.\"    Physical Exam   BP: (!) 140/75  Pulse: 113  Temp: (!) 101.6  F (38.7  C)  Resp: 16  SpO2: 100 %  Physical Exam  Vitals and nursing note reviewed.   Constitutional:       General: He is not in acute distress.     Appearance: Normal appearance. He is normal weight. He is not ill-appearing, toxic-appearing or diaphoretic.   HENT:      Head: Normocephalic and atraumatic.      Nose: Nose normal.      Mouth/Throat:      Mouth: Mucous membranes are moist.      Comments: Left palatine arch seems smooth   Eyes:      General: No scleral icterus.        Right eye: No discharge.         Left eye: No discharge.      Extraocular Movements: Extraocular movements intact.      Pupils: Pupils are equal, round, and reactive to light.   Neurological:      Mental Status: He is alert.           ED Course, " Procedures, & Data     ED Course as of 08/10/24 1810   Sat Aug 10, 2024   1616 Case discussed with DEC . Plan to admit to psychiatric observation and obtain Psychiatry consult for medication adjustment.      Procedures       -----  Observation Addendum  With this Addendum, this ED Provider Note may also serve as an Observation H&P    Observation Initiation Date: Aug 10, 2024    Patient presenting with saying his meds since leaving are making him worse, that he is isolating and not wanting to get out of bed.  Also having fever for 1 days with painful neck nodes. .    A DEC assessment was completed, and the case was discussed with the . The  recommended observation with psychiatry consult. . See separate DEC note from today's date for details on the assessment.    During the initial care period, the patient did not require medications for agitation, and did not require restraints/seclusion for patient and/or provider safety.     The patient's outpatient medications were reconciled and ordered.     The patient was found to have a psychiatric condition that would benefit from an observation stay in the emergency department for further psychiatric stabilization and/or coordination of a safe disposition. The observation plan includes serial assessments of psychiatric condition, potential administration of medications if indicated, further disposition pending the patient's psychiatric course during the monitoring period.   -----       Results for orders placed or performed during the hospital encounter of 08/10/24   Symptomatic Influenza A/B, RSV, & SARS-CoV2 PCR (COVID-19) Nasopharyngeal     Status: Normal    Specimen: Nasopharyngeal; Swab   Result Value Ref Range    Influenza A PCR Negative Negative    Influenza B PCR Negative Negative    RSV PCR Negative Negative    SARS CoV2 PCR Negative Negative    Narrative    Testing was performed using the Xpert Xpress CoV2/Flu/RSV Assay on the [employer] Instrument. This test should be ordered for the detection of SARS-CoV-2, influenza, and RSV viruses in individuals who meet clinical and/or epidemiological criteria. Test performance is unknown in asymptomatic patients. This test is for in vitro diagnostic use under the FDA EUA for laboratories certified under CLIA to perform high or moderate complexity testing. This test has not been FDA cleared or approved. A negative result does not rule out the presence of PCR inhibitors in the specimen or target RNA in concentration below the limit of detection for the assay. If only one viral target is positive but coinfection with multiple targets is suspected, the sample should be re-tested with another FDA cleared, approved, or authorized test, if coinfection would change clinical management. This test was validated by the [facility] [employer]. These laboratories are certified under the Clinical Laboratory Improvement Amendments of 1988 (CLIA-88) as qualified to perform high complexity laboratory testing.   Group A Streptococcus PCR Throat Swab     Status: Normal    Specimen: Throat; Swab   Result Value Ref Range    Group A strep by PCR Not Detected Not Detected    Narrative    The Xpert Xpress Strep A test, performed on the [employer]  Instrument Systems, is a rapid, qualitative in vitro diagnostic test for the detection of Streptococcus pyogenes (Group A ß-hemolytic Streptococcus, Strep A) in throat swab specimens from patients with signs and symptoms of pharyngitis. The Xpert Xpress Strep A test can be used as an aid in the diagnosis of Group A Streptococcal pharyngitis. The assay is not intended to monitor treatment for Group A Streptococcus infections. The Xpert Xpress Strep A test utilizes an automated real-time polymerase chain reaction (PCR) to detect Streptococcus pyogenes DNA.     Medications   acetaminophen (TYLENOL) tablet 650 mg (650 mg Oral $Given 8/10/24 8912)   amoxicillin-clavulanate  (AUGMENTIN) 875-125 MG per tablet 1 tablet (1 tablet Oral $Given 8/10/24 6794)   diphenhydrAMINE (BENADRYL) capsule 25 mg (has no administration in time range)   diphenhydrAMINE (BENADRYL) capsule 50 mg (has no administration in time range)   divalproex sodium extended-release (DEPAKOTE ER) 24 hr tablet 1,750 mg (has no administration in time range)   guanFACINE (TENEX) tablet 1 mg (has no administration in time range)   haloperidol (HALDOL) tablet 5 mg (has no administration in time range)   hydrOXYzine HCl (ATARAX) tablet 25 mg (has no administration in time range)   nicotine (COMMIT) lozenge 4 mg (has no administration in time range)   traZODone (DESYREL) tablet 50 mg (has no administration in time range)     Labs Ordered and Resulted from Time of ED Arrival to Time of ED Departure   INFLUENZA A/B, RSV, & SARS-COV2 PCR - Normal       Result Value    Influenza A PCR Negative      Influenza B PCR Negative      RSV PCR Negative      SARS CoV2 PCR Negative     GROUP A STREPTOCOCCUS PCR THROAT SWAB - Normal    Group A strep by PCR Not Detected       No orders to display          Critical care was not performed.     Medical Decision Making  The patient's presentation was of moderate complexity (a chronic illness mild to moderate exacerbation, progression, or side effect of treatment).    The patient's evaluation involved:  an assessment requiring an independent historian (prior admit/recent)  ordering and/or review of 2 test(s) in this encounter (see separate area of note for details)  discussion of management or test interpretation with another health professional (dec )    The patient's management necessitated high risk (a decision regarding hospitalization).    Assessment & Plan    Venancio Edgar is a 28 year old male [facility] resident with history of severe bipolar 1 disorder with psychosis, ADHD on Tenex, substance use disorder who presents to the emergency department with 2 separate  concerns. They are having fever x 1 day.  Strep and covid panel are negative.  Palate on one side seems smooth but no fullness or uvula deviation (could this be early peritonsillar cellulitis?). However due to some cervical adenopathy, throat pain and palate changes, will treat with augmentin for 7 days.  Patient also concerned about recent meds changes making him worse.  Dec assessment done.  Will observe and have psychiatry consult tomorrow. This is a voluntary admit.  He can return to his group home if he wishes.       I have reviewed the nursing notes. I have reviewed the findings, diagnosis, plan and need for follow up with the patient.    New Prescriptions    No medications on file       Final diagnoses:   Bipolar 1 disorder (H)       Joann Lawson MD  [facility] EMERGENCY DEPARTMENT  8/10/2024           Joann Lawson MD  08/10/24 1817

## 2024-08-10 NOTE — PLAN OF CARE
Venanciosydnee Edgar  August 10, 2024  Plan of Care Hand-off Note     Patient Care Path:  Observation    Plan for Care:   Patient presents to the ED with dental pain and concerns about medications. Since recent medication changes, patient experienced increased mood symptoms including isolation, depression, increased sleep and being unable to function. Patient denies suicidal ideation, homicidal ideation, hallucinations and delusions. Collateral source confirmed patient not being himself, isolative and not participating in regularly scheduled group activities. The patient is able to participate in safety and discharge planning. The patient agreed to participate in outpatient therapy. Patient s next medication management appointment is in 30 days. Dr Joann Lawson recommended that the patient remains overnight in the ED and ordered a psych consult to review medications and symptoms.    Identified Goals and Safety Issues: Psych consult to review medications and improve symptoms.    Overview:  [phone], , Rebecca Martines        Legal Status:  Voluntary     Psychiatry Consult:  YES       Updated   regarding plan of care. Patient, RN and attending          MITRA Bean, LICSW, Psychotherapist

## 2024-08-10 NOTE — ED TRIAGE NOTES
Pt here for medication adjustment. Feels like haldol and Benadryl is negatively affecting him. Pt can't sleep, dizzy, nausea, anxiety.   Pt also complaining of tooth pain. Says there is a wisdom tooth protruding.

## 2024-08-11 ENCOUNTER — TELEPHONE (OUTPATIENT)
Dept: BEHAVIORAL HEALTH | Facility: CLINIC | Age: 29
End: 2024-08-11
Payer: COMMERCIAL

## 2024-08-11 NOTE — DISCHARGE SUMMARY
ED Observation Discharge Summary  [facility]  Discharge Date: 8/10/2024    Venancio Edgar MRN: [id number]   Age: 28 year old YOB: 1995     Brief HPI & Initial ED Course     Chief Complaint   Patient presents with    Dental Pain    Medication Reaction     HPI  Venancio Edgar is a 28 year old male with PMH notable for bipolar disease who presented to the ED with a psychiatric concern. .      The patient was evaluated in the emergency department by a physician and DEC behavioral health . The DEC  recommended observation in the ER patient was amenable.  Patient however has no SI or HI.  Dr. Ansari the patient was not holdable.  Patient is currently living in a group home.  The group home is here and is able to take the patient back.  Patient is feeling safe with the plan of going back to his group home.  Patient does not want to stay overnight for a psychiatric evaluation in the morning.1. See separate DEC note from this encounter for details on the assessment. The patient's psychiatric state was such that he would benefit from ongoing monitoring. Observation care was initiated with the plan including serial assessments of psychiatric condition, potential administration of medications if indicated, and further disposition pending the patient's psychiatric course during the monitoring period.     See ED Observation H&P for further details on the patient's presenting history and initial evaluation.     Physical Exam   BP: (!) 140/75  Pulse: 113  Temp: (!) 101.6  F (38.7  C)  Resp: 16  SpO2: 100 %    Physical Exam  General: . Appears stated age.   HENT: MMM, no oropharyngeal lesions  Eyes: PERRL, normal sclerae     Neuro: alert and fully oriented. CN II-XII grossly intact. Grossly normal strength and sensation in all extremities.   MSK: no deformities.   Integumentary/Skin: no rash visualized, normal color  Psych: calm affect, calm behavior. no SI. no HI.  none hallucinations. Thought process clear. Insight normal.     Results     ED Course as of 08/10/24 2013   Sat Aug 10, 2024   1616 Case discussed with DEC . Plan to admit to psychiatric observation and obtain Psychiatry consult for medication adjustment.      Procedures                  Labs Ordered and Resulted from Time of ED Arrival to Time of ED Departure   INFLUENZA A/B, RSV, & SARS-COV2 PCR - Normal       Result Value    Influenza A PCR Negative      Influenza B PCR Negative      RSV PCR Negative      SARS CoV2 PCR Negative     GROUP A STREPTOCOCCUS PCR THROAT SWAB - Normal    Group A strep by PCR Not Detected              Observation Course   The patient was found to have a psychiatric condition that would benefit from an observation stay in the emergency department for further psychiatric stabilization and/or coordination of a safe disposition. The plan upon observation admission included serial assessments of psychiatric condition, potential administration of medications if indicated, further disposition pending the patient's psychiatric course during the monitoring period.     Serial assessments of the patient's psychiatric condition were performed. Nursing notes were reviewed. During the observation period, the patient did not require medications for agitation, and did not require restraints/seclusion for patient and/or provider safety.        After the period in observation care, the patient's circumstances and mental state were safe for outpatient management. After counseling on the diagnosis, work-up, and treatment plan, the patient was discharged. Close follow-up with a psychiatrist and/or therapist was recommended and community psychiatric resources were provided. Patient is to return to the ED if any urgent or potentially life-threatening concerns.      Discharge Diagnoses:   Final diagnoses:   Bipolar 1 disorder (H)   Pharyngitis, unspecified etiology       --  MD ZAKIYA Rivas  [facility] EMERGENCY DEPARTMENT  8/10/2024

## 2024-08-11 NOTE — TELEPHONE ENCOUNTER
Triage and Transition Services- Patient Follow Up Call  Service Line Making Phone Call:  Telemedicine    Who did Writer Talk to: NA    Details of Call: busy signal    Eileen Atwood 8/11/2024 10:03 AM

## 2024-10-06 ENCOUNTER — HEALTH MAINTENANCE LETTER (OUTPATIENT)
Age: 29
End: 2024-10-06

## 2024-10-22 ENCOUNTER — LAB (OUTPATIENT)
Dept: LAB | Facility: CLINIC | Age: 29
End: 2024-10-22
Payer: COMMERCIAL

## 2024-10-22 DIAGNOSIS — F25.0 SCHIZOAFFECTIVE DISORDER, BIPOLAR TYPE (H): ICD-10-CM

## 2024-10-22 DIAGNOSIS — Z00.00 EXAMINATION, MEDICAL, GENERAL: ICD-10-CM

## 2024-10-22 DIAGNOSIS — F90.2 ATTENTION DEFICIT HYPERACTIVITY DISORDER, COMBINED TYPE: Primary | ICD-10-CM

## 2024-10-22 DIAGNOSIS — Z00.8 PSYCHOLOGICAL ASSESSMENT: ICD-10-CM

## 2024-10-22 DIAGNOSIS — F41.1 GENERALIZED ANXIETY DISORDER: ICD-10-CM

## 2024-10-22 LAB
AMPHETAMINES UR QL SCN: NORMAL
BARBITURATES UR QL SCN: NORMAL
BENZODIAZ UR QL SCN: NORMAL
BZE UR QL SCN: NORMAL
CANNABINOIDS UR QL SCN: NORMAL
FENTANYL UR QL: NORMAL
OPIATES UR QL SCN: NORMAL
PCP QUAL URINE (ROCHE): NORMAL

## 2024-10-22 PROCEDURE — 80307 DRUG TEST PRSMV CHEM ANLYZR: CPT

## 2025-04-28 ENCOUNTER — HOSPITAL ENCOUNTER (EMERGENCY)
Facility: CLINIC | Age: 30
Discharge: LEFT BEFORE DISCHARGE | End: 2025-04-28
Admitting: EMERGENCY MEDICINE
Payer: COMMERCIAL

## 2025-04-28 VITALS
SYSTOLIC BLOOD PRESSURE: 115 MMHG | RESPIRATION RATE: 16 BRPM | DIASTOLIC BLOOD PRESSURE: 79 MMHG | HEIGHT: 72 IN | OXYGEN SATURATION: 96 % | HEART RATE: 102 BPM | TEMPERATURE: 97.6 F | WEIGHT: 230 LBS | BODY MASS INDEX: 31.15 KG/M2

## 2025-04-28 PROCEDURE — 99281 EMR DPT VST MAYX REQ PHY/QHP: CPT

## 2025-04-28 ASSESSMENT — ACTIVITIES OF DAILY LIVING (ADL)
ADLS_ACUITY_SCORE: 48

## 2025-04-29 NOTE — ED TRIAGE NOTES
Patient states ear plugged with wax.  Cannot hear out of left ear.     Triage Assessment (Adult)       Row Name 04/28/25 2052          Triage Assessment    Airway WDL WDL        Respiratory WDL    Respiratory WDL WDL        Cardiac WDL    Cardiac WDL WDL        Cognitive/Neuro/Behavioral WDL    Cognitive/Neuro/Behavioral WDL WDL

## 2025-06-03 NOTE — PLAN OF CARE
"  Problem: Psychotic Signs/Symptoms  Goal: Enhanced Social, Occupational or Functional Skills (Psychotic Signs/Symptoms)  Outcome: Not Progressing     Problem: Adult Behavioral Health Plan of Care  Goal: Optimized Coping Skills in Response to Life Stressors  Outcome: Not Progressing       Pt is labile, intrusive. Pt has a tearful episode in morning. He receives several prns per his request. Prns appear to be moderately helpful. Pt continues to believe that they are minimally helpful. \"I still have that bad, anxious feeling in my stomach--what should I do? What else can I take?\" Pt rejects many suggestions that staff make regarding distraction activities or techniques. When he is informed by [facility] that it will likely be a couple of weeks before he is discharged, pt states: \"I can't do this without some Ativan. I know you'll need to schedule prn, that I can't get it all of the time, but I will definitely need it every four or eight hours.\" Pt continues to intermittently come to the desk to ask how he can be helped or to make requests for lozenges. He is currently wearing lavender patches on both sides of his nose. He receives some handouts from [facility] regarding activities and distraction techniques--he looks them over briefly and states: \"You may as well shred these, there is nothing here that will help me.\"    Pt remains on 1:1 SIO for intrusiveness and assault risk. SIO provides pt with time on the Xbox, snacks, suggestions of music and exercise throughout shift. He remains intrusive and demands constant attention. This writer left the nurses station to complete charting for remaining minutes of shift due to his being hyper-focused and complaining to me about his restlessness and boredom.     Eun Ford RN                              " room air